# Patient Record
Sex: FEMALE | Race: WHITE | NOT HISPANIC OR LATINO | Employment: FULL TIME | URBAN - METROPOLITAN AREA
[De-identification: names, ages, dates, MRNs, and addresses within clinical notes are randomized per-mention and may not be internally consistent; named-entity substitution may affect disease eponyms.]

---

## 2017-07-12 ENCOUNTER — ALLSCRIPTS OFFICE VISIT (OUTPATIENT)
Dept: OTHER | Facility: OTHER | Age: 47
End: 2017-07-12

## 2017-10-14 ENCOUNTER — APPOINTMENT (OUTPATIENT)
Dept: LAB | Facility: OTHER | Age: 47
End: 2017-10-14
Payer: COMMERCIAL

## 2017-10-14 ENCOUNTER — TRANSCRIBE ORDERS (OUTPATIENT)
Dept: LAB | Facility: OTHER | Age: 47
End: 2017-10-14

## 2017-10-14 DIAGNOSIS — E78.00 PURE HYPERCHOLESTEROLEMIA: ICD-10-CM

## 2017-10-14 DIAGNOSIS — M79.672 LEFT FOOT PAIN: ICD-10-CM

## 2017-10-14 DIAGNOSIS — K21.00 GASTROESOPHAGEAL REFLUX DISEASE WITH ESOPHAGITIS: Primary | ICD-10-CM

## 2017-10-14 DIAGNOSIS — I10 ESSENTIAL HYPERTENSION, MALIGNANT: ICD-10-CM

## 2017-10-14 DIAGNOSIS — M79.671 RIGHT FOOT PAIN: ICD-10-CM

## 2017-10-14 LAB
ALBUMIN SERPL BCP-MCNC: 3.7 G/DL (ref 3.5–5)
ALP SERPL-CCNC: 55 U/L (ref 46–116)
ALT SERPL W P-5'-P-CCNC: 19 U/L (ref 12–78)
ANION GAP SERPL CALCULATED.3IONS-SCNC: 6 MMOL/L (ref 4–13)
AST SERPL W P-5'-P-CCNC: 11 U/L (ref 5–45)
BASOPHILS # BLD AUTO: 0.02 THOUSANDS/ΜL (ref 0–0.1)
BASOPHILS NFR BLD AUTO: 0 % (ref 0–1)
BILIRUB SERPL-MCNC: 0.42 MG/DL (ref 0.2–1)
BUN SERPL-MCNC: 15 MG/DL (ref 5–25)
CALCIUM SERPL-MCNC: 8.7 MG/DL (ref 8.3–10.1)
CHLORIDE SERPL-SCNC: 104 MMOL/L (ref 100–108)
CHOLEST SERPL-MCNC: 221 MG/DL (ref 50–200)
CO2 SERPL-SCNC: 26 MMOL/L (ref 21–32)
CREAT SERPL-MCNC: 0.87 MG/DL (ref 0.6–1.3)
CRP SERPL QL: 3.5 MG/L
EOSINOPHIL # BLD AUTO: 0.25 THOUSAND/ΜL (ref 0–0.61)
EOSINOPHIL NFR BLD AUTO: 2 % (ref 0–6)
ERYTHROCYTE [DISTWIDTH] IN BLOOD BY AUTOMATED COUNT: 13.4 % (ref 11.6–15.1)
ERYTHROCYTE [SEDIMENTATION RATE] IN BLOOD: 16 MM/HOUR (ref 0–20)
GFR SERPL CREATININE-BSD FRML MDRD: 80 ML/MIN/1.73SQ M
GLUCOSE P FAST SERPL-MCNC: 79 MG/DL (ref 65–99)
HCT VFR BLD AUTO: 33.6 % (ref 34.8–46.1)
HDLC SERPL-MCNC: 55 MG/DL (ref 40–60)
HGB BLD-MCNC: 11.9 G/DL (ref 11.5–15.4)
LDLC SERPL CALC-MCNC: 122 MG/DL (ref 0–100)
LYMPHOCYTES # BLD AUTO: 2.71 THOUSANDS/ΜL (ref 0.6–4.47)
LYMPHOCYTES NFR BLD AUTO: 25 % (ref 14–44)
MCH RBC QN AUTO: 30.7 PG (ref 26.8–34.3)
MCHC RBC AUTO-ENTMCNC: 35.4 G/DL (ref 31.4–37.4)
MCV RBC AUTO: 87 FL (ref 82–98)
MONOCYTES # BLD AUTO: 0.67 THOUSAND/ΜL (ref 0.17–1.22)
MONOCYTES NFR BLD AUTO: 6 % (ref 4–12)
NEUTROPHILS # BLD AUTO: 7.22 THOUSANDS/ΜL (ref 1.85–7.62)
NEUTS SEG NFR BLD AUTO: 67 % (ref 43–75)
NRBC BLD AUTO-RTO: 0 /100 WBCS
PLATELET # BLD AUTO: 283 THOUSANDS/UL (ref 149–390)
PMV BLD AUTO: 11.1 FL (ref 8.9–12.7)
POTASSIUM SERPL-SCNC: 4.5 MMOL/L (ref 3.5–5.3)
PROT SERPL-MCNC: 7.1 G/DL (ref 6.4–8.2)
RBC # BLD AUTO: 3.88 MILLION/UL (ref 3.81–5.12)
SODIUM SERPL-SCNC: 136 MMOL/L (ref 136–145)
TRIGL SERPL-MCNC: 220 MG/DL
TSH SERPL DL<=0.05 MIU/L-ACNC: 2.01 UIU/ML (ref 0.36–3.74)
URATE SERPL-MCNC: 5.7 MG/DL (ref 2–6.8)
WBC # BLD AUTO: 10.91 THOUSAND/UL (ref 4.31–10.16)

## 2017-10-14 PROCEDURE — 80061 LIPID PANEL: CPT

## 2017-10-14 PROCEDURE — 84443 ASSAY THYROID STIM HORMONE: CPT

## 2017-10-14 PROCEDURE — 85652 RBC SED RATE AUTOMATED: CPT

## 2017-10-14 PROCEDURE — 36415 COLL VENOUS BLD VENIPUNCTURE: CPT

## 2017-10-14 PROCEDURE — 86140 C-REACTIVE PROTEIN: CPT

## 2017-10-14 PROCEDURE — 85025 COMPLETE CBC W/AUTO DIFF WBC: CPT

## 2017-10-14 PROCEDURE — 84550 ASSAY OF BLOOD/URIC ACID: CPT

## 2017-10-14 PROCEDURE — 80053 COMPREHEN METABOLIC PANEL: CPT

## 2018-01-16 NOTE — PROCEDURES
Results/Data    Procedure: Electromyogram and Nerve Conduction Study  Indication:  Referred by Dr Mike  The procedure's risks, benefits and bleeding risk were discussed with the patient  Written consent was obtained prior to the procedure and is detailed in the patient's record  Technique: A sterile concentric needle electrode was used  The patient tolerated the procedure well  There were no complications  Results : The right peroneal and the bilateral tibial compound motor action potentials were within normal limits  The left peroneal motor terminal latency was within normal limits with a normal compound motor action potential amplitude at the ankle but a low compound motor action potential amplitude at the fibula head secondary to intolerability to pain  The left peroneal compound motor amplitude was within normal limits above the fibula head with a normal conduction velocity across the fibula head  The bilateral sural sensory action potentials were within normal limits  Lateral H soleus responses were within normal limits  Concentric needle examination was performed on various proximal and distal muscles bilaterally including gluteus medius, vastus lateralis, tibialis anterior, medial gastrocnemius, EDB, L4-5 and L5-S1 paraspinal myotomes  There was no evidence of active denervation in any of the muscles tested  The compound motor unit action potentials were of normal configuration with interference patterns being full or full for effort in the remaining muscles tested  Interpretation : This is a normal study  There is no evidence of a tarsal tunnel syndrome bilaterally        Signatures   Electronically signed by : Heydi Ortiz MD; Jul 12 2017  1:29PM EST                       (Author)    Electronically signed by : Heydi Ortiz MD; Aug 28 2017 10:53AM EST                       (Author)

## 2018-10-19 ENCOUNTER — TRANSCRIBE ORDERS (OUTPATIENT)
Dept: ADMINISTRATIVE | Facility: HOSPITAL | Age: 48
End: 2018-10-19

## 2018-10-19 DIAGNOSIS — Z12.31 VISIT FOR SCREENING MAMMOGRAM: Primary | ICD-10-CM

## 2018-10-26 ENCOUNTER — OFFICE VISIT (OUTPATIENT)
Dept: URGENT CARE | Facility: CLINIC | Age: 48
End: 2018-10-26
Payer: COMMERCIAL

## 2018-10-26 ENCOUNTER — HOSPITAL ENCOUNTER (OUTPATIENT)
Dept: MAMMOGRAPHY | Facility: HOSPITAL | Age: 48
Discharge: HOME | End: 2018-10-26
Attending: SPECIALIST
Payer: COMMERCIAL

## 2018-10-26 VITALS
HEIGHT: 67 IN | TEMPERATURE: 98.5 F | DIASTOLIC BLOOD PRESSURE: 90 MMHG | HEART RATE: 93 BPM | BODY MASS INDEX: 37.1 KG/M2 | RESPIRATION RATE: 18 BRPM | WEIGHT: 236.4 LBS | SYSTOLIC BLOOD PRESSURE: 146 MMHG | OXYGEN SATURATION: 98 %

## 2018-10-26 DIAGNOSIS — J06.9 UPPER RESPIRATORY TRACT INFECTION, UNSPECIFIED TYPE: Primary | ICD-10-CM

## 2018-10-26 DIAGNOSIS — Z12.31 VISIT FOR SCREENING MAMMOGRAM: ICD-10-CM

## 2018-10-26 PROCEDURE — 77067 SCR MAMMO BI INCL CAD: CPT

## 2018-10-26 PROCEDURE — S9088 SERVICES PROVIDED IN URGENT: HCPCS | Performed by: PHYSICIAN ASSISTANT

## 2018-10-26 PROCEDURE — 99203 OFFICE O/P NEW LOW 30 MIN: CPT | Performed by: PHYSICIAN ASSISTANT

## 2018-10-26 PROCEDURE — 77063 BREAST TOMOSYNTHESIS BI: CPT

## 2018-10-26 RX ORDER — OMEPRAZOLE 20 MG/1
20 CAPSULE, DELAYED RELEASE ORAL DAILY
COMMUNITY
End: 2019-02-15 | Stop reason: SDUPTHER

## 2018-10-26 RX ORDER — COLESTIPOL HYDROCHLORIDE 5 G/5G
1 GRANULE, FOR SUSPENSION ORAL DAILY
COMMUNITY
End: 2022-06-10 | Stop reason: ALTCHOICE

## 2018-10-26 RX ORDER — CHLORAL HYDRATE 500 MG
1000 CAPSULE ORAL DAILY
COMMUNITY

## 2018-10-26 RX ORDER — DIPHENOXYLATE HYDROCHLORIDE AND ATROPINE SULFATE 2.5; .025 MG/1; MG/1
1 TABLET ORAL DAILY
COMMUNITY

## 2018-10-26 RX ORDER — LISINOPRIL 10 MG/1
10 TABLET ORAL DAILY
COMMUNITY
End: 2019-02-15 | Stop reason: SDUPTHER

## 2018-10-26 RX ORDER — GABAPENTIN 400 MG/1
400 CAPSULE ORAL 3 TIMES DAILY
COMMUNITY
End: 2019-01-29 | Stop reason: SDUPTHER

## 2018-10-26 RX ORDER — LORATADINE 10 MG/1
10 TABLET ORAL DAILY
COMMUNITY
End: 2019-02-15 | Stop reason: SDUPTHER

## 2018-10-26 RX ORDER — MULTIVIT WITH MINERALS/LUTEIN
1000 TABLET ORAL DAILY
COMMUNITY

## 2018-10-26 NOTE — PATIENT INSTRUCTIONS
1  Upper respiratory infection  -Use flonase nasal spray  -Try Coricidin OTC  -Increase fluids  -Tylenol/motrin  -Salt H20 gargles/throat lozenges  -Saline nasal spray  -Try using humidifier at nighttime    -Follow-up with PCP within 5 days  -Go to ER with worsening symptoms, difficulty breathing, high fever, or any signs of distress    Upper Respiratory Infection   AMBULATORY CARE:   An upper respiratory infection  is also called a common cold  It can affect your nose, throat, ears, and sinuses  Common signs and symptoms include the following:  Cold symptoms are usually worst for the first 3 to 5 days  You may have any of the following:  · Runny or stuffy nose    · Sneezing and coughing    · Sore throat or hoarseness    · Red, watery, and sore eyes    · Fatigue     · Chills and fever    · Headache, body aches, or sore muscles  Seek care immediately if:   · You have chest pain or trouble breathing  Contact your healthcare provider if:   · You have a fever over 102ºF (39°C)  · Your sore throat gets worse or you see white or yellow spots in your throat  · Your symptoms get worse after 3 to 5 days or your cold is not better in 14 days  · You have a rash anywhere on your skin  · You have large, tender lumps in your neck  · You have thick, green or yellow drainage from your nose  · You cough up thick yellow, green, or bloody mucus  · You have vomiting for more than 24 hours and cannot keep fluids down  · You have a bad earache  · You have questions or concerns about your condition or care  Treatment for a cold: There is no cure for the common cold  Colds are caused by viruses and do not get better with antibiotics  Most people get better in 7 to 14 days  You may continue to cough for 2 to 3 weeks  The following may help decrease your symptoms:  · Decongestants  help reduce nasal congestion and help you breathe more easily   If you take decongestant pills, they may make you feel restless or not able to sleep  Do not use decongestant sprays for more than a few days  · Cough suppressants  help reduce coughing  Ask your healthcare provider which type of cough medicine is best for you  · NSAIDs , such as ibuprofen, help decrease swelling, pain, and fever  NSAIDs can cause stomach bleeding or kidney problems in certain people  If you take blood thinner medicine, always ask your healthcare provider if NSAIDs are safe for you  Always read the medicine label and follow directions  · Acetaminophen  decreases pain and fever  It is available without a doctor's order  Ask how much to take and how often to take it  Follow directions  Read the labels of all other medicines you are using to see if they also contain acetaminophen, or ask your doctor or pharmacist  Acetaminophen can cause liver damage if not taken correctly  Do not use more than 4 grams (4,000 milligrams) total of acetaminophen in one day  Manage your cold:   · Rest as much as possible  Slowly start to do more each day  · Drink more liquids as directed  Liquids will help thin and loosen mucus so you can cough it up  Liquids will also help prevent dehydration  Liquids that help prevent dehydration include water, fruit juice, and broth  Do not drink liquids that contain caffeine  Caffeine can increase your risk for dehydration  Ask your healthcare provider how much liquid to drink each day  · Soothe a sore throat  Gargle with warm salt water  This helps your sore throat feel better  Make salt water by dissolving ¼ teaspoon salt in 1 cup warm water  You may also suck on hard candy or throat lozenges  You may use a sore throat spray  · Use a humidifier or vaporizer  Use a cool mist humidifier or a vaporizer to increase air moisture in your home  This may make it easier for you to breathe and help decrease your cough  · Use saline nasal drops as directed  These help relieve congestion       · Apply petroleum-based jelly around the outside of your nostrils  This can decrease irritation from blowing your nose  · Do not smoke  Nicotine and other chemicals in cigarettes and cigars can make your symptoms worse  They can also cause infections such as bronchitis or pneumonia  Ask your healthcare provider for information if you currently smoke and need help to quit  E-cigarettes or smokeless tobacco still contain nicotine  Talk to your healthcare provider before you use these products  Prevent spreading your cold to others:   · Try to stay away from other people during the first 2 to 3 days of your cold when it is more easily spread  · Do not share food or drinks  · Do not share hand towels with household members  · Wash your hands often, especially after you blow your nose  Turn away from other people and cover your mouth and nose with a tissue when you sneeze or cough  Follow up with your healthcare provider as directed:  Write down your questions so you remember to ask them during your visits  © 2017 [employer] Information is for End User's use only and may not be sold, redistributed or otherwise used for commercial purposes  All illustrations and images included in CareNotes® are the copyrighted property of [employer]  or [employer]  The above information is an  only  It is not intended as medical advice for individual conditions or treatments  Talk to your doctor, nurse or pharmacist before following any medical regimen to see if it is safe and effective for you

## 2018-10-26 NOTE — PROGRESS NOTES
[facility]        NAME: Pito Mobley is a 50 y o  female  : 1970    MRN: [id number]  DATE: 2018  TIME: 5:52 PM    Assessment and Plan   Upper respiratory tract infection, unspecified type [J06 9]  1  Upper respiratory tract infection, unspecified type           Patient Instructions     1  Upper respiratory infection  -Use flonase nasal spray  -Try Coricidin OTC  -Increase fluids  -Tylenol/motrin  -Salt H20 gargles/throat lozenges  -Saline nasal spray  -Try using humidifier at nighttime    -Follow-up with PCP within 5 days  -Go to ER with worsening symptoms, difficulty breathing, high fever, or any signs of distress  Chief Complaint     Chief Complaint   Patient presents with    Cough    Cold Like Symptoms     started yesterday, reports coughing and sneezing a lot with an itchy throat         History of Present Illness       Patient is a 49-year-old female with a medical history of hypertension who presents today for evaluation of cold symptoms that started yesterday  Patient admits to having a slight cough  She also admits to having increased sneezing and itchy throat  Patient states that she has not tried and any medications at home  Review of Systems   Review of Systems   Constitutional: Negative for chills and fever  HENT: Positive for sneezing  Negative for ear pain, rhinorrhea and sore throat  Respiratory: Positive for cough  Negative for shortness of breath  Cardiovascular: Negative for chest pain  Musculoskeletal: Negative for arthralgias  Neurological: Negative for headaches           Current Medications       Current Outpatient Prescriptions:     Ascorbic Acid (VITAMIN C) 1000 MG tablet, Take 1,000 mg by mouth daily, Disp: , Rfl:     colestipol (COLESTID) 5 g granules, Take 1 g by mouth daily, Disp: , Rfl:     etanercept (ENBREL) 50 mg/mL SOSY, Inject under the skin once a week, Disp: , Rfl:     gabapentin (NEURONTIN) 400 mg capsule, Take 400 mg by mouth 3 (three) times a day, Disp: , Rfl:     lisinopril (ZESTRIL) 10 mg tablet, Take 10 mg by mouth daily, Disp: , Rfl:     loratadine (CLARITIN) 10 mg tablet, Take 10 mg by mouth daily, Disp: , Rfl:     multivitamin (THERAGRAN) TABS, Take 1 tablet by mouth daily, Disp: , Rfl:     Omega-3 Fatty Acids (FISH OIL) 1,000 mg, Take 1,000 mg by mouth daily, Disp: , Rfl:     omeprazole (PriLOSEC) 20 mg delayed release capsule, Take 20 mg by mouth daily, Disp: , Rfl:     Probiotic Product (PROBIOTIC-10 PO), Take 1 Dose by mouth daily, Disp: , Rfl:     Current Allergies     Allergies as of 10/26/2018 - Reviewed 10/26/2018   Allergen Reaction Noted    Methotrexate Lip Swelling 10/26/2018    Keflex [cephalexin] GI Intolerance 10/26/2018            The following portions of the patient's history were reviewed and updated as appropriate: allergies, current medications, past family history, past medical history, past social history, past surgical history and problem list      Past Medical History:   Diagnosis Date    GERD (gastroesophageal reflux disease)     Hypertension     Neuropathy        Past Surgical History:   Procedure Laterality Date    APPENDECTOMY      CHOLECYSTECTOMY      FOOT POSTERIOR RELEASE Left     around ankle    TUBAL LIGATION         Family History   Problem Relation Age of Onset    Hypertension Mother     Cancer Mother     Alzheimer's disease Mother     Parkinsonism Mother     Diabetes Father     Hypertension Father     Cancer Father          Medications have been verified  Objective   /90   Pulse 93   Temp 98 5 °F (36 9 °C) (Temporal)   Resp 18   Ht 5' 7" (1 702 m)   Wt 107 kg (236 lb 6 4 oz)   SpO2 98%   BMI 37 03 kg/m²        Physical Exam     Physical Exam   Constitutional: She is oriented to person, place, and time  She appears well-developed and well-nourished  No distress     HENT:   Right Ear: Tympanic membrane and external ear normal    Left Ear: Tympanic membrane and external ear normal    Nose: Nose normal    Mouth/Throat: Uvula is midline, oropharynx is clear and moist and mucous membranes are normal    Eyes: Pupils are equal, round, and reactive to light  Conjunctivae and EOM are normal    Neck: Normal range of motion  Neck supple  Cardiovascular: Normal rate, regular rhythm and normal heart sounds  Pulmonary/Chest: Effort normal and breath sounds normal  She has no wheezes  She has no rales  Lymphadenopathy:     She has no cervical adenopathy  Neurological: She is alert and oriented to person, place, and time  Skin: Skin is warm and dry  Psychiatric: She has a normal mood and affect  Nursing note and vitals reviewed

## 2018-12-19 ENCOUNTER — OFFICE VISIT (OUTPATIENT)
Dept: URGENT CARE | Facility: CLINIC | Age: 48
End: 2018-12-19
Payer: COMMERCIAL

## 2018-12-19 ENCOUNTER — APPOINTMENT (OUTPATIENT)
Dept: RADIOLOGY | Facility: CLINIC | Age: 48
End: 2018-12-19
Payer: COMMERCIAL

## 2018-12-19 VITALS
WEIGHT: 230 LBS | HEART RATE: 86 BPM | BODY MASS INDEX: 36.96 KG/M2 | SYSTOLIC BLOOD PRESSURE: 132 MMHG | TEMPERATURE: 97.4 F | OXYGEN SATURATION: 99 % | HEIGHT: 66 IN | DIASTOLIC BLOOD PRESSURE: 80 MMHG | RESPIRATION RATE: 18 BRPM

## 2018-12-19 DIAGNOSIS — M25.561 ACUTE PAIN OF RIGHT KNEE: ICD-10-CM

## 2018-12-19 DIAGNOSIS — M25.561 ACUTE PAIN OF RIGHT KNEE: Primary | ICD-10-CM

## 2018-12-19 PROCEDURE — 73562 X-RAY EXAM OF KNEE 3: CPT

## 2018-12-19 PROCEDURE — S9088 SERVICES PROVIDED IN URGENT: HCPCS | Performed by: PHYSICIAN ASSISTANT

## 2018-12-19 PROCEDURE — 99213 OFFICE O/P EST LOW 20 MIN: CPT | Performed by: PHYSICIAN ASSISTANT

## 2018-12-19 RX ORDER — NAPROXEN 500 MG/1
500 TABLET ORAL 2 TIMES DAILY WITH MEALS
Qty: 30 TABLET | Refills: 0 | Status: ON HOLD | OUTPATIENT
Start: 2018-12-19 | End: 2020-01-03 | Stop reason: CLARIF

## 2018-12-20 NOTE — PATIENT INSTRUCTIONS
X-ray shows no arthritis or dislocation  No fracture  Will start naproxen twice a day  She can follow up with Orthopedics  May benefit from physical therapy  Follow up with PCP in 3-5 days  Proceed to  ER if symptoms worsen

## 2018-12-20 NOTE — PROGRESS NOTES
[facility]        NAME: Jeffery Sweet is a 50 y o  female  : 1970    MRN: [id number]  DATE: 2018  TIME: 7:44 PM    Assessment and Plan   Acute pain of right knee [M25 561]  1  Acute pain of right knee  XR knee 3 vw right non injury    naproxen (EC NAPROSYN) 500 MG EC tablet    Ambulatory referral to Orthopedic Surgery         Patient Instructions     X-ray shows no arthritis or dislocation  No fracture  Will start naproxen twice a day  She can follow up with Orthopedics  May benefit from physical therapy  Follow up with PCP in 3-5 days  Proceed to  ER if symptoms worsen  Chief Complaint     Chief Complaint   Patient presents with    Knee Pain     twisted R knee last night  hx arthritis to BL knees  recently had cortisone injections to knees which helped the pain  took ibuprofen prn today  History of Present Illness       51-year-old female with a history of rheumatoid arthritis on Enbrel complains of right knee pain since yesterday  She was sitting down and twisted  She felt a sharp pain in the pool in the knee  It is very stiff and sore today  She took ibuprofen which helped minimally  No fever or chills  No numbness or tingling in the leg  No history of surgery that knee    She had a steroid injection last week from her rheumatologist        Review of Systems   Review of Systems      Current Medications       Current Outpatient Prescriptions:     Ascorbic Acid (VITAMIN C) 1000 MG tablet, Take 1,000 mg by mouth daily, Disp: , Rfl:     colestipol (COLESTID) 5 g granules, Take 1 g by mouth daily, Disp: , Rfl:     etanercept (ENBREL) 50 mg/mL SOSY, Inject under the skin once a week, Disp: , Rfl:     gabapentin (NEURONTIN) 400 mg capsule, Take 400 mg by mouth 3 (three) times a day, Disp: , Rfl:     lisinopril (ZESTRIL) 10 mg tablet, Take 10 mg by mouth daily, Disp: , Rfl:     loratadine (CLARITIN) 10 mg tablet, Take 10 mg by mouth daily, Disp: , Rfl:    multivitamin (THERAGRAN) TABS, Take 1 tablet by mouth daily, Disp: , Rfl:     Omega-3 Fatty Acids (FISH OIL) 1,000 mg, Take 1,000 mg by mouth daily, Disp: , Rfl:     omeprazole (PriLOSEC) 20 mg delayed release capsule, Take 20 mg by mouth daily, Disp: , Rfl:     Probiotic Product (PROBIOTIC-10 PO), Take 1 Dose by mouth daily, Disp: , Rfl:     naproxen (EC NAPROSYN) 500 MG EC tablet, Take 1 tablet (500 mg total) by mouth 2 (two) times a day with meals, Disp: 30 tablet, Rfl: 0    Current Allergies     Allergies as of 12/19/2018 - Reviewed 12/19/2018   Allergen Reaction Noted    Methotrexate Lip Swelling 10/26/2018    Keflex [cephalexin] GI Intolerance 10/26/2018            The following portions of the patient's history were reviewed and updated as appropriate: allergies, current medications, past family history, past medical history, past social history, past surgical history and problem list      Past Medical History:   Diagnosis Date    GERD (gastroesophageal reflux disease)     Hypertension     Neuropathy        Past Surgical History:   Procedure Laterality Date    APPENDECTOMY      CHOLECYSTECTOMY      FOOT POSTERIOR RELEASE Left     around ankle    TUBAL LIGATION         Family History   Problem Relation Age of Onset    Hypertension Mother     Cancer Mother     Alzheimer's disease Mother     Parkinsonism Mother     Diabetes Father     Hypertension Father     Cancer Father          Medications have been verified  Objective   /80 (BP Location: Left arm, Patient Position: Sitting)   Pulse 86   Temp (!) 97 4 °F (36 3 °C) (Tympanic)   Resp 18   Ht 5' 6" (1 676 m)   Wt 104 kg (230 lb)   LMP 12/15/2018   SpO2 99%   BMI 37 12 kg/m²        Physical Exam     Physical Exam   Constitutional: She appears well-developed and well-nourished  Musculoskeletal:   Right knee no effusion  Tender over the medial and lateral joint lines  She has some tenderness along the patellar tendon  She has full active extension and flexion to 90  She has painful flexion past 90  No laxity with varus or valgus  She is very guarded  Negative drawers but again she is guarded  I am unable to elicit a pop or pain with Felice's

## 2019-01-29 DIAGNOSIS — G62.9 PERIPHERAL POLYNEUROPATHY: Primary | ICD-10-CM

## 2019-01-29 RX ORDER — GABAPENTIN 400 MG/1
CAPSULE ORAL
Qty: 90 CAPSULE | Refills: 1 | Status: SHIPPED | OUTPATIENT
Start: 2019-01-29 | End: 2019-03-05 | Stop reason: SDUPTHER

## 2019-02-14 ENCOUNTER — OFFICE VISIT (OUTPATIENT)
Dept: URGENT CARE | Facility: CLINIC | Age: 49
End: 2019-02-14
Payer: COMMERCIAL

## 2019-02-14 VITALS
HEART RATE: 88 BPM | WEIGHT: 230 LBS | SYSTOLIC BLOOD PRESSURE: 122 MMHG | DIASTOLIC BLOOD PRESSURE: 68 MMHG | OXYGEN SATURATION: 99 % | RESPIRATION RATE: 20 BRPM | BODY MASS INDEX: 36.1 KG/M2 | TEMPERATURE: 98 F | HEIGHT: 67 IN

## 2019-02-14 DIAGNOSIS — J02.9 SORE THROAT: Primary | ICD-10-CM

## 2019-02-14 DIAGNOSIS — J06.9 VIRAL UPPER RESPIRATORY TRACT INFECTION: ICD-10-CM

## 2019-02-14 LAB — S PYO AG THROAT QL: NEGATIVE

## 2019-02-14 PROCEDURE — 87430 STREP A AG IA: CPT | Performed by: FAMILY MEDICINE

## 2019-02-14 PROCEDURE — 99203 OFFICE O/P NEW LOW 30 MIN: CPT | Performed by: FAMILY MEDICINE

## 2019-02-15 DIAGNOSIS — G62.9 PERIPHERAL POLYNEUROPATHY: Primary | ICD-10-CM

## 2019-02-15 DIAGNOSIS — I10 ESSENTIAL HYPERTENSION: ICD-10-CM

## 2019-02-15 RX ORDER — OMEPRAZOLE 20 MG/1
CAPSULE, DELAYED RELEASE ORAL
Qty: 30 CAPSULE | Refills: 4 | Status: SHIPPED | OUTPATIENT
Start: 2019-02-15 | End: 2019-03-05 | Stop reason: SDUPTHER

## 2019-02-15 RX ORDER — LORATADINE 10 MG/1
TABLET ORAL
Qty: 30 TABLET | Refills: 4 | Status: SHIPPED | OUTPATIENT
Start: 2019-02-15 | End: 2019-03-05 | Stop reason: SDUPTHER

## 2019-02-15 RX ORDER — LISINOPRIL 10 MG/1
TABLET ORAL
Qty: 30 TABLET | Refills: 4 | Status: SHIPPED | OUTPATIENT
Start: 2019-02-15 | End: 2019-03-05 | Stop reason: SDUPTHER

## 2019-02-15 NOTE — PROGRESS NOTES
[facility]        NAME: Milka Chavez is a 52 y o  female  : 1970    MRN: [id number]  DATE: 2019  TIME: 7:49 PM    Assessment and Plan   Sore throat [J02 9]  1  Sore throat  POCT rapid strepA   2  Viral upper respiratory tract infection           Patient Instructions     Plain Mucinex, plain Robitussin, or plain guaifenesin for congestion  Ibuprofen for fever and pain  Follow up with PCP in 3-5 days  Proceed to  ER if symptoms worsen  Chief Complaint     Chief Complaint   Patient presents with    Cough     cough and congestion for 1 day    Sore Throat     for 1 day         History of Present Illness       Cough (cough and congestion for 1 day)  Sore Throat (for 1 day)          Review of Systems   Review of Systems   Constitutional: Negative  HENT: Positive for congestion and sore throat  Respiratory: Negative  Cardiovascular: Negative            Current Medications       Current Outpatient Medications:     Ascorbic Acid (VITAMIN C) 1000 MG tablet, Take 1,000 mg by mouth daily, Disp: , Rfl:     colestipol (COLESTID) 5 g granules, Take 1 g by mouth daily, Disp: , Rfl:     etanercept (ENBREL) 50 mg/mL SOSY, Inject under the skin once a week, Disp: , Rfl:     gabapentin (NEURONTIN) 400 mg capsule, TAKE ONE CAPSULE BY MOUTH THREE TIMES DAILY , Disp: 90 capsule, Rfl: 1    lisinopril (ZESTRIL) 10 mg tablet, Take 10 mg by mouth daily, Disp: , Rfl:     loratadine (CLARITIN) 10 mg tablet, Take 10 mg by mouth daily, Disp: , Rfl:     multivitamin (THERAGRAN) TABS, Take 1 tablet by mouth daily, Disp: , Rfl:     Omega-3 Fatty Acids (FISH OIL) 1,000 mg, Take 1,000 mg by mouth daily, Disp: , Rfl:     omeprazole (PriLOSEC) 20 mg delayed release capsule, Take 20 mg by mouth daily, Disp: , Rfl:     Probiotic Product (PROBIOTIC-10 PO), Take 1 Dose by mouth daily, Disp: , Rfl:     naproxen (EC NAPROSYN) 500 MG EC tablet, Take 1 tablet (500 mg total) by mouth 2 (two) times a day with meals (Patient not taking: Reported on 2/14/2019), Disp: 30 tablet, Rfl: 0    Current Allergies     Allergies as of 02/14/2019 - Reviewed 02/14/2019   Allergen Reaction Noted    Methotrexate Lip Swelling 10/26/2018    Keflex [cephalexin] GI Intolerance 10/26/2018            The following portions of the patient's history were reviewed and updated as appropriate: allergies, current medications, past family history, past medical history, past social history, past surgical history and problem list      Past Medical History:   Diagnosis Date    GERD (gastroesophageal reflux disease)     Hypertension     Neuropathy        Past Surgical History:   Procedure Laterality Date    APPENDECTOMY      CHOLECYSTECTOMY      FOOT POSTERIOR RELEASE Left     around ankle    TUBAL LIGATION         Family History   Problem Relation Age of Onset    Hypertension Mother     Cancer Mother     Alzheimer's disease Mother     Parkinsonism Mother     Diabetes Father     Hypertension Father     Cancer Father          Medications have been verified  Objective   /68   Pulse 88   Temp 98 °F (36 7 °C) (Tympanic)   Resp 20   Ht 5' 7" (1 702 m)   Wt 104 kg (230 lb)   SpO2 99%   BMI 36 02 kg/m²        Physical Exam     Physical Exam   Constitutional: She is oriented to person, place, and time  She appears well-developed and well-nourished  HENT:   Right Ear: External ear normal    Left Ear: External ear normal    Nose: Nose normal    Mouth/Throat: Oropharynx is clear and moist  No oropharyngeal exudate  Eyes: Conjunctivae are normal    Neck: Normal range of motion  Neck supple  Cardiovascular: Normal rate, regular rhythm and normal heart sounds  No murmur heard  Pulmonary/Chest: Effort normal and breath sounds normal  No respiratory distress  She has no wheezes  She has no rales  She exhibits no tenderness  Abdominal: Soft  Musculoskeletal: Normal range of motion     Lymphadenopathy:     She has no cervical adenopathy  Neurological: She is alert and oriented to person, place, and time  Skin: Skin is warm  No rash noted  No erythema

## 2019-02-15 NOTE — PATIENT INSTRUCTIONS
Plain Mucinex, plain Robitussin, or plain guaifenesin for congestion  Ibuprofen for fever and pain  Follow up with PCP in 3-5 days  Proceed to  ER if symptoms worsen  Upper Respiratory Infection   AMBULATORY CARE:   An upper respiratory infection  is also called a common cold  It can affect your nose, throat, ears, and sinuses  Common signs and symptoms include the following:  Cold symptoms are usually worst for the first 3 to 5 days  You may have any of the following:  · Runny or stuffy nose    · Sneezing and coughing    · Sore throat or hoarseness    · Red, watery, and sore eyes    · Fatigue     · Chills and fever    · Headache, body aches, or sore muscles  Seek care immediately if:   · You have chest pain or trouble breathing  Contact your healthcare provider if:   · You have a fever over 102ºF (39°C)  · Your sore throat gets worse or you see white or yellow spots in your throat  · Your symptoms get worse after 3 to 5 days or your cold is not better in 14 days  · You have a rash anywhere on your skin  · You have large, tender lumps in your neck  · You have thick, green or yellow drainage from your nose  · You cough up thick yellow, green, or bloody mucus  · You have vomiting for more than 24 hours and cannot keep fluids down  · You have a bad earache  · You have questions or concerns about your condition or care  Treatment for a cold: There is no cure for the common cold  Colds are caused by viruses and do not get better with antibiotics  Most people get better in 7 to 14 days  You may continue to cough for 2 to 3 weeks  The following may help decrease your symptoms:  · Decongestants  help reduce nasal congestion and help you breathe more easily  If you take decongestant pills, they may make you feel restless or not able to sleep  Do not use decongestant sprays for more than a few days  · Cough suppressants  help reduce coughing   Ask your healthcare provider which type of cough medicine is best for you  · NSAIDs , such as ibuprofen, help decrease swelling, pain, and fever  NSAIDs can cause stomach bleeding or kidney problems in certain people  If you take blood thinner medicine, always ask your healthcare provider if NSAIDs are safe for you  Always read the medicine label and follow directions  · Acetaminophen  decreases pain and fever  It is available without a doctor's order  Ask how much to take and how often to take it  Follow directions  Read the labels of all other medicines you are using to see if they also contain acetaminophen, or ask your doctor or pharmacist  Acetaminophen can cause liver damage if not taken correctly  Do not use more than 4 grams (4,000 milligrams) total of acetaminophen in one day  Manage your cold:   · Rest as much as possible  Slowly start to do more each day  · Drink more liquids as directed  Liquids will help thin and loosen mucus so you can cough it up  Liquids will also help prevent dehydration  Liquids that help prevent dehydration include water, fruit juice, and broth  Do not drink liquids that contain caffeine  Caffeine can increase your risk for dehydration  Ask your healthcare provider how much liquid to drink each day  · Soothe a sore throat  Gargle with warm salt water  This helps your sore throat feel better  Make salt water by dissolving ¼ teaspoon salt in 1 cup warm water  You may also suck on hard candy or throat lozenges  You may use a sore throat spray  · Use a humidifier or vaporizer  Use a cool mist humidifier or a vaporizer to increase air moisture in your home  This may make it easier for you to breathe and help decrease your cough  · Use saline nasal drops as directed  These help relieve congestion  · Apply petroleum-based jelly around the outside of your nostrils  This can decrease irritation from blowing your nose  · Do not smoke    Nicotine and other chemicals in cigarettes and cigars can make your symptoms worse  They can also cause infections such as bronchitis or pneumonia  Ask your healthcare provider for information if you currently smoke and need help to quit  E-cigarettes or smokeless tobacco still contain nicotine  Talk to your healthcare provider before you use these products  Prevent spreading your cold to others:   · Try to stay away from other people during the first 2 to 3 days of your cold when it is more easily spread  · Do not share food or drinks  · Do not share hand towels with household members  · Wash your hands often, especially after you blow your nose  Turn away from other people and cover your mouth and nose with a tissue when you sneeze or cough  Follow up with your healthcare provider as directed:  Write down your questions so you remember to ask them during your visits  © 2017 [employer] Information is for End User's use only and may not be sold, redistributed or otherwise used for commercial purposes  All illustrations and images included in CareNotes® are the copyrighted property of [employer]  or [employer]ena  The above information is an  only  It is not intended as medical advice for individual conditions or treatments  Talk to your doctor, nurse or pharmacist before following any medical regimen to see if it is safe and effective for you

## 2019-03-02 ENCOUNTER — TRANSCRIBE ORDERS (OUTPATIENT)
Dept: ADMINISTRATIVE | Facility: HOSPITAL | Age: 49
End: 2019-03-02

## 2019-03-02 ENCOUNTER — APPOINTMENT (OUTPATIENT)
Dept: LAB | Facility: CLINIC | Age: 49
End: 2019-03-02
Payer: COMMERCIAL

## 2019-03-02 DIAGNOSIS — L30.8 INTERFACE DERMATITIS: ICD-10-CM

## 2019-03-02 DIAGNOSIS — K52.9 INFLAMMATORY BOWEL DISEASE: Primary | ICD-10-CM

## 2019-03-02 DIAGNOSIS — K57.30 DIVERTICULOSIS OF LARGE INTESTINE WITHOUT DIVERTICULITIS: ICD-10-CM

## 2019-03-02 DIAGNOSIS — E78.5 HYPERLIPIDEMIA, UNSPECIFIED HYPERLIPIDEMIA TYPE: ICD-10-CM

## 2019-03-02 DIAGNOSIS — K52.9 INFLAMMATORY BOWEL DISEASE: ICD-10-CM

## 2019-03-02 LAB
ALBUMIN SERPL BCP-MCNC: 3.7 G/DL (ref 3.5–5)
ALP SERPL-CCNC: 64 U/L (ref 46–116)
ALT SERPL W P-5'-P-CCNC: 24 U/L (ref 12–78)
ANION GAP SERPL CALCULATED.3IONS-SCNC: 7 MMOL/L (ref 4–13)
AST SERPL W P-5'-P-CCNC: 14 U/L (ref 5–45)
BILIRUB SERPL-MCNC: 0.52 MG/DL (ref 0.2–1)
BUN SERPL-MCNC: 16 MG/DL (ref 5–25)
CALCIUM SERPL-MCNC: 8.8 MG/DL (ref 8.3–10.1)
CHLORIDE SERPL-SCNC: 102 MMOL/L (ref 100–108)
CHOLEST SERPL-MCNC: 256 MG/DL (ref 50–200)
CO2 SERPL-SCNC: 24 MMOL/L (ref 21–32)
CREAT SERPL-MCNC: 1.05 MG/DL (ref 0.6–1.3)
ERYTHROCYTE [DISTWIDTH] IN BLOOD BY AUTOMATED COUNT: 12.9 % (ref 11.6–15.1)
GFR SERPL CREATININE-BSD FRML MDRD: 63 ML/MIN/1.73SQ M
GLUCOSE P FAST SERPL-MCNC: 68 MG/DL (ref 65–99)
HCT VFR BLD AUTO: 36.5 % (ref 34.8–46.1)
HDLC SERPL-MCNC: 69 MG/DL (ref 40–60)
HGB BLD-MCNC: 11.6 G/DL (ref 11.5–15.4)
LDLC SERPL CALC-MCNC: 144 MG/DL (ref 0–100)
MCH RBC QN AUTO: 30.4 PG (ref 26.8–34.3)
MCHC RBC AUTO-ENTMCNC: 31.8 G/DL (ref 31.4–37.4)
MCV RBC AUTO: 96 FL (ref 82–98)
NONHDLC SERPL-MCNC: 187 MG/DL
PLATELET # BLD AUTO: 310 THOUSANDS/UL (ref 149–390)
PMV BLD AUTO: 10.6 FL (ref 8.9–12.7)
POTASSIUM SERPL-SCNC: 3.6 MMOL/L (ref 3.5–5.3)
PROT SERPL-MCNC: 7.6 G/DL (ref 6.4–8.2)
RBC # BLD AUTO: 3.82 MILLION/UL (ref 3.81–5.12)
SODIUM SERPL-SCNC: 133 MMOL/L (ref 136–145)
T4 SERPL-MCNC: 12.2 UG/DL (ref 4.7–13.3)
TRIGL SERPL-MCNC: 214 MG/DL
TSH SERPL DL<=0.05 MIU/L-ACNC: 1.94 UIU/ML (ref 0.36–3.74)
WBC # BLD AUTO: 10.44 THOUSAND/UL (ref 4.31–10.16)

## 2019-03-02 PROCEDURE — 84436 ASSAY OF TOTAL THYROXINE: CPT

## 2019-03-02 PROCEDURE — 85027 COMPLETE CBC AUTOMATED: CPT

## 2019-03-02 PROCEDURE — 80061 LIPID PANEL: CPT

## 2019-03-02 PROCEDURE — 80053 COMPREHEN METABOLIC PANEL: CPT

## 2019-03-02 PROCEDURE — 84443 ASSAY THYROID STIM HORMONE: CPT

## 2019-03-02 PROCEDURE — 36415 COLL VENOUS BLD VENIPUNCTURE: CPT

## 2019-03-05 ENCOUNTER — OFFICE VISIT (OUTPATIENT)
Dept: FAMILY MEDICINE CLINIC | Facility: CLINIC | Age: 49
End: 2019-03-05
Payer: COMMERCIAL

## 2019-03-05 VITALS
SYSTOLIC BLOOD PRESSURE: 128 MMHG | HEART RATE: 86 BPM | OXYGEN SATURATION: 98 % | WEIGHT: 231 LBS | HEIGHT: 67 IN | DIASTOLIC BLOOD PRESSURE: 72 MMHG | BODY MASS INDEX: 36.26 KG/M2

## 2019-03-05 DIAGNOSIS — I10 ESSENTIAL HYPERTENSION: ICD-10-CM

## 2019-03-05 DIAGNOSIS — K21.9 GERD WITHOUT ESOPHAGITIS: ICD-10-CM

## 2019-03-05 DIAGNOSIS — H81.10 BENIGN PAROXYSMAL POSITIONAL VERTIGO, UNSPECIFIED LATERALITY: Primary | ICD-10-CM

## 2019-03-05 DIAGNOSIS — E78.2 MIXED HYPERLIPIDEMIA: ICD-10-CM

## 2019-03-05 DIAGNOSIS — G62.9 PERIPHERAL POLYNEUROPATHY: ICD-10-CM

## 2019-03-05 PROCEDURE — 1036F TOBACCO NON-USER: CPT | Performed by: FAMILY MEDICINE

## 2019-03-05 PROCEDURE — 3074F SYST BP LT 130 MM HG: CPT | Performed by: FAMILY MEDICINE

## 2019-03-05 PROCEDURE — 3078F DIAST BP <80 MM HG: CPT | Performed by: FAMILY MEDICINE

## 2019-03-05 PROCEDURE — 3008F BODY MASS INDEX DOCD: CPT | Performed by: FAMILY MEDICINE

## 2019-03-05 PROCEDURE — 99214 OFFICE O/P EST MOD 30 MIN: CPT | Performed by: FAMILY MEDICINE

## 2019-03-05 RX ORDER — GABAPENTIN 400 MG/1
400 CAPSULE ORAL 3 TIMES DAILY
Qty: 90 CAPSULE | Refills: 1 | Status: SHIPPED | OUTPATIENT
Start: 2019-03-05 | End: 2019-05-25 | Stop reason: SDUPTHER

## 2019-03-05 RX ORDER — LISINOPRIL 10 MG/1
10 TABLET ORAL DAILY
Qty: 30 TABLET | Refills: 4 | Status: SHIPPED | OUTPATIENT
Start: 2019-03-05 | End: 2019-07-26 | Stop reason: SDUPTHER

## 2019-03-05 RX ORDER — OMEPRAZOLE 20 MG/1
20 CAPSULE, DELAYED RELEASE ORAL DAILY
Qty: 30 CAPSULE | Refills: 4 | Status: SHIPPED | OUTPATIENT
Start: 2019-03-05 | End: 2019-07-26 | Stop reason: SDUPTHER

## 2019-03-05 RX ORDER — LORATADINE 10 MG/1
10 TABLET ORAL DAILY
Qty: 30 TABLET | Refills: 4 | Status: SHIPPED | OUTPATIENT
Start: 2019-03-05 | End: 2019-07-26 | Stop reason: SDUPTHER

## 2019-03-05 NOTE — ASSESSMENT & PLAN NOTE
GERD symptoms controlled no dyspepsia blood in her stools or nausea continue on current omeprazole dosage no change at this time and follow up in 6 months

## 2019-03-05 NOTE — PATIENT INSTRUCTIONS

## 2019-03-05 NOTE — PROGRESS NOTES
Assessment/Plan:       Problem List Items Addressed This Visit        Digestive    GERD without esophagitis     GERD symptoms controlled no dyspepsia blood in her stools or nausea continue on current omeprazole dosage no change at this time and follow up in 6 months         Relevant Medications    omeprazole (PriLOSEC) 20 mg delayed release capsule    Other Relevant Orders    Comprehensive metabolic panel    Lipid panel    TSH, 3rd generation with Free T4 reflex    CBC and differential       Cardiovascular and Mediastinum    Essential hypertension     Essential hypertension controlled at this time on Zestril continue medication no change at this time blood pressure 128/72 follow-up in 6 months         Relevant Medications    omeprazole (PriLOSEC) 20 mg delayed release capsule    loratadine (CLARITIN) 10 mg tablet    lisinopril (ZESTRIL) 10 mg tablet    Other Relevant Orders    Comprehensive metabolic panel    Lipid panel    TSH, 3rd generation with Free T4 reflex    CBC and differential       Nervous and Auditory    Benign paroxysmal positional vertigo - Primary     Patient notes benign positional vertigo symptoms the room has been spinning when she gets up at times too quickly  It subsided somewhat when she sits still  She denies nausea or vomiting associated with this no headaches no fevers or chills or other infectious signal triggering this  She is here otherwise to review her medications follow-up on her blood pressure and her GERD symptoms    She notes that she has been feeling tired lately         Peripheral polyneuropathy     Peripheral polyneuropathy controlled with gabapentin renew medication at this time follow-up as directed in 6 months         Relevant Medications    gabapentin (NEURONTIN) 400 mg capsule       Other    Mixed hyperlipidemia     Patient has hyperlipidemia at this point she will watch her diet avoid beef now which she had been eating a lot of and now she stopping this and just going with mook and check-in I will repeat a lipid profile in 6 months  She continues on Colestid from Dr Jb Ballesteros for GI and bowel control this will         Relevant Orders    Comprehensive metabolic panel    Lipid panel            Subjective:      Patient ID: Milka Chavez is a 52 y o  female  Patient presents for general follow-up evaluation at time she gets up too quickly and finds that the room is spinning and resolved after minute when she sits still  She otherwise needs her blood pressure recheck medications reviewed and labs reviewed at this point after she had blood work done last week about 3-4 days ago  Her GERD symptoms have been under good control with the medication she wants to review this as well      The following portions of the patient's history were reviewed and updated as appropriate: allergies, current medications, past family history, past medical history, past social history, past surgical history and problem list     Review of Systems   Constitutional: Negative for chills, fatigue and fever  HENT: Negative for congestion, nosebleeds, rhinorrhea, sinus pressure and sore throat  Eyes: Negative for discharge and redness  Respiratory: Negative for cough and shortness of breath  Cardiovascular: Negative for chest pain, palpitations and leg swelling  Gastrointestinal: Negative for abdominal pain, blood in stool and nausea  Endocrine: Negative for cold intolerance, heat intolerance and polyuria  Genitourinary: Negative for dysuria and frequency  Musculoskeletal: Negative for arthralgias, back pain and myalgias  Skin: Negative for rash  Neurological: Positive for dizziness  Negative for weakness and headaches  Hematological: Negative for adenopathy  Psychiatric/Behavioral: Negative for behavioral problems and sleep disturbance  The patient is not nervous/anxious            Objective:      /72 (BP Location: Left arm, Patient Position: Sitting)   Pulse 86   Ht 5' 7" (1 702 m)   Wt 105 kg (231 lb)   SpO2 98%   BMI 36 18 kg/m²        Physical Exam   Constitutional: She is oriented to person, place, and time  She appears well-developed and well-nourished  No distress  HENT:   Head: Normocephalic and atraumatic  Right Ear: External ear normal    Left Ear: External ear normal    Nose: Nose normal    Mouth/Throat: Oropharynx is clear and moist  No oropharyngeal exudate  Eyes: Pupils are equal, round, and reactive to light  Conjunctivae and EOM are normal  Right eye exhibits no discharge  Left eye exhibits no discharge  No scleral icterus  Neck: Normal range of motion  No JVD present  No thyromegaly present  Cardiovascular: Normal rate, regular rhythm and normal heart sounds  No murmur heard  Pulmonary/Chest: Effort normal  She has no wheezes  She has no rales  She exhibits no tenderness  Abdominal: Soft  Bowel sounds are normal  She exhibits no distension and no mass  There is no tenderness  Musculoskeletal: Normal range of motion  She exhibits no edema, tenderness or deformity  Lymphadenopathy:     She has no cervical adenopathy  Neurological: She is alert and oriented to person, place, and time  She has normal reflexes  She displays normal reflexes  No cranial nerve deficit  Coordination normal    Skin: Skin is warm and dry  No rash noted  Psychiatric: She has a normal mood and affect  Her behavior is normal  Judgment and thought content normal    Nursing note and vitals reviewed  Data:    Laboratory Results: I have personally reviewed the pertinent laboratory results/reports   Radiology/Other Diagnostic Testing Results: I have personally reviewed pertinent reports         Lab Results   Component Value Date    WBC 10 44 (H) 03/02/2019    HGB 11 6 03/02/2019    HCT 36 5 03/02/2019    MCV 96 03/02/2019     03/02/2019     Lab Results   Component Value Date    K 3 6 03/02/2019     03/02/2019    CO2 24 03/02/2019    BUN 16 03/02/2019 CREATININE 1 05 03/02/2019    GLUF 68 03/02/2019    CALCIUM 8 8 03/02/2019    AST 14 03/02/2019    ALT 24 03/02/2019    ALKPHOS 64 03/02/2019    EGFR 63 03/02/2019     Lab Results   Component Value Date    CHOLESTEROL 256 (H) 03/02/2019    CHOLESTEROL 221 (H) 10/14/2017     Lab Results   Component Value Date    HDL 69 (H) 03/02/2019    HDL 55 10/14/2017     Lab Results   Component Value Date    LDLCALC 144 (H) 03/02/2019    LDLCALC 122 (H) 10/14/2017     Lab Results   Component Value Date    TRIG 214 (H) 03/02/2019    TRIG 220 (H) 10/14/2017     No results found for: [place], [place]  Lab Results   Component Value Date    [id number] 1 940 03/02/2019     No results found for: HGBA1C  No results found for: EPI Patel, DO

## 2019-03-05 NOTE — ASSESSMENT & PLAN NOTE
Peripheral polyneuropathy controlled with gabapentin renew medication at this time follow-up as directed in 6 months

## 2019-03-05 NOTE — ASSESSMENT & PLAN NOTE
Patient notes benign positional vertigo symptoms the room has been spinning when she gets up at times too quickly  It subsided somewhat when she sits still  She denies nausea or vomiting associated with this no headaches no fevers or chills or other infectious signal triggering this  She is here otherwise to review her medications follow-up on her blood pressure and her GERD symptoms    She notes that she has been feeling tired lately

## 2019-03-05 NOTE — ASSESSMENT & PLAN NOTE
Essential hypertension controlled at this time on Zestril continue medication no change at this time blood pressure 128/72 follow-up in 6 months

## 2019-03-05 NOTE — ASSESSMENT & PLAN NOTE
Patient has hyperlipidemia at this point she will watch her diet avoid beef now which she had been eating a lot of and now she stopping this and just going with salmon and check-in I will repeat a lipid profile in 6 months    She continues on Colestid from Dr Anitha Mejía for GI and bowel control this will

## 2019-05-25 DIAGNOSIS — G62.9 PERIPHERAL POLYNEUROPATHY: ICD-10-CM

## 2019-05-28 RX ORDER — GABAPENTIN 400 MG/1
CAPSULE ORAL
Qty: 90 CAPSULE | Refills: 0 | Status: SHIPPED | OUTPATIENT
Start: 2019-05-28 | End: 2019-06-24 | Stop reason: SDUPTHER

## 2019-06-24 DIAGNOSIS — G62.9 PERIPHERAL POLYNEUROPATHY: ICD-10-CM

## 2019-06-24 RX ORDER — GABAPENTIN 400 MG/1
CAPSULE ORAL
Qty: 90 CAPSULE | Refills: 0 | Status: SHIPPED | OUTPATIENT
Start: 2019-06-24 | End: 2019-07-23 | Stop reason: SDUPTHER

## 2019-07-05 DIAGNOSIS — L40.9 PSORIASIS (A TYPE OF SKIN INFLAMMATION): Primary | ICD-10-CM

## 2019-07-05 RX ORDER — BETAMETHASONE DIPROPIONATE 0.5 MG/G
CREAM TOPICAL 2 TIMES DAILY
Qty: 30 G | Refills: 0 | Status: SHIPPED | OUTPATIENT
Start: 2019-07-05 | End: 2019-07-26 | Stop reason: SDUPTHER

## 2019-07-23 DIAGNOSIS — G62.9 PERIPHERAL POLYNEUROPATHY: ICD-10-CM

## 2019-07-23 RX ORDER — GABAPENTIN 400 MG/1
CAPSULE ORAL
Qty: 90 CAPSULE | Refills: 0 | Status: SHIPPED | OUTPATIENT
Start: 2019-07-23 | End: 2019-07-26 | Stop reason: SDUPTHER

## 2019-07-26 DIAGNOSIS — I10 ESSENTIAL HYPERTENSION: ICD-10-CM

## 2019-07-26 DIAGNOSIS — L40.9 PSORIASIS (A TYPE OF SKIN INFLAMMATION): ICD-10-CM

## 2019-07-26 DIAGNOSIS — G62.9 PERIPHERAL POLYNEUROPATHY: ICD-10-CM

## 2019-07-29 RX ORDER — BETAMETHASONE DIPROPIONATE 0.5 MG/G
CREAM TOPICAL 2 TIMES DAILY
Qty: 30 G | Refills: 0 | Status: SHIPPED | OUTPATIENT
Start: 2019-07-29 | End: 2020-03-04

## 2019-07-29 RX ORDER — LORATADINE 10 MG/1
10 TABLET ORAL DAILY
Qty: 30 TABLET | Refills: 4 | Status: SHIPPED | OUTPATIENT
Start: 2019-07-29 | End: 2021-02-26 | Stop reason: ALTCHOICE

## 2019-07-29 RX ORDER — GABAPENTIN 400 MG/1
400 CAPSULE ORAL 3 TIMES DAILY
Qty: 90 CAPSULE | Refills: 0 | Status: SHIPPED | OUTPATIENT
Start: 2019-07-29 | End: 2019-09-05 | Stop reason: SDUPTHER

## 2019-07-29 RX ORDER — OMEPRAZOLE 20 MG/1
20 CAPSULE, DELAYED RELEASE ORAL DAILY
Qty: 30 CAPSULE | Refills: 4 | Status: SHIPPED | OUTPATIENT
Start: 2019-07-29 | End: 2019-07-30 | Stop reason: SDUPTHER

## 2019-07-29 RX ORDER — LISINOPRIL 10 MG/1
10 TABLET ORAL DAILY
Qty: 30 TABLET | Refills: 4 | Status: SHIPPED | OUTPATIENT
Start: 2019-07-29 | End: 2019-07-30 | Stop reason: SDUPTHER

## 2019-07-30 DIAGNOSIS — I10 ESSENTIAL HYPERTENSION: ICD-10-CM

## 2019-07-30 RX ORDER — OMEPRAZOLE 20 MG/1
20 CAPSULE, DELAYED RELEASE ORAL DAILY
Qty: 90 CAPSULE | Refills: 2 | Status: SHIPPED | OUTPATIENT
Start: 2019-07-30 | End: 2020-05-15 | Stop reason: SDUPTHER

## 2019-07-30 RX ORDER — LISINOPRIL 10 MG/1
10 TABLET ORAL DAILY
Qty: 90 TABLET | Refills: 2 | Status: SHIPPED | OUTPATIENT
Start: 2019-07-30 | End: 2020-05-15 | Stop reason: SDUPTHER

## 2019-09-05 DIAGNOSIS — G62.9 PERIPHERAL POLYNEUROPATHY: ICD-10-CM

## 2019-09-05 RX ORDER — GABAPENTIN 400 MG/1
CAPSULE ORAL
Qty: 90 CAPSULE | Refills: 12 | Status: SHIPPED | OUTPATIENT
Start: 2019-09-05 | End: 2019-09-12 | Stop reason: SDUPTHER

## 2019-09-11 ENCOUNTER — TELEPHONE (OUTPATIENT)
Dept: FAMILY MEDICINE CLINIC | Facility: CLINIC | Age: 49
End: 2019-09-11

## 2019-09-12 DIAGNOSIS — G62.9 PERIPHERAL POLYNEUROPATHY: ICD-10-CM

## 2019-09-12 RX ORDER — GABAPENTIN 400 MG/1
400 CAPSULE ORAL 3 TIMES DAILY
Qty: 270 CAPSULE | Refills: 3 | Status: SHIPPED | OUTPATIENT
Start: 2019-09-12 | End: 2021-02-26 | Stop reason: ALTCHOICE

## 2019-09-13 DIAGNOSIS — G62.9 PERIPHERAL POLYNEUROPATHY: ICD-10-CM

## 2019-09-13 RX ORDER — GABAPENTIN 400 MG/1
CAPSULE ORAL
Qty: 90 CAPSULE | Refills: 0 | Status: ON HOLD | OUTPATIENT
Start: 2019-09-13 | End: 2020-01-03 | Stop reason: CLARIF

## 2019-09-14 ENCOUNTER — APPOINTMENT (OUTPATIENT)
Dept: LAB | Facility: CLINIC | Age: 49
End: 2019-09-14
Payer: COMMERCIAL

## 2019-09-14 DIAGNOSIS — K21.9 GERD WITHOUT ESOPHAGITIS: ICD-10-CM

## 2019-09-14 DIAGNOSIS — E78.2 MIXED HYPERLIPIDEMIA: ICD-10-CM

## 2019-09-14 DIAGNOSIS — I10 ESSENTIAL HYPERTENSION: ICD-10-CM

## 2019-09-14 LAB
ALBUMIN SERPL BCP-MCNC: 3.7 G/DL (ref 3.5–5)
ALP SERPL-CCNC: 67 U/L (ref 46–116)
ALT SERPL W P-5'-P-CCNC: 25 U/L (ref 12–78)
ANION GAP SERPL CALCULATED.3IONS-SCNC: 7 MMOL/L (ref 4–13)
AST SERPL W P-5'-P-CCNC: 16 U/L (ref 5–45)
BASOPHILS # BLD AUTO: 0.06 THOUSANDS/ΜL (ref 0–0.1)
BASOPHILS NFR BLD AUTO: 1 % (ref 0–1)
BILIRUB SERPL-MCNC: 0.43 MG/DL (ref 0.2–1)
BUN SERPL-MCNC: 18 MG/DL (ref 5–25)
CALCIUM SERPL-MCNC: 8.8 MG/DL (ref 8.3–10.1)
CHLORIDE SERPL-SCNC: 104 MMOL/L (ref 100–108)
CHOLEST SERPL-MCNC: 222 MG/DL (ref 50–200)
CO2 SERPL-SCNC: 23 MMOL/L (ref 21–32)
CREAT SERPL-MCNC: 1.18 MG/DL (ref 0.6–1.3)
EOSINOPHIL # BLD AUTO: 0.28 THOUSAND/ΜL (ref 0–0.61)
EOSINOPHIL NFR BLD AUTO: 3 % (ref 0–6)
ERYTHROCYTE [DISTWIDTH] IN BLOOD BY AUTOMATED COUNT: 13 % (ref 11.6–15.1)
GFR SERPL CREATININE-BSD FRML MDRD: 54 ML/MIN/1.73SQ M
GLUCOSE P FAST SERPL-MCNC: 75 MG/DL (ref 65–99)
HCT VFR BLD AUTO: 35.7 % (ref 34.8–46.1)
HDLC SERPL-MCNC: 54 MG/DL (ref 40–60)
HGB BLD-MCNC: 11.8 G/DL (ref 11.5–15.4)
IMM GRANULOCYTES # BLD AUTO: 0.05 THOUSAND/UL (ref 0–0.2)
IMM GRANULOCYTES NFR BLD AUTO: 0 % (ref 0–2)
LDLC SERPL CALC-MCNC: 106 MG/DL (ref 0–100)
LYMPHOCYTES # BLD AUTO: 2.67 THOUSANDS/ΜL (ref 0.6–4.47)
LYMPHOCYTES NFR BLD AUTO: 23 % (ref 14–44)
MCH RBC QN AUTO: 30.3 PG (ref 26.8–34.3)
MCHC RBC AUTO-ENTMCNC: 33.1 G/DL (ref 31.4–37.4)
MCV RBC AUTO: 92 FL (ref 82–98)
MONOCYTES # BLD AUTO: 0.59 THOUSAND/ΜL (ref 0.17–1.22)
MONOCYTES NFR BLD AUTO: 5 % (ref 4–12)
NEUTROPHILS # BLD AUTO: 7.75 THOUSANDS/ΜL (ref 1.85–7.62)
NEUTS SEG NFR BLD AUTO: 68 % (ref 43–75)
NONHDLC SERPL-MCNC: 168 MG/DL
NRBC BLD AUTO-RTO: 0 /100 WBCS
PLATELET # BLD AUTO: 223 THOUSANDS/UL (ref 149–390)
PMV BLD AUTO: 11.8 FL (ref 8.9–12.7)
POTASSIUM SERPL-SCNC: 4.6 MMOL/L (ref 3.5–5.3)
PROT SERPL-MCNC: 7.5 G/DL (ref 6.4–8.2)
RBC # BLD AUTO: 3.9 MILLION/UL (ref 3.81–5.12)
SODIUM SERPL-SCNC: 134 MMOL/L (ref 136–145)
TRIGL SERPL-MCNC: 311 MG/DL
TSH SERPL DL<=0.05 MIU/L-ACNC: 2.41 UIU/ML (ref 0.36–3.74)
WBC # BLD AUTO: 11.4 THOUSAND/UL (ref 4.31–10.16)

## 2019-09-14 PROCEDURE — 85025 COMPLETE CBC W/AUTO DIFF WBC: CPT

## 2019-09-14 PROCEDURE — 36415 COLL VENOUS BLD VENIPUNCTURE: CPT

## 2019-09-14 PROCEDURE — 80053 COMPREHEN METABOLIC PANEL: CPT

## 2019-09-14 PROCEDURE — 84443 ASSAY THYROID STIM HORMONE: CPT

## 2019-09-14 PROCEDURE — 80061 LIPID PANEL: CPT

## 2019-09-17 ENCOUNTER — OFFICE VISIT (OUTPATIENT)
Dept: FAMILY MEDICINE CLINIC | Facility: CLINIC | Age: 49
End: 2019-09-17
Payer: COMMERCIAL

## 2019-09-17 VITALS
OXYGEN SATURATION: 98 % | BODY MASS INDEX: 36.82 KG/M2 | SYSTOLIC BLOOD PRESSURE: 130 MMHG | WEIGHT: 234.6 LBS | HEART RATE: 86 BPM | HEIGHT: 67 IN | DIASTOLIC BLOOD PRESSURE: 78 MMHG

## 2019-09-17 DIAGNOSIS — E78.2 MIXED HYPERLIPIDEMIA: Primary | ICD-10-CM

## 2019-09-17 DIAGNOSIS — I10 ESSENTIAL HYPERTENSION: ICD-10-CM

## 2019-09-17 DIAGNOSIS — K21.9 GERD WITHOUT ESOPHAGITIS: ICD-10-CM

## 2019-09-17 DIAGNOSIS — K58.0 IRRITABLE BOWEL SYNDROME WITH DIARRHEA: ICD-10-CM

## 2019-09-17 DIAGNOSIS — Z01.89 NEED FOR ASSESSMENT FOR SLEEP APNEA: ICD-10-CM

## 2019-09-17 PROCEDURE — 3008F BODY MASS INDEX DOCD: CPT | Performed by: FAMILY MEDICINE

## 2019-09-17 PROCEDURE — 3075F SYST BP GE 130 - 139MM HG: CPT | Performed by: FAMILY MEDICINE

## 2019-09-17 PROCEDURE — 3078F DIAST BP <80 MM HG: CPT | Performed by: FAMILY MEDICINE

## 2019-09-17 PROCEDURE — 99214 OFFICE O/P EST MOD 30 MIN: CPT | Performed by: FAMILY MEDICINE

## 2019-09-17 RX ORDER — MELOXICAM 15 MG/1
TABLET ORAL
Status: ON HOLD | COMMUNITY
Start: 2019-08-05 | End: 2020-01-03 | Stop reason: CLARIF

## 2019-09-17 NOTE — ASSESSMENT & PLAN NOTE
GERD symptoms controlled with omeprazole 20 mg continue this medication and follow up in 6 months    Patient can be referred to Gastroenterology after she turns 50 at her next office visit in 6 months

## 2019-09-17 NOTE — PROGRESS NOTES
Assessment/Plan:       Problem List Items Addressed This Visit        Digestive    GERD without esophagitis      GERD symptoms controlled with omeprazole 20 mg continue this medication and follow up in 6 months  Patient can be referred to Gastroenterology after she turns 50 at her next office visit in 6 months         Irritable bowel syndrome with diarrhea     Irritable bowel syndrome with diarrhea controlled with colestipol at this time and she continues to follow up with Gastroenterology            Cardiovascular and Mediastinum    Essential hypertension      Essential hypertension under relatively stable control at this time initial blood pressure 140/84 and re-evaluation showed an improvement down to 130/78 continue with lisinopril 10 mg daily and follow up at next office visit in 6 months            Other    Mixed hyperlipidemia - Primary      Mixed hyperlipidemia doing well with current medication cholesterol total is at 222 HDL at 54  triglycerides 311 she will continue with Omega 3 fish oils and watch diet closely her cholesterol has improved and dropped down over 30 point from 256 in March I would like this number under 200 and she has to watch closely with her diet regimen as she does not not know if she can tolerate statin medications         Need for assessment for sleep apnea     Patient has had extreme fatigue she is tired at this time of my office visit she has had difficulty with sleeping she states that she awakens every morning exhausted and tired and her  states that she snores all night long  This has been ongoing for over the last 3 months and is is worsening  At this time I will have her assessed for sleep apnea         Relevant Orders    CPAP Study            Subjective:      Patient ID: Trevor Vasquez is a 52 y o  female      Patient presents today for her follow-up visit regarding her hypertension hyperlipidemia and GERD symptoms and review of all lab work and medications at this time  She is extremely fatigued all the time, snoring all night according to her   The following portions of the patient's history were reviewed and updated as appropriate: allergies, current medications, past family history, past medical history, past social history, past surgical history and problem list     Review of Systems   Constitutional: Negative for chills, fatigue and fever  HENT: Negative for congestion, nosebleeds, rhinorrhea, sinus pressure and sore throat  Eyes: Negative for discharge and redness  Respiratory: Negative for cough and shortness of breath  Cardiovascular: Negative for chest pain, palpitations and leg swelling  Gastrointestinal: Negative for abdominal pain, blood in stool and nausea  Endocrine: Negative for cold intolerance, heat intolerance and polyuria  Genitourinary: Negative for dysuria and frequency  Musculoskeletal: Negative for arthralgias, back pain and myalgias  Skin: Negative for rash  Neurological: Negative for dizziness, weakness and headaches  Hematological: Negative for adenopathy  Psychiatric/Behavioral: Negative for behavioral problems and sleep disturbance  The patient is not nervous/anxious  Objective:      /78   Pulse 86   Ht 5' 7" (1 702 m)   Wt 106 kg (234 lb 9 6 oz)   SpO2 98%   BMI 36 74 kg/m²        Physical Exam   Constitutional: She is oriented to person, place, and time  She appears well-developed and well-nourished  No distress  HENT:   Head: Normocephalic and atraumatic  Right Ear: External ear normal    Left Ear: External ear normal    Nose: Nose normal    Mouth/Throat: Oropharynx is clear and moist  No oropharyngeal exudate  Eyes: Pupils are equal, round, and reactive to light  Conjunctivae and EOM are normal  Right eye exhibits no discharge  Left eye exhibits no discharge  No scleral icterus  Neck: Normal range of motion  No JVD present  No thyromegaly present     Cardiovascular: Normal rate, regular rhythm and normal heart sounds  No murmur heard  Pulmonary/Chest: Effort normal  She has no wheezes  She has no rales  She exhibits no tenderness  Abdominal: Soft  Bowel sounds are normal  She exhibits no distension and no mass  There is no tenderness  Musculoskeletal: Normal range of motion  She exhibits no edema, tenderness or deformity  Lymphadenopathy:     She has no cervical adenopathy  Neurological: She is alert and oriented to person, place, and time  She has normal reflexes  She displays normal reflexes  No cranial nerve deficit  Coordination normal    Skin: Skin is warm and dry  No rash noted  Psychiatric: She has a normal mood and affect  Her behavior is normal  Judgment and thought content normal    Nursing note and vitals reviewed  Data:    Laboratory Results: I have personally reviewed the pertinent laboratory results/reports   Radiology/Other Diagnostic Testing Results: I have personally reviewed pertinent reports         Lab Results   Component Value Date    WBC 11 40 (H) 09/14/2019    HGB 11 8 09/14/2019    HCT 35 7 09/14/2019    MCV 92 09/14/2019     09/14/2019     Lab Results   Component Value Date    K 4 6 09/14/2019     09/14/2019    CO2 23 09/14/2019    BUN 18 09/14/2019    CREATININE 1 18 09/14/2019    GLUF 75 09/14/2019    CALCIUM 8 8 09/14/2019    AST 16 09/14/2019    ALT 25 09/14/2019    ALKPHOS 67 09/14/2019    EGFR 54 09/14/2019     Lab Results   Component Value Date    CHOLESTEROL 222 (H) 09/14/2019    CHOLESTEROL 256 (H) 03/02/2019    CHOLESTEROL 221 (H) 10/14/2017     Lab Results   Component Value Date    HDL 54 09/14/2019    HDL 69 (H) 03/02/2019    HDL 55 10/14/2017     Lab Results   Component Value Date    LDLCALC 106 (H) 09/14/2019    LDLCALC 144 (H) 03/02/2019    LDLCALC 122 (H) 10/14/2017     Lab Results   Component Value Date    TRIG 311 (H) 09/14/2019    TRIG 214 (H) 03/02/2019    TRIG 220 (H) 10/14/2017     No results found for: [place], [place]  Lab Results   Component Value Date    [id number] 2 410 09/14/2019     No results found for: HGBA1C  No results found for: PSA    Raegan Saunders DO

## 2019-09-17 NOTE — ASSESSMENT & PLAN NOTE
Mixed hyperlipidemia doing well with current medication cholesterol total is at 222 HDL at 54  triglycerides 311 she will continue with Omega 3 fish oils and watch diet closely her cholesterol has improved and dropped down over 30 point from 256 in March I would like this number under 200 and she has to watch closely with her diet regimen as she does not not know if she can tolerate statin medications

## 2019-09-17 NOTE — ASSESSMENT & PLAN NOTE
Irritable bowel syndrome with diarrhea controlled with colestipol at this time and she continues to follow up with Gastroenterology

## 2019-09-17 NOTE — PATIENT INSTRUCTIONS

## 2019-09-17 NOTE — ASSESSMENT & PLAN NOTE
Essential hypertension under relatively stable control at this time initial blood pressure 140/84 and re-evaluation showed an improvement down to 130/78 continue with lisinopril 10 mg daily and follow up at next office visit in 6 months

## 2019-09-24 DIAGNOSIS — Z01.89 NEED FOR ASSESSMENT FOR SLEEP APNEA: Primary | ICD-10-CM

## 2019-10-08 ENCOUNTER — TELEPHONE (OUTPATIENT)
Dept: FAMILY MEDICINE CLINIC | Facility: CLINIC | Age: 49
End: 2019-10-08

## 2019-10-08 NOTE — TELEPHONE ENCOUNTER
Patient can have a home sleep study contact patient or her insurance to confirm that it is covered and see if the sleep center can set this up as the order was already placed its just about the location

## 2019-11-12 ENCOUNTER — OFFICE VISIT (OUTPATIENT)
Dept: FAMILY MEDICINE CLINIC | Facility: CLINIC | Age: 49
End: 2019-11-12
Payer: COMMERCIAL

## 2019-11-12 VITALS
HEART RATE: 68 BPM | BODY MASS INDEX: 35.85 KG/M2 | HEIGHT: 67 IN | SYSTOLIC BLOOD PRESSURE: 138 MMHG | WEIGHT: 228.4 LBS | OXYGEN SATURATION: 98 % | DIASTOLIC BLOOD PRESSURE: 82 MMHG

## 2019-11-12 DIAGNOSIS — K21.9 GERD WITHOUT ESOPHAGITIS: ICD-10-CM

## 2019-11-12 DIAGNOSIS — E78.2 MIXED HYPERLIPIDEMIA: ICD-10-CM

## 2019-11-12 DIAGNOSIS — I10 ESSENTIAL HYPERTENSION: Primary | ICD-10-CM

## 2019-11-12 DIAGNOSIS — G62.9 PERIPHERAL POLYNEUROPATHY: ICD-10-CM

## 2019-11-12 PROCEDURE — 99214 OFFICE O/P EST MOD 30 MIN: CPT | Performed by: FAMILY MEDICINE

## 2019-11-12 PROCEDURE — 1036F TOBACCO NON-USER: CPT | Performed by: FAMILY MEDICINE

## 2019-11-12 NOTE — ASSESSMENT & PLAN NOTE
GERD symptoms stable at this time she follows up with Gastroenterology and continues on Prilosec 20 mg she stop taking meloxicam and is feeling better overall with more energy and less fatigue joint pains have also subsided and less neuropathy after starting the B complex

## 2019-11-12 NOTE — ASSESSMENT & PLAN NOTE
Peripheral polyneuropathy continue with current medications at this time gabapentin his working well and her symptoms improved with the addition of the B complex vitamins follow-up in 6 months

## 2019-11-12 NOTE — PROGRESS NOTES
Assessment/Plan:       Problem List Items Addressed This Visit        Digestive    GERD without esophagitis     GERD symptoms stable at this time she follows up with Gastroenterology and continues on Prilosec 20 mg she stop taking meloxicam and is feeling better overall with more energy and less fatigue joint pains have also subsided and less neuropathy after starting the B complex  Cardiovascular and Mediastinum    Essential hypertension - Primary     Essential hypertension under good control at this point with lisinopril 10 mg tablets continue to remain physically active avoiding sodium watching diet and continue on the current medication follow-up in 6 months         Relevant Orders    CBC and differential    Comprehensive metabolic panel    Lipid panel    TSH, 3rd generation with Free T4 reflex    Vitamin D 25 hydroxy       Nervous and Auditory    Peripheral polyneuropathy     Peripheral polyneuropathy continue with current medications at this time gabapentin his working well and her symptoms improved with the addition of the B complex vitamins follow-up in 6 months         Relevant Orders    CBC and differential    Comprehensive metabolic panel    Lipid panel    TSH, 3rd generation with Free T4 reflex    Vitamin D 25 hydroxy       Other    Mixed hyperlipidemia     Mixed hyperlipidemia stable lipid profile on last blood test done in September repeat laboratory work in 6 months avoid saturated fats in the diet         Relevant Orders    Lipid panel            Subjective:      Patient ID: Irma Khan is a 52 y o  female  Patient presents today with nasal congestion sore throat which feels dry for her    Overall she is doing better with the B complex vitamins and she stop meloxicam after was evaluated by Gastroenterology      The following portions of the patient's history were reviewed and updated as appropriate: allergies, current medications, past family history, past medical history, past social history, past surgical history and problem list     Review of Systems   Constitutional: Negative for chills, fatigue and fever  HENT: Negative for congestion, nosebleeds, rhinorrhea, sinus pressure and sore throat  Eyes: Negative for discharge and redness  Respiratory: Negative for cough and shortness of breath  Cardiovascular: Negative for chest pain, palpitations and leg swelling  Gastrointestinal: Negative for abdominal pain, blood in stool and nausea  Endocrine: Negative for cold intolerance, heat intolerance and polyuria  Genitourinary: Negative for dysuria and frequency  Musculoskeletal: Negative for arthralgias, back pain and myalgias  Musculoskeletal pain improved patient stop meloxicam at this time continues with Rheumatology   Skin: Negative for rash  Neurological: Negative for dizziness, weakness and headaches  Hematological: Negative for adenopathy  Psychiatric/Behavioral: Negative for behavioral problems and sleep disturbance  The patient is not nervous/anxious  Objective:      /82 (BP Location: Left arm, Patient Position: Sitting)   Pulse 68   Ht 5' 7" (1 702 m)   Wt 104 kg (228 lb 6 4 oz)   SpO2 98%   BMI 35 77 kg/m²        Physical Exam   Constitutional: She is oriented to person, place, and time  She appears well-developed and well-nourished  No distress  HENT:   Head: Normocephalic and atraumatic  Right Ear: External ear normal    Left Ear: External ear normal    Nose: Nose normal    Mouth/Throat: Oropharynx is clear and moist  No oropharyngeal exudate  Eyes: Pupils are equal, round, and reactive to light  Conjunctivae and EOM are normal  Right eye exhibits no discharge  Left eye exhibits no discharge  No scleral icterus  Neck: Normal range of motion  No JVD present  No thyromegaly present  Cardiovascular: Normal rate, regular rhythm and normal heart sounds  No murmur heard  Pulmonary/Chest: Effort normal  She has no wheezes   She has no rales  She exhibits no tenderness  Abdominal: Soft  Bowel sounds are normal  She exhibits no distension and no mass  There is no tenderness  Musculoskeletal: Normal range of motion  She exhibits no edema, tenderness or deformity  Lymphadenopathy:     She has cervical adenopathy  Neurological: She is alert and oriented to person, place, and time  She has normal reflexes  She displays normal reflexes  No cranial nerve deficit  Coordination normal    Skin: Skin is warm and dry  No rash noted  Psychiatric: She has a normal mood and affect  Her behavior is normal  Judgment and thought content normal    Nursing note and vitals reviewed  Data:    Laboratory Results: I have personally reviewed the pertinent laboratory results/reports   Radiology/Other Diagnostic Testing Results: I have personally reviewed pertinent reports         Lab Results   Component Value Date    WBC 11 40 (H) 09/14/2019    HGB 11 8 09/14/2019    HCT 35 7 09/14/2019    MCV 92 09/14/2019     09/14/2019     Lab Results   Component Value Date    K 4 6 09/14/2019     09/14/2019    CO2 23 09/14/2019    BUN 18 09/14/2019    CREATININE 1 18 09/14/2019    GLUF 75 09/14/2019    CALCIUM 8 8 09/14/2019    AST 16 09/14/2019    ALT 25 09/14/2019    ALKPHOS 67 09/14/2019    EGFR 54 09/14/2019     Lab Results   Component Value Date    CHOLESTEROL 222 (H) 09/14/2019    CHOLESTEROL 256 (H) 03/02/2019    CHOLESTEROL 221 (H) 10/14/2017     Lab Results   Component Value Date    HDL 54 09/14/2019    HDL 69 (H) 03/02/2019    HDL 55 10/14/2017     Lab Results   Component Value Date    LDLCALC 106 (H) 09/14/2019    LDLCALC 144 (H) 03/02/2019    LDLCALC 122 (H) 10/14/2017     Lab Results   Component Value Date    TRIG 311 (H) 09/14/2019    TRIG 214 (H) 03/02/2019    TRIG 220 (H) 10/14/2017     No results found for: [place], [place]  Lab Results   Component Value Date    ADO9LQNRESTX 2 410 09/14/2019     No results found for: HGBA1C  No results found for: PSA    Vern Todd, DO

## 2019-11-12 NOTE — ASSESSMENT & PLAN NOTE
Essential hypertension under good control at this point with lisinopril 10 mg tablets continue to remain physically active avoiding sodium watching diet and continue on the current medication follow-up in 6 months

## 2019-11-12 NOTE — ASSESSMENT & PLAN NOTE
Mixed hyperlipidemia stable lipid profile on last blood test done in September repeat laboratory work in 6 months avoid saturated fats in the diet

## 2019-11-12 NOTE — PATIENT INSTRUCTIONS
Hypertension   AMBULATORY CARE:   Hypertension  is high blood pressure (BP)  Your BP is the force of your blood moving against the walls of your arteries  Normal BP is less than 120/80  Prehypertension is between 120/80 and 139/89  Hypertension is 140/90 or higher  Hypertension causes your BP to get so high that your heart has to work much harder than normal  This can damage your heart  You can control hypertension with a healthy lifestyle or medicines  A controlled blood pressure helps protect your organs, such as your heart, lungs, brain, and kidneys  Common symptoms include the following:   · Headache     · Blurred vision     · Chest pain     · Dizziness or weakness     · Trouble breathing    · Nosebleeds  Call 911 for any of the following:   · You have discomfort in your chest that feels like squeezing, pressure, fullness, or pain  · You become confused or have difficulty speaking  · You suddenly feel lightheaded or have trouble breathing  · You have pain or discomfort in your back, neck, jaw, stomach, or arm  Seek care immediately if:   · You have a severe headache or vision loss  · You have weakness in an arm or leg  Contact your healthcare provider if:   · You feel faint, dizzy, confused, or drowsy  · You have been taking your BP medicine and your BP is still higher than your healthcare provider says it should be  · You have questions or concerns about your condition or care  Treatment for hypertension  may include medicine to lower your BP and lower your cholesterol level  A low cholesterol level helps prevent heart disease and makes it easier to control your blood pressure  You may also need to make lifestyle changes  Take your medicine exactly as directed  Manage hypertension:  Talk with your healthcare provider about these and other ways to manage hypertension:  · Check your BP at home  Sit and rest for 5 minutes before you take your BP   Extend your arm and support it on a flat surface  Your arm should be at the same level as your heart  Follow the directions that came with your BP monitor  If possible, take at least 2 BP readings each time  Take your BP at least twice a day at the same times each day, such as morning and evening  Keep a record of your BP readings and bring it to your follow-up visits  Ask your healthcare provider what your BP should be  · Limit sodium (salt) as directed  Too much sodium can affect your fluid balance  Check labels to find low-sodium or no-salt-added foods  Some low-sodium foods use potassium salts for flavor  Too much potassium can also cause health problems  Your healthcare provider will tell you how much sodium and potassium are safe for you to have in a day  He or she may recommend that you limit sodium to 2,300 mg a day  · Follow the meal plan recommended by your healthcare provider  A dietitian or your provider can give you more information on low-sodium plans or the DASH (Dietary Approaches to Stop Hypertension) eating plan  The DASH plan is low in sodium, unhealthy fats, and total fat  It is high in potassium, calcium, and fiber  · Exercise to maintain a healthy weight  Exercise at least 30 minutes per day, on most days of the week  This will help decrease your blood pressure  Ask your healthcare provider about the best exercise plan for you  · Decrease stress  This may help lower your BP  Learn ways to relax, such as deep breathing or listening to music  · Limit alcohol  Women should limit alcohol to 1 drink a day  Men should limit alcohol to 2 drinks a day  A drink of alcohol is 12 ounces of beer, 5 ounces of wine, or 1½ ounces of liquor  · Do not smoke  Nicotine and other chemicals in cigarettes and cigars can increase your BP and also cause lung damage  Ask your healthcare provider for information if you currently smoke and need help to quit  E-cigarettes or smokeless tobacco still contain nicotine  Talk to your healthcare provider before you use these products  · Manage any other health conditions you have  Health conditions such as diabetes can increase your risk for hypertension  Follow your healthcare provider's instructions and take all your medicines as directed  Follow up with your healthcare provider as directed: You will need to return to have your BP checked and to have other lab tests done  Write down your questions so you remember to ask them during your visits  © 2017 [employer] Information is for End User's use only and may not be sold, redistributed or otherwise used for commercial purposes  All illustrations and images included in CareNotes® are the copyrighted property of [employer]  or [employer]  The above information is an  only  It is not intended as medical advice for individual conditions or treatments  Talk to your doctor, nurse or pharmacist before following any medical regimen to see if it is safe and effective for you

## 2019-11-22 ENCOUNTER — OCCMED (OUTPATIENT)
Dept: URGENT CARE | Facility: CLINIC | Age: 49
End: 2019-11-22
Payer: OTHER MISCELLANEOUS

## 2019-11-22 DIAGNOSIS — S80.12XA CONTUSION OF LEFT LOWER LEG, INITIAL ENCOUNTER: Primary | ICD-10-CM

## 2019-11-22 PROCEDURE — G0382 LEV 3 HOSP TYPE B ED VISIT: HCPCS | Performed by: FAMILY MEDICINE

## 2019-11-22 PROCEDURE — 99283 EMERGENCY DEPT VISIT LOW MDM: CPT | Performed by: FAMILY MEDICINE

## 2019-12-07 ENCOUNTER — APPOINTMENT (OUTPATIENT)
Dept: URGENT CARE | Facility: CLINIC | Age: 49
End: 2019-12-07
Payer: OTHER MISCELLANEOUS

## 2019-12-07 PROCEDURE — 99213 OFFICE O/P EST LOW 20 MIN: CPT

## 2019-12-19 ENCOUNTER — APPOINTMENT (OUTPATIENT)
Dept: URGENT CARE | Facility: CLINIC | Age: 49
End: 2019-12-19
Payer: OTHER MISCELLANEOUS

## 2019-12-19 PROCEDURE — 99213 OFFICE O/P EST LOW 20 MIN: CPT

## 2020-01-03 ENCOUNTER — HOSPITAL ENCOUNTER (OUTPATIENT)
Facility: HOSPITAL | Age: 50
Setting detail: OBSERVATION
Discharge: HOME | End: 2020-01-04
Attending: FAMILY MEDICINE | Admitting: INTERNAL MEDICINE
Payer: COMMERCIAL

## 2020-01-03 ENCOUNTER — APPOINTMENT (EMERGENCY)
Dept: RADIOLOGY | Facility: HOSPITAL | Age: 50
End: 2020-01-03
Payer: COMMERCIAL

## 2020-01-03 DIAGNOSIS — R07.9 CHEST PAIN: ICD-10-CM

## 2020-01-03 DIAGNOSIS — R00.2 PALPITATIONS: Primary | ICD-10-CM

## 2020-01-03 PROBLEM — D72.829 LEUKOCYTOSIS: Status: ACTIVE | Noted: 2020-01-03

## 2020-01-03 PROBLEM — I10 ESSENTIAL HYPERTENSION: Chronic | Status: ACTIVE | Noted: 2019-03-05

## 2020-01-03 LAB
ALBUMIN SERPL BCP-MCNC: 4.3 G/DL (ref 3.5–5.7)
ALP SERPL-CCNC: 47 U/L (ref 40–150)
ALT SERPL W P-5'-P-CCNC: 19 U/L (ref 7–52)
ANION GAP SERPL CALCULATED.3IONS-SCNC: 9 MMOL/L (ref 4–13)
AST SERPL W P-5'-P-CCNC: 17 U/L (ref 13–39)
ATRIAL RATE: 82 BPM
ATRIAL RATE: 84 BPM
BASOPHILS # BLD AUTO: 0.1 THOUSANDS/ΜL (ref 0–0.1)
BASOPHILS NFR BLD AUTO: 0 % (ref 0–2)
BILIRUB SERPL-MCNC: 0.4 MG/DL (ref 0.2–1)
BUN SERPL-MCNC: 22 MG/DL (ref 7–25)
CALCIUM SERPL-MCNC: 9.3 MG/DL (ref 8.6–10.5)
CHLORIDE SERPL-SCNC: 103 MMOL/L (ref 98–107)
CO2 SERPL-SCNC: 24 MMOL/L (ref 21–31)
CREAT SERPL-MCNC: 1.25 MG/DL (ref 0.6–1.2)
EOSINOPHIL # BLD AUTO: 0.2 THOUSAND/ΜL (ref 0–0.61)
EOSINOPHIL NFR BLD AUTO: 2 % (ref 0–5)
ERYTHROCYTE [DISTWIDTH] IN BLOOD BY AUTOMATED COUNT: 13.3 % (ref 11.5–14.5)
GFR SERPL CREATININE-BSD FRML MDRD: 51 ML/MIN/1.73SQ M
GLUCOSE SERPL-MCNC: 99 MG/DL (ref 65–99)
HCT VFR BLD AUTO: 35.8 % (ref 42–47)
HGB BLD-MCNC: 12.2 G/DL (ref 12–16)
LYMPHOCYTES # BLD AUTO: 1.8 THOUSANDS/ΜL (ref 0.6–4.47)
LYMPHOCYTES NFR BLD AUTO: 12 % (ref 21–51)
MCH RBC QN AUTO: 30.6 PG (ref 26–34)
MCHC RBC AUTO-ENTMCNC: 34 G/DL (ref 31–37)
MCV RBC AUTO: 90 FL (ref 81–99)
MONOCYTES # BLD AUTO: 0.9 THOUSAND/ΜL (ref 0.17–1.22)
MONOCYTES NFR BLD AUTO: 6 % (ref 2–12)
NEUTROPHILS # BLD AUTO: 11.9 THOUSANDS/ΜL (ref 1.4–6.5)
NEUTS SEG NFR BLD AUTO: 80 % (ref 42–75)
P AXIS: 44 DEGREES
P AXIS: 47 DEGREES
PLATELET # BLD AUTO: 275 THOUSANDS/UL (ref 149–390)
PMV BLD AUTO: 8.5 FL (ref 8.6–11.7)
POTASSIUM SERPL-SCNC: 4.2 MMOL/L (ref 3.5–5.5)
PR INTERVAL: 158 MS
PR INTERVAL: 162 MS
PROT SERPL-MCNC: 7.3 G/DL (ref 6.4–8.9)
QRS AXIS: -2 DEGREES
QRS AXIS: -2 DEGREES
QRSD INTERVAL: 100 MS
QRSD INTERVAL: 94 MS
QT INTERVAL: 384 MS
QT INTERVAL: 390 MS
QTC INTERVAL: 453 MS
QTC INTERVAL: 455 MS
RBC # BLD AUTO: 3.98 MILLION/UL (ref 3.9–5.2)
SODIUM SERPL-SCNC: 136 MMOL/L (ref 134–143)
T WAVE AXIS: 18 DEGREES
T WAVE AXIS: 33 DEGREES
TROPONIN I SERPL-MCNC: <0.03 NG/ML
TSH SERPL DL<=0.05 MIU/L-ACNC: 3.31 UIU/ML (ref 0.45–5.33)
VENTRICULAR RATE: 82 BPM
VENTRICULAR RATE: 84 BPM
WBC # BLD AUTO: 14.9 THOUSAND/UL (ref 4.8–10.8)

## 2020-01-03 PROCEDURE — 94760 N-INVAS EAR/PLS OXIMETRY 1: CPT

## 2020-01-03 PROCEDURE — 99220 PR INITIAL OBSERVATION CARE/DAY 70 MINUTES: CPT | Performed by: PHYSICIAN ASSISTANT

## 2020-01-03 PROCEDURE — 96374 THER/PROPH/DIAG INJ IV PUSH: CPT

## 2020-01-03 PROCEDURE — 99285 EMERGENCY DEPT VISIT HI MDM: CPT

## 2020-01-03 PROCEDURE — 71046 X-RAY EXAM CHEST 2 VIEWS: CPT

## 2020-01-03 PROCEDURE — 84484 ASSAY OF TROPONIN QUANT: CPT | Performed by: PHYSICIAN ASSISTANT

## 2020-01-03 PROCEDURE — 93010 ELECTROCARDIOGRAM REPORT: CPT | Performed by: INTERNAL MEDICINE

## 2020-01-03 PROCEDURE — 36415 COLL VENOUS BLD VENIPUNCTURE: CPT | Performed by: PHYSICIAN ASSISTANT

## 2020-01-03 PROCEDURE — 94664 DEMO&/EVAL PT USE INHALER: CPT

## 2020-01-03 PROCEDURE — 80053 COMPREHEN METABOLIC PANEL: CPT | Performed by: PHYSICIAN ASSISTANT

## 2020-01-03 PROCEDURE — 99284 EMERGENCY DEPT VISIT MOD MDM: CPT | Performed by: PHYSICIAN ASSISTANT

## 2020-01-03 PROCEDURE — 84443 ASSAY THYROID STIM HORMONE: CPT | Performed by: PHYSICIAN ASSISTANT

## 2020-01-03 PROCEDURE — 85025 COMPLETE CBC W/AUTO DIFF WBC: CPT | Performed by: PHYSICIAN ASSISTANT

## 2020-01-03 PROCEDURE — 93005 ELECTROCARDIOGRAM TRACING: CPT

## 2020-01-03 RX ORDER — PANTOPRAZOLE SODIUM 20 MG/1
20 TABLET, DELAYED RELEASE ORAL
Status: DISCONTINUED | OUTPATIENT
Start: 2020-01-03 | End: 2020-01-04 | Stop reason: HOSPADM

## 2020-01-03 RX ORDER — LISINOPRIL 10 MG/1
10 TABLET ORAL
Status: DISCONTINUED | OUTPATIENT
Start: 2020-01-03 | End: 2020-01-04 | Stop reason: HOSPADM

## 2020-01-03 RX ORDER — VITAMIN B COMPLEX
1 CAPSULE ORAL DAILY
COMMUNITY

## 2020-01-03 RX ORDER — ASPIRIN 81 MG/1
324 TABLET, CHEWABLE ORAL ONCE
Status: COMPLETED | OUTPATIENT
Start: 2020-01-03 | End: 2020-01-03

## 2020-01-03 RX ORDER — CHOLECALCIFEROL (VITAMIN D3) 10 MCG
1 TABLET ORAL DAILY
Status: DISCONTINUED | OUTPATIENT
Start: 2020-01-04 | End: 2020-01-04 | Stop reason: HOSPADM

## 2020-01-03 RX ORDER — SACCHAROMYCES BOULARDII 250 MG
250 CAPSULE ORAL 2 TIMES DAILY
Status: DISCONTINUED | OUTPATIENT
Start: 2020-01-03 | End: 2020-01-04 | Stop reason: HOSPADM

## 2020-01-03 RX ORDER — GABAPENTIN 400 MG/1
400 CAPSULE ORAL 3 TIMES DAILY
Status: DISCONTINUED | OUTPATIENT
Start: 2020-01-03 | End: 2020-01-04 | Stop reason: HOSPADM

## 2020-01-03 RX ORDER — MONTELUKAST SODIUM 4 MG/1
1 TABLET, CHEWABLE ORAL DAILY
Status: DISCONTINUED | OUTPATIENT
Start: 2020-01-04 | End: 2020-01-04 | Stop reason: HOSPADM

## 2020-01-03 RX ORDER — NITROGLYCERIN 0.4 MG/1
0.4 TABLET SUBLINGUAL
Status: DISCONTINUED | OUTPATIENT
Start: 2020-01-03 | End: 2020-01-04 | Stop reason: HOSPADM

## 2020-01-03 RX ORDER — ACETAMINOPHEN 325 MG/1
650 TABLET ORAL EVERY 4 HOURS PRN
Status: DISCONTINUED | OUTPATIENT
Start: 2020-01-03 | End: 2020-01-04 | Stop reason: HOSPADM

## 2020-01-03 RX ORDER — NITROGLYCERIN 0.4 MG/1
0.4 TABLET SUBLINGUAL ONCE
Status: COMPLETED | OUTPATIENT
Start: 2020-01-03 | End: 2020-01-03

## 2020-01-03 RX ORDER — ONDANSETRON 2 MG/ML
4 INJECTION INTRAMUSCULAR; INTRAVENOUS EVERY 6 HOURS PRN
Status: DISCONTINUED | OUTPATIENT
Start: 2020-01-03 | End: 2020-01-04 | Stop reason: HOSPADM

## 2020-01-03 RX ORDER — CHLORAL HYDRATE 500 MG
1000 CAPSULE ORAL DAILY
Status: DISCONTINUED | OUTPATIENT
Start: 2020-01-04 | End: 2020-01-04 | Stop reason: HOSPADM

## 2020-01-03 RX ORDER — ASCORBIC ACID 500 MG
1000 TABLET ORAL DAILY
Status: DISCONTINUED | OUTPATIENT
Start: 2020-01-04 | End: 2020-01-04 | Stop reason: HOSPADM

## 2020-01-03 RX ORDER — LORAZEPAM 2 MG/ML
0.5 INJECTION INTRAMUSCULAR ONCE
Status: COMPLETED | OUTPATIENT
Start: 2020-01-03 | End: 2020-01-03

## 2020-01-03 RX ADMIN — ASPIRIN 81 MG 324 MG: 81 TABLET ORAL at 12:45

## 2020-01-03 RX ADMIN — NITROGLYCERIN 0.4 MG: 0.4 TABLET SUBLINGUAL at 12:46

## 2020-01-03 RX ADMIN — GABAPENTIN 400 MG: 400 CAPSULE ORAL at 22:57

## 2020-01-03 RX ADMIN — ACETAMINOPHEN 650 MG: 325 TABLET ORAL at 23:08

## 2020-01-03 RX ADMIN — LORAZEPAM 0.5 MG: 2 INJECTION INTRAMUSCULAR; INTRAVENOUS at 13:34

## 2020-01-03 RX ADMIN — LISINOPRIL 10 MG: 10 TABLET ORAL at 23:00

## 2020-01-03 RX ADMIN — PANTOPRAZOLE SODIUM 20 MG: 20 TABLET, DELAYED RELEASE ORAL at 22:57

## 2020-01-03 NOTE — H&P
H&P- Sobeida Santiago 1970, 52 y o  female MRN: [id number]    Unit/Bed#: -01 Encounter: [id number]    Primary Care Provider: Mp Patel DO   Date and time admitted to hospital: 1/3/2020 10:47 AM        * Chest pain  Assessment & Plan  · Patient reported palpitations and chest pain on admission  · Her blood pressure was elevated  · She was given nitro and Her symptoms reported improved  · Monitor troponins and telemetry - if negative outpatient stress test  · TSH was normal    Leukocytosis  Assessment & Plan  · Unclear etiology, possibly reactive  · Monitor    Mixed hyperlipidemia  Assessment & Plan  · Continue meds    GERD without esophagitis  Assessment & Plan  · Continue PPI    Essential hypertension  Assessment & Plan  · Continue meds and monitor    VTE Prophylaxis: Enoxaparin (Lovenox)  Code Status: full code  POLST: POLST is not applicable to this patient  Discussion with patient    Anticipated Length of Stay:  Patient will be admitted on an Observation basis with an anticipated length of stay of  < 2 midnights  Justification for Hospital Stay: chest pain rule out    Chief Complaint:   Chest pain    History of Present Illness:    Sobeida Santiago is a 52 y o  female who presents with chest pain  Patient with past medical history of GERD, neuropathy and hypertension presented to the emergency room secondary to report of palpitations and also chest discomfort  Patient was at work sitting in a chair became sweaty and dizzy she told her boss she didn't feel well  He told her "She was [place]"  BP was 170/110  Called family to come and get her  She felt tight in chest, felt like heart jumping out  She had elevated blood pressure and the chest sensation was given nitro and symptoms improved, now she feels flutters in chest   She had headache, dizziness that started w/ symptoms  Review of Systems:  Review of Systems   Constitutional: Positive for diaphoresis  Negative for chills and fever  HENT: Negative for rhinorrhea, sore throat and trouble swallowing  Eyes: Negative for discharge and redness  Respiratory: Negative for cough and shortness of breath  Cardiovascular: Positive for chest pain and palpitations  Negative for leg swelling  Gastrointestinal: Negative for abdominal pain, diarrhea, nausea and vomiting  Genitourinary: Positive for hematuria  Negative for dysuria  Musculoskeletal: Positive for arthralgias, back pain and neck pain  Skin: Positive for color change (left leg)  Neurological: Positive for dizziness, light-headedness and headaches  Psychiatric/Behavioral: Negative for agitation and dysphoric mood  Past Medical and Surgical History:   Past Medical History:   Diagnosis Date    GERD (gastroesophageal reflux disease)     Hypertension     Neuropathy        Past Surgical History:   Procedure Laterality Date    APPENDECTOMY      CHOLECYSTECTOMY      FOOT POSTERIOR RELEASE Left     around ankle    TUBAL LIGATION         Meds/Allergies:  Prior to Admission medications    Medication Sig Start Date End Date Taking?  Authorizing Provider   Ascorbic Acid (VITAMIN C) 1000 MG tablet Take 1,000 mg by mouth daily   Yes Historical Provider, MD   betamethasone dipropionate (DIPROSONE) 0 05 % cream Apply topically 2 (two) times a day Both Legs as needed 7/29/19  Yes Jaki Ask, DO   colestipol (COLESTID) 5 g granules Take 1 g by mouth daily   Yes Historical Provider, MD   etanercept (ENBREL) 50 mg/mL SOSY Inject under the skin once a week   Yes Historical Provider, MD   gabapentin (NEURONTIN) 400 mg capsule Take 1 capsule (400 mg total) by mouth 3 (three) times a day 9/12/19  Yes Jaki Ask, DO   lisinopril (ZESTRIL) 10 mg tablet Take 1 tablet (10 mg total) by mouth daily 7/30/19  Yes Jaki Ask, DO   multivitamin (THERAGRAN) TABS Take 1 tablet by mouth daily   Yes Historical Provider, MD   Omega-3 Fatty Acids (FISH OIL) 1,000 mg Take 1,000 mg by mouth daily   Yes Historical Provider, MD   omeprazole (PriLOSEC) 20 mg delayed release capsule Take 1 capsule (20 mg total) by mouth daily 19  Yes Ed Atwood DO   Probiotic Product (PROBIOTIC-10 PO) Take 1 Dose by mouth daily   Yes Historical Provider, MD   gabapentin (NEURONTIN) 400 mg capsule TAKE ONE CAPSULE BY MOUTH THREE TIMES DAILY  19   Ed Atwood, DO   loratadine (CLARITIN) 10 mg tablet Take 1 tablet (10 mg total) by mouth daily 19   Ed Atwood, DO   meloxicam (MOBIC) 15 mg tablet  19   Historical Provider, MD   naproxen (EC NAPROSYN) 500 MG EC tablet Take 1 tablet (500 mg total) by mouth 2 (two) times a day with meals  Patient not taking: Reported on 18   Sawyer Richardson PA-C     I have reviewed home medications with patient personally  Allergies:    Allergies   Allergen Reactions    Methotrexate Lip Swelling    Keflex [Cephalexin] GI Intolerance       Social History:  Marital Status: /Civil Union   Occupation:  dental technician  Patient Pre-hospital Living Situation: home  Patient Pre-hospital Level of Mobility: mobile  Patient Pre-hospital Diet Restrictions: none  Substance Use History:   Social History     Substance and Sexual Activity   Alcohol Use Never    Frequency: Never     Social History     Tobacco Use   Smoking Status Former Smoker    Last attempt to quit: Becky Hairston Years since quittin 0   Smokeless Tobacco Never Used     Social History     Substance and Sexual Activity   Drug Use No       Family History:  I have reviewed the patients family history    Physical Exam:   Vitals:   Blood Pressure: 120/73 (20 1547)  Pulse: 92 (20 1547)  Temperature: 97 6 °F (36 4 °C) (20 1547)  Temp Source: Temporal (20 1547)  Respirations: 17 (20 1547)  Height: 5' 7" (170 2 cm) (20 1500)  Weight - Scale: 101 kg (223 lb 1 7 oz) (20 1500)  SpO2: 99 % (20 1547)    Physical Exam   Constitutional: She is oriented to person, place, and time  She appears well-developed and well-nourished  Fatigued appearing   HENT:   Head: Normocephalic and atraumatic  Eyes: Conjunctivae and EOM are normal  Right eye exhibits no discharge  Left eye exhibits no discharge  Neck: Normal range of motion  No tracheal deviation present  Cardiovascular: Normal rate, regular rhythm and normal heart sounds  Exam reveals no gallop and no friction rub  No murmur heard  Pulmonary/Chest: Effort normal and breath sounds normal  No respiratory distress  She has no wheezes  She has no rales  Abdominal: Soft  Bowel sounds are normal  She exhibits no distension and no mass  There is no tenderness  There is no guarding  Musculoskeletal: Normal range of motion  She exhibits no edema, tenderness or deformity  Neurological: She is alert and oriented to person, place, and time  Skin: Skin is warm and dry  No rash noted  No erythema  No pallor  Bruising left anterior shin   Psychiatric: She has a normal mood and affect  Her behavior is normal  Judgment and thought content normal    Nursing note and vitals reviewed  Additional Data:   Lab Results: I have personally reviewed pertinent reports  Results from last 7 days   Lab Units 01/03/20  1106   WBC Thousand/uL 14 90*   HEMOGLOBIN g/dL 12 2   HEMATOCRIT % 35 8*   PLATELETS Thousands/uL 275   NEUTROS PCT % 80*   LYMPHS PCT % 12*   MONOS PCT % 6   EOS PCT % 2     Results from last 7 days   Lab Units 01/03/20  1106   POTASSIUM mmol/L 4 2   CHLORIDE mmol/L 103   CO2 mmol/L 24   BUN mg/dL 22   CREATININE mg/dL 1 25*   CALCIUM mg/dL 9 3   ALK PHOS U/L 47   ALT U/L 19   AST U/L 17       Imaging: formal read pending  XR chest 2 views   ED Interpretation by Yeni Argueta PA-C (01/03 1203)   No acute finding      Final Result by Monse Lawler MD (01/03 1423)      No acute cardiopulmonary disease              Workstation performed: [id number]           Outright / Epic Records Reviewed: Yes     ** Please Note: This note has been constructed using a voice recognition system   **

## 2020-01-03 NOTE — RESPIRATORY THERAPY NOTE
RT Protocol Note  Marnie Mattson 52 y o  female MRN: [id number]  Unit/Bed#: -01 Encounter: [id number]    Assessment    Principal Problem:    Chest pain  Active Problems:    Essential hypertension    GERD without esophagitis    Mixed hyperlipidemia    Leukocytosis      Home Pulmonary Medications:    Home Devices/Therapy: Other (Comment)(None)    Past Medical History:   Diagnosis Date    GERD (gastroesophageal reflux disease)     Hypertension     Neuropathy      Social History     Socioeconomic History    Marital status: /Civil Union     Spouse name: None    Number of children: None    Years of education: None    Highest education level: None   Occupational History    None   Social Needs    Financial resource strain: None    Food insecurity:     Worry: None     Inability: None    Transportation needs:     Medical: None     Non-medical: None   Tobacco Use    Smoking status: Former Smoker     Last attempt to quit:      Years since quittin 0    Smokeless tobacco: Never Used   Substance and Sexual Activity    Alcohol use: Never     Frequency: Never    Drug use: No    Sexual activity: None   Lifestyle    Physical activity:     Days per week: None     Minutes per session: None    Stress: None   Relationships    Social connections:     Talks on phone: None     Gets together: None     Attends Congregational service: None     Active member of club or organization: None     Attends meetings of clubs or organizations: None     Relationship status: None    Intimate partner violence:     Fear of current or ex partner: None     Emotionally abused: None     Physically abused: None     Forced sexual activity: None   Other Topics Concern    None   Social History Narrative    None       Subjective         Objective    Physical Exam:   Assessment Type: Assess only  General Appearance: Alert, Awake  Respiratory Pattern: Normal  Bilateral Breath Sounds: Clear, Diminished  Cough: None    Vitals:  Blood pressure 120/73, pulse 92, temperature 97 6 °F (36 4 °C), temperature source Temporal, resp  rate 17, height 5' 7" (1 702 m), weight 101 kg (223 lb 1 7 oz), SpO2 98 %  Imaging and other studies: I have personally reviewed pertinent reports  Plan    Respiratory Plan: (Patient states she has no pulmonary history)        Resp Comments: Patient states she will call if she has any breathing difficulty  Patient states she has no breathing problems  will continue to monitor the patient

## 2020-01-03 NOTE — ED PROVIDER NOTES
History  Chief Complaint   Patient presents with    Rapid Heart Rate     Patient states that she feels her heart racing     This is a 51-year-old female patient with a history of hypertension  Today while at work she stated she got fluttering in her chest came flush and diaphoretic  She had a blood pressure taken and states that it was 160/110  During this time she described pressure on her chest but more of a flutter type sensation  She has had this once before while taking Sudafed however she is not taking Sudafed  It is getting better  She did have shortness of breath was episode  No recent illness  No fever no chills no headache blurred vision double vision cough congestion sore throat nausea vomiting or diarrhea no true chest pain or shortness of breath no swelling of the ankles nothing made it better worse  Differential diagnosis includes not limited to supraventricular tachycardia, AFib, anxiety, ACS, abnormal thyroid Avril side effect of medications however does not have any once  , electrolyte imbalance  Patient has no significant medical history of any type of cardiovascular disease in younger people less than 70      Initial EKG interpreted by me 84 beats per minute normal sinus rhythm no ST elevation no ectopics normal axis  no comparisons  Prior to Admission Medications   Prescriptions Last Dose Informant Patient Reported? Taking?    Ascorbic Acid (VITAMIN C) 1000 MG tablet   Yes Yes   Sig: Take 1,000 mg by mouth daily   Omega-3 Fatty Acids (FISH OIL) 1,000 mg   Yes Yes   Sig: Take 1,000 mg by mouth daily   Probiotic Product (PROBIOTIC-10 PO)   Yes Yes   Sig: Take 1 Dose by mouth daily   betamethasone dipropionate (DIPROSONE) 0 05 % cream   No Yes   Sig: Apply topically 2 (two) times a day Both Legs as needed   colestipol (COLESTID) 5 g granules   Yes Yes   Sig: Take 1 g by mouth daily   etanercept (ENBREL) 50 mg/mL SOSY   Yes Yes   Sig: Inject under the skin once a week gabapentin (NEURONTIN) 400 mg capsule   No Yes   Sig: Take 1 capsule (400 mg total) by mouth 3 (three) times a day   gabapentin (NEURONTIN) 400 mg capsule   No No   Sig: TAKE ONE CAPSULE BY MOUTH THREE TIMES DAILY    lisinopril (ZESTRIL) 10 mg tablet   No Yes   Sig: Take 1 tablet (10 mg total) by mouth daily   loratadine (CLARITIN) 10 mg tablet   No No   Sig: Take 1 tablet (10 mg total) by mouth daily   meloxicam (MOBIC) 15 mg tablet   Yes No   multivitamin (THERAGRAN) TABS   Yes Yes   Sig: Take 1 tablet by mouth daily   naproxen (EC NAPROSYN) 500 MG EC tablet Not Taking at Unknown time  No No   Sig: Take 1 tablet (500 mg total) by mouth 2 (two) times a day with meals   Patient not taking: Reported on 2019   omeprazole (PriLOSEC) 20 mg delayed release capsule   No Yes   Sig: Take 1 capsule (20 mg total) by mouth daily      Facility-Administered Medications: None       Past Medical History:   Diagnosis Date    GERD (gastroesophageal reflux disease)     Hypertension     Neuropathy        Past Surgical History:   Procedure Laterality Date    APPENDECTOMY      CHOLECYSTECTOMY      FOOT POSTERIOR RELEASE Left     around ankle    TUBAL LIGATION         Family History   Problem Relation Age of Onset    Hypertension Mother     Cancer Mother     Alzheimer's disease Mother     Parkinsonism Mother     Diabetes Father     Hypertension Father     Cancer Father      I have reviewed and agree with the history as documented  Social History     Tobacco Use    Smoking status: Former Smoker     Last attempt to quit:      Years since quittin 0    Smokeless tobacco: Never Used   Substance Use Topics    Alcohol use: No    Drug use: No        Review of Systems   All other systems reviewed and are negative  Physical Exam  Physical Exam   Constitutional: She appears well-developed and well-nourished  HENT:   Head: Normocephalic and atraumatic     Right Ear: External ear normal    Left Ear: External ear normal    Nose: Nose normal    Mouth/Throat: Oropharynx is clear and moist    Eyes: Pupils are equal, round, and reactive to light  Conjunctivae are normal    Neck: Normal range of motion  Neck supple  Cardiovascular: Normal rate and regular rhythm  Pulmonary/Chest: Effort normal and breath sounds normal    Abdominal: Soft  Bowel sounds are normal  There is no tenderness  Musculoskeletal: Normal range of motion  She exhibits no edema  Neurological: She is alert  Skin: Skin is warm  Psychiatric: She has a normal mood and affect  Her behavior is normal    Nursing note and vitals reviewed        Vital Signs  ED Triage Vitals   Temperature Pulse Resp Blood Pressure SpO2   01/03/20 1054 01/03/20 1048 -- 01/03/20 1048 01/03/20 1048   97 9 °F (36 6 °C) 95  (!) 178/94 100 %      Temp src Heart Rate Source Patient Position - Orthostatic VS BP Location FiO2 (%)   -- -- -- -- --             Pain Score       01/03/20 1048       No Pain           Vitals:    01/03/20 1048   BP: (!) 178/94   Pulse: 95         Visual Acuity      ED Medications  Medications   aspirin chewable tablet 324 mg (324 mg Oral Given 1/3/20 1245)   nitroglycerin (NITROSTAT) SL tablet 0 4 mg (0 4 mg Sublingual Given 1/3/20 1246)   LORazepam (ATIVAN) 2 mg/mL injection 0 5 mg (0 5 mg Intravenous Given 1/3/20 1334)       Diagnostic Studies  Results Reviewed     Procedure Component Value Units Date/Time    Troponin I [[id number]]  (Normal) Collected:  01/03/20 1306    Lab Status:  Final result Specimen:  Blood from Arm, Right Updated:  01/03/20 1333     Troponin I <0 03 ng/mL     Troponin I [[id number]]     Lab Status:  No result Specimen:  Blood     TSH [[id number]]  (Normal) Collected:  01/03/20 1106    Lab Status:  Final result Specimen:  Blood from Arm, Left Updated:  01/03/20 1153     TSH 3RD GENERATON 3 310 uIU/mL     Narrative:       Patients undergoing fluorescein dye angiography may retain small amounts of fluorescein in the body for 48-72 hours post procedure  Samples containing fluorescein can produce falsely depressed TSH values  If the patient had this procedure,a specimen should be resubmitted post fluorescein clearance        Troponin I [[id number]]  (Normal) Collected:  01/03/20 1106    Lab Status:  Final result Specimen:  Blood from Arm, Left Updated:  01/03/20 1142     Troponin I <0 03 ng/mL     Comprehensive metabolic panel [[id number]]  (Abnormal) Collected:  01/03/20 1106    Lab Status:  Final result Specimen:  Blood from Arm, Left Updated:  01/03/20 1137     Sodium 136 mmol/L      Potassium 4 2 mmol/L      Chloride 103 mmol/L      CO2 24 mmol/L      ANION GAP 9 mmol/L      BUN 22 mg/dL      Creatinine 1 25 mg/dL      Glucose 99 mg/dL      Calcium 9 3 mg/dL      AST 17 U/L      ALT 19 U/L      Alkaline Phosphatase 47 U/L      Total Protein 7 3 g/dL      Albumin 4 3 g/dL      Total Bilirubin 0 40 mg/dL      eGFR 51 ml/min/1 73sq m     Narrative:       [place] guidelines for Chronic Kidney Disease (CKD):     Stage 1 with normal or high GFR (GFR > 90 mL/min/1 73 square meters)    Stage 2 Mild CKD (GFR = 60-89 mL/min/1 73 square meters)    Stage 3A Moderate CKD (GFR = 45-59 mL/min/1 73 square meters)    Stage 3B Moderate CKD (GFR = 30-44 mL/min/1 73 square meters)    Stage 4 Severe CKD (GFR = 15-29 mL/min/1 73 square meters)    Stage 5 End Stage CKD (GFR <15 mL/min/1 73 square meters)  Note: GFR calculation is accurate only with a steady state creatinine    CBC and differential [[id number]]  (Abnormal) Collected:  01/03/20 1106    Lab Status:  Final result Specimen:  Blood from Arm, Left Updated:  01/03/20 1120     WBC 14 90 Thousand/uL      RBC 3 98 Million/uL      Hemoglobin 12 2 g/dL      Hematocrit 35 8 %      MCV 90 fL      MCH 30 6 pg      MCHC 34 0 g/dL      RDW 13 3 %      MPV 8 5 fL      Platelets 105 Thousands/uL      Neutrophils Relative 80 %      Lymphocytes Relative 12 %      Monocytes Relative 6 % Eosinophils Relative 2 %      Basophils Relative 0 %      Neutrophils Absolute 11 90 Thousands/µL      Lymphocytes Absolute 1 80 Thousands/µL      Monocytes Absolute 0 90 Thousand/µL      Eosinophils Absolute 0 20 Thousand/µL      Basophils Absolute 0 10 Thousands/µL                  XR chest 2 views   ED Interpretation by Angela Dalton PA-C (01/03 1203)   No acute finding                 Procedures  Procedures         ED Course  ED Course as of Jan 03 1354   Fri Jan 03, 2020   1235 Re-evaluated patient now she states she has some chest pressure  1309 Patient got slight relief in her pressure with nitroglycerin I will also add Ativan  HEART Risk Score      Most Recent Value   History  1 Filed at: 01/03/2020 1242   ECG  0 Filed at: 01/03/2020 1242   Age  1 Filed at: 01/03/2020 1242   Risk Factors  2 Filed at: 01/03/2020 1242   Troponin  0 Filed at: 01/03/2020 1242   Heart Score Risk Calculator   History  1 Filed at: 01/03/2020 1242   ECG  0 Filed at: 01/03/2020 1242   Age  1 Filed at: 01/03/2020 1242   Risk Factors  2 Filed at: 01/03/2020 1242   Troponin  0 Filed at: 01/03/2020 1242   HEART Score  4 Filed at: 01/03/2020 1242   HEART Score  4 Filed at: 01/03/2020 1242                            MDM      Disposition  Final diagnoses:   Palpitations   Chest pain     Time reflects when diagnosis was documented in both MDM as applicable and the Disposition within this note     Time User Action Codes Description Comment    1/3/2020  1:51 PM [place] [R00 2] Palpitations     1/3/2020  1:51 PM [place] [R07 9] Chest pain       ED Disposition     ED Disposition Condition Date/Time Comment    Admit Stable Fri Rigo 3, 2020  1:51 PM Case was discussed with Lindsey Calhoun and the patient's admission status was agreed to be Admission Status: observation status to the service of Dr Kody Ramirez           Follow-up Information    None         Patient's Medications   Discharge Prescriptions No medications on file     No discharge procedures on file      ED Provider  Electronically Signed by           Raisa Singleton PA-C  01/03/20 3759

## 2020-01-03 NOTE — ASSESSMENT & PLAN NOTE
· Patient reported palpitations and chest pain on admission  · Her blood pressure was elevated  · She was given nitro and Her symptoms reported improved  · Monitor troponins and telemetry - if negative outpatient stress test  · TSH was normal

## 2020-01-03 NOTE — PLAN OF CARE
Problem: INFECTION - ADULT  Goal: Absence or prevention of progression during hospitalization  Description  INTERVENTIONS:  - Assess and monitor for signs and symptoms of infection  - Monitor lab/diagnostic results  - Monitor all insertion sites, i e  indwelling lines, tubes, and drains  - Monitor endotracheal if appropriate and nasal secretions for changes in amount and color  - [place] appropriate cooling/warming therapies per order  - Administer medications as ordered  - Instruct and encourage patient and family to use good hand hygiene technique  - Identify and instruct in appropriate isolation precautions for identified infection/condition  Outcome: Progressing     Problem: SAFETY ADULT  Goal: Patient will remain free of falls  Description  INTERVENTIONS:  - Assess patient frequently for physical needs  -  Identify cognitive and physical deficits and behaviors that affect risk of falls    -  [place] fall precautions as indicated by assessment   - Educate patient/family on patient safety including physical limitations  - Instruct patient to call for assistance with activity based on assessment  - Modify environment to reduce risk of injury  - Consider OT/PT consult to assist with strengthening/mobility  Outcome: Progressing     Problem: DISCHARGE PLANNING  Goal: Discharge to home or other facility with appropriate resources  Description  INTERVENTIONS:  - Identify barriers to discharge w/patient and caregiver  - Arrange for needed discharge resources and transportation as appropriate  - Identify discharge learning needs (meds, wound care, etc )  - Refer to Case Management Department for coordinating discharge planning if the patient needs post-hospital services based on physician/advanced practitioner order or complex needs related to functional status, cognitive ability, or social support system   Outcome: Progressing     Problem: Knowledge Deficit  Goal: Patient/family/caregiver demonstrates understanding of disease process, treatment plan, medications, and discharge instructions  Description  Complete learning assessment and assess knowledge base    Interventions:  - Provide teaching at level of understanding  - Provide teaching via preferred learning methods  Outcome: Progressing     Problem: CARDIOVASCULAR - ADULT  Goal: Maintains optimal cardiac output and hemodynamic stability  Description  INTERVENTIONS:  - Monitor I/O, vital signs and rhythm  - Monitor for S/S and trends of decreased cardiac output  - Administer and titrate ordered vasoactive medications to optimize hemodynamic stability  - Assess quality of pulses, skin color and temperature  - Assess for signs of decreased coronary artery perfusion  - Instruct patient to report change in severity of symptoms  Outcome: Progressing  Goal: Absence of cardiac dysrhythmias or at baseline rhythm  Description  INTERVENTIONS:  - Continuous cardiac monitoring, vital signs, obtain 12 lead EKG if ordered  - Administer antiarrhythmic and heart rate control medications as ordered  - Monitor electrolytes and administer replacement therapy as ordered  Outcome: Progressing

## 2020-01-04 VITALS
WEIGHT: 223.11 LBS | SYSTOLIC BLOOD PRESSURE: 132 MMHG | DIASTOLIC BLOOD PRESSURE: 69 MMHG | OXYGEN SATURATION: 93 % | HEIGHT: 67 IN | TEMPERATURE: 98.3 F | HEART RATE: 86 BPM | RESPIRATION RATE: 16 BRPM | BODY MASS INDEX: 35.02 KG/M2

## 2020-01-04 PROBLEM — K21.9 GERD WITHOUT ESOPHAGITIS: Chronic | Status: ACTIVE | Noted: 2019-03-05

## 2020-01-04 LAB
ANION GAP SERPL CALCULATED.3IONS-SCNC: 9 MMOL/L (ref 4–13)
BUN SERPL-MCNC: 21 MG/DL (ref 7–25)
CALCIUM SERPL-MCNC: 9.3 MG/DL (ref 8.6–10.5)
CHLORIDE SERPL-SCNC: 107 MMOL/L (ref 98–107)
CHOLEST SERPL-MCNC: 219 MG/DL (ref 0–200)
CO2 SERPL-SCNC: 22 MMOL/L (ref 21–31)
CREAT SERPL-MCNC: 1.18 MG/DL (ref 0.6–1.2)
ERYTHROCYTE [DISTWIDTH] IN BLOOD BY AUTOMATED COUNT: 13.2 % (ref 11.5–14.5)
GFR SERPL CREATININE-BSD FRML MDRD: 54 ML/MIN/1.73SQ M
GLUCOSE P FAST SERPL-MCNC: 82 MG/DL (ref 65–99)
GLUCOSE SERPL-MCNC: 82 MG/DL (ref 65–99)
HCT VFR BLD AUTO: 32.5 % (ref 42–47)
HDLC SERPL-MCNC: 62 MG/DL
HGB BLD-MCNC: 11.3 G/DL (ref 12–16)
LDLC SERPL CALC-MCNC: 132 MG/DL (ref 0–100)
MAGNESIUM SERPL-MCNC: 1.9 MG/DL (ref 1.9–2.7)
MCH RBC QN AUTO: 30.9 PG (ref 26–34)
MCHC RBC AUTO-ENTMCNC: 34.7 G/DL (ref 31–37)
MCV RBC AUTO: 89 FL (ref 81–99)
NONHDLC SERPL-MCNC: 157 MG/DL
PLATELET # BLD AUTO: 243 THOUSANDS/UL (ref 149–390)
PMV BLD AUTO: 8.9 FL (ref 8.6–11.7)
POTASSIUM SERPL-SCNC: 4.3 MMOL/L (ref 3.5–5.5)
RBC # BLD AUTO: 3.65 MILLION/UL (ref 3.9–5.2)
SODIUM SERPL-SCNC: 138 MMOL/L (ref 134–143)
TRIGL SERPL-MCNC: 125 MG/DL (ref 44–166)
WBC # BLD AUTO: 8.2 THOUSAND/UL (ref 4.8–10.8)

## 2020-01-04 PROCEDURE — 83735 ASSAY OF MAGNESIUM: CPT | Performed by: PHYSICIAN ASSISTANT

## 2020-01-04 PROCEDURE — 85027 COMPLETE CBC AUTOMATED: CPT | Performed by: PHYSICIAN ASSISTANT

## 2020-01-04 PROCEDURE — 99217 PR OBSERVATION CARE DISCHARGE MANAGEMENT: CPT | Performed by: PHYSICIAN ASSISTANT

## 2020-01-04 PROCEDURE — 80061 LIPID PANEL: CPT | Performed by: PHYSICIAN ASSISTANT

## 2020-01-04 PROCEDURE — 80048 BASIC METABOLIC PNL TOTAL CA: CPT | Performed by: PHYSICIAN ASSISTANT

## 2020-01-04 RX ADMIN — GABAPENTIN 400 MG: 400 CAPSULE ORAL at 09:17

## 2020-01-04 RX ADMIN — OXYCODONE HYDROCHLORIDE AND ACETAMINOPHEN 1000 MG: 500 TABLET ORAL at 09:17

## 2020-01-04 RX ADMIN — Medication 250 MG: at 09:17

## 2020-01-04 RX ADMIN — Medication 1 CAPSULE: at 09:16

## 2020-01-04 RX ADMIN — OMEGA-3 FATTY ACIDS CAP 1000 MG 1000 MG: 1000 CAP at 09:16

## 2020-01-04 NOTE — PROGRESS NOTES
Progress Note - Coralyn Oppenheim 1970, 52 y o  female MRN: [id number]    Unit/Bed#: -01 Encounter: [id number]    Primary Care Provider: Moses Herrera DO   Date and time admitted to hospital: 1/3/2020 10:47 AM        * Chest pain  Assessment & Plan  · Patient reported palpitations and chest pain on admission  · Her blood pressure was elevated  · She was given nitro and Her symptoms reported improved  · Monitor troponins and telemetry - if negative outpatient stress test - 0 03 x 4  · TSH was normal    Leukocytosis  Assessment & Plan  · Unclear etiology, possibly reactive  · Monitor    GERD without esophagitis  Assessment & Plan  · Continue PPI    Mixed hyperlipidemia  Assessment & Plan  · Continue meds    Peripheral polyneuropathy  Assessment & Plan  · Continue Neurontin      VTE Pharmacologic Prophylaxis: Pharmacologic: Enoxaparin (Lovenox)    Patient Centered Rounds: I have performed bedside rounds with nursing staff today  Discussions with Specialists or Other Care Team Provider:  Nursing, respiratory  Education and Discussions with Family / Patient: ***    Current Length of Stay: 0 day(s)    Current Patient Status: Observation   Certification Statement: {Certification [id number]}    Discharge Plan: ***    Code Status: Level 1 - Full Code    Subjective:   ***    Objective:     Vitals:   Temp (24hrs), Av 7 °F (36 5 °C), Min:97 3 °F (36 3 °C), Max:97 9 °F (36 6 °C)    Temp:  [97 3 °F (36 3 °C)-97 9 °F (36 6 °C)] 97 3 °F (36 3 °C)  HR:  [79-95] 79  Resp:  [16-18] 16  BP: (107-178)/(58-94) 122/87  SpO2:  [97 %-100 %] 97 %  Body mass index is 34 94 kg/m²       Input and Output Summary (last 24 hours):     No intake or output data in the 24 hours ending 20 0528    Physical Exam:     Physical Exam    Additional Data:     Labs:    Results from last 7 days   Lab Units 20  1106   WBC Thousand/uL 14 90*   HEMOGLOBIN g/dL 12 2   HEMATOCRIT % 35 8*   PLATELETS Thousands/uL 275   NEUTROS PCT % 80*   LYMPHS PCT % 12*   MONOS PCT % 6   EOS PCT % 2     Results from last 7 days   Lab Units 01/03/20  1106   POTASSIUM mmol/L 4 2   CHLORIDE mmol/L 103   CO2 mmol/L 24   BUN mg/dL 22   CREATININE mg/dL 1 25*   CALCIUM mg/dL 9 3   ALK PHOS U/L 47   ALT U/L 19   AST U/L 17                   * I Have Reviewed All Lab Data Listed Above  * Additional Pertinent Lab Tests Reviewed: [place] [facility] Admission  Reviewed    Imaging:  Imaging Reports Reviewed Today Include: ***    Recent Cultures (last 7 days):           Last 24 Hours Medication List:     Current Facility-Administered Medications:  acetaminophen 650 mg Oral Q4H PRN Patsie Purchase, PA-C   vitamin C 1,000 mg Oral Daily Patsie Purchase, PA-C   b complex-vitamin C-folic acid 1 capsule Oral Daily Patsie Purchase, PA-C   colestipol 1 g Oral Daily Patsie Purchase, PA-C   fish oil 1,000 mg Oral Daily Patsie Purchase, PA-C   gabapentin 400 mg Oral TID Patsie Purchase, PA-C   lisinopril 10 mg Oral HS Patsie Purchase, PA-C   nitroglycerin 0 4 mg Sublingual Q5 Min PRN Patsie Purchase, PA-C   ondansetron 4 mg Intravenous Q6H PRN Patsie Purchase, PA-C   pantoprazole 20 mg Oral HS Chelsea Sheth, PA-C   saccharomyces boulardii 250 mg Oral BID Patsie Purchase, PA-C        Today, Patient Was Seen By: ADILENE Mercado    ** Please Note: Dictation voice to text software may have been used in the creation of this document   **

## 2020-01-04 NOTE — PLAN OF CARE
Problem: INFECTION - ADULT  Goal: Absence or prevention of progression during hospitalization  Description  INTERVENTIONS:  - Assess and monitor for signs and symptoms of infection  - Monitor lab/diagnostic results  - Monitor all insertion sites, i e  indwelling lines, tubes, and drains  - Monitor endotracheal if appropriate and nasal secretions for changes in amount and color  - [place] appropriate cooling/warming therapies per order  - Administer medications as ordered  - Instruct and encourage patient and family to use good hand hygiene technique  - Identify and instruct in appropriate isolation precautions for identified infection/condition  Outcome: Progressing     Problem: SAFETY ADULT  Goal: Patient will remain free of falls  Description  INTERVENTIONS:  - Assess patient frequently for physical needs  -  Identify cognitive and physical deficits and behaviors that affect risk of falls    -  [place] fall precautions as indicated by assessment   - Educate patient/family on patient safety including physical limitations  - Instruct patient to call for assistance with activity based on assessment  - Modify environment to reduce risk of injury  - Consider OT/PT consult to assist with strengthening/mobility  Outcome: Progressing     Problem: DISCHARGE PLANNING  Goal: Discharge to home or other facility with appropriate resources  Description  INTERVENTIONS:  - Identify barriers to discharge w/patient and caregiver  - Arrange for needed discharge resources and transportation as appropriate  - Identify discharge learning needs (meds, wound care, etc )  - Refer to Case Management Department for coordinating discharge planning if the patient needs post-hospital services based on physician/advanced practitioner order or complex needs related to functional status, cognitive ability, or social support system   Outcome: Progressing     Problem: Knowledge Deficit  Goal: Patient/family/caregiver demonstrates understanding of disease process, treatment plan, medications, and discharge instructions  Description  Complete learning assessment and assess knowledge base  Interventions:  - Provide teaching at level of understanding  - Provide teaching via preferred learning methods  Outcome: Progressing     Problem: CARDIOVASCULAR - ADULT  Goal: Maintains optimal cardiac output and hemodynamic stability  Description  INTERVENTIONS:  - Monitor I/O, vital signs and rhythm  - Monitor for S/S and trends of decreased cardiac output  - Administer and titrate ordered vasoactive medications to optimize hemodynamic stability  - Assess quality of pulses, skin color and temperature  - Assess for signs of decreased coronary artery perfusion  - Instruct patient to report change in severity of symptoms  Outcome: Progressing  Goal: Absence of cardiac dysrhythmias or at baseline rhythm  Description  INTERVENTIONS:  - Continuous cardiac monitoring, vital signs, obtain 12 lead EKG if ordered  - Administer antiarrhythmic and heart rate control medications as ordered  - Monitor electrolytes and administer replacement therapy as ordered  Outcome: Progressing     Problem: Potential for Falls  Goal: Patient will remain free of falls  Description  INTERVENTIONS:  - Assess patient frequently for physical needs  -  Identify cognitive and physical deficits and behaviors that affect risk of falls    -  [place] fall precautions as indicated by assessment   - Educate patient/family on patient safety including physical limitations  - Instruct patient to call for assistance with activity based on assessment  - Modify environment to reduce risk of injury  - Consider OT/PT consult to assist with strengthening/mobility  Outcome: Progressing

## 2020-01-04 NOTE — NURSING NOTE
Personal belongings at bedside, IV discontinued without incident, AVS reviewed, patient verbalizes intent to comply with follow-up care, awaiting  for transport

## 2020-01-04 NOTE — UTILIZATION REVIEW
Initial Clinical Review    Admission: Date/Time/Statement: OBSERVATION 1/3/19 @ 1354    Orders Placed This Encounter   Procedures    Place in Observation     Standing Status:   Standing     Number of Occurrences:   1     Order Specific Question:   Admitting Physician     Answer:   Osmel Villalba [[id number]]     Order Specific Question:   Level of Care     Answer:   Med Surg [[id number]]     ED Arrival Information     Expected Arrival Acuity Means of Arrival Escorted By Service Admission Type    - 1/3/2020 10:41 Urgent [address] Urgent    Arrival Complaint    rapid heart beat        Chief Complaint   Patient presents with    Rapid Heart Rate     Patient states that she feels her heart racing     Assessment/Plan:   Mrs Maegan Schmid is a 51 yo female who presents to the ED from home with c/o chst pain, palpitations like "heart jumping out" and chest discomfort, chest tightness  while sitting at work when she became dizzy and sweaty, with gray color per co-workers  /100 at work  In ED given NTG with relief of chest pain but + headache and dizziness  She is admitted to OBSERVATION status with chest pain, trend troponins - were negative, tele, OP stress test if trops are negative  Leukocytosis - possibly reactive, trend  PMH:  HLD, HTN, GERD        ED Triage Vitals   Temperature Pulse Respirations Blood Pressure SpO2   01/03/20 1054 01/03/20 1048 01/03/20 1547 01/03/20 1048 01/03/20 1048   97 9 °F (36 6 °C) 95 17 (!) 178/94 100 %      Temp Source Heart Rate Source Patient Position - Orthostatic VS BP Location FiO2 (%)   01/03/20 1547 01/03/20 1547 01/04/20 0054 01/03/20 1547 --   Temporal Monitor Lying Left arm       Pain Score       01/03/20 1048       No Pain        Wt Readings from Last 1 Encounters:   01/03/20 101 kg (223 lb 1 7 oz)     Additional Vital Signs:     01/04/20 0712  97 5 °F (36 4 °C)  79  16  114/65  97 %  None (Room air)   01/04/20 0311  97 3 °F (36 3 °C)Abnormal   79  16  122/87  97 % None (Room air)   01/04/20 0054  97 8 °F (36 6 °C)  83  18  107/58  99 %  None (Room air)   01/03/20 2300    86    119/71       01/03/20 1741          98 %  None (Room air)   01/03/20 1547  97 6 °F (36 4 °C)  92  17  120/73  99 %  None (Room air)     Pertinent Labs/Diagnostic Test Results:     1/3 CXR - no acute disease     1/3 ECG - Normal sinus rhythm  Incomplete right bundle branch block  Possible Anterior infarct , age undetermined  Abnormal ECG  No previous ECGs available    1/3 ECG - Normal sinus rhythm  Low voltage QRS  Incomplete right bundle branch block  Cannot rule out Anterior infarct (cited on or before 03-JAN-2020)  Abnormal ECG  When compared with ECG of 03-JAN-2020 10:56, (unconfirmed)  No significant change was found    Results from last 7 days   Lab Units 01/04/20  0557 01/03/20  1106   WBC Thousand/uL 8 20 14 90*   HEMOGLOBIN g/dL 11 3* 12 2   HEMATOCRIT % 32 5* 35 8*   PLATELETS Thousands/uL 243 275   NEUTROS ABS Thousands/µL  --  11 90*     Results from last 7 days   Lab Units 01/04/20  0557 01/03/20  1106   SODIUM mmol/L 138 136   POTASSIUM mmol/L 4 3 4 2   CHLORIDE mmol/L 107 103   CO2 mmol/L 22 24   ANION GAP mmol/L 9 9   BUN mg/dL 21 22   CREATININE mg/dL 1 18 1 25*   EGFR ml/min/1 73sq m 54 51   CALCIUM mg/dL 9 3 9 3   MAGNESIUM mg/dL 1 9  --      Results from last 7 days   Lab Units 01/03/20  1106   AST U/L 17   ALT U/L 19   ALK PHOS U/L 47   TOTAL PROTEIN g/dL 7 3   ALBUMIN g/dL 4 3   TOTAL BILIRUBIN mg/dL 0 40     Results from last 7 days   Lab Units 01/04/20  0557 01/03/20  1106   GLUCOSE RANDOM mg/dL 82 99     Results from last 7 days   Lab Units 01/03/20  2120 01/03/20  1742 01/03/20  1306 01/03/20  1106   TROPONIN I ng/mL <0 03 <0 03 <0 03 <0 03       Results from last 7 days   Lab Units 01/03/20  1106   TSH 3RD GENERATON uIU/mL 3 310     ED Treatment:   Medication Administration from 01/03/2020 1040 to 01/03/2020 1520    Date/Time Order Dose Route Action   01/03/2020 1243 aspirin chewable tablet 324 mg 324 mg Oral Given   01/03/2020 1246 nitroglycerin (NITROSTAT) SL tablet 0 4 mg 0 4 mg Sublingual Given   01/03/2020 1334 LORazepam (ATIVAN) 2 mg/mL injection 0 5 mg 0 5 mg Intravenous Given        Past Medical History:   Diagnosis Date    GERD (gastroesophageal reflux disease)     Hypertension     Neuropathy      Present on Admission:   Essential hypertension   Mixed hyperlipidemia   GERD without esophagitis   Chest pain   Leukocytosis   Peripheral polyneuropathy    Admitting Diagnosis: Palpitations [R00 2]  Rapid heart rate [R00 0]  Chest pain [R07 9]     Age/Sex: 52 y o  female     Admission Orders:  Scheduled Medications:    Medications:  vitamin C 1,000 mg Oral Daily   b complex-vitamin C-folic acid 1 capsule Oral Daily   colestipol 1 g Oral Daily   fish oil 1,000 mg Oral Daily   gabapentin 400 mg Oral TID   lisinopril 10 mg Oral HS   pantoprazole 20 mg Oral HS   saccharomyces boulardii 250 mg Oral BID     Continuous IV Infusions:     PRN Meds:    acetaminophen 650 mg Oral Q4H PRN x1 1/3   nitroglycerin 0 4 mg Sublingual Q5 Min PRN    ondansetron 4 mg Intravenous Q6H PRN      Tele  SCDs  Ambulate q shift   Cardiac diet no caffeine, no chocolate    Network Utilization Review Department  [email or web address]  ATTENTION: Please call with any questions or concerns to [phone] and carefully listen to the prompts so that you are directed to the right person  All voicemails are confidential   Kalee Welch all requests for admission clinical reviews, approved or denied determinations and any other requests to dedicated fax number below belonging to the campus where the patient is receiving treatment   List of dedicated fax numbers for the Facilities:  [address] DENIALS (Administrative/Medical Necessity) [phone]   [address] (Maternity/NICU/Pediatrics) [phone]   [facility] [phone]   Joaquin Mattson Margarita Campos [phone]   [facility] [phone]   [facility] [address] [facility] [phone]   [facility]  [phone] [address] [facility]  [phone]   [address] [place] [address] [phone]

## 2020-01-04 NOTE — SOCIAL WORK
D/c order written, pt denied any d/c needs, pt's family transported the pt home, pt in agreement with the d/c and d/c plan home,

## 2020-01-04 NOTE — ASSESSMENT & PLAN NOTE
· Patient reported palpitations and chest pain on admission - symptoms have resolved    There was no recurrence of noted on telemetry for tachycardia and her blood pressure was normal  · blood pressure was elevated - repeats normal  · She was given nitro and Her symptoms reported improved  · Monitor troponins and telemetry - 0 03 x 4 - no abnormalities  · TSH was normal

## 2020-01-04 NOTE — DISCHARGE SUMMARY
Discharge- Baldemar Herrera 1970, 52 y o  female MRN: [id number]    Unit/Bed#: -01 Encounter: [id number]    Primary Care Provider: Huang Birmingham DO   Date and time admitted to hospital: 1/3/2020 10:47 AM        * Chest pain  Assessment & Plan  · Patient reported palpitations and chest pain on admission - symptoms have resolved  There was no recurrence of noted on telemetry for tachycardia and her blood pressure was normal  · blood pressure was elevated - repeats normal  · She was given nitro and Her symptoms reported improved  · Monitor troponins and telemetry - 0 03 x 4 - no abnormalities  · TSH was normal    Leukocytosis  Assessment & Plan  · Unclear etiology, possibly reactive  · Resolved    Mixed hyperlipidemia  Assessment & Plan  · Continue meds     GERD without esophagitis  Assessment & Plan  · Continue PPI     Peripheral polyneuropathy  Assessment & Plan  · Continue Neurontin     Essential hypertension  Assessment & Plan  · Continue meds and monitor     Discharging Physician / Practitioner: Sri Martinez PA-C  PCP: Huang Birmingham DO  Admission Date:   Admission Orders (From admission, onward)     Ordered        01/03/20 1354  Place in Observation  Once                   Discharge Date: 01/04/20    Resolved Problems  Date Reviewed: 1/4/2020    None          Consultations During Hospital Stay:  · None    Procedures Performed:   · Chest x-ray no acute disease    Significant Findings / Test Results:   · None    Incidental Findings:   · None    Test Results Pending at Discharge (will require follow up): · None     Outpatient Tests Requested:  · None    Complications:     None    Reason for Admission:  Lightheaded dizzy chest pain    Hospital Course:     Baldemar Herrera is a 52 y o  female patient who originally presented to the hospital on 1/3/2020 due to lightheaded dizzy and chest pain    Patient was at work when she felt a sensation in her chest with palpitations and chest discomfort she reportedly became sweaty and dizzy and was grade appearance her blood pressure was elevated  Family brought her to the emergency room she was admitted to rule out any acute coronary syndrome  Troponins were normal x3 thyroid level was normal   There was no abnormal findings on telemetry are her vitals during her observation stay  There is no complaints at this time patient is feeling well and is requesting discharge home  She can follow up outpatient with her PCP for any additional testing that is requested  Please see above list of diagnoses and related plan for additional information  Condition at Discharge: fair     Discharge Day Visit / Exam:     Subjective:  Patient seen in bed  She has had no recurrence of symptoms  She is feeling well and requests discharge home  She has tolerated a diet she has been up ambulating without any issues or concerns  There is no findings on her telemetry  Blood pressures been stable  Vitals: Blood Pressure: 132/69 (01/04/20 1107)  Pulse: 86 (01/04/20 1107)  Temperature: 98 3 °F (36 8 °C) (01/04/20 1107)  Temp Source: Tympanic (01/04/20 1107)  Respirations: 16 (01/04/20 1107)  Height: 5' 7" (170 2 cm) (01/03/20 1500)  Weight - Scale: 101 kg (223 lb 1 7 oz) (01/03/20 1500)  SpO2: 93 % (01/04/20 1107)     Exam:   Physical Exam   Constitutional: She is oriented to person, place, and time  She appears well-developed and well-nourished  HENT:   Head: Normocephalic and atraumatic  Eyes: Conjunctivae and EOM are normal  Right eye exhibits no discharge  Left eye exhibits no discharge  Neck: Normal range of motion  No tracheal deviation present  Cardiovascular: Normal rate, regular rhythm and normal heart sounds  Exam reveals no gallop and no friction rub  No murmur heard  Pulmonary/Chest: Effort normal and breath sounds normal  No respiratory distress  She has no wheezes  She has no rales  Abdominal: Soft   Bowel sounds are normal  She exhibits no distension and no mass  There is no tenderness  There is no guarding  Musculoskeletal: Normal range of motion  She exhibits no edema, tenderness or deformity  Neurological: She is alert and oriented to person, place, and time  Skin: Skin is warm and dry  No rash noted  No erythema  No pallor  Psychiatric: She has a normal mood and affect  Her behavior is normal  Judgment and thought content normal    Nursing note and vitals reviewed  Discussion with patient    Discharge instructions/Information to patient and family:   See after visit summary for information provided to patient and family  Provisions for Follow-Up Care:  See after visit summary for information related to follow-up care and any pertinent home health orders  Disposition:     Home    Planned Readmission:    None     Discharge Statement:  I spent 30 minutes discharging the patient  This time was spent on the day of discharge  I had direct contact with the patient on the day of discharge  Greater than 50% of the total time was spent examining patient, answering all patient questions, arranging and discussing plan of care with patient as well as directly providing post-discharge instructions  Additional time then spent on discharge activities  Discharge Medications:  See after visit summary for reconciled discharge medications provided to patient and family        ** Please Note: This note has been constructed using a voice recognition system **

## 2020-01-04 NOTE — SOCIAL WORK
Chart reviewed, assessment completed, pt is aware she is here as an obs status, pt is independent and drives,  Pt lives with her spouse in a 1 story home, 2 steps outside and a ramp, no steps inside, pt does wear glasses and states that she is able to read the medication labels, pt has rx plan at [address] [facility] in [place], pt monalisa any d/c needs, family will transport the pt home when stable for d/c , Patient/caregiver received discharge checklist   Content reviewed  Patient/caregiver encouraged to participate in discharge plan of care prior to discharge home  CM reviewed d/c planning process including the following: identifying help at home, patient preference for d/c planning needs, availability of treatment team to discuss questions or concerns patient and/or family may have regarding understanding medications and recognizing signs and symptoms once discharged  CM also encouraged patient to follow up with all recommended appointments after discharge  Patient advised of importance for patient and family to participate in managing patients medical well being

## 2020-01-06 ENCOUNTER — TRANSITIONAL CARE MANAGEMENT (OUTPATIENT)
Dept: FAMILY MEDICINE CLINIC | Facility: CLINIC | Age: 50
End: 2020-01-06

## 2020-01-10 ENCOUNTER — OFFICE VISIT (OUTPATIENT)
Dept: FAMILY MEDICINE CLINIC | Facility: CLINIC | Age: 50
End: 2020-01-10
Payer: COMMERCIAL

## 2020-01-10 VITALS
SYSTOLIC BLOOD PRESSURE: 122 MMHG | HEART RATE: 88 BPM | DIASTOLIC BLOOD PRESSURE: 78 MMHG | HEIGHT: 67 IN | BODY MASS INDEX: 35.53 KG/M2 | WEIGHT: 226.4 LBS | OXYGEN SATURATION: 98 %

## 2020-01-10 DIAGNOSIS — G62.9 PERIPHERAL POLYNEUROPATHY: ICD-10-CM

## 2020-01-10 DIAGNOSIS — K21.9 GERD WITHOUT ESOPHAGITIS: Chronic | ICD-10-CM

## 2020-01-10 DIAGNOSIS — K58.0 IRRITABLE BOWEL SYNDROME WITH DIARRHEA: ICD-10-CM

## 2020-01-10 DIAGNOSIS — R07.9 CHEST PAIN, UNSPECIFIED TYPE: Primary | ICD-10-CM

## 2020-01-10 DIAGNOSIS — M99.02 THORACIC REGION SOMATIC DYSFUNCTION: ICD-10-CM

## 2020-01-10 DIAGNOSIS — I10 ESSENTIAL HYPERTENSION: Chronic | ICD-10-CM

## 2020-01-10 PROCEDURE — 99495 TRANSJ CARE MGMT MOD F2F 14D: CPT | Performed by: FAMILY MEDICINE

## 2020-01-10 PROCEDURE — 1111F DSCHRG MED/CURRENT MED MERGE: CPT | Performed by: FAMILY MEDICINE

## 2020-01-10 PROCEDURE — 98925 OSTEOPATH MANJ 1-2 REGIONS: CPT | Performed by: FAMILY MEDICINE

## 2020-01-10 NOTE — ASSESSMENT & PLAN NOTE
Chest pain appears to be noncardiac with rule out enzymes at done through hospitalization over 23 hour care    She does not have anginal-type symptoms or worsening shortness of breath associated with this and she will follow up with Cardiology

## 2020-01-10 NOTE — ASSESSMENT & PLAN NOTE
GERD symptoms by history which could contribute to her chest pain she does not have blood in her stools or significantly worsening acid reflux indigestion or nausea she will continue with her current medication if symptoms worsen she is to double the dosage on her medication and take it twice daily for 1 week or 2 weeks and then follow up with me as needed

## 2020-01-10 NOTE — ASSESSMENT & PLAN NOTE
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

## 2020-01-10 NOTE — ASSESSMENT & PLAN NOTE
Consider reducing gabapentin as a result of side effects we discussed that at this point and will talk about it again at her next office visit she just picked up a new script for this I would recommend that she reduce it to twice daily now since she has the 400 mg at home and if symptoms worsen she can always go back to 3 times a day but she will keep in close contact with me in follow-up in 1 month

## 2020-01-10 NOTE — PROGRESS NOTES
Assessment/Plan:       Problem List Items Addressed This Visit        Digestive    GERD without esophagitis (Chronic)     GERD symptoms by history which could contribute to her chest pain she does not have blood in her stools or significantly worsening acid reflux indigestion or nausea she will continue with her current medication if symptoms worsen she is to double the dosage on her medication and take it twice daily for 1 week or 2 weeks and then follow up with me as needed         Irritable bowel syndrome with diarrhea       Cardiovascular and Mediastinum    Essential hypertension (Chronic)     Essential hypertension with initial blood pressure showing 140/80 on recheck after sitting patient's blood pressure came down to 100 and 22/76 now  I recommend maintaining good blood pressure control weight reduction exercise and she will follow up with Cardiology            Nervous and Auditory    Peripheral polyneuropathy     Consider reducing gabapentin as a result of side effects we discussed that at this point and will talk about it again at her next office visit she just picked up a new script for this I would recommend that she reduce it to twice daily now since she has the 400 mg at home and if symptoms worsen she can always go back to 3 times a day but she will keep in close contact with me in follow-up in 1 month            Other    Chest pain - Primary     Chest pain appears to be noncardiac with rule out enzymes at done through hospitalization over 23 hour care  She does not have anginal-type symptoms or worsening shortness of breath associated with this and she will follow up with Cardiology         Thoracic region somatic dysfunction     OMT done for patient with active corrections and BP reduction    Additionally some release of chest pain as it could be thoracic in nature contributing we discussed this 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  Subjective:      Patient ID: Scotty Rizzo is a 52 y o  female  Patient presents today for transition of care management after hospitalization for 23 hour stay for chest pain noncardiac she  has worsening shortness of breath  Walking up steps, but unclear anginal-type pain at this point without palpitations dizziness or headaches  The following portions of the patient's history were reviewed and updated as appropriate: allergies, current medications, past family history, past medical history, past social history, past surgical history and problem list     Review of Systems   Constitutional: Negative for chills, fatigue and fever  HENT: Negative for congestion, nosebleeds, rhinorrhea, sinus pressure and sore throat  Eyes: Negative for discharge and redness  Respiratory: Negative for cough and shortness of breath  Cardiovascular: Positive for chest pain  Negative for palpitations and leg swelling  Gastrointestinal: Negative for abdominal pain, blood in stool and nausea  Endocrine: Negative for cold intolerance, heat intolerance and polyuria  Genitourinary: Negative for dysuria and frequency  Musculoskeletal: Negative for arthralgias, back pain and myalgias  Skin: Negative for rash  Neurological: Negative for dizziness, weakness and headaches  Hematological: Negative for adenopathy  Psychiatric/Behavioral: Negative for behavioral problems and sleep disturbance  The patient is not nervous/anxious  Objective:      /78   Pulse 88   Ht 5' 7" (1 702 m)   Wt 103 kg (226 lb 6 4 oz)   SpO2 98%   BMI 35 46 kg/m²        Physical Exam   Constitutional: She is oriented to person, place, and time  She appears well-developed and well-nourished  No distress  HENT:   Head: Normocephalic and atraumatic     Right Ear: External ear normal    Left Ear: External ear normal    Nose: Nose normal    Mouth/Throat: Oropharynx is clear and moist  No oropharyngeal exudate  Eyes: Pupils are equal, round, and reactive to light  Conjunctivae and EOM are normal  Right eye exhibits no discharge  Left eye exhibits no discharge  No scleral icterus  Neck: Normal range of motion  No JVD present  No thyromegaly present  Cardiovascular: Normal rate, regular rhythm and normal heart sounds  No murmur heard  Pulmonary/Chest: Effort normal  She has no wheezes  She has no rales  She exhibits no tenderness  Abdominal: Soft  Bowel sounds are normal  She exhibits no distension and no mass  There is no tenderness  Musculoskeletal: Normal range of motion  She exhibits no edema, tenderness or deformity  Lymphadenopathy:     She has no cervical adenopathy  Neurological: She is alert and oriented to person, place, and time  She has normal reflexes  She displays normal reflexes  No cranial nerve deficit  Coordination normal    Skin: Skin is warm and dry  No rash noted  Psychiatric: She has a normal mood and affect  Her behavior is normal  Judgment and thought content normal    Nursing note and vitals reviewed       OMT  Performed by: Macarthur Bosworth, DO  Authorized by: Macarthur Bosworth,      Verbal consent obtained?: Yes    Consent given by:  Patient  Patient states understanding of procedure being performed: Yes    Patient identity confirmed:  Verbally with patient  Procedure Details:     Region evaluated and treated:  Thoracic T1 - T4 and Thoracic T5 - T9    Thoracic T1 - T4 details:     Examination Method:  Range of Motion, Contracture, Active and Tenderness, Pain    Severity:  Moderate    Treatment Method:  Muscle Energy Treatment, Myofascial Release Treatment and Soft Tissue Treatment    Response:  Improved    Thoracic T5 - T9 details:     Examination Method:  Range of Motion, Contracture    Severity:  Moderate    Treatment Method:  Soft Tissue Treatment and Myofascial Release Treatment    Response:  Improved - The somatic dysfunction is improved but not completely resolved  Total Regions Treated:  2      Data:    Laboratory Results: I have personally reviewed the pertinent laboratory results/reports   Radiology/Other Diagnostic Testing Results: I have personally reviewed pertinent reports         Lab Results   Component Value Date    WBC 8 20 01/04/2020    HGB 11 3 (L) 01/04/2020    HCT 32 5 (L) 01/04/2020    MCV 89 01/04/2020     01/04/2020     Lab Results   Component Value Date    K 4 3 01/04/2020     01/04/2020    CO2 22 01/04/2020    BUN 21 01/04/2020    CREATININE 1 18 01/04/2020    GLUF 82 01/04/2020    CALCIUM 9 3 01/04/2020    AST 17 01/03/2020    ALT 19 01/03/2020    ALKPHOS 47 01/03/2020    EGFR 54 01/04/2020     Lab Results   Component Value Date    CHOLESTEROL 219 (H) 01/04/2020    CHOLESTEROL 222 (H) 09/14/2019    CHOLESTEROL 256 (H) 03/02/2019     Lab Results   Component Value Date    HDL 62 01/04/2020    HDL 54 09/14/2019    HDL 69 (H) 03/02/2019     Lab Results   Component Value Date    LDLCALC 132 (H) 01/04/2020    LDLCALC 106 (H) 09/14/2019    LDLCALC 144 (H) 03/02/2019     Lab Results   Component Value Date    TRIG 125 01/04/2020    TRIG 311 (H) 09/14/2019    TRIG 214 (H) 03/02/2019     No results found for: [place], [place]  Lab Results   Component Value Date    [id number] 3 310 01/03/2020     No results found for: HGBA1C  No results found for: EPI Patel DO

## 2020-01-10 NOTE — ASSESSMENT & PLAN NOTE
Essential hypertension with initial blood pressure showing 140/80 on recheck after sitting patient's blood pressure came down to 100 and 22/76 now    I recommend maintaining good blood pressure control weight reduction exercise and she will follow up with Cardiology

## 2020-01-31 ENCOUNTER — TELEPHONE (OUTPATIENT)
Dept: FAMILY MEDICINE CLINIC | Facility: CLINIC | Age: 50
End: 2020-01-31

## 2020-01-31 NOTE — TELEPHONE ENCOUNTER
Patient cancelled appointment for next fridya is feeling better  Patient wants to know if she can cut back the gabapentin from 2 pills to 1 pill? She is using CBD pain rleief cream and that is working well

## 2020-03-03 DIAGNOSIS — L40.9 PSORIASIS (A TYPE OF SKIN INFLAMMATION): ICD-10-CM

## 2020-03-04 RX ORDER — BETAMETHASONE DIPROPIONATE 0.5 MG/G
CREAM TOPICAL
Qty: 30 G | Refills: 12 | Status: SHIPPED | OUTPATIENT
Start: 2020-03-04 | End: 2022-06-10 | Stop reason: ALTCHOICE

## 2020-05-11 ENCOUNTER — APPOINTMENT (OUTPATIENT)
Dept: LAB | Facility: CLINIC | Age: 50
End: 2020-05-11
Payer: COMMERCIAL

## 2020-05-11 DIAGNOSIS — E78.2 MIXED HYPERLIPIDEMIA: ICD-10-CM

## 2020-05-11 DIAGNOSIS — I10 ESSENTIAL HYPERTENSION: ICD-10-CM

## 2020-05-11 DIAGNOSIS — G62.9 PERIPHERAL POLYNEUROPATHY: ICD-10-CM

## 2020-05-11 LAB
25(OH)D3 SERPL-MCNC: 18.4 NG/ML (ref 30–100)
ALBUMIN SERPL BCP-MCNC: 3.7 G/DL (ref 3.5–5)
ALP SERPL-CCNC: 53 U/L (ref 46–116)
ALT SERPL W P-5'-P-CCNC: 20 U/L (ref 12–78)
ANION GAP SERPL CALCULATED.3IONS-SCNC: 7 MMOL/L (ref 4–13)
AST SERPL W P-5'-P-CCNC: 14 U/L (ref 5–45)
BASOPHILS # BLD AUTO: 0.06 THOUSANDS/ΜL (ref 0–0.1)
BASOPHILS NFR BLD AUTO: 1 % (ref 0–1)
BILIRUB SERPL-MCNC: 0.39 MG/DL (ref 0.2–1)
BUN SERPL-MCNC: 26 MG/DL (ref 5–25)
CALCIUM SERPL-MCNC: 8.8 MG/DL (ref 8.3–10.1)
CHLORIDE SERPL-SCNC: 105 MMOL/L (ref 100–108)
CHOLEST SERPL-MCNC: 235 MG/DL (ref 50–200)
CO2 SERPL-SCNC: 22 MMOL/L (ref 21–32)
CREAT SERPL-MCNC: 1.21 MG/DL (ref 0.6–1.3)
EOSINOPHIL # BLD AUTO: 0.24 THOUSAND/ΜL (ref 0–0.61)
EOSINOPHIL NFR BLD AUTO: 2 % (ref 0–6)
ERYTHROCYTE [DISTWIDTH] IN BLOOD BY AUTOMATED COUNT: 12.9 % (ref 11.6–15.1)
GFR SERPL CREATININE-BSD FRML MDRD: 52 ML/MIN/1.73SQ M
GLUCOSE P FAST SERPL-MCNC: 86 MG/DL (ref 65–99)
HCT VFR BLD AUTO: 32.4 % (ref 34.8–46.1)
HDLC SERPL-MCNC: 60 MG/DL
HGB BLD-MCNC: 10.8 G/DL (ref 11.5–15.4)
IMM GRANULOCYTES # BLD AUTO: 0.05 THOUSAND/UL (ref 0–0.2)
IMM GRANULOCYTES NFR BLD AUTO: 1 % (ref 0–2)
LDLC SERPL CALC-MCNC: 132 MG/DL (ref 0–100)
LYMPHOCYTES # BLD AUTO: 2.45 THOUSANDS/ΜL (ref 0.6–4.47)
LYMPHOCYTES NFR BLD AUTO: 23 % (ref 14–44)
MCH RBC QN AUTO: 30.5 PG (ref 26.8–34.3)
MCHC RBC AUTO-ENTMCNC: 33.3 G/DL (ref 31.4–37.4)
MCV RBC AUTO: 92 FL (ref 82–98)
MONOCYTES # BLD AUTO: 0.66 THOUSAND/ΜL (ref 0.17–1.22)
MONOCYTES NFR BLD AUTO: 6 % (ref 4–12)
NEUTROPHILS # BLD AUTO: 7.22 THOUSANDS/ΜL (ref 1.85–7.62)
NEUTS SEG NFR BLD AUTO: 67 % (ref 43–75)
NONHDLC SERPL-MCNC: 175 MG/DL
NRBC BLD AUTO-RTO: 0 /100 WBCS
PLATELET # BLD AUTO: 258 THOUSANDS/UL (ref 149–390)
PMV BLD AUTO: 10.8 FL (ref 8.9–12.7)
POTASSIUM SERPL-SCNC: 4.3 MMOL/L (ref 3.5–5.3)
PROT SERPL-MCNC: 7.1 G/DL (ref 6.4–8.2)
RBC # BLD AUTO: 3.54 MILLION/UL (ref 3.81–5.12)
SODIUM SERPL-SCNC: 134 MMOL/L (ref 136–145)
TRIGL SERPL-MCNC: 215 MG/DL
TSH SERPL DL<=0.05 MIU/L-ACNC: 1.9 UIU/ML (ref 0.36–3.74)
WBC # BLD AUTO: 10.68 THOUSAND/UL (ref 4.31–10.16)

## 2020-05-11 PROCEDURE — 85025 COMPLETE CBC W/AUTO DIFF WBC: CPT

## 2020-05-11 PROCEDURE — 36415 COLL VENOUS BLD VENIPUNCTURE: CPT

## 2020-05-11 PROCEDURE — 80061 LIPID PANEL: CPT

## 2020-05-11 PROCEDURE — 80053 COMPREHEN METABOLIC PANEL: CPT

## 2020-05-11 PROCEDURE — 82306 VITAMIN D 25 HYDROXY: CPT

## 2020-05-11 PROCEDURE — 84443 ASSAY THYROID STIM HORMONE: CPT

## 2020-05-15 ENCOUNTER — TELEMEDICINE (OUTPATIENT)
Dept: FAMILY MEDICINE CLINIC | Facility: CLINIC | Age: 50
End: 2020-05-15
Payer: COMMERCIAL

## 2020-05-15 VITALS — BODY MASS INDEX: 36.96 KG/M2 | HEIGHT: 66 IN | WEIGHT: 230 LBS

## 2020-05-15 DIAGNOSIS — G62.9 PERIPHERAL POLYNEUROPATHY: Primary | ICD-10-CM

## 2020-05-15 DIAGNOSIS — K58.0 IRRITABLE BOWEL SYNDROME WITH DIARRHEA: ICD-10-CM

## 2020-05-15 DIAGNOSIS — K21.9 GERD WITHOUT ESOPHAGITIS: Chronic | ICD-10-CM

## 2020-05-15 DIAGNOSIS — E78.2 MIXED HYPERLIPIDEMIA: ICD-10-CM

## 2020-05-15 DIAGNOSIS — I10 ESSENTIAL HYPERTENSION: Chronic | ICD-10-CM

## 2020-05-15 PROCEDURE — 3008F BODY MASS INDEX DOCD: CPT | Performed by: FAMILY MEDICINE

## 2020-05-15 PROCEDURE — 99214 OFFICE O/P EST MOD 30 MIN: CPT | Performed by: FAMILY MEDICINE

## 2020-05-15 RX ORDER — LISINOPRIL 10 MG/1
10 TABLET ORAL DAILY
Qty: 90 TABLET | Refills: 2 | Status: SHIPPED | OUTPATIENT
Start: 2020-05-15 | End: 2021-03-01

## 2020-05-15 RX ORDER — OMEPRAZOLE 20 MG/1
20 CAPSULE, DELAYED RELEASE ORAL DAILY
Qty: 90 CAPSULE | Refills: 2 | Status: SHIPPED | OUTPATIENT
Start: 2020-05-15 | End: 2021-03-01

## 2020-06-02 ENCOUNTER — APPOINTMENT (EMERGENCY)
Dept: CT IMAGING | Facility: HOSPITAL | Age: 50
End: 2020-06-02
Payer: COMMERCIAL

## 2020-06-02 ENCOUNTER — HOSPITAL ENCOUNTER (EMERGENCY)
Facility: HOSPITAL | Age: 50
Discharge: HOME | End: 2020-06-02
Attending: FAMILY MEDICINE | Admitting: FAMILY MEDICINE
Payer: COMMERCIAL

## 2020-06-02 VITALS
SYSTOLIC BLOOD PRESSURE: 177 MMHG | OXYGEN SATURATION: 100 % | TEMPERATURE: 97.9 F | DIASTOLIC BLOOD PRESSURE: 82 MMHG | RESPIRATION RATE: 16 BRPM | HEART RATE: 89 BPM

## 2020-06-02 DIAGNOSIS — K52.9 GASTROENTERITIS: Primary | ICD-10-CM

## 2020-06-02 LAB
ALBUMIN SERPL BCP-MCNC: 4.2 G/DL (ref 3.5–5.7)
ALP SERPL-CCNC: 44 U/L (ref 40–150)
ALT SERPL W P-5'-P-CCNC: 16 U/L (ref 7–52)
ANION GAP SERPL CALCULATED.3IONS-SCNC: 7 MMOL/L (ref 4–13)
AST SERPL W P-5'-P-CCNC: 16 U/L (ref 13–39)
BACTERIA UR QL AUTO: ABNORMAL /HPF
BASOPHILS # BLD AUTO: 0 THOUSANDS/ΜL (ref 0–0.1)
BASOPHILS NFR BLD AUTO: 0 % (ref 0–2)
BILIRUB SERPL-MCNC: 0.3 MG/DL (ref 0.2–1)
BILIRUB UR QL STRIP: NEGATIVE
BUN SERPL-MCNC: 15 MG/DL (ref 7–25)
CALCIUM SERPL-MCNC: 8.7 MG/DL (ref 8.6–10.5)
CHLORIDE SERPL-SCNC: 105 MMOL/L (ref 98–107)
CLARITY UR: CLEAR
CO2 SERPL-SCNC: 24 MMOL/L (ref 21–31)
COLOR UR: YELLOW
CREAT SERPL-MCNC: 1.2 MG/DL (ref 0.6–1.2)
EOSINOPHIL # BLD AUTO: 0.1 THOUSAND/ΜL (ref 0–0.61)
EOSINOPHIL NFR BLD AUTO: 1 % (ref 0–5)
ERYTHROCYTE [DISTWIDTH] IN BLOOD BY AUTOMATED COUNT: 13 % (ref 11.5–14.5)
GFR SERPL CREATININE-BSD FRML MDRD: 53 ML/MIN/1.73SQ M
GLUCOSE SERPL-MCNC: 85 MG/DL (ref 65–99)
GLUCOSE UR STRIP-MCNC: NEGATIVE MG/DL
HCT VFR BLD AUTO: 32.7 % (ref 42–47)
HGB BLD-MCNC: 11.5 G/DL (ref 12–16)
HGB UR QL STRIP.AUTO: ABNORMAL
KETONES UR STRIP-MCNC: NEGATIVE MG/DL
LEUKOCYTE ESTERASE UR QL STRIP: NEGATIVE
LIPASE SERPL-CCNC: 20 U/L (ref 11–82)
LYMPHOCYTES # BLD AUTO: 1.6 THOUSANDS/ΜL (ref 0.6–4.47)
LYMPHOCYTES NFR BLD AUTO: 16 % (ref 21–51)
MCH RBC QN AUTO: 31.2 PG (ref 26–34)
MCHC RBC AUTO-ENTMCNC: 35.2 G/DL (ref 31–37)
MCV RBC AUTO: 89 FL (ref 81–99)
MONOCYTES # BLD AUTO: 0.3 THOUSAND/ΜL (ref 0.17–1.22)
MONOCYTES NFR BLD AUTO: 3 % (ref 2–12)
NEUTROPHILS # BLD AUTO: 8.1 THOUSANDS/ΜL (ref 1.4–6.5)
NEUTS SEG NFR BLD AUTO: 81 % (ref 42–75)
NITRITE UR QL STRIP: NEGATIVE
NON-SQ EPI CELLS URNS QL MICRO: ABNORMAL /HPF
PH UR STRIP.AUTO: 6 [PH]
PLATELET # BLD AUTO: 247 THOUSANDS/UL (ref 149–390)
PMV BLD AUTO: 8.1 FL (ref 8.6–11.7)
POTASSIUM SERPL-SCNC: 4 MMOL/L (ref 3.5–5.5)
PROT SERPL-MCNC: 6.9 G/DL (ref 6.4–8.9)
PROT UR STRIP-MCNC: ABNORMAL MG/DL
RBC # BLD AUTO: 3.69 MILLION/UL (ref 3.9–5.2)
RBC #/AREA URNS AUTO: ABNORMAL /HPF
SODIUM SERPL-SCNC: 136 MMOL/L (ref 134–143)
SP GR UR STRIP.AUTO: <=1.005 (ref 1–1.03)
UROBILINOGEN UR QL STRIP.AUTO: 0.2 E.U./DL
WBC # BLD AUTO: 10.1 THOUSAND/UL (ref 4.8–10.8)
WBC #/AREA URNS AUTO: ABNORMAL /HPF

## 2020-06-02 PROCEDURE — 96375 TX/PRO/DX INJ NEW DRUG ADDON: CPT

## 2020-06-02 PROCEDURE — 99284 EMERGENCY DEPT VISIT MOD MDM: CPT | Performed by: FAMILY MEDICINE

## 2020-06-02 PROCEDURE — 80053 COMPREHEN METABOLIC PANEL: CPT | Performed by: FAMILY MEDICINE

## 2020-06-02 PROCEDURE — 74176 CT ABD & PELVIS W/O CONTRAST: CPT

## 2020-06-02 PROCEDURE — 85025 COMPLETE CBC W/AUTO DIFF WBC: CPT | Performed by: FAMILY MEDICINE

## 2020-06-02 PROCEDURE — 83690 ASSAY OF LIPASE: CPT | Performed by: FAMILY MEDICINE

## 2020-06-02 PROCEDURE — 96374 THER/PROPH/DIAG INJ IV PUSH: CPT

## 2020-06-02 PROCEDURE — 81001 URINALYSIS AUTO W/SCOPE: CPT | Performed by: FAMILY MEDICINE

## 2020-06-02 PROCEDURE — 81003 URINALYSIS AUTO W/O SCOPE: CPT | Performed by: FAMILY MEDICINE

## 2020-06-02 PROCEDURE — 99284 EMERGENCY DEPT VISIT MOD MDM: CPT

## 2020-06-02 PROCEDURE — 96361 HYDRATE IV INFUSION ADD-ON: CPT

## 2020-06-02 PROCEDURE — 36415 COLL VENOUS BLD VENIPUNCTURE: CPT | Performed by: FAMILY MEDICINE

## 2020-06-02 RX ORDER — KETOROLAC TROMETHAMINE 30 MG/ML
30 INJECTION, SOLUTION INTRAMUSCULAR; INTRAVENOUS ONCE
Status: COMPLETED | OUTPATIENT
Start: 2020-06-02 | End: 2020-06-02

## 2020-06-02 RX ORDER — ONDANSETRON 2 MG/ML
4 INJECTION INTRAMUSCULAR; INTRAVENOUS ONCE
Status: COMPLETED | OUTPATIENT
Start: 2020-06-02 | End: 2020-06-02

## 2020-06-02 RX ORDER — ONDANSETRON 4 MG/1
4 TABLET, FILM COATED ORAL EVERY 6 HOURS
Qty: 12 TABLET | Refills: 0 | Status: SHIPPED | OUTPATIENT
Start: 2020-06-02 | End: 2021-02-26 | Stop reason: ALTCHOICE

## 2020-06-02 RX ADMIN — FAMOTIDINE 40 MG: 10 INJECTION, SOLUTION INTRAVENOUS at 09:12

## 2020-06-02 RX ADMIN — ONDANSETRON 4 MG: 2 INJECTION INTRAMUSCULAR; INTRAVENOUS at 09:12

## 2020-06-02 RX ADMIN — SODIUM CHLORIDE 1000 ML: 0.9 INJECTION, SOLUTION INTRAVENOUS at 09:13

## 2020-06-02 RX ADMIN — KETOROLAC TROMETHAMINE 30 MG: 30 INJECTION, SOLUTION INTRAMUSCULAR at 10:10

## 2020-06-04 ENCOUNTER — VBI (OUTPATIENT)
Dept: ADMINISTRATIVE | Facility: OTHER | Age: 50
End: 2020-06-04

## 2020-11-14 ENCOUNTER — APPOINTMENT (OUTPATIENT)
Dept: LAB | Facility: CLINIC | Age: 50
End: 2020-11-14
Payer: COMMERCIAL

## 2020-11-14 DIAGNOSIS — E78.2 MIXED HYPERLIPIDEMIA: ICD-10-CM

## 2020-11-14 DIAGNOSIS — I10 ESSENTIAL HYPERTENSION: Chronic | ICD-10-CM

## 2020-11-14 LAB
ALBUMIN SERPL BCP-MCNC: 3.5 G/DL (ref 3.5–5)
ALP SERPL-CCNC: 57 U/L (ref 46–116)
ALT SERPL W P-5'-P-CCNC: 23 U/L (ref 12–78)
ANION GAP SERPL CALCULATED.3IONS-SCNC: 6 MMOL/L (ref 4–13)
AST SERPL W P-5'-P-CCNC: 10 U/L (ref 5–45)
BASOPHILS # BLD AUTO: 0.05 THOUSANDS/ΜL (ref 0–0.1)
BASOPHILS NFR BLD AUTO: 1 % (ref 0–1)
BILIRUB SERPL-MCNC: 0.4 MG/DL (ref 0.2–1)
BUN SERPL-MCNC: 20 MG/DL (ref 5–25)
CALCIUM SERPL-MCNC: 8.7 MG/DL (ref 8.3–10.1)
CHLORIDE SERPL-SCNC: 106 MMOL/L (ref 100–108)
CHOLEST SERPL-MCNC: 260 MG/DL (ref 50–200)
CO2 SERPL-SCNC: 26 MMOL/L (ref 21–32)
CREAT SERPL-MCNC: 1.22 MG/DL (ref 0.6–1.3)
EOSINOPHIL # BLD AUTO: 0.27 THOUSAND/ΜL (ref 0–0.61)
EOSINOPHIL NFR BLD AUTO: 3 % (ref 0–6)
ERYTHROCYTE [DISTWIDTH] IN BLOOD BY AUTOMATED COUNT: 12.5 % (ref 11.6–15.1)
GFR SERPL CREATININE-BSD FRML MDRD: 52 ML/MIN/1.73SQ M
GLUCOSE P FAST SERPL-MCNC: 81 MG/DL (ref 65–99)
HCT VFR BLD AUTO: 34.1 % (ref 34.8–46.1)
HDLC SERPL-MCNC: 74 MG/DL
HGB BLD-MCNC: 11.4 G/DL (ref 11.5–15.4)
IMM GRANULOCYTES # BLD AUTO: 0.04 THOUSAND/UL (ref 0–0.2)
IMM GRANULOCYTES NFR BLD AUTO: 0 % (ref 0–2)
LDLC SERPL CALC-MCNC: 137 MG/DL (ref 0–100)
LYMPHOCYTES # BLD AUTO: 2.75 THOUSANDS/ΜL (ref 0.6–4.47)
LYMPHOCYTES NFR BLD AUTO: 26 % (ref 14–44)
MCH RBC QN AUTO: 30.7 PG (ref 26.8–34.3)
MCHC RBC AUTO-ENTMCNC: 33.4 G/DL (ref 31.4–37.4)
MCV RBC AUTO: 92 FL (ref 82–98)
MONOCYTES # BLD AUTO: 0.54 THOUSAND/ΜL (ref 0.17–1.22)
MONOCYTES NFR BLD AUTO: 5 % (ref 4–12)
NEUTROPHILS # BLD AUTO: 6.91 THOUSANDS/ΜL (ref 1.85–7.62)
NEUTS SEG NFR BLD AUTO: 65 % (ref 43–75)
NONHDLC SERPL-MCNC: 186 MG/DL
NRBC BLD AUTO-RTO: 0 /100 WBCS
PLATELET # BLD AUTO: 272 THOUSANDS/UL (ref 149–390)
PMV BLD AUTO: 10.9 FL (ref 8.9–12.7)
POTASSIUM SERPL-SCNC: 4.6 MMOL/L (ref 3.5–5.3)
PROT SERPL-MCNC: 7 G/DL (ref 6.4–8.2)
RBC # BLD AUTO: 3.71 MILLION/UL (ref 3.81–5.12)
SODIUM SERPL-SCNC: 138 MMOL/L (ref 136–145)
TRIGL SERPL-MCNC: 247 MG/DL
TSH SERPL DL<=0.05 MIU/L-ACNC: 1.88 UIU/ML (ref 0.36–3.74)
WBC # BLD AUTO: 10.56 THOUSAND/UL (ref 4.31–10.16)

## 2020-11-14 PROCEDURE — 85025 COMPLETE CBC W/AUTO DIFF WBC: CPT

## 2020-11-14 PROCEDURE — 36415 COLL VENOUS BLD VENIPUNCTURE: CPT

## 2020-11-14 PROCEDURE — 84443 ASSAY THYROID STIM HORMONE: CPT

## 2020-11-14 PROCEDURE — 80061 LIPID PANEL: CPT

## 2020-11-14 PROCEDURE — 80053 COMPREHEN METABOLIC PANEL: CPT

## 2020-11-20 ENCOUNTER — OFFICE VISIT (OUTPATIENT)
Dept: FAMILY MEDICINE CLINIC | Facility: CLINIC | Age: 50
End: 2020-11-20
Payer: COMMERCIAL

## 2020-11-20 VITALS
HEIGHT: 66 IN | WEIGHT: 233.4 LBS | OXYGEN SATURATION: 99 % | SYSTOLIC BLOOD PRESSURE: 124 MMHG | HEART RATE: 93 BPM | BODY MASS INDEX: 37.51 KG/M2 | TEMPERATURE: 97.9 F | DIASTOLIC BLOOD PRESSURE: 80 MMHG

## 2020-11-20 DIAGNOSIS — Z12.31 VISIT FOR SCREENING MAMMOGRAM: ICD-10-CM

## 2020-11-20 DIAGNOSIS — D50.8 IRON DEFICIENCY ANEMIA SECONDARY TO INADEQUATE DIETARY IRON INTAKE: ICD-10-CM

## 2020-11-20 DIAGNOSIS — K21.9 GERD WITHOUT ESOPHAGITIS: Primary | Chronic | ICD-10-CM

## 2020-11-20 DIAGNOSIS — E78.2 MIXED HYPERLIPIDEMIA: ICD-10-CM

## 2020-11-20 DIAGNOSIS — M05.79 RHEUMATOID ARTHRITIS INVOLVING MULTIPLE SITES WITH POSITIVE RHEUMATOID FACTOR (HCC): ICD-10-CM

## 2020-11-20 DIAGNOSIS — I10 ESSENTIAL HYPERTENSION: Chronic | ICD-10-CM

## 2020-11-20 DIAGNOSIS — G62.9 PERIPHERAL POLYNEUROPATHY: ICD-10-CM

## 2020-11-20 PROCEDURE — 3725F SCREEN DEPRESSION PERFORMED: CPT | Performed by: FAMILY MEDICINE

## 2020-11-20 PROCEDURE — 99396 PREV VISIT EST AGE 40-64: CPT | Performed by: FAMILY MEDICINE

## 2020-11-20 PROCEDURE — 3079F DIAST BP 80-89 MM HG: CPT | Performed by: FAMILY MEDICINE

## 2020-11-20 PROCEDURE — 1036F TOBACCO NON-USER: CPT | Performed by: FAMILY MEDICINE

## 2020-11-20 PROCEDURE — 3074F SYST BP LT 130 MM HG: CPT | Performed by: FAMILY MEDICINE

## 2020-11-20 PROCEDURE — 3008F BODY MASS INDEX DOCD: CPT | Performed by: FAMILY MEDICINE

## 2020-11-20 RX ORDER — ROSUVASTATIN CALCIUM 10 MG/1
10 TABLET, COATED ORAL DAILY
Qty: 90 TABLET | Refills: 3 | Status: SHIPPED | OUTPATIENT
Start: 2020-11-20 | End: 2021-08-27 | Stop reason: SDUPTHER

## 2021-02-26 ENCOUNTER — OFFICE VISIT (OUTPATIENT)
Dept: FAMILY MEDICINE CLINIC | Facility: CLINIC | Age: 51
End: 2021-02-26
Payer: COMMERCIAL

## 2021-02-26 VITALS
TEMPERATURE: 98.3 F | SYSTOLIC BLOOD PRESSURE: 120 MMHG | OXYGEN SATURATION: 100 % | WEIGHT: 236 LBS | DIASTOLIC BLOOD PRESSURE: 80 MMHG | BODY MASS INDEX: 37.93 KG/M2 | HEIGHT: 66 IN | HEART RATE: 95 BPM

## 2021-02-26 DIAGNOSIS — M05.79 RHEUMATOID ARTHRITIS INVOLVING MULTIPLE SITES WITH POSITIVE RHEUMATOID FACTOR (HCC): ICD-10-CM

## 2021-02-26 DIAGNOSIS — E78.2 MIXED HYPERLIPIDEMIA: ICD-10-CM

## 2021-02-26 DIAGNOSIS — G62.9 PERIPHERAL POLYNEUROPATHY: ICD-10-CM

## 2021-02-26 DIAGNOSIS — D50.8 IRON DEFICIENCY ANEMIA SECONDARY TO INADEQUATE DIETARY IRON INTAKE: ICD-10-CM

## 2021-02-26 DIAGNOSIS — I10 ESSENTIAL HYPERTENSION: Chronic | ICD-10-CM

## 2021-02-26 DIAGNOSIS — K21.9 GERD WITHOUT ESOPHAGITIS: Primary | Chronic | ICD-10-CM

## 2021-02-26 PROCEDURE — 3074F SYST BP LT 130 MM HG: CPT | Performed by: FAMILY MEDICINE

## 2021-02-26 PROCEDURE — 3008F BODY MASS INDEX DOCD: CPT | Performed by: FAMILY MEDICINE

## 2021-02-26 PROCEDURE — 99214 OFFICE O/P EST MOD 30 MIN: CPT | Performed by: FAMILY MEDICINE

## 2021-02-26 PROCEDURE — 3079F DIAST BP 80-89 MM HG: CPT | Performed by: FAMILY MEDICINE

## 2021-02-26 PROCEDURE — 1036F TOBACCO NON-USER: CPT | Performed by: FAMILY MEDICINE

## 2021-02-26 RX ORDER — MONTELUKAST SODIUM 4 MG/1
TABLET, CHEWABLE ORAL
COMMUNITY
Start: 2021-02-07

## 2021-02-26 RX ORDER — PRAMIPEXOLE DIHYDROCHLORIDE 0.25 MG/1
0.25 TABLET ORAL 3 TIMES DAILY
Qty: 90 TABLET | Refills: 3 | Status: SHIPPED | OUTPATIENT
Start: 2021-02-26 | End: 2022-06-10 | Stop reason: ALTCHOICE

## 2021-02-26 NOTE — ASSESSMENT & PLAN NOTE
Hyperlipidemia stable with Crestor 10 mg tablets continue heart healthy diet avoiding  Excess saturated fats follow-up lipid profile CMP at next office visit all labs reviewed today with recommendations provided

## 2021-02-26 NOTE — ASSESSMENT & PLAN NOTE
Rheumatoid arthritis with positive rheumatoid factor continue with Enbrel continue with follow-up through Rheumatology

## 2021-02-26 NOTE — ASSESSMENT & PLAN NOTE
Peripheral polyneuropathy stable currently without worsening pain patient will continue with home exercise program stretching and strengthening

## 2021-02-26 NOTE — ASSESSMENT & PLAN NOTE
GERD symptoms currently stable with omeprazole continue capsule as directed follow-up as scheduled next office visit

## 2021-02-26 NOTE — PROGRESS NOTES
Assessment/Plan:       Problem List Items Addressed This Visit        Digestive    GERD without esophagitis - Primary (Chronic)      GERD symptoms currently stable with omeprazole continue capsule as directed follow-up as scheduled next office visit         Relevant Orders    CBC and differential    Comprehensive metabolic panel    Lipid panel    TSH, 3rd generation with Free T4 reflex    HIV 1/2 Antigen/Antibody (4th Generation) w Reflex [facility]       Cardiovascular and Mediastinum    Essential hypertension (Chronic)      Hypertension stable continue current medication as directed         Relevant Orders    CBC and differential    Comprehensive metabolic panel    Lipid panel    TSH, 3rd generation with Free T4 reflex    HIV 1/2 Antigen/Antibody (4th Generation) w Reflex [facility]       Nervous and Auditory    Peripheral polyneuropathy      Peripheral polyneuropathy stable currently without worsening pain patient will continue with home exercise program stretching and strengthening         Relevant Medications    pramipexole (MIRAPEX) 0 25 mg tablet    Other Relevant Orders    CBC and differential    Comprehensive metabolic panel    Lipid panel    TSH, 3rd generation with Free T4 reflex    HIV 1/2 Antigen/Antibody (4th Generation) w Reflex [facility]       Musculoskeletal and Integument    Rheumatoid arthritis involving multiple sites with positive rheumatoid factor (HCC)      Rheumatoid arthritis with positive rheumatoid factor continue with Enbrel continue with follow-up through Rheumatology         Relevant Orders    CBC and differential    Comprehensive metabolic panel    Lipid panel    TSH, 3rd generation with Free T4 reflex    HIV 1/2 Antigen/Antibody (4th Generation) w Reflex [facility]       Other    Mixed hyperlipidemia      Hyperlipidemia stable with Crestor 10 mg tablets continue heart healthy diet avoiding  Excess saturated fats follow-up lipid profile CMP at next office visit all labs reviewed today with recommendations provided         Relevant Medications    colestipol (COLESTID) 1 g tablet    Other Relevant Orders    CBC and differential    Comprehensive metabolic panel    Lipid panel    TSH, 3rd generation with Free T4 reflex    HIV 1/2 Antigen/Antibody (4th Generation) w Reflex [facility]    Iron deficiency anemia secondary to inadequate dietary iron intake      Continue over-the-counter supplement and iron rich foods follow CBC iron levels yearly         Relevant Orders    Iron    CBC and differential    Comprehensive metabolic panel    Lipid panel    TSH, 3rd generation with Free T4 reflex    HIV 1/2 Antigen/Antibody (4th Generation) w Reflex [facility]            Subjective:      Patient ID: Roselyn Mckeon is a 46 y o  female  Patient is here presenting today for general checkup and review of lab work and medications      The following portions of the patient's history were reviewed and updated as appropriate: allergies, current medications, past family history, past medical history, past social history, past surgical history and problem list     Review of Systems   Constitutional: Negative for chills, fatigue and fever  HENT: Negative for congestion, nosebleeds, rhinorrhea, sinus pressure and sore throat  Eyes: Negative for discharge and redness  Respiratory: Negative for cough and shortness of breath  Cardiovascular: Negative for chest pain, palpitations and leg swelling  Gastrointestinal: Negative for abdominal pain, blood in stool and nausea  Endocrine: Negative for cold intolerance, heat intolerance and polyuria  Genitourinary: Negative for dysuria and frequency  Musculoskeletal: Negative for arthralgias, back pain and myalgias  Skin: Negative for rash  Neurological: Negative for dizziness, weakness and headaches  Hematological: Negative for adenopathy  Psychiatric/Behavioral: Negative for behavioral problems and sleep disturbance  The patient is not nervous/anxious  Objective:      /80   Pulse 95   Temp 98 3 °F (36 8 °C)   Ht 5' 6" (1 676 m)   Wt 107 kg (236 lb)   SpO2 100%   BMI 38 09 kg/m²        Physical Exam  Vitals signs and nursing note reviewed  Constitutional:       General: She is not in acute distress  Appearance: Normal appearance  She is well-developed and normal weight  HENT:      Head: Normocephalic and atraumatic  Right Ear: Tympanic membrane and external ear normal       Left Ear: Tympanic membrane and external ear normal       Nose: Nose normal       Mouth/Throat:      Mouth: Mucous membranes are moist       Pharynx: Oropharynx is clear  No oropharyngeal exudate  Eyes:      General: No scleral icterus  Right eye: No discharge  Left eye: No discharge  Conjunctiva/sclera: Conjunctivae normal       Pupils: Pupils are equal, round, and reactive to light  Neck:      Musculoskeletal: Normal range of motion  Thyroid: No thyromegaly  Vascular: No JVD  Cardiovascular:      Rate and Rhythm: Regular rhythm  Tachycardia present  Heart sounds: Normal heart sounds  No murmur  Pulmonary:      Effort: Pulmonary effort is normal       Breath sounds: No wheezing or rales  Chest:      Chest wall: No tenderness  Abdominal:      General: Bowel sounds are normal  There is no distension  Palpations: Abdomen is soft  There is no mass  Tenderness: There is no abdominal tenderness  Musculoskeletal: Normal range of motion  General: No tenderness or deformity  Lymphadenopathy:      Cervical: No cervical adenopathy  Skin:     General: Skin is warm and dry  Findings: No rash  Neurological:      General: No focal deficit present  Mental Status: She is alert and oriented to person, place, and time  Cranial Nerves: No cranial nerve deficit  Coordination: Coordination normal       Deep Tendon Reflexes: Reflexes are normal and symmetric   Reflexes normal    Psychiatric: Mood and Affect: Mood normal          Behavior: Behavior normal          Thought Content: Thought content normal          Judgment: Judgment normal           Data:    Laboratory Results: I have personally reviewed the pertinent laboratory results/reports   Radiology/Other Diagnostic Testing Results: I have personally reviewed pertinent reports         Lab Results   Component Value Date    WBC 10 56 (H) 11/14/2020    HGB 11 4 (L) 11/14/2020    HCT 34 1 (L) 11/14/2020    MCV 92 11/14/2020     11/14/2020     Lab Results   Component Value Date    K 4 6 11/14/2020     11/14/2020    CO2 26 11/14/2020    BUN 20 11/14/2020    CREATININE 1 22 11/14/2020    GLUF 81 11/14/2020    CALCIUM 8 7 11/14/2020    AST 10 11/14/2020    ALT 23 11/14/2020    ALKPHOS 57 11/14/2020    EGFR 52 11/14/2020     Lab Results   Component Value Date    CHOLESTEROL 260 (H) 11/14/2020    CHOLESTEROL 235 (H) 05/11/2020    CHOLESTEROL 219 (H) 01/04/2020     Lab Results   Component Value Date    HDL 74 11/14/2020    HDL 60 05/11/2020    HDL 62 01/04/2020     Lab Results   Component Value Date    LDLCALC 137 (H) 11/14/2020    LDLCALC 132 (H) 05/11/2020    LDLCALC 132 (H) 01/04/2020     Lab Results   Component Value Date    TRIG 247 (H) 11/14/2020    TRIG 215 (H) 05/11/2020    TRIG 125 01/04/2020     No results found for: [place], [place]  Lab Results   Component Value Date    [id number] 1 880 11/14/2020     No results found for: HGBA1C  No results found for: PSA    [facility] [place], DO

## 2021-02-26 NOTE — PATIENT INSTRUCTIONS
Heart Healthy Diet   WHAT YOU NEED TO KNOW:   A heart healthy diet is an eating plan low in unhealthy fats and sodium (salt)  The plan is high in healthy fats and fiber  A heart healthy diet helps improve your cholesterol levels and lowers your risk for heart disease and stroke  A dietitian will teach you how to read and understand food labels  DISCHARGE INSTRUCTIONS:   Heart healthy diet guidelines to follow:   · Choose foods that contain healthy fats  ? Unsaturated fats  include monounsaturated and polyunsaturated fats  Unsaturated fat is found in foods such as soybean, canola, olive, corn, and safflower oils  It is also found in soft tub margarine that is made with liquid vegetable oil  ? Omega-3 fat  is found in certain fish, such as salmon, tuna, and trout, and in walnuts and flaxseed  Eat fish high in omega-3 fats at least 2 times a week  · Get 20 to 30 grams of fiber each day  Fruits, vegetables, whole-grain foods, and legumes (cooked beans) are good sources of fiber  · Limit or do not have unhealthy fats  ? Cholesterol  is found in animal foods, such as eggs and lobster, and in dairy products made from whole milk  Limit cholesterol to less than 200 mg each day  ? Saturated fat  is found in meats, such as watson and hamburger  It is also found in chicken or turkey skin, whole milk, and butter  Limit saturated fat to less than 7% of your total daily calories  ? Trans fat  is found in packaged foods, such as potato chips and cookies  It is also in hard margarine, some fried foods, and shortening  Do not eat foods that contain trans fats  · Limit sodium as directed  You may be told to limit sodium to 2,000 to 2,300 mg each day  Choose low-sodium or no-salt-added foods  Add little or no salt to food you prepare  Use herbs and spices in place of salt         Include the following in your heart healthy plan:  Ask your dietitian or healthcare provider how many servings to have from each of the following food groups:  · Grains:      ? Whole-wheat breads, cereals, and pastas, and brown rice    ? Low-fat, low-sodium crackers and chips    · Vegetables:      ? Broccoli, green beans, green peas, and spinach    ? Collards, kale, and lima beans    ? Carrots, sweet potatoes, tomatoes, and peppers    ? Canned vegetables with no salt added    · Fruits:      ? Bananas, peaches, pears, and pineapple    ? Grapes, raisins, and dates    ? Oranges, tangerines, grapefruit, orange juice, and grapefruit juice    ? Apricots, mangoes, melons, and papaya    ? Raspberries and strawberries    ? Canned fruit with no added sugar    · Low-fat dairy:      ? Nonfat (skim) milk, 1% milk, and low-fat almond, cashew, or soy milks fortified with calcium    ? Low-fat cheese, regular or frozen yogurt, and cottage cheese    · Meats and proteins:      ? Lean cuts of beef and pork (loin, leg, round), skinless chicken and turkey    ? Legumes, soy products, egg whites, or nuts    Limit or do not include the following in your heart healthy plan:   · Unhealthy fats and oils:      ? Whole or 2% milk, cream cheese, sour cream, or cheese    ? High-fat cuts of beef (T-bone steaks, ribs), chicken or turkey with skin, and organ meats such as liver    ? Butter, stick margarine, shortening, and cooking oils such as coconut or palm oil    · Foods and liquids high in sodium:      ? Packaged foods, such as frozen dinners, cookies, macaroni and cheese, and cereals with more than 300 mg of sodium per serving    ? Vegetables with added sodium, such as instant potatoes, vegetables with added sauces, or regular canned vegetables    ? Cured or smoked meats, such as hot dogs, watson, and sausage    ? High-sodium ketchup, barbecue sauce, salad dressing, pickles, olives, soy sauce, or miso    · Foods and liquids high in sugar:      ? Candy, cake, cookies, pies, or doughnuts    ? Soft drinks (soda), sports drinks, or sweetened tea    ?  Canned or dry mixes for cakes, soups, sauces, or gravies    Other healthy heart guidelines:   · Do not smoke  Nicotine and other chemicals in cigarettes and cigars can cause lung and heart damage  Ask your healthcare provider for information if you currently smoke and need help to quit  E-cigarettes or smokeless tobacco still contain nicotine  Talk to your healthcare provider before you use these products  · Limit or do not drink alcohol as directed  Alcohol can damage your heart and raise your blood pressure  Your healthcare provider may give you specific daily and weekly limits  The general recommended limit is 1 drink a day for women 21 or older and for men 72 or older  Do not have more than 3 drinks in a day or 7 in a week  The recommended limit is 2 drinks a day for men 24to 59years of age  Do not have more than 4 drinks in a day or 14 in a week  A drink of alcohol is 12 ounces of beer, 5 ounces of wine, or 1½ ounces of liquor  · Exercise regularly  Exercise can help you maintain a healthy weight and improve your blood pressure and cholesterol levels  Regular exercise can also decrease your risk for heart problems  Ask your healthcare provider about the best exercise plan for you  Do not start an exercise program without asking your healthcare provider  Follow up with your doctor or cardiologist as directed:  Write down your questions so you remember to ask them during your visits  © Copyright [address] Information is for End User's use only and may not be sold, redistributed or otherwise used for commercial purposes  All illustrations and images included in CareNotes® are the copyrighted property of A D A M [employer] Inc  or [address] [place] [address]  The above information is an  only  It is not intended as medical advice for individual conditions or treatments  Talk to your doctor, nurse or pharmacist before following any medical regimen to see if it is safe and effective for you

## 2021-02-28 DIAGNOSIS — I10 ESSENTIAL HYPERTENSION: Chronic | ICD-10-CM

## 2021-03-01 RX ORDER — LISINOPRIL 10 MG/1
TABLET ORAL
Qty: 90 TABLET | Refills: 3 | Status: SHIPPED | OUTPATIENT
Start: 2021-03-01 | End: 2021-08-27 | Stop reason: SDUPTHER

## 2021-03-01 RX ORDER — OMEPRAZOLE 20 MG/1
CAPSULE, DELAYED RELEASE ORAL
Qty: 90 CAPSULE | Refills: 3 | Status: SHIPPED | OUTPATIENT
Start: 2021-03-01 | End: 2021-08-27 | Stop reason: SDUPTHER

## 2021-03-06 ENCOUNTER — LAB (OUTPATIENT)
Dept: LAB | Facility: CLINIC | Age: 51
End: 2021-03-06
Payer: COMMERCIAL

## 2021-03-06 DIAGNOSIS — M05.79 RHEUMATOID ARTHRITIS INVOLVING MULTIPLE SITES WITH POSITIVE RHEUMATOID FACTOR (HCC): ICD-10-CM

## 2021-03-06 DIAGNOSIS — G62.9 PERIPHERAL POLYNEUROPATHY: ICD-10-CM

## 2021-03-06 DIAGNOSIS — E78.2 MIXED HYPERLIPIDEMIA: ICD-10-CM

## 2021-03-06 DIAGNOSIS — I10 ESSENTIAL HYPERTENSION: Chronic | ICD-10-CM

## 2021-03-06 DIAGNOSIS — D50.8 IRON DEFICIENCY ANEMIA SECONDARY TO INADEQUATE DIETARY IRON INTAKE: ICD-10-CM

## 2021-03-06 DIAGNOSIS — Z12.31 VISIT FOR SCREENING MAMMOGRAM: ICD-10-CM

## 2021-03-06 DIAGNOSIS — K21.9 GERD WITHOUT ESOPHAGITIS: Chronic | ICD-10-CM

## 2021-03-06 LAB
ALBUMIN SERPL BCP-MCNC: 3.7 G/DL (ref 3.5–5)
ALP SERPL-CCNC: 65 U/L (ref 46–116)
ALT SERPL W P-5'-P-CCNC: 29 U/L (ref 12–78)
ANION GAP SERPL CALCULATED.3IONS-SCNC: 5 MMOL/L (ref 4–13)
AST SERPL W P-5'-P-CCNC: 11 U/L (ref 5–45)
BASOPHILS # BLD AUTO: 0.06 THOUSANDS/ΜL (ref 0–0.1)
BASOPHILS NFR BLD AUTO: 1 % (ref 0–1)
BILIRUB SERPL-MCNC: 0.47 MG/DL (ref 0.2–1)
BUN SERPL-MCNC: 23 MG/DL (ref 5–25)
CALCIUM SERPL-MCNC: 8.7 MG/DL (ref 8.3–10.1)
CHLORIDE SERPL-SCNC: 109 MMOL/L (ref 100–108)
CHOLEST SERPL-MCNC: 183 MG/DL (ref 50–200)
CO2 SERPL-SCNC: 25 MMOL/L (ref 21–32)
CREAT SERPL-MCNC: 1.47 MG/DL (ref 0.6–1.3)
EOSINOPHIL # BLD AUTO: 0.28 THOUSAND/ΜL (ref 0–0.61)
EOSINOPHIL NFR BLD AUTO: 2 % (ref 0–6)
ERYTHROCYTE [DISTWIDTH] IN BLOOD BY AUTOMATED COUNT: 13 % (ref 11.6–15.1)
GFR SERPL CREATININE-BSD FRML MDRD: 41 ML/MIN/1.73SQ M
GLUCOSE P FAST SERPL-MCNC: 87 MG/DL (ref 65–99)
HCT VFR BLD AUTO: 35.1 % (ref 34.8–46.1)
HDLC SERPL-MCNC: 61 MG/DL
HGB BLD-MCNC: 12 G/DL (ref 11.5–15.4)
IMM GRANULOCYTES # BLD AUTO: 0.05 THOUSAND/UL (ref 0–0.2)
IMM GRANULOCYTES NFR BLD AUTO: 0 % (ref 0–2)
IRON SERPL-MCNC: 87 UG/DL (ref 50–170)
LDLC SERPL CALC-MCNC: 75 MG/DL (ref 0–100)
LYMPHOCYTES # BLD AUTO: 3.01 THOUSANDS/ΜL (ref 0.6–4.47)
LYMPHOCYTES NFR BLD AUTO: 25 % (ref 14–44)
MCH RBC QN AUTO: 31.1 PG (ref 26.8–34.3)
MCHC RBC AUTO-ENTMCNC: 34.2 G/DL (ref 31.4–37.4)
MCV RBC AUTO: 91 FL (ref 82–98)
MONOCYTES # BLD AUTO: 0.78 THOUSAND/ΜL (ref 0.17–1.22)
MONOCYTES NFR BLD AUTO: 7 % (ref 4–12)
NEUTROPHILS # BLD AUTO: 7.74 THOUSANDS/ΜL (ref 1.85–7.62)
NEUTS SEG NFR BLD AUTO: 65 % (ref 43–75)
NONHDLC SERPL-MCNC: 122 MG/DL
NRBC BLD AUTO-RTO: 0 /100 WBCS
PLATELET # BLD AUTO: 314 THOUSANDS/UL (ref 149–390)
PMV BLD AUTO: 10.7 FL (ref 8.9–12.7)
POTASSIUM SERPL-SCNC: 4.6 MMOL/L (ref 3.5–5.3)
PROT SERPL-MCNC: 7 G/DL (ref 6.4–8.2)
RBC # BLD AUTO: 3.86 MILLION/UL (ref 3.81–5.12)
SODIUM SERPL-SCNC: 139 MMOL/L (ref 136–145)
TRIGL SERPL-MCNC: 235 MG/DL
TSH SERPL DL<=0.05 MIU/L-ACNC: 1.86 UIU/ML (ref 0.36–3.74)
WBC # BLD AUTO: 11.92 THOUSAND/UL (ref 4.31–10.16)

## 2021-03-06 PROCEDURE — 87389 HIV-1 AG W/HIV-1&-2 AB AG IA: CPT

## 2021-03-06 PROCEDURE — 84443 ASSAY THYROID STIM HORMONE: CPT

## 2021-03-06 PROCEDURE — 80053 COMPREHEN METABOLIC PANEL: CPT

## 2021-03-06 PROCEDURE — 85025 COMPLETE CBC W/AUTO DIFF WBC: CPT

## 2021-03-06 PROCEDURE — 83540 ASSAY OF IRON: CPT

## 2021-03-06 PROCEDURE — 36415 COLL VENOUS BLD VENIPUNCTURE: CPT

## 2021-03-06 PROCEDURE — 80061 LIPID PANEL: CPT

## 2021-03-08 LAB — HIV 1+2 AB+HIV1 P24 AG SERPL QL IA: NORMAL

## 2021-07-20 ENCOUNTER — OFFICE VISIT (OUTPATIENT)
Dept: FAMILY MEDICINE CLINIC | Facility: CLINIC | Age: 51
End: 2021-07-20
Payer: COMMERCIAL

## 2021-07-20 VITALS
HEIGHT: 66 IN | OXYGEN SATURATION: 96 % | SYSTOLIC BLOOD PRESSURE: 138 MMHG | RESPIRATION RATE: 18 BRPM | HEART RATE: 86 BPM | TEMPERATURE: 99.6 F | DIASTOLIC BLOOD PRESSURE: 70 MMHG | BODY MASS INDEX: 40.18 KG/M2 | WEIGHT: 250 LBS

## 2021-07-20 DIAGNOSIS — E78.2 MIXED HYPERLIPIDEMIA: ICD-10-CM

## 2021-07-20 DIAGNOSIS — K58.0 IRRITABLE BOWEL SYNDROME WITH DIARRHEA: ICD-10-CM

## 2021-07-20 DIAGNOSIS — I10 ESSENTIAL HYPERTENSION: Chronic | ICD-10-CM

## 2021-07-20 DIAGNOSIS — K21.9 GERD WITHOUT ESOPHAGITIS: Primary | Chronic | ICD-10-CM

## 2021-07-20 DIAGNOSIS — M54.42 ACUTE LEFT-SIDED LOW BACK PAIN WITH LEFT-SIDED SCIATICA: ICD-10-CM

## 2021-07-20 DIAGNOSIS — M05.79 RHEUMATOID ARTHRITIS INVOLVING MULTIPLE SITES WITH POSITIVE RHEUMATOID FACTOR (HCC): ICD-10-CM

## 2021-07-20 PROCEDURE — 3078F DIAST BP <80 MM HG: CPT | Performed by: FAMILY MEDICINE

## 2021-07-20 PROCEDURE — 1036F TOBACCO NON-USER: CPT | Performed by: FAMILY MEDICINE

## 2021-07-20 PROCEDURE — 3075F SYST BP GE 130 - 139MM HG: CPT | Performed by: FAMILY MEDICINE

## 2021-07-20 PROCEDURE — 3008F BODY MASS INDEX DOCD: CPT | Performed by: FAMILY MEDICINE

## 2021-07-20 PROCEDURE — 99214 OFFICE O/P EST MOD 30 MIN: CPT | Performed by: FAMILY MEDICINE

## 2021-07-20 RX ORDER — CHLORZOXAZONE 500 MG/1
500 TABLET ORAL 4 TIMES DAILY PRN
Qty: 30 TABLET | Refills: 1 | Status: SHIPPED | OUTPATIENT
Start: 2021-07-20 | End: 2022-06-10 | Stop reason: ALTCHOICE

## 2021-07-20 RX ORDER — ETANERCEPT 50 MG/ML
SOLUTION SUBCUTANEOUS
COMMUNITY
Start: 2021-04-28 | End: 2021-07-20 | Stop reason: ALTCHOICE

## 2021-07-20 RX ORDER — KETOROLAC TROMETHAMINE 10 MG/1
10 TABLET, FILM COATED ORAL EVERY 6 HOURS PRN
Qty: 20 TABLET | Refills: 1 | Status: SHIPPED | OUTPATIENT
Start: 2021-07-20 | End: 2022-02-21

## 2021-07-20 RX ORDER — TOFACITINIB 5 MG/1
TABLET, FILM COATED ORAL
COMMUNITY
Start: 2021-07-13

## 2021-07-20 RX ORDER — PREDNISONE 1 MG/1
TABLET ORAL
COMMUNITY
Start: 2021-05-28 | End: 2022-06-10 | Stop reason: ALTCHOICE

## 2021-07-20 RX ORDER — VITAMIN B COMPLEX
1 CAPSULE ORAL
COMMUNITY
End: 2022-06-10 | Stop reason: ALTCHOICE

## 2021-07-20 RX ORDER — ROSUVASTATIN CALCIUM 10 MG/1
TABLET, COATED ORAL
COMMUNITY
Start: 2021-05-06 | End: 2022-06-10 | Stop reason: ALTCHOICE

## 2021-07-20 NOTE — ASSESSMENT & PLAN NOTE
Essential hypertension under stable control continue current medications as directed lisinopril 10 mg tablets following a heart healthy diet and avoiding excess sodium re-evaluate at next office visit

## 2021-07-20 NOTE — PROGRESS NOTES
BMI Counseling: Body mass index is 40 35 kg/m²  The BMI is above normal  Nutrition recommendations include reducing portion sizes, 3-5 servings of fruits/vegetables daily, reducing fast food intake, consuming healthier snacks, decreasing soda and/or juice intake, moderation in carbohydrate intake, reducing intake of saturated fat and trans fat and reducing intake of cholesterol  Exercise recommendations include exercising 3-5 times per week

## 2021-07-20 NOTE — ASSESSMENT & PLAN NOTE
Irritable bowel syndrome stable with current diet regimen maintain high-fiber diet and continue with Colestid cholestyramine to prevent diarrhea

## 2021-07-20 NOTE — ASSESSMENT & PLAN NOTE
Mixed hyperlipidemia stable currently following lipid profile and heart healthy diet continue with Colestid

## 2021-07-20 NOTE — PATIENT INSTRUCTIONS
Lower Back Exercises   WHAT YOU NEED TO KNOW:   Lower back exercises help heal and strengthen your back muscles to prevent another injury  Ask your healthcare provider if you need to see a physical therapist for more advanced exercises  DISCHARGE INSTRUCTIONS:   Return to the emergency department if:   · You have severe pain that prevents you from moving  Contact your healthcare provider if:   · Your pain becomes worse  · You have new pain  · You have questions or concerns about your condition or care  Do lower back exercises safely:   · Do the exercises on a mat or firm surface  (not on a bed) to support your spine and prevent low back pain  · Move slowly and smoothly  Avoid fast or jerky motions  · Breathe normally  Do not hold your breath  · Stop if you feel pain  It is normal to feel some discomfort at first  Regular exercise will help decrease your discomfort over time  Lower back exercises: Your healthcare provider may recommend that you do back exercises 10 to 30 minutes each day  He may also recommend that you do exercises 1 to 3 times each day  Ask your healthcare provider which exercises are best for you and how often to do them  · Ankle pumps:  Lie on your back  Move your foot up (with your toes pointing toward your head)  Then, move your foot down (with your toes pointing away from you)  Repeat this exercise 10 times on each side  · Heel slides:  Lie on your back  Slowly bend one leg and then straighten it  Next, bend the other leg and then straighten it  Repeat 10 times on each side  · Pelvic tilt:  Lie on your back with your knees bent and feet flat on the floor  Place your arms in a relaxed position beside your body  Tighten the muscles of your abdomen and flatten your back against the floor  Hold for 5 seconds  Repeat 5 times  · Back stretch:  Lie on your back with your hands behind your head   Bend your knees and turn the lower half of your body to one side  Hold this position for 10 seconds  Repeat 3 times on each side  · Straight leg raises:  Lie on your back with one leg straight  Bend the other knee  Tighten your abdomen and then slowly lift the straight leg up about 6 to 12 inches off the floor  Hold for 1 to 5 seconds  Lower your leg slowly  Repeat 10 times on each leg  · Knee-to-chest:  Lie on your back with your knees bent and feet flat on the floor  Pull one of your knees toward your chest and hold it there for 5 seconds  Return your leg to the starting position  Lift the other knee toward your chest and hold for 5 seconds  Do this 5 times on each side  · Cat and camel:  Place your hands and knees on the floor  Arch your back upward toward the ceiling and lower your head  Round out your spine as much as you can  Hold for 5 seconds  Lift your head upward and push your chest downward toward the floor  Hold for 5 seconds  Do 3 sets or as directed  · Wall squats:  Stand with your back against a wall  Tighten the muscles of your abdomen  Slowly lower your body until your knees are bent at a 45 degree angle  Hold this position for 5 seconds  Slowly move back up to a standing position  Repeat 10 times  · Curl up:  Lie on your back with your knees bent and feet flat on the floor  Place your hands, palms down, underneath the curve in your lower back  Next, with your elbows on the floor, lift your shoulders and chest 2 to 3 inches  Keep your head in line with your shoulders  Hold this position for 5 seconds  When you can do this exercise without pain for 10 to 15 seconds, you may add a rotation  While your shoulders and chest are lifted off the ground, turn slightly to the left and hold  Repeat on the other side  · Bird dog:  Place your hands and knees on the floor  Keep your wrists directly below your shoulders and your knees directly below your hips  Pull your belly button in toward your spine   Do not flatten or arch your back  Tighten your abdominal muscles  Raise one arm straight out so that it is aligned with your head  Next, raise the leg opposite your arm  Hold this position for 15 seconds  Lower your arm and leg slowly and change sides  Do 5 sets  © Copyright [employer] 2021 Information is for End User's use only and may not be sold, redistributed or otherwise used for commercial purposes  All illustrations and images included in CareNotes® are the copyrighted property of [employer]  or [facility]   The above information is an  only  It is not intended as medical advice for individual conditions or treatments  Talk to your doctor, nurse or pharmacist before following any medical regimen to see if it is safe and effective for you

## 2021-07-20 NOTE — PROGRESS NOTES
Assessment/Plan:       Problem List Items Addressed This Visit        Digestive    GERD without esophagitis - Primary (Chronic)      GERD symptoms currently stable with omeprazole no change in dosing currently follow GERD lifestyle diet         Irritable bowel syndrome with diarrhea      Irritable bowel syndrome stable with current diet regimen maintain high-fiber diet and continue with Colestid cholestyramine to prevent diarrhea            Cardiovascular and Mediastinum    Essential hypertension (Chronic)      Essential hypertension under stable control continue current medications as directed lisinopril 10 mg tablets following a heart healthy diet and avoiding excess sodium re-evaluate at next office visit            Musculoskeletal and Integument    Rheumatoid arthritis involving multiple sites with positive rheumatoid factor (HCC)      Rheumatoid arthritis followed by rheumatology continue with Enbrel patient will also additionally take prednisone as needed for flare ups         Relevant Medications    Xeljanz 5 MG TABS    Enbrel SureClick 50 MG/ML injection    predniSONE 5 mg tablet       Other    Mixed hyperlipidemia      Mixed hyperlipidemia stable currently following lipid profile and heart healthy diet continue with Colestid         Relevant Medications    rosuvastatin (CRESTOR) 10 MG tablet            Subjective:      Patient ID: Taiwo Napier is a 46 y o  female  Patient presents for general checkup evaluation review of laboratory work doing well -- but presents today for lower back and left hip region pain over about 3 days denies trauma or falling      The following portions of the patient's history were reviewed and updated as appropriate: allergies, current medications, past family history, past medical history, past social history, past surgical history and problem list     Review of Systems   Constitutional: Negative for chills, fatigue and fever     HENT: Negative for congestion, nosebleeds, rhinorrhea, sinus pressure and sore throat  Eyes: Negative for discharge and redness  Respiratory: Negative for cough and shortness of breath  Cardiovascular: Negative for chest pain, palpitations and leg swelling  Gastrointestinal: Negative for abdominal pain, blood in stool and nausea  Endocrine: Negative for cold intolerance, heat intolerance and polyuria  Genitourinary: Negative for dysuria and frequency  Musculoskeletal: Positive for arthralgias  Negative for back pain and myalgias  Skin: Negative for rash  Neurological: Negative for dizziness, weakness and headaches  Hematological: Negative for adenopathy  Psychiatric/Behavioral: Negative for behavioral problems and sleep disturbance  The patient is not nervous/anxious  Objective:      /70 (BP Location: Left arm, Patient Position: Sitting)   Pulse 86   Temp 99 6 °F (37 6 °C)   Resp 18   Ht 5' 6" (1 676 m)   Wt 113 kg (250 lb)   SpO2 96%   BMI 40 35 kg/m²        Physical Exam  Vitals and nursing note reviewed  Constitutional:       General: She is not in acute distress  Appearance: Normal appearance  She is well-developed and normal weight  HENT:      Head: Normocephalic and atraumatic  Right Ear: Tympanic membrane, ear canal and external ear normal       Left Ear: Tympanic membrane, ear canal and external ear normal       Nose: Nose normal       Mouth/Throat:      Mouth: Mucous membranes are moist       Pharynx: Oropharynx is clear  No oropharyngeal exudate  Eyes:      General: No scleral icterus  Right eye: No discharge  Left eye: No discharge  Conjunctiva/sclera: Conjunctivae normal       Pupils: Pupils are equal, round, and reactive to light  Neck:      Thyroid: No thyromegaly  Vascular: No JVD  Cardiovascular:      Rate and Rhythm: Normal rate and regular rhythm  Pulses: Normal pulses  Heart sounds: Normal heart sounds  No murmur heard       Pulmonary: Effort: Pulmonary effort is normal       Breath sounds: No wheezing or rales  Chest:      Chest wall: No tenderness  Abdominal:      General: Bowel sounds are normal  There is no distension  Palpations: Abdomen is soft  There is no mass  Tenderness: There is no abdominal tenderness  Musculoskeletal:         General: No tenderness or deformity  Normal range of motion  Cervical back: Normal range of motion  Comments: Pain in lower lumbar region left side acute tenderness over L4-5 region radiating into positive sciatic notch pain and rotation pain   Lymphadenopathy:      Cervical: No cervical adenopathy  Skin:     General: Skin is warm and dry  Findings: No rash  Neurological:      General: No focal deficit present  Mental Status: She is alert and oriented to person, place, and time  Mental status is at baseline  Cranial Nerves: No cranial nerve deficit  Coordination: Coordination normal       Deep Tendon Reflexes: Reflexes are normal and symmetric  Reflexes normal    Psychiatric:         Mood and Affect: Mood normal          Behavior: Behavior normal          Thought Content: Thought content normal          Judgment: Judgment normal           Data:    Laboratory Results: I have personally reviewed the pertinent laboratory results/reports   Radiology/Other Diagnostic Testing Results: I have personally reviewed pertinent reports         Lab Results   Component Value Date    WBC 11 92 (H) 03/06/2021    HGB 12 0 03/06/2021    HCT 35 1 03/06/2021    MCV 91 03/06/2021     03/06/2021     Lab Results   Component Value Date    K 4 6 03/06/2021     (H) 03/06/2021    CO2 25 03/06/2021    BUN 23 03/06/2021    CREATININE 1 47 (H) 03/06/2021    GLUF 87 03/06/2021    CALCIUM 8 7 03/06/2021    AST 11 03/06/2021    ALT 29 03/06/2021    ALKPHOS 65 03/06/2021    EGFR 41 03/06/2021     Lab Results   Component Value Date    CHOLESTEROL 183 03/06/2021    CHOLESTEROL 260 (H) 11/14/2020    CHOLESTEROL 235 (H) 05/11/2020     Lab Results   Component Value Date    HDL 61 03/06/2021    HDL 74 11/14/2020    HDL 60 05/11/2020     Lab Results   Component Value Date    LDLCALC 75 03/06/2021    LDLCALC 137 (H) 11/14/2020    LDLCALC 132 (H) 05/11/2020     Lab Results   Component Value Date    TRIG 235 (H) 03/06/2021    TRIG 247 (H) 11/14/2020    TRIG 215 (H) 05/11/2020     No results found for: [place], [place]  Lab Results   Component Value Date    [id number] 1 860 03/06/2021     No results found for: HGBA1C  No results found for: EPI Marcum, DO

## 2021-07-20 NOTE — ASSESSMENT & PLAN NOTE
Patient will need a short course of prednisone muscle relaxers and pain control    She has pain over lumbar 4 5 region into sciatic notch and lateral posterior thigh region of left side to her knee with pain on rotation and deep palpation

## 2021-07-20 NOTE — ASSESSMENT & PLAN NOTE
GERD symptoms currently stable with omeprazole no change in dosing currently follow GERD lifestyle diet

## 2021-07-20 NOTE — ASSESSMENT & PLAN NOTE
Rheumatoid arthritis followed by rheumatology continue with Onita Grain as patient noted the Enbrel was not effective anymore and her rheumatologist need to change for her- patient will also additionally take prednisone as needed for flare ups

## 2021-08-13 ENCOUNTER — VBI (OUTPATIENT)
Dept: ADMINISTRATIVE | Facility: OTHER | Age: 51
End: 2021-08-13

## 2021-08-19 ENCOUNTER — RA CDI HCC (OUTPATIENT)
Dept: OTHER | Facility: HOSPITAL | Age: 51
End: 2021-08-19

## 2021-08-27 ENCOUNTER — OFFICE VISIT (OUTPATIENT)
Dept: FAMILY MEDICINE CLINIC | Facility: CLINIC | Age: 51
End: 2021-08-27
Payer: COMMERCIAL

## 2021-08-27 VITALS
OXYGEN SATURATION: 97 % | HEART RATE: 99 BPM | HEIGHT: 66 IN | DIASTOLIC BLOOD PRESSURE: 60 MMHG | BODY MASS INDEX: 39.7 KG/M2 | SYSTOLIC BLOOD PRESSURE: 130 MMHG | TEMPERATURE: 99.3 F | WEIGHT: 247 LBS | RESPIRATION RATE: 18 BRPM

## 2021-08-27 DIAGNOSIS — I10 ESSENTIAL HYPERTENSION: Chronic | ICD-10-CM

## 2021-08-27 DIAGNOSIS — M05.79 RHEUMATOID ARTHRITIS INVOLVING MULTIPLE SITES WITH POSITIVE RHEUMATOID FACTOR (HCC): ICD-10-CM

## 2021-08-27 DIAGNOSIS — K58.0 IRRITABLE BOWEL SYNDROME WITH DIARRHEA: Primary | ICD-10-CM

## 2021-08-27 DIAGNOSIS — G62.9 PERIPHERAL POLYNEUROPATHY: ICD-10-CM

## 2021-08-27 DIAGNOSIS — E78.2 MIXED HYPERLIPIDEMIA: ICD-10-CM

## 2021-08-27 DIAGNOSIS — D50.8 IRON DEFICIENCY ANEMIA SECONDARY TO INADEQUATE DIETARY IRON INTAKE: ICD-10-CM

## 2021-08-27 DIAGNOSIS — K21.9 GERD WITHOUT ESOPHAGITIS: Chronic | ICD-10-CM

## 2021-08-27 DIAGNOSIS — Z12.31 VISIT FOR SCREENING MAMMOGRAM: ICD-10-CM

## 2021-08-27 PROCEDURE — 3078F DIAST BP <80 MM HG: CPT | Performed by: FAMILY MEDICINE

## 2021-08-27 PROCEDURE — 1036F TOBACCO NON-USER: CPT | Performed by: FAMILY MEDICINE

## 2021-08-27 PROCEDURE — 99214 OFFICE O/P EST MOD 30 MIN: CPT | Performed by: FAMILY MEDICINE

## 2021-08-27 PROCEDURE — 3008F BODY MASS INDEX DOCD: CPT | Performed by: FAMILY MEDICINE

## 2021-08-27 PROCEDURE — 3075F SYST BP GE 130 - 139MM HG: CPT | Performed by: FAMILY MEDICINE

## 2021-08-27 RX ORDER — ROSUVASTATIN CALCIUM 10 MG/1
10 TABLET, COATED ORAL DAILY
Qty: 90 TABLET | Refills: 3 | Status: SHIPPED | OUTPATIENT
Start: 2021-08-27

## 2021-08-27 RX ORDER — LISINOPRIL 10 MG/1
10 TABLET ORAL DAILY
Qty: 90 TABLET | Refills: 3 | Status: SHIPPED | OUTPATIENT
Start: 2021-08-27

## 2021-08-27 RX ORDER — OMEPRAZOLE 20 MG/1
20 CAPSULE, DELAYED RELEASE ORAL DAILY
Qty: 90 CAPSULE | Refills: 3 | Status: SHIPPED | OUTPATIENT
Start: 2021-08-27

## 2021-08-27 NOTE — ASSESSMENT & PLAN NOTE
Irritable bowel syndrome stable continue with probiotics and omeprazole along with Omega 3 fish oils and multiple vitamins also Colestid to prevent diarrhea

## 2021-08-27 NOTE — ASSESSMENT & PLAN NOTE
Hypertension under stable control with lisinopril 10 mg tablets continue same dosage avoiding sodium in her diet recommend weight reduction and exercise and re-evaluate in 6 months    Patient was 11 lb lighter at her winter appointment in February of this year I encouraged her to work on diet and exercise for her next visit

## 2021-08-27 NOTE — PROGRESS NOTES
Assessment/Plan:       Problem List Items Addressed This Visit        Digestive    GERD without esophagitis (Chronic)    Irritable bowel syndrome with diarrhea - Primary      Irritable bowel syndrome stable continue with probiotics and omeprazole along with Omega 3 fish oils and multiple vitamins also Colestid to prevent diarrhea            Cardiovascular and Mediastinum    Essential hypertension (Chronic)     Hypertension under stable control with lisinopril 10 mg tablets continue same dosage avoiding sodium in her diet recommend weight reduction and exercise and re-evaluate in 6 months  Patient was 11 lb lighter at her winter appointment in February of this year I encouraged her to work on diet and exercise for her next visit            Nervous and Auditory    Peripheral polyneuropathy       Musculoskeletal and Integument    Rheumatoid arthritis involving multiple sites with positive rheumatoid factor (HCC)      Rheumatoid arthritis involving multiple joints patient is followed by Rheumatology and is continuing on Xeljanz 5 mg tablets doing well            Other    Mixed hyperlipidemia    Iron deficiency anemia secondary to inadequate dietary iron intake      Other Visit Diagnoses     Visit for screening mammogram                Subjective:      Patient ID: Moses Lima is a 46 y o  female  Patient presents today for six-month follow-up evaluation review of medications and laboratory work      The following portions of the patient's history were reviewed and updated as appropriate: allergies, current medications, past family history, past medical history, past social history, past surgical history and problem list     Review of Systems   Constitutional: Negative for chills, fatigue and fever  HENT: Negative for congestion, nosebleeds, rhinorrhea, sinus pressure and sore throat  Eyes: Negative for discharge and redness  Respiratory: Negative for cough and shortness of breath      Cardiovascular: Negative for chest pain, palpitations and leg swelling  Gastrointestinal: Negative for abdominal pain, blood in stool and nausea  Endocrine: Negative for cold intolerance, heat intolerance and polyuria  Genitourinary: Negative for dysuria and frequency  Musculoskeletal: Negative for arthralgias, back pain and myalgias  Skin: Negative for rash  Neurological: Negative for dizziness, weakness and headaches  Hematological: Negative for adenopathy  Psychiatric/Behavioral: Negative for behavioral problems and sleep disturbance  The patient is not nervous/anxious  Objective:      /60 (BP Location: Left arm, Patient Position: Sitting)   Pulse 99   Temp 99 3 °F (37 4 °C)   Resp 18   Ht 5' 6" (1 676 m)   Wt 112 kg (247 lb)   SpO2 97%   BMI 39 87 kg/m²        Physical Exam  Vitals and nursing note reviewed  Constitutional:       General: She is not in acute distress  Appearance: Normal appearance  She is well-developed and normal weight  HENT:      Head: Normocephalic and atraumatic  Right Ear: Tympanic membrane, ear canal and external ear normal       Left Ear: Tympanic membrane, ear canal and external ear normal       Nose: Nose normal       Mouth/Throat:      Mouth: Mucous membranes are moist       Pharynx: Oropharynx is clear  No oropharyngeal exudate  Eyes:      General: No scleral icterus  Right eye: No discharge  Left eye: No discharge  Extraocular Movements: Extraocular movements intact  Conjunctiva/sclera: Conjunctivae normal       Pupils: Pupils are equal, round, and reactive to light  Neck:      Thyroid: No thyromegaly  Vascular: No JVD  Cardiovascular:      Rate and Rhythm: Normal rate and regular rhythm  Pulses: Normal pulses  Heart sounds: Normal heart sounds  No murmur heard  Pulmonary:      Effort: Pulmonary effort is normal       Breath sounds: No wheezing or rales  Chest:      Chest wall: No tenderness  Abdominal:      General: Bowel sounds are normal  There is no distension  Palpations: Abdomen is soft  There is no mass  Tenderness: There is no abdominal tenderness  Musculoskeletal:         General: No tenderness or deformity  Normal range of motion  Cervical back: Normal range of motion  Lymphadenopathy:      Cervical: No cervical adenopathy  Skin:     General: Skin is warm and dry  Findings: No rash  Neurological:      General: No focal deficit present  Mental Status: She is alert and oriented to person, place, and time  Mental status is at baseline  Cranial Nerves: No cranial nerve deficit  Coordination: Coordination normal       Deep Tendon Reflexes: Reflexes are normal and symmetric  Reflexes normal    Psychiatric:         Mood and Affect: Mood normal          Behavior: Behavior normal          Thought Content: Thought content normal          Judgment: Judgment normal           Data:    Laboratory Results: I have personally reviewed the pertinent laboratory results/reports   Radiology/Other Diagnostic Testing Results: I have personally reviewed pertinent reports         Lab Results   Component Value Date    WBC 11 92 (H) 03/06/2021    HGB 12 0 03/06/2021    HCT 35 1 03/06/2021    MCV 91 03/06/2021     03/06/2021     Lab Results   Component Value Date    K 4 6 03/06/2021     (H) 03/06/2021    CO2 25 03/06/2021    BUN 23 03/06/2021    CREATININE 1 47 (H) 03/06/2021    GLUF 87 03/06/2021    CALCIUM 8 7 03/06/2021    AST 11 03/06/2021    ALT 29 03/06/2021    ALKPHOS 65 03/06/2021    EGFR 41 03/06/2021     Lab Results   Component Value Date    CHOLESTEROL 183 03/06/2021    CHOLESTEROL 260 (H) 11/14/2020    CHOLESTEROL 235 (H) 05/11/2020     Lab Results   Component Value Date    HDL 61 03/06/2021    HDL 74 11/14/2020    HDL 60 05/11/2020     Lab Results   Component Value Date    LDLCALC 75 03/06/2021    LDLCALC 137 (H) 11/14/2020    LDLCALC 132 (H) 05/11/2020 Lab Results   Component Value Date    TRIG 235 (H) 03/06/2021    TRIG 247 (H) 11/14/2020    TRIG 215 (H) 05/11/2020     No results found for: [place], [place]  Lab Results   Component Value Date    [id number] 1 860 03/06/2021     No results found for: HGBA1C  No results found for: PSA    Noble Chen DO

## 2021-08-27 NOTE — ASSESSMENT & PLAN NOTE
Rheumatoid arthritis involving multiple joints patient is followed by Rheumatology and is continuing on Xeljanz 5 mg tablets doing well

## 2021-09-30 ENCOUNTER — HOSPITAL ENCOUNTER (OUTPATIENT)
Dept: MAMMOGRAPHY | Facility: HOSPITAL | Age: 51
Discharge: HOME | End: 2021-09-30
Attending: FAMILY MEDICINE
Payer: COMMERCIAL

## 2021-09-30 VITALS — BODY MASS INDEX: 39.7 KG/M2 | WEIGHT: 247 LBS | HEIGHT: 66 IN

## 2021-09-30 DIAGNOSIS — Z12.31 VISIT FOR SCREENING MAMMOGRAM: ICD-10-CM

## 2021-09-30 PROCEDURE — 77063 BREAST TOMOSYNTHESIS BI: CPT

## 2021-09-30 PROCEDURE — 77067 SCR MAMMO BI INCL CAD: CPT

## 2021-12-08 NOTE — ASSESSMENT & PLAN NOTE
Patient has had extreme fatigue she is tired at this time of my office visit she has had difficulty with sleeping she states that she awakens every morning exhausted and tired and her  states that she snores all night long  This has been ongoing for over the last 3 months and is is worsening    At this time I will have her assessed for sleep apnea Consent 1/Introductory Paragraph: The rationale for Mohs was explained to the patient and consent was obtained. The risks, benefits and alternatives to therapy were discussed in detail. Specifically, the risks of infection, scarring, bleeding, prolonged wound healing, incomplete removal, allergy to anesthesia, nerve injury and recurrence were addressed. Prior to the procedure, the treatment site was clearly identified and confirmed by the patient. All components of Universal Protocol/PAUSE Rule completed.

## 2021-12-15 ENCOUNTER — VBI (OUTPATIENT)
Dept: ADMINISTRATIVE | Facility: OTHER | Age: 51
End: 2021-12-15

## 2022-02-20 DIAGNOSIS — M54.42 ACUTE LEFT-SIDED LOW BACK PAIN WITH LEFT-SIDED SCIATICA: ICD-10-CM

## 2022-02-21 RX ORDER — KETOROLAC TROMETHAMINE 10 MG/1
TABLET, FILM COATED ORAL
Qty: 20 TABLET | Refills: 0 | Status: SHIPPED | OUTPATIENT
Start: 2022-02-21 | End: 2022-06-10 | Stop reason: ALTCHOICE

## 2022-03-04 ENCOUNTER — OFFICE VISIT (OUTPATIENT)
Dept: FAMILY MEDICINE CLINIC | Facility: CLINIC | Age: 52
End: 2022-03-04
Payer: COMMERCIAL

## 2022-03-04 VITALS
TEMPERATURE: 98.2 F | OXYGEN SATURATION: 100 % | HEIGHT: 66 IN | BODY MASS INDEX: 40.11 KG/M2 | HEART RATE: 100 BPM | SYSTOLIC BLOOD PRESSURE: 130 MMHG | RESPIRATION RATE: 18 BRPM | WEIGHT: 249.6 LBS | DIASTOLIC BLOOD PRESSURE: 80 MMHG

## 2022-03-04 DIAGNOSIS — I10 ESSENTIAL HYPERTENSION: Chronic | ICD-10-CM

## 2022-03-04 DIAGNOSIS — M05.79 RHEUMATOID ARTHRITIS INVOLVING MULTIPLE SITES WITH POSITIVE RHEUMATOID FACTOR (HCC): ICD-10-CM

## 2022-03-04 DIAGNOSIS — R06.02 SHORTNESS OF BREATH: ICD-10-CM

## 2022-03-04 DIAGNOSIS — U07.1 COVID-19: Primary | ICD-10-CM

## 2022-03-04 PROCEDURE — 3725F SCREEN DEPRESSION PERFORMED: CPT | Performed by: FAMILY MEDICINE

## 2022-03-04 PROCEDURE — 99214 OFFICE O/P EST MOD 30 MIN: CPT | Performed by: FAMILY MEDICINE

## 2022-03-04 RX ORDER — FLUTICASONE FUROATE AND VILANTEROL TRIFENATATE 100; 25 UG/1; UG/1
1 POWDER RESPIRATORY (INHALATION) DAILY
Qty: 60 BLISTER | Refills: 0 | Status: SHIPPED | OUTPATIENT
Start: 2022-03-04 | End: 2022-03-29

## 2022-03-04 RX ORDER — DEXTROMETHORPHAN HYDROBROMIDE AND PROMETHAZINE HYDROCHLORIDE 15; 6.25 MG/5ML; MG/5ML
10 SOLUTION ORAL 3 TIMES DAILY PRN
Qty: 240 ML | Refills: 1 | Status: SHIPPED | OUTPATIENT
Start: 2022-03-04 | End: 2022-06-10 | Stop reason: ALTCHOICE

## 2022-03-04 RX ORDER — AZITHROMYCIN 250 MG/1
TABLET, FILM COATED ORAL
Qty: 6 TABLET | Refills: 0 | Status: SHIPPED | OUTPATIENT
Start: 2022-03-04 | End: 2022-03-09

## 2022-03-04 NOTE — PROGRESS NOTES
BMI Counseling: Body mass index is 40 29 kg/m²  The BMI is above normal  Nutrition recommendations include reducing portion sizes, decreasing overall calorie intake, 3-5 servings of fruits/vegetables daily, reducing fast food intake, consuming healthier snacks, decreasing soda and/or juice intake, moderation in carbohydrate intake, increasing intake of lean protein, reducing intake of saturated fat and trans fat and reducing intake of cholesterol  Exercise recommendations include exercising 3-5 times per week  Assessment/Plan:       Problem List Items Addressed This Visit        Cardiovascular and Mediastinum    Essential hypertension (Chronic)     Stable no change in medications currently            Musculoskeletal and Integument    Rheumatoid arthritis involving multiple sites with positive rheumatoid factor ([facility] [address] )     Patient is having difficulty with recovery she still has shortness of breath when she showers I will add an inhaler her joints have been stable without worsening rheumatoid symptoms she is taking ibuprofen            Other    COVID-19 - Primary     Patient tested positive for COVID-19 1 week ago she had both vaccines Moderna without the booster  She has had sore throat headache and congestion with dry cough symptoms and generalized fatigue and she remain at home this week  She presents here for follow-up evaluation    I will provide her with a Zithromax Z-Salty prescription and a note for her job along with promethazine DM         Relevant Medications    fluticasone-vilanterol (Breo Ellipta) 100-25 mcg/inh inhaler    Promethazine-DM (PHENERGAN-DM) 6 25-15 mg/5 mL oral syrup    azithromycin (ZITHROMAX) 250 mg tablet    Shortness of breath     Add Breo inhaler now in light of COVID infection patient is having difficulty with showers and physical activities at home planning to return to work on Monday of next week I would like to follow-up with her                 Subjective:      Patient ID: Watson Quiroz Joelle Nieves is a 46 y o  female  Patient has had cough congestion sore throat headache over the last week after testing positive for COVID last Saturday      The following portions of the patient's history were reviewed and updated as appropriate: allergies, current medications, past family history, past medical history, past social history, past surgical history and problem list     Review of Systems   Constitutional: Negative for chills, fatigue and fever  HENT: Positive for congestion  Negative for nosebleeds, rhinorrhea, sinus pressure and sore throat  Eyes: Negative for discharge and redness  Respiratory: Negative for cough and shortness of breath  Cardiovascular: Negative for chest pain, palpitations and leg swelling  Gastrointestinal: Negative for abdominal pain, blood in stool and nausea  Endocrine: Negative for cold intolerance, heat intolerance and polyuria  Genitourinary: Negative for dysuria and frequency  Musculoskeletal: Negative for arthralgias, back pain and myalgias  Skin: Negative for rash  Neurological: Positive for weakness and headaches  Negative for dizziness  Hematological: Negative for adenopathy  Psychiatric/Behavioral: Negative for behavioral problems and sleep disturbance  The patient is not nervous/anxious  Objective:      /80 (BP Location: Left arm, Patient Position: Sitting)   Pulse 100   Temp 98 2 °F (36 8 °C)   Resp 18   Ht 5' 6" (1 676 m)   Wt 113 kg (249 lb 9 6 oz)   SpO2 100%   BMI 40 29 kg/m²        Physical Exam  Vitals and nursing note reviewed  Constitutional:       General: She is not in acute distress  Appearance: Normal appearance  She is well-developed  HENT:      Head: Normocephalic and atraumatic  Right Ear: Tympanic membrane, ear canal and external ear normal       Left Ear: Tympanic membrane, ear canal and external ear normal       Nose: Congestion present        Mouth/Throat:      Pharynx: Posterior oropharyngeal erythema present  No oropharyngeal exudate  Eyes:      General: No scleral icterus  Right eye: No discharge  Left eye: No discharge  Conjunctiva/sclera: Conjunctivae normal       Pupils: Pupils are equal, round, and reactive to light  Neck:      Thyroid: No thyromegaly  Vascular: No JVD  Cardiovascular:      Rate and Rhythm: Normal rate and regular rhythm  Heart sounds: Normal heart sounds  No murmur heard  Pulmonary:      Effort: Pulmonary effort is normal       Breath sounds: Wheezing and rhonchi present  No rales  Chest:      Chest wall: No tenderness  Abdominal:      General: Bowel sounds are normal  There is no distension  Palpations: Abdomen is soft  There is no mass  Tenderness: There is no abdominal tenderness  Musculoskeletal:         General: No tenderness or deformity  Normal range of motion  Cervical back: Normal range of motion  Lymphadenopathy:      Cervical: No cervical adenopathy  Skin:     General: Skin is warm and dry  Findings: No rash  Neurological:      Mental Status: She is alert and oriented to person, place, and time  Cranial Nerves: No cranial nerve deficit  Coordination: Coordination normal       Deep Tendon Reflexes: Reflexes are normal and symmetric  Reflexes normal    Psychiatric:         Behavior: Behavior normal          Thought Content: Thought content normal          Judgment: Judgment normal           Data:    Laboratory Results: I have personally reviewed the pertinent laboratory results/reports   Radiology/Other Diagnostic Testing Results: I have personally reviewed pertinent reports         Lab Results   Component Value Date    WBC 11 92 (H) 03/06/2021    HGB 12 0 03/06/2021    HCT 35 1 03/06/2021    MCV 91 03/06/2021     03/06/2021     Lab Results   Component Value Date    K 4 6 03/06/2021     (H) 03/06/2021    CO2 25 03/06/2021    BUN 23 03/06/2021    CREATININE 1 47 (H) 03/06/2021    GLUF 87 03/06/2021    CALCIUM 8 7 03/06/2021    AST 11 03/06/2021    ALT 29 03/06/2021    ALKPHOS 65 03/06/2021    EGFR 41 03/06/2021     Lab Results   Component Value Date    CHOLESTEROL 183 03/06/2021    CHOLESTEROL 260 (H) 11/14/2020    CHOLESTEROL 235 (H) 05/11/2020     Lab Results   Component Value Date    HDL 61 03/06/2021    HDL 74 11/14/2020    HDL 60 05/11/2020     Lab Results   Component Value Date    LDLCALC 75 03/06/2021    LDLCALC 137 (H) 11/14/2020    LDLCALC 132 (H) 05/11/2020     Lab Results   Component Value Date    TRIG 235 (H) 03/06/2021    TRIG 247 (H) 11/14/2020    TRIG 215 (H) 05/11/2020     No results found for: [place], [place]  Lab Results   Component Value Date    [id number] 1 860 03/06/2021     No results found for: HGBA1C  No results found for: EPI Flowers, DO

## 2022-03-04 NOTE — ASSESSMENT & PLAN NOTE
Patient tested positive for COVID-19 1 week ago she had both vaccines Moderna without the booster  She has had sore throat headache and congestion with dry cough symptoms and generalized fatigue and she remain at home this week  She presents here for follow-up evaluation    I will provide her with a Zithromax Z-Slaty prescription and a note for her job along with promethazine DM

## 2022-03-04 NOTE — ASSESSMENT & PLAN NOTE
Add Breo inhaler now in light of COVID infection patient is having difficulty with showers and physical activities at home planning to return to work on Monday of next week I would like to follow-up with her

## 2022-03-04 NOTE — ASSESSMENT & PLAN NOTE
Patient is having difficulty with recovery she still has shortness of breath when she showers I will add an inhaler her joints have been stable without worsening rheumatoid symptoms she is taking ibuprofen

## 2022-03-11 ENCOUNTER — APPOINTMENT (OUTPATIENT)
Dept: RADIOLOGY | Facility: CLINIC | Age: 52
End: 2022-03-11
Payer: COMMERCIAL

## 2022-03-11 ENCOUNTER — OFFICE VISIT (OUTPATIENT)
Dept: FAMILY MEDICINE CLINIC | Facility: CLINIC | Age: 52
End: 2022-03-11
Payer: COMMERCIAL

## 2022-03-11 VITALS
HEART RATE: 88 BPM | TEMPERATURE: 98.3 F | OXYGEN SATURATION: 100 % | HEIGHT: 66 IN | WEIGHT: 252.2 LBS | DIASTOLIC BLOOD PRESSURE: 78 MMHG | RESPIRATION RATE: 18 BRPM | SYSTOLIC BLOOD PRESSURE: 140 MMHG | BODY MASS INDEX: 40.53 KG/M2

## 2022-03-11 DIAGNOSIS — U07.1 COVID-19: Primary | ICD-10-CM

## 2022-03-11 DIAGNOSIS — R06.02 SHORTNESS OF BREATH: ICD-10-CM

## 2022-03-11 DIAGNOSIS — I10 ESSENTIAL HYPERTENSION: Chronic | ICD-10-CM

## 2022-03-11 PROCEDURE — 3008F BODY MASS INDEX DOCD: CPT | Performed by: NURSE PRACTITIONER

## 2022-03-11 PROCEDURE — 1036F TOBACCO NON-USER: CPT | Performed by: NURSE PRACTITIONER

## 2022-03-11 PROCEDURE — 71046 X-RAY EXAM CHEST 2 VIEWS: CPT

## 2022-03-11 PROCEDURE — 99214 OFFICE O/P EST MOD 30 MIN: CPT | Performed by: NURSE PRACTITIONER

## 2022-03-11 PROCEDURE — 3078F DIAST BP <80 MM HG: CPT | Performed by: NURSE PRACTITIONER

## 2022-03-11 PROCEDURE — 3077F SYST BP >= 140 MM HG: CPT | Performed by: NURSE PRACTITIONER

## 2022-03-11 NOTE — PROGRESS NOTES
OFFICE VISIT  Cleve Severance 46 y o  female MRN: [id number]          Assessment / Plan:  Problem List Items Addressed This Visit        Cardiovascular and Mediastinum    Essential hypertension (Chronic)     Blood pressure slightly elevated today in office  On lisinopril 10mg daily, low salt diet, avoid otc decongestants  Other    COVID-19 - Primary     Most all symptoms have subsided  Overall doing well except for shortness and feeling winded with exertion, oxygen has been stable  Will obtain a chest xray to evaluate          Shortness of breath     Oxygen stable at 97% in office, no resp distress  Intermittent cough, using breo with good relief No previous lung disease  Relevant Orders    XR chest pa & lateral            Reason For Visit / Chief Complaint  Chief Complaint   Patient presents with    Follow-up    COVID-19        HPI:  Cleve Severance is a 46 y o  female who presents today for covid followup, she took a home test on , she did take another tracy test which was positive  She reports some sob with activities, she does have a pulse ox at home 95% or higher  She did have a zpack, breo, phenergan  She reports feeling much better  SHe has been back to work since Monday    She does report using th ebero, more after shower     Historical Information   Past Medical History:   Diagnosis Date    GERD (gastroesophageal reflux disease)     Hypertension     Neuropathy      Past Surgical History:   Procedure Laterality Date    APPENDECTOMY      CHOLECYSTECTOMY      FOOT POSTERIOR RELEASE Left     around ankle    TUBAL LIGATION       Social History   Social History     Substance and Sexual Activity   Alcohol Use Never     Social History     Substance and Sexual Activity   Drug Use No     Social History     Tobacco Use   Smoking Status Former Smoker    Quit date:     Years since quittin 2   Smokeless Tobacco Never Used     Family History   Problem Relation Age of Onset    Hypertension Mother     Cancer Mother     Alzheimer's disease Mother     Parkinsonism Mother     Diabetes Father     Hypertension Father     Cancer Father     Breast cancer Sister 48    No Known Problems Daughter     No Known Problems Maternal Grandmother     No Known Problems Paternal Grandmother     No Known Problems Sister     No Known Problems Sister     No Known Problems Maternal Aunt     No Known Problems Maternal Aunt     No Known Problems Maternal Aunt     No Known Problems Paternal Aunt     Breast cancer Paternal Aunt     Breast cancer Paternal Aunt     No Known Problems Paternal Aunt        Meds/Allergies   Allergies   Allergen Reactions    Methotrexate Lip Swelling    Keflex [Cephalexin] GI Intolerance       Meds:    Current Outpatient Medications:     Ascorbic Acid (VITAMIN C) 1000 MG tablet, Take 1,000 mg by mouth daily, Disp: , Rfl:     b complex vitamins capsule, Take 1 capsule by mouth daily, Disp: , Rfl:     b complex vitamins capsule, Take 1 capsule by mouth, Disp: , Rfl:     betamethasone dipropionate (DIPROSONE) 0 05 % cream, APPLY TOPICALLY TWICE A DAY TO BOTH LEGS AS NEEDED, Disp: 30 g, Rfl: 12    chlorzoxazone (PARAFON FORTE) 500 mg tablet, Take 1 tablet (500 mg total) by mouth 4 (four) times a day as needed for muscle spasms, Disp: 30 tablet, Rfl: 1    colestipol (COLESTID) 1 g tablet, , Disp: , Rfl:     colestipol (COLESTID) 5 g granules, Take 1 g by mouth daily, Disp: , Rfl:     fluticasone-vilanterol (Breo Ellipta) 100-25 mcg/inh inhaler, Inhale 1 puff daily Rinse mouth after use , Disp: 60 blister, Rfl: 0    lisinopril (ZESTRIL) 10 mg tablet, Take 1 tablet (10 mg total) by mouth daily, Disp: 90 tablet, Rfl: 3    Misc Natural Products (Turmeric Curcumin) CAPS, Take by mouth, Disp: , Rfl:     multivitamin (THERAGRAN) TABS, Take 1 tablet by mouth daily, Disp: , Rfl:     Omega-3 Fatty Acids (FISH OIL) 1,000 mg, Take 1,000 mg by mouth daily, Disp: , Rfl:    omeprazole (PriLOSEC) 20 mg delayed release capsule, Take 1 capsule (20 mg total) by mouth daily, Disp: 90 capsule, Rfl: 3    Probiotic Product (PROBIOTIC-10 PO), Take 1 Dose by mouth daily, Disp: , Rfl:     Promethazine-DM (PHENERGAN-DM) 6 25-15 mg/5 mL oral syrup, Take 10 mL by mouth 3 (three) times a day as needed for cough, Disp: 240 mL, Rfl: 1    rosuvastatin (CRESTOR) 10 MG tablet, , Disp: , Rfl:     rosuvastatin (CRESTOR) 10 MG tablet, Take 1 tablet (10 mg total) by mouth daily, Disp: 90 tablet, Rfl: 3    Xeljanz 5 MG TABS, , Disp: , Rfl:     ketorolac (TORADOL) 10 mg tablet, TAKE ONE TABLET BY MOUTH EVERY SIX HOURS AS NEEDED FOR MODERATE PAIN (Patient not taking: Reported on 3/4/2022), Disp: 20 tablet, Rfl: 0    pramipexole (MIRAPEX) 0 25 mg tablet, Take 1 tablet (0 25 mg total) by mouth 3 (three) times a day (Patient not taking: Reported on 8/27/2021), Disp: 90 tablet, Rfl: 3    predniSONE 5 mg tablet, Take 4 daily (all together) X 3 days, 3 daily X 3 days, 2 daily X 3 days, 1 daily X 3 days (Patient not taking: Reported on 8/27/2021), Disp: , Rfl:       REVIEW OF SYSTEMS  Review of Systems   Constitutional: Negative for activity change, chills, fatigue and fever  HENT: Negative for congestion, ear discharge, ear pain, sinus pressure, sinus pain, sore throat, tinnitus and trouble swallowing  Eyes: Negative for photophobia, pain, discharge, itching and visual disturbance  Respiratory: Positive for cough, chest tightness and shortness of breath  Negative for wheezing  Cardiovascular: Negative for chest pain and leg swelling  Gastrointestinal: Negative for abdominal distention, abdominal pain, constipation, diarrhea, nausea and vomiting  Endocrine: Negative for polydipsia, polyphagia and polyuria  Genitourinary: Negative for dysuria and frequency  Musculoskeletal: Negative for arthralgias, myalgias, neck pain and neck stiffness  Skin: Negative for color change     Neurological: Negative for dizziness, syncope, weakness, numbness and headaches  Hematological: Does not bruise/bleed easily  Psychiatric/Behavioral: Negative for behavioral problems, confusion, self-injury, sleep disturbance and suicidal ideas  The patient is not nervous/anxious  Current Vitals:   Blood Pressure: 140/78 (03/11/22 0800)  Pulse: 88 (03/11/22 0800)  Temperature: 98 3 °F (36 8 °C) (03/11/22 0800)  Respirations: 18 (03/11/22 0800)  Height: 5' 6" (167 6 cm) (03/11/22 0800)  Weight - Scale: 114 kg (252 lb 3 2 oz) (03/11/22 0800)  SpO2: 100 % (03/11/22 0800)  [unfilled]    PHYSICAL EXAMS:  Physical Exam  Constitutional:       Appearance: Normal appearance  HENT:      Head: Normocephalic and atraumatic  Cardiovascular:      Rate and Rhythm: Normal rate and regular rhythm  Pulses: Normal pulses  Heart sounds: Normal heart sounds  Pulmonary:      Effort: Pulmonary effort is normal       Breath sounds: Normal breath sounds  Skin:     General: Skin is warm  Neurological:      General: No focal deficit present  Mental Status: She is alert and oriented to person, place, and time  Psychiatric:         Mood and Affect: Mood normal          Behavior: Behavior normal              Lab, imaging and other studies: I have personally reviewed pertinent reports  Jenni Judge

## 2022-03-11 NOTE — ASSESSMENT & PLAN NOTE
Blood pressure slightly elevated today in office  On lisinopril 10mg daily, low salt diet, avoid otc decongestants

## 2022-03-11 NOTE — ASSESSMENT & PLAN NOTE
Oxygen stable at 97% in office, no resp distress  Intermittent cough, using breo with good relief No previous lung disease

## 2022-03-11 NOTE — ASSESSMENT & PLAN NOTE
Most all symptoms have subsided  Overall doing well except for shortness and feeling winded with exertion, oxygen has been stable   Will obtain a chest xray to evaluate

## 2022-03-29 DIAGNOSIS — U07.1 COVID-19: ICD-10-CM

## 2022-03-29 RX ORDER — FLUTICASONE FUROATE AND VILANTEROL TRIFENATATE 100; 25 UG/1; UG/1
POWDER RESPIRATORY (INHALATION)
Qty: 60 EACH | Refills: 0 | Status: SHIPPED | OUTPATIENT
Start: 2022-03-29 | End: 2022-06-10 | Stop reason: ALTCHOICE

## 2022-06-10 ENCOUNTER — OFFICE VISIT (OUTPATIENT)
Dept: FAMILY MEDICINE CLINIC | Facility: CLINIC | Age: 52
End: 2022-06-10
Payer: COMMERCIAL

## 2022-06-10 VITALS
OXYGEN SATURATION: 98 % | TEMPERATURE: 97.9 F | DIASTOLIC BLOOD PRESSURE: 82 MMHG | BODY MASS INDEX: 40.34 KG/M2 | WEIGHT: 251 LBS | HEART RATE: 88 BPM | HEIGHT: 66 IN | SYSTOLIC BLOOD PRESSURE: 128 MMHG

## 2022-06-10 DIAGNOSIS — E78.2 MIXED HYPERLIPIDEMIA: ICD-10-CM

## 2022-06-10 DIAGNOSIS — D50.8 IRON DEFICIENCY ANEMIA SECONDARY TO INADEQUATE DIETARY IRON INTAKE: ICD-10-CM

## 2022-06-10 DIAGNOSIS — H81.10 BENIGN PAROXYSMAL POSITIONAL VERTIGO, UNSPECIFIED LATERALITY: ICD-10-CM

## 2022-06-10 DIAGNOSIS — M05.79 RHEUMATOID ARTHRITIS INVOLVING MULTIPLE SITES WITH POSITIVE RHEUMATOID FACTOR (HCC): Primary | ICD-10-CM

## 2022-06-10 DIAGNOSIS — Z00.00 ANNUAL PHYSICAL EXAM: ICD-10-CM

## 2022-06-10 DIAGNOSIS — K21.9 GERD WITHOUT ESOPHAGITIS: Chronic | ICD-10-CM

## 2022-06-10 DIAGNOSIS — I10 ESSENTIAL HYPERTENSION: Chronic | ICD-10-CM

## 2022-06-10 PROCEDURE — 99396 PREV VISIT EST AGE 40-64: CPT | Performed by: FAMILY MEDICINE

## 2022-06-10 PROCEDURE — 3074F SYST BP LT 130 MM HG: CPT | Performed by: FAMILY MEDICINE

## 2022-06-10 PROCEDURE — 1036F TOBACCO NON-USER: CPT | Performed by: FAMILY MEDICINE

## 2022-06-10 PROCEDURE — 3079F DIAST BP 80-89 MM HG: CPT | Performed by: FAMILY MEDICINE

## 2022-06-10 PROCEDURE — 3008F BODY MASS INDEX DOCD: CPT | Performed by: FAMILY MEDICINE

## 2022-06-10 NOTE — PROGRESS NOTES
Assessment/Plan:       Problem List Items Addressed This Visit        Digestive    GERD without esophagitis (Chronic)     GERD symptoms are stable currently continue with omeprazole 20 mg avoid late-night eating avoid acid rich foods              Cardiovascular and Mediastinum    Essential hypertension (Chronic)     Hypertension stable control currently doing well avoiding sodium and continuing with lisinopril 10 mg           Relevant Orders    CBC and differential    Comprehensive metabolic panel    Lipid panel    TSH, 3rd generation with Free T4 reflex    Iron Panel (Includes Ferritin, Iron Sat%, Iron, and TIBC)       Nervous and Auditory    Benign paroxysmal positional vertigo     No recent vertigo and she will continue to perform exercises for balance when symptoms flare up and if exacerbation worsens referral for physical therapy can be done              Musculoskeletal and Integument    Rheumatoid arthritis involving multiple sites with positive rheumatoid factor (HCC) - Primary     Done is stable rheumatoid arthritis without recent flare up continuing on Xeljanz 5 mg and followed by Rheumatology           Relevant Orders    CBC and differential    Comprehensive metabolic panel    Lipid panel    TSH, 3rd generation with Free T4 reflex    Iron Panel (Includes Ferritin, Iron Sat%, Iron, and TIBC)       Other    Mixed hyperlipidemia     Lipid profile CMP reviewed continue with Crestor 10 mg tablets avoiding saturated fats following a heart healthy diet and re-evaluate laboratory work every 6 months           Relevant Orders    CBC and differential    Comprehensive metabolic panel    Lipid panel    TSH, 3rd generation with Free T4 reflex    Iron Panel (Includes Ferritin, Iron Sat%, Iron, and TIBC)    Iron deficiency anemia secondary to inadequate dietary iron intake     Repeat CBC iron level at next office visit in 6 months           Relevant Orders    CBC and differential    Comprehensive metabolic panel    Lipid panel    TSH, 3rd generation with Free T4 reflex    Iron Panel (Includes Ferritin, Iron Sat%, Iron, and TIBC)    Annual physical exam     Reviewed all health issues today and ordered laboratory work for next visit                   Subjective:      Patient ID: Alley Goodwin is a 46 y o  female  Follow-up evaluation today with laboratory work      The following portions of the patient's history were reviewed and updated as appropriate: allergies, current medications, past family history, past medical history, past social history, past surgical history and problem list     Review of Systems   Constitutional: Negative for chills, fatigue and fever  HENT: Positive for rhinorrhea  Negative for congestion, nosebleeds, sinus pressure and sore throat  Eyes: Negative for discharge and redness  Respiratory: Negative for cough and shortness of breath  Cardiovascular: Negative for chest pain, palpitations and leg swelling  Gastrointestinal: Negative for abdominal pain, blood in stool and nausea  Endocrine: Negative for cold intolerance, heat intolerance and polyuria  Genitourinary: Negative for dysuria and frequency  Musculoskeletal: Positive for arthralgias  Negative for back pain and myalgias  Skin: Negative for rash  Neurological: Negative for dizziness, weakness and headaches  Hematological: Negative for adenopathy  Psychiatric/Behavioral: Negative for behavioral problems and sleep disturbance  The patient is not nervous/anxious  Objective:      /82   Pulse 88   Temp 97 9 °F (36 6 °C)   Ht 5' 6" (1 676 m)   Wt 114 kg (251 lb)   SpO2 98%   BMI 40 51 kg/m²        Physical Exam  Vitals and nursing note reviewed  Constitutional:       General: She is not in acute distress  Appearance: Normal appearance  She is well-developed and normal weight  HENT:      Head: Normocephalic and atraumatic        Right Ear: Tympanic membrane, ear canal and external ear normal       Left Ear: Tympanic membrane, ear canal and external ear normal       Nose: Nose normal       Mouth/Throat:      Mouth: Mucous membranes are moist       Pharynx: No oropharyngeal exudate  Eyes:      General: No scleral icterus  Right eye: No discharge  Left eye: No discharge  Conjunctiva/sclera: Conjunctivae normal       Pupils: Pupils are equal, round, and reactive to light  Neck:      Thyroid: No thyromegaly  Vascular: No JVD  Cardiovascular:      Rate and Rhythm: Normal rate and regular rhythm  Pulses: Normal pulses  Heart sounds: Normal heart sounds  No murmur heard  Pulmonary:      Effort: Pulmonary effort is normal       Breath sounds: No wheezing or rales  Chest:      Chest wall: No tenderness  Abdominal:      General: Bowel sounds are normal  There is no distension  Palpations: Abdomen is soft  There is no mass  Tenderness: There is no abdominal tenderness  Musculoskeletal:         General: No tenderness or deformity  Normal range of motion  Cervical back: Normal range of motion  Lymphadenopathy:      Cervical: No cervical adenopathy  Skin:     General: Skin is warm and dry  Capillary Refill: Capillary refill takes less than 2 seconds  Findings: No rash  Neurological:      General: No focal deficit present  Mental Status: She is alert and oriented to person, place, and time  Mental status is at baseline  Cranial Nerves: No cranial nerve deficit  Coordination: Coordination normal       Deep Tendon Reflexes: Reflexes are normal and symmetric  Reflexes normal    Psychiatric:         Mood and Affect: Mood normal          Behavior: Behavior normal          Thought Content: Thought content normal          Judgment: Judgment normal           Data:    Laboratory Results: I have personally reviewed the pertinent laboratory results/reports   Radiology/Other Diagnostic Testing Results: I have personally reviewed pertinent reports  Lab Results   Component Value Date    WBC 11 92 (H) 03/06/2021    HGB 12 0 03/06/2021    HCT 35 1 03/06/2021    MCV 91 03/06/2021     03/06/2021     Lab Results   Component Value Date    K 4 6 03/06/2021     (H) 03/06/2021    CO2 25 03/06/2021    BUN 23 03/06/2021    CREATININE 1 47 (H) 03/06/2021    GLUF 87 03/06/2021    CALCIUM 8 7 03/06/2021    AST 11 03/06/2021    ALT 29 03/06/2021    ALKPHOS 65 03/06/2021    EGFR 41 03/06/2021     Lab Results   Component Value Date    CHOLESTEROL 183 03/06/2021    CHOLESTEROL 260 (H) 11/14/2020    CHOLESTEROL 235 (H) 05/11/2020     Lab Results   Component Value Date    HDL 61 03/06/2021    HDL 74 11/14/2020    HDL 60 05/11/2020     Lab Results   Component Value Date    LDLCALC 75 03/06/2021    LDLCALC 137 (H) 11/14/2020    LDLCALC 132 (H) 05/11/2020     Lab Results   Component Value Date    TRIG 235 (H) 03/06/2021    TRIG 247 (H) 11/14/2020    TRIG 215 (H) 05/11/2020     No results found for: [place], [place]  Lab Results   Component Value Date    [id number] 1 860 03/06/2021     No results found for: HGBA1C  No results found for: EPI Whitlock DO

## 2022-06-10 NOTE — ASSESSMENT & PLAN NOTE
Done is stable rheumatoid arthritis without recent flare up continuing on Xeljanz 5 mg and followed by Rheumatology

## 2022-06-10 NOTE — ASSESSMENT & PLAN NOTE
Lipid profile CMP reviewed continue with Crestor 10 mg tablets avoiding saturated fats following a heart healthy diet and re-evaluate laboratory work every 6 months

## 2022-06-10 NOTE — ASSESSMENT & PLAN NOTE
No recent vertigo and she will continue to perform exercises for balance when symptoms flare up and if exacerbation worsens referral for physical therapy can be done

## 2022-06-10 NOTE — ASSESSMENT & PLAN NOTE
Hypertension stable control currently doing well avoiding sodium and continuing with lisinopril 10 mg

## 2022-06-10 NOTE — PATIENT INSTRUCTIONS
Heart Healthy Diet   WHAT YOU NEED TO KNOW:   A heart healthy diet is an eating plan low in unhealthy fats and sodium (salt)  The plan is high in healthy fats and fiber  A heart healthy diet helps improve your cholesterol levels and lowers your risk for heart disease and stroke  A dietitian will teach you how to read and understand food labels  DISCHARGE INSTRUCTIONS:   Heart healthy diet guidelines to follow:   Choose foods that contain healthy fats  Unsaturated fats  include monounsaturated and polyunsaturated fats  Unsaturated fat is found in foods such as soybean, canola, olive, corn, and safflower oils  It is also found in soft tub margarine that is made with liquid vegetable oil  Omega-3 fat  is found in certain fish, such as salmon, tuna, and trout, and in walnuts and flaxseed  Eat fish high in omega-3 fats at least 2 times a week  Get 20 to 30 grams of fiber each day  Fruits, vegetables, whole-grain foods, and legumes (cooked beans) are good sources of fiber  Limit or do not have unhealthy fats  Cholesterol  is found in animal foods, such as eggs and lobster, and in dairy products made from whole milk  Limit cholesterol to less than 200 mg each day  Saturated fat  is found in meats, such as watson and hamburger  It is also found in chicken or turkey skin, whole milk, and butter  Limit saturated fat to less than 7% of your total daily calories  Trans fat  is found in packaged foods, such as potato chips and cookies  It is also in hard margarine, some fried foods, and shortening  Do not eat foods that contain trans fats  Limit sodium as directed  You may be told to limit sodium to 2,000 to 2,300 mg each day  Choose low-sodium or no-salt-added foods  Add little or no salt to food you prepare  Use herbs and spices in place of salt         Include the following in your heart healthy plan:  Ask your dietitian or healthcare provider how many servings to have from each of the following food groups:  Grains:      Whole-wheat breads, cereals, and pastas, and brown rice    Low-fat, low-sodium crackers and chips    Vegetables:      Broccoli, green beans, green peas, and spinach    Collards, kale, and lima beans    Carrots, sweet potatoes, tomatoes, and peppers    Canned vegetables with no salt added    Fruits:      Bananas, peaches, pears, and pineapple    Grapes, raisins, and dates    Oranges, tangerines, grapefruit, orange juice, and grapefruit juice    Apricots, mangoes, melons, and papaya    Raspberries and strawberries    Canned fruit with no added sugar    Low-fat dairy:      Nonfat (skim) milk, 1% milk, and low-fat almond, cashew, or soy milks fortified with calcium    Low-fat cheese, regular or frozen yogurt, and cottage cheese    Meats and proteins:      Lean cuts of beef and pork (loin, leg, round), skinless chicken and turkey    Legumes, soy products, egg whites, or nuts    Limit or do not include the following in your heart healthy plan:   Unhealthy fats and oils:      Whole or 2% milk, cream cheese, sour cream, or cheese    High-fat cuts of beef (T-bone steaks, ribs), chicken or turkey with skin, and organ meats such as liver    Butter, stick margarine, shortening, and cooking oils such as coconut or palm oil    Foods and liquids high in sodium:      Packaged foods, such as frozen dinners, cookies, macaroni and cheese, and cereals with more than 300 mg of sodium per serving    Vegetables with added sodium, such as instant potatoes, vegetables with added sauces, or regular canned vegetables    Cured or smoked meats, such as hot dogs, watson, and sausage    High-sodium ketchup, barbecue sauce, salad dressing, pickles, olives, soy sauce, or miso    Foods and liquids high in sugar:      Candy, cake, cookies, pies, or doughnuts    Soft drinks (soda), sports drinks, or sweetened tea    Canned or dry mixes for cakes, soups, sauces, or gravies    Other healthy heart guidelines:   Do not smoke   Nicotine and other chemicals in cigarettes and cigars can cause lung and heart damage  Ask your healthcare provider for information if you currently smoke and need help to quit  E-cigarettes or smokeless tobacco still contain nicotine  Talk to your healthcare provider before you use these products  Limit or do not drink alcohol as directed  Alcohol can damage your heart and raise your blood pressure  Your healthcare provider may give you specific daily and weekly limits  The general recommended limit is 1 drink a day for women 21 or older and for men 72 or older  Do not have more than 3 drinks in a day or 7 in a week  The recommended limit is 2 drinks a day for men 24to 59years of age  Do not have more than 4 drinks in a day or 14 in a week  A drink of alcohol is 12 ounces of beer, 5 ounces of wine, or 1½ ounces of liquor  Exercise regularly  Exercise can help you maintain a healthy weight and improve your blood pressure and cholesterol levels  Regular exercise can also decrease your risk for heart problems  Ask your healthcare provider about the best exercise plan for you  Do not start an exercise program without asking your healthcare provider  Follow up with your doctor or cardiologist as directed:  Write down your questions so you remember to ask them during your visits  © Copyright [address] 2022 Information is for End User's use only and may not be sold, redistributed or otherwise used for commercial purposes  All illustrations and images included in CareNotes® are the copyrighted property of [employer]  or [employer]   The above information is an  only  It is not intended as medical advice for individual conditions or treatments  Talk to your doctor, nurse or pharmacist before following any medical regimen to see if it is safe and effective for you

## 2022-06-10 NOTE — ASSESSMENT & PLAN NOTE
GERD symptoms are stable currently continue with omeprazole 20 mg avoid late-night eating avoid acid rich foods

## 2022-07-01 ENCOUNTER — APPOINTMENT (OUTPATIENT)
Dept: LAB | Facility: CLINIC | Age: 52
End: 2022-07-01
Payer: COMMERCIAL

## 2022-07-01 DIAGNOSIS — K21.9 GERD WITHOUT ESOPHAGITIS: Chronic | ICD-10-CM

## 2022-07-01 DIAGNOSIS — I10 ESSENTIAL HYPERTENSION: ICD-10-CM

## 2022-07-01 DIAGNOSIS — E78.2 MIXED HYPERLIPIDEMIA: ICD-10-CM

## 2022-07-01 DIAGNOSIS — Z12.31 VISIT FOR SCREENING MAMMOGRAM: ICD-10-CM

## 2022-07-01 DIAGNOSIS — M05.79 RHEUMATOID ARTHRITIS INVOLVING MULTIPLE SITES WITH POSITIVE RHEUMATOID FACTOR (HCC): ICD-10-CM

## 2022-07-01 DIAGNOSIS — D50.8 IRON DEFICIENCY ANEMIA SECONDARY TO INADEQUATE DIETARY IRON INTAKE: ICD-10-CM

## 2022-07-01 DIAGNOSIS — G62.9 PERIPHERAL POLYNEUROPATHY: ICD-10-CM

## 2022-07-01 DIAGNOSIS — K58.0 IRRITABLE BOWEL SYNDROME WITH DIARRHEA: ICD-10-CM

## 2022-07-01 LAB
ALBUMIN SERPL BCP-MCNC: 3.5 G/DL (ref 3.5–5)
ALP SERPL-CCNC: 54 U/L (ref 46–116)
ALT SERPL W P-5'-P-CCNC: 40 U/L (ref 12–78)
ANION GAP SERPL CALCULATED.3IONS-SCNC: 8 MMOL/L (ref 4–13)
AST SERPL W P-5'-P-CCNC: 26 U/L (ref 5–45)
BASOPHILS # BLD AUTO: 0.04 THOUSANDS/ΜL (ref 0–0.1)
BASOPHILS NFR BLD AUTO: 0 % (ref 0–1)
BILIRUB SERPL-MCNC: 0.39 MG/DL (ref 0.2–1)
BUN SERPL-MCNC: 22 MG/DL (ref 5–25)
CALCIUM SERPL-MCNC: 9.1 MG/DL (ref 8.3–10.1)
CHLORIDE SERPL-SCNC: 108 MMOL/L (ref 100–108)
CHOLEST SERPL-MCNC: 200 MG/DL
CO2 SERPL-SCNC: 22 MMOL/L (ref 21–32)
CREAT SERPL-MCNC: 1.84 MG/DL (ref 0.6–1.3)
EOSINOPHIL # BLD AUTO: 0.13 THOUSAND/ΜL (ref 0–0.61)
EOSINOPHIL NFR BLD AUTO: 1 % (ref 0–6)
ERYTHROCYTE [DISTWIDTH] IN BLOOD BY AUTOMATED COUNT: 13.2 % (ref 11.6–15.1)
FERRITIN SERPL-MCNC: 97 NG/ML (ref 8–388)
GFR SERPL CREATININE-BSD FRML MDRD: 31 ML/MIN/1.73SQ M
GLUCOSE P FAST SERPL-MCNC: 85 MG/DL (ref 65–99)
HCT VFR BLD AUTO: 31.5 % (ref 34.8–46.1)
HDLC SERPL-MCNC: 63 MG/DL
HGB BLD-MCNC: 10.8 G/DL (ref 11.5–15.4)
IMM GRANULOCYTES # BLD AUTO: 0.09 THOUSAND/UL (ref 0–0.2)
IMM GRANULOCYTES NFR BLD AUTO: 1 % (ref 0–2)
IRON SATN MFR SERPL: 31 % (ref 15–50)
IRON SERPL-MCNC: 118 UG/DL (ref 50–170)
LDLC SERPL CALC-MCNC: 81 MG/DL (ref 0–100)
LYMPHOCYTES # BLD AUTO: 1.95 THOUSANDS/ΜL (ref 0.6–4.47)
LYMPHOCYTES NFR BLD AUTO: 16 % (ref 14–44)
MCH RBC QN AUTO: 31.7 PG (ref 26.8–34.3)
MCHC RBC AUTO-ENTMCNC: 34.3 G/DL (ref 31.4–37.4)
MCV RBC AUTO: 92 FL (ref 82–98)
MONOCYTES # BLD AUTO: 0.76 THOUSAND/ΜL (ref 0.17–1.22)
MONOCYTES NFR BLD AUTO: 6 % (ref 4–12)
NEUTROPHILS # BLD AUTO: 9.33 THOUSANDS/ΜL (ref 1.85–7.62)
NEUTS SEG NFR BLD AUTO: 76 % (ref 43–75)
NONHDLC SERPL-MCNC: 137 MG/DL
NRBC BLD AUTO-RTO: 0 /100 WBCS
PLATELET # BLD AUTO: 265 THOUSANDS/UL (ref 149–390)
PMV BLD AUTO: 10.9 FL (ref 8.9–12.7)
POTASSIUM SERPL-SCNC: 4.7 MMOL/L (ref 3.5–5.3)
PROT SERPL-MCNC: 7.1 G/DL (ref 6.4–8.2)
RBC # BLD AUTO: 3.41 MILLION/UL (ref 3.81–5.12)
SODIUM SERPL-SCNC: 138 MMOL/L (ref 136–145)
TIBC SERPL-MCNC: 377 UG/DL (ref 250–450)
TRIGL SERPL-MCNC: 279 MG/DL
TSH SERPL DL<=0.05 MIU/L-ACNC: 2.48 UIU/ML (ref 0.45–4.5)
WBC # BLD AUTO: 12.3 THOUSAND/UL (ref 4.31–10.16)

## 2022-07-01 PROCEDURE — 82728 ASSAY OF FERRITIN: CPT

## 2022-07-01 PROCEDURE — 80053 COMPREHEN METABOLIC PANEL: CPT

## 2022-07-01 PROCEDURE — 36415 COLL VENOUS BLD VENIPUNCTURE: CPT

## 2022-07-01 PROCEDURE — 83550 IRON BINDING TEST: CPT

## 2022-07-01 PROCEDURE — 84443 ASSAY THYROID STIM HORMONE: CPT

## 2022-07-01 PROCEDURE — 83540 ASSAY OF IRON: CPT

## 2022-07-01 PROCEDURE — 85025 COMPLETE CBC W/AUTO DIFF WBC: CPT

## 2022-07-01 PROCEDURE — 80061 LIPID PANEL: CPT

## 2022-08-29 DIAGNOSIS — Z12.31 VISIT FOR SCREENING MAMMOGRAM: ICD-10-CM

## 2022-08-29 DIAGNOSIS — M05.79 RHEUMATOID ARTHRITIS INVOLVING MULTIPLE SITES WITH POSITIVE RHEUMATOID FACTOR (HCC): ICD-10-CM

## 2022-08-29 DIAGNOSIS — D50.8 IRON DEFICIENCY ANEMIA SECONDARY TO INADEQUATE DIETARY IRON INTAKE: ICD-10-CM

## 2022-08-29 DIAGNOSIS — I10 ESSENTIAL HYPERTENSION: Chronic | ICD-10-CM

## 2022-08-29 DIAGNOSIS — K21.9 GERD WITHOUT ESOPHAGITIS: Chronic | ICD-10-CM

## 2022-08-29 DIAGNOSIS — E78.2 MIXED HYPERLIPIDEMIA: ICD-10-CM

## 2022-08-29 DIAGNOSIS — G62.9 PERIPHERAL POLYNEUROPATHY: ICD-10-CM

## 2022-08-30 DIAGNOSIS — E78.2 MIXED HYPERLIPIDEMIA: ICD-10-CM

## 2022-08-30 DIAGNOSIS — K21.9 GERD WITHOUT ESOPHAGITIS: Chronic | ICD-10-CM

## 2022-08-30 DIAGNOSIS — D50.8 IRON DEFICIENCY ANEMIA SECONDARY TO INADEQUATE DIETARY IRON INTAKE: ICD-10-CM

## 2022-08-30 DIAGNOSIS — Z12.31 VISIT FOR SCREENING MAMMOGRAM: ICD-10-CM

## 2022-08-30 DIAGNOSIS — I10 ESSENTIAL HYPERTENSION: Chronic | ICD-10-CM

## 2022-08-30 DIAGNOSIS — M05.79 RHEUMATOID ARTHRITIS INVOLVING MULTIPLE SITES WITH POSITIVE RHEUMATOID FACTOR (HCC): ICD-10-CM

## 2022-08-30 DIAGNOSIS — G62.9 PERIPHERAL POLYNEUROPATHY: ICD-10-CM

## 2022-08-30 RX ORDER — ROSUVASTATIN CALCIUM 10 MG/1
TABLET, COATED ORAL
Qty: 90 TABLET | Refills: 3 | Status: SHIPPED | OUTPATIENT
Start: 2022-08-30 | End: 2022-08-30 | Stop reason: SDUPTHER

## 2022-08-30 RX ORDER — ROSUVASTATIN CALCIUM 10 MG/1
10 TABLET, COATED ORAL DAILY
Qty: 90 TABLET | Refills: 0 | Status: SHIPPED | OUTPATIENT
Start: 2022-08-30

## 2022-08-30 RX ORDER — OMEPRAZOLE 20 MG/1
20 CAPSULE, DELAYED RELEASE ORAL DAILY
Qty: 90 CAPSULE | Refills: 0 | Status: SHIPPED | OUTPATIENT
Start: 2022-08-30

## 2022-08-30 RX ORDER — OMEPRAZOLE 20 MG/1
CAPSULE, DELAYED RELEASE ORAL
Qty: 90 CAPSULE | Refills: 3 | Status: SHIPPED | OUTPATIENT
Start: 2022-08-30 | End: 2022-08-30 | Stop reason: SDUPTHER

## 2022-11-28 ENCOUNTER — VBI (OUTPATIENT)
Dept: ADMINISTRATIVE | Facility: OTHER | Age: 52
End: 2022-11-28

## 2023-02-03 ENCOUNTER — OFFICE VISIT (OUTPATIENT)
Dept: FAMILY MEDICINE CLINIC | Facility: CLINIC | Age: 53
End: 2023-02-03

## 2023-02-03 VITALS
TEMPERATURE: 96.9 F | RESPIRATION RATE: 16 BRPM | OXYGEN SATURATION: 100 % | DIASTOLIC BLOOD PRESSURE: 78 MMHG | HEIGHT: 66 IN | BODY MASS INDEX: 40.5 KG/M2 | WEIGHT: 252 LBS | SYSTOLIC BLOOD PRESSURE: 122 MMHG | HEART RATE: 92 BPM

## 2023-02-03 DIAGNOSIS — G62.9 PERIPHERAL POLYNEUROPATHY: ICD-10-CM

## 2023-02-03 DIAGNOSIS — D50.8 IRON DEFICIENCY ANEMIA SECONDARY TO INADEQUATE DIETARY IRON INTAKE: ICD-10-CM

## 2023-02-03 DIAGNOSIS — I10 ESSENTIAL HYPERTENSION: Chronic | ICD-10-CM

## 2023-02-03 DIAGNOSIS — Z12.31 VISIT FOR SCREENING MAMMOGRAM: ICD-10-CM

## 2023-02-03 DIAGNOSIS — K21.9 GERD WITHOUT ESOPHAGITIS: Chronic | ICD-10-CM

## 2023-02-03 DIAGNOSIS — M05.79 RHEUMATOID ARTHRITIS INVOLVING MULTIPLE SITES WITH POSITIVE RHEUMATOID FACTOR (HCC): ICD-10-CM

## 2023-02-03 DIAGNOSIS — E78.2 MIXED HYPERLIPIDEMIA: ICD-10-CM

## 2023-02-03 RX ORDER — LISINOPRIL 10 MG/1
10 TABLET ORAL DAILY
Qty: 90 TABLET | Refills: 3 | Status: SHIPPED | OUTPATIENT
Start: 2023-02-03

## 2023-02-03 RX ORDER — OMEPRAZOLE 20 MG/1
20 CAPSULE, DELAYED RELEASE ORAL DAILY
Qty: 90 CAPSULE | Refills: 3 | Status: SHIPPED | OUTPATIENT
Start: 2023-02-03

## 2023-02-03 RX ORDER — ROSUVASTATIN CALCIUM 10 MG/1
10 TABLET, COATED ORAL DAILY
Qty: 90 TABLET | Refills: 3 | Status: SHIPPED | OUTPATIENT
Start: 2023-02-03

## 2023-02-03 NOTE — ASSESSMENT & PLAN NOTE
Mixed hyperlipidemia stable with Crestor 10 mg continue same dosage follow-up lipid profile reviewed labs with patient at this time avoid saturated fats repeat blood work at next visit in 6 months

## 2023-02-03 NOTE — PROGRESS NOTES
BMI Counseling: Body mass index is 40 67 kg/m²  The BMI is above normal  Nutrition recommendations include reducing portion sizes, 3-5 servings of fruits/vegetables daily, reducing fast food intake, consuming healthier snacks, decreasing soda and/or juice intake, moderation in carbohydrate intake and reducing intake of saturated fat and trans fat  Exercise recommendations include exercising 3-5 times per week     Assessment/Plan:       Problem List Items Addressed This Visit        Digestive    GERD without esophagitis (Chronic)     GERD symptoms stable continue omeprazole 20 mg daily         Relevant Medications    omeprazole (PriLOSEC) 20 mg delayed release capsule    rosuvastatin (CRESTOR) 10 MG tablet       Cardiovascular and Mediastinum    Essential hypertension (Chronic)     Pretension stable control continue same medication regimen lisinopril renew at this time 10 mg daily         Relevant Medications    lisinopril (ZESTRIL) 10 mg tablet    omeprazole (PriLOSEC) 20 mg delayed release capsule    rosuvastatin (CRESTOR) 10 MG tablet       Nervous and Auditory    Peripheral polyneuropathy    Relevant Medications    rosuvastatin (CRESTOR) 10 MG tablet       Musculoskeletal and Integument    Rheumatoid arthritis involving multiple sites with positive rheumatoid factor (HCC)     Rheumatoid arthritis stable no recent flareup continue with Xeljanz 5 mg tablets and follow-up with rheumatology as scheduled         Relevant Medications    rosuvastatin (CRESTOR) 10 MG tablet       Other    Mixed hyperlipidemia     Mixed hyperlipidemia stable with Crestor 10 mg continue same dosage follow-up lipid profile reviewed labs with patient at this time avoid saturated fats repeat blood work at next visit in 6 months         Relevant Medications    rosuvastatin (CRESTOR) 10 MG tablet    Iron deficiency anemia secondary to inadequate dietary iron intake     Follow-up CBC and iron level in 6 months continue B complex vitamins Relevant Medications    rosuvastatin (CRESTOR) 10 MG tablet   Other Visit Diagnoses     Visit for screening mammogram        Relevant Medications    rosuvastatin (CRESTOR) 10 MG tablet    Other Relevant Orders    Mammo screening bilateral w 3d & cad            Subjective:      Patient ID: Pippa Navarro is a 46 y o  female  Patient here for 6-month follow-up evaluation review of lab work      The following portions of the patient's history were reviewed and updated as appropriate: allergies, current medications, past family history, past medical history, past social history, past surgical history and problem list     Review of Systems   Constitutional: Negative for chills, fatigue and fever  HENT: Negative for congestion, nosebleeds, rhinorrhea, sinus pressure and sore throat  Eyes: Negative for discharge and redness  Respiratory: Negative for cough and shortness of breath  Cardiovascular: Negative for chest pain, palpitations and leg swelling  Gastrointestinal: Negative for abdominal pain, blood in stool and nausea  Endocrine: Negative for cold intolerance, heat intolerance and polyuria  Genitourinary: Negative for dysuria and frequency  Musculoskeletal: Negative for arthralgias, back pain and myalgias  Skin: Negative for rash  Neurological: Negative for dizziness, weakness and headaches  Hematological: Negative for adenopathy  Psychiatric/Behavioral: Negative for behavioral problems and sleep disturbance  The patient is not nervous/anxious  Objective:      /78   Pulse 92   Temp (!) 96 9 °F (36 1 °C)   Resp 16   Ht 5' 6" (1 676 m)   Wt 114 kg (252 lb)   SpO2 100%   BMI 40 67 kg/m²        Physical Exam  Vitals and nursing note reviewed  Constitutional:       General: She is not in acute distress  Appearance: Normal appearance  She is well-developed and normal weight  HENT:      Head: Normocephalic and atraumatic        Right Ear: Tympanic membrane and external ear normal       Left Ear: Tympanic membrane and external ear normal       Nose: Nose normal       Mouth/Throat:      Mouth: Mucous membranes are moist       Pharynx: No oropharyngeal exudate  Eyes:      General: No scleral icterus  Right eye: No discharge  Left eye: No discharge  Conjunctiva/sclera: Conjunctivae normal       Pupils: Pupils are equal, round, and reactive to light  Neck:      Thyroid: No thyromegaly  Vascular: No JVD  Cardiovascular:      Rate and Rhythm: Normal rate and regular rhythm  Pulses: Normal pulses  Heart sounds: Normal heart sounds  No murmur heard  Pulmonary:      Effort: Pulmonary effort is normal       Breath sounds: No wheezing or rales  Chest:      Chest wall: No tenderness  Abdominal:      General: Bowel sounds are normal  There is no distension  Palpations: Abdomen is soft  There is no mass  Tenderness: There is no abdominal tenderness  Musculoskeletal:         General: No tenderness or deformity  Normal range of motion  Cervical back: Normal range of motion  Lymphadenopathy:      Cervical: No cervical adenopathy  Skin:     General: Skin is warm and dry  Capillary Refill: Capillary refill takes less than 2 seconds  Findings: No rash  Neurological:      General: No focal deficit present  Mental Status: She is alert and oriented to person, place, and time  Cranial Nerves: No cranial nerve deficit  Coordination: Coordination normal       Deep Tendon Reflexes: Reflexes are normal and symmetric  Reflexes normal    Psychiatric:         Mood and Affect: Mood normal          Behavior: Behavior normal          Thought Content: Thought content normal          Judgment: Judgment normal           Data:    Laboratory Results: I have personally reviewed the pertinent laboratory results/reports   Radiology/Other Diagnostic Testing Results: I have personally reviewed pertinent reports         Lab Results   Component Value Date    WBC 12 30 (H) 07/01/2022    HGB 10 8 (L) 07/01/2022    HCT 31 5 (L) 07/01/2022    MCV 92 07/01/2022     07/01/2022     Lab Results   Component Value Date    K 4 7 07/01/2022     07/01/2022    CO2 22 07/01/2022    BUN 22 07/01/2022    CREATININE 1 84 (H) 07/01/2022    GLUF 85 07/01/2022    CALCIUM 9 1 07/01/2022    AST 26 07/01/2022    ALT 40 07/01/2022    ALKPHOS 54 07/01/2022    EGFR 31 07/01/2022     Lab Results   Component Value Date    CHOLESTEROL 200 07/01/2022    CHOLESTEROL 183 03/06/2021    CHOLESTEROL 260 (H) 11/14/2020     Lab Results   Component Value Date    HDL 63 07/01/2022    HDL 61 03/06/2021    HDL 74 11/14/2020     Lab Results   Component Value Date    LDLCALC 81 07/01/2022    LDLCALC 75 03/06/2021    LDLCALC 137 (H) 11/14/2020     Lab Results   Component Value Date    TRIG 279 (H) 07/01/2022    TRIG 235 (H) 03/06/2021    TRIG 247 (H) 11/14/2020     No results found for: [place], [place]  Lab Results   Component Value Date    [id number] 2 480 07/01/2022     No results found for: HGBA1C  No results found for: EPI CORONANissa, DO

## 2023-02-03 NOTE — ASSESSMENT & PLAN NOTE
Pretension stable control continue same medication regimen lisinopril renew at this time 10 mg daily

## 2023-02-03 NOTE — ASSESSMENT & PLAN NOTE
Rheumatoid arthritis stable no recent flareup continue with Xeljanz 5 mg tablets and follow-up with rheumatology as scheduled

## 2023-06-23 ENCOUNTER — APPOINTMENT (OUTPATIENT)
Dept: LAB | Facility: CLINIC | Age: 53
End: 2023-06-23
Payer: COMMERCIAL

## 2023-06-23 DIAGNOSIS — E78.2 MIXED HYPERLIPIDEMIA: ICD-10-CM

## 2023-06-23 DIAGNOSIS — D50.8 IRON DEFICIENCY ANEMIA SECONDARY TO INADEQUATE DIETARY IRON INTAKE: ICD-10-CM

## 2023-06-23 LAB
FERRITIN SERPL-MCNC: 100 NG/ML (ref 11–307)
IRON SATN MFR SERPL: 28 % (ref 15–50)
IRON SERPL-MCNC: 96 UG/DL (ref 50–170)
TIBC SERPL-MCNC: 340 UG/DL (ref 250–450)
TSH SERPL DL<=0.05 MIU/L-ACNC: 2.62 UIU/ML (ref 0.45–4.5)

## 2023-06-23 PROCEDURE — 84443 ASSAY THYROID STIM HORMONE: CPT

## 2023-06-23 PROCEDURE — 83550 IRON BINDING TEST: CPT

## 2023-06-23 PROCEDURE — 83540 ASSAY OF IRON: CPT

## 2023-06-23 PROCEDURE — 82728 ASSAY OF FERRITIN: CPT

## 2023-06-29 ENCOUNTER — OFFICE VISIT (OUTPATIENT)
Dept: FAMILY MEDICINE CLINIC | Facility: CLINIC | Age: 53
End: 2023-06-29
Payer: COMMERCIAL

## 2023-06-29 VITALS
WEIGHT: 254.63 LBS | SYSTOLIC BLOOD PRESSURE: 138 MMHG | RESPIRATION RATE: 18 BRPM | HEART RATE: 84 BPM | HEIGHT: 66 IN | BODY MASS INDEX: 40.92 KG/M2 | OXYGEN SATURATION: 100 % | DIASTOLIC BLOOD PRESSURE: 74 MMHG | TEMPERATURE: 97.1 F

## 2023-06-29 DIAGNOSIS — K58.0 IRRITABLE BOWEL SYNDROME WITH DIARRHEA: ICD-10-CM

## 2023-06-29 DIAGNOSIS — D63.1 ANEMIA DUE TO STAGE 4 CHRONIC KIDNEY DISEASE (HCC): ICD-10-CM

## 2023-06-29 DIAGNOSIS — K21.9 GERD WITHOUT ESOPHAGITIS: Chronic | ICD-10-CM

## 2023-06-29 DIAGNOSIS — N18.4 ANEMIA DUE TO STAGE 4 CHRONIC KIDNEY DISEASE (HCC): ICD-10-CM

## 2023-06-29 DIAGNOSIS — N17.9 ACUTE RENAL FAILURE, UNSPECIFIED ACUTE RENAL FAILURE TYPE (HCC): ICD-10-CM

## 2023-06-29 DIAGNOSIS — G62.9 PERIPHERAL POLYNEUROPATHY: ICD-10-CM

## 2023-06-29 DIAGNOSIS — M05.79 RHEUMATOID ARTHRITIS INVOLVING MULTIPLE SITES WITH POSITIVE RHEUMATOID FACTOR (HCC): Primary | ICD-10-CM

## 2023-06-29 DIAGNOSIS — I10 ESSENTIAL HYPERTENSION: Chronic | ICD-10-CM

## 2023-06-29 DIAGNOSIS — E78.2 MIXED HYPERLIPIDEMIA: ICD-10-CM

## 2023-06-29 PROCEDURE — 99215 OFFICE O/P EST HI 40 MIN: CPT | Performed by: FAMILY MEDICINE

## 2023-06-29 RX ORDER — AMLODIPINE BESYLATE 5 MG/1
5 TABLET ORAL DAILY
Qty: 30 TABLET | Refills: 5 | Status: SHIPPED | OUTPATIENT
Start: 2023-06-29

## 2023-06-29 NOTE — ASSESSMENT & PLAN NOTE
Patient relatively stable she tried gabapentin and is continuing with the B complex multiple vitamin now

## 2023-06-29 NOTE — ASSESSMENT & PLAN NOTE
Followed by rheumatology seen recently and has been doing better overall with Parish Jones    Follow-up with rheumatology every 6 months

## 2023-06-29 NOTE — ASSESSMENT & PLAN NOTE
IBS syndrome with diarrhea continue with current medication regimen high-fiber diet Colestid 1 g tablet twice daily

## 2023-06-29 NOTE — ASSESSMENT & PLAN NOTE
Acute on chronic renal insufficiency developing and worsening over the course of the past year lab work was done through rheumatology and she was notified to contact our office on review of the lab work she does show significant reduced function in her kidneys with GFR now at 16 and creatinine up above 3 03 back on her baseline 2 years ago was normal   I will refer her immediately for nephrology evaluation additionally she will need a gastroenterology work-up for the significant anemia     Her iron levels were normal on last laboratory work    I will repeat her lab work now pending nephrology follow-up

## 2023-06-29 NOTE — PROGRESS NOTES
Assessment/Plan:       Problem List Items Addressed This Visit        Digestive    GERD without esophagitis (Chronic)     GERD symptoms are stable continue with current medication regimen omeprazole 20 mg daily         Relevant Medications    amLODIPine (NORVASC) 5 mg tablet    Other Relevant Orders    Ambulatory Referral to Gastroenterology    Irritable bowel syndrome with diarrhea     IBS syndrome with diarrhea continue with current medication regimen high-fiber diet Colestid 1 g tablet twice daily         Relevant Medications    amLODIPine (NORVASC) 5 mg tablet       Cardiovascular and Mediastinum    Essential hypertension (Chronic)     Hypertension stable however in light of kidney function change we will stop lisinopril and start patient on amlodipine 5 mg daily         Relevant Medications    amLODIPine (NORVASC) 5 mg tablet       Nervous and Auditory    Peripheral polyneuropathy     Patient relatively stable she tried gabapentin and is continuing with the B complex multiple vitamin now         Relevant Medications    amLODIPine (NORVASC) 5 mg tablet       Musculoskeletal and Integument    Rheumatoid arthritis involving multiple sites with positive rheumatoid factor ([address] ) - Primary     Followed by rheumatology seen recently and has been doing better overall with Diana Rene    Follow-up with rheumatology every 6 months         Relevant Medications    amLODIPine (NORVASC) 5 mg tablet       Genitourinary    Acute renal failure (ARF) ([address] )     Acute on chronic renal insufficiency developing and worsening over the course of the past year lab work was done through rheumatology and she was notified to contact our office on review of the lab work she does show significant reduced function in her kidneys with GFR now at 16 and creatinine up above 3 03 back on her baseline 2 years ago was normal   I will refer her immediately for nephrology evaluation additionally she will need a gastroenterology work-up for the significant anemia     Her iron levels were normal on last laboratory work  I will repeat her lab work now pending nephrology follow-up         Relevant Medications    amLODIPine (NORVASC) 5 mg tablet    Other Relevant Orders    Ambulatory Referral to Nephrology    CBC and differential    Comprehensive metabolic panel       Other    Mixed hyperlipidemia     Mixed hyperlipidemia stable on Crestor 10 mg tablets daily         Relevant Medications    amLODIPine (NORVASC) 5 mg tablet    Anemia due to stage 4 chronic kidney disease (HCC)     Anemia with stage IV kidney disease follow-up with gastroenterology  Relevant Medications    amLODIPine (NORVASC) 5 mg tablet    Other Relevant Orders    Ambulatory Referral to Gastroenterology    CBC and differential    Comprehensive metabolic panel         Subjective:      Patient ID: Velma Cooper is a 48 y o  female  Patient presents today to discuss abnormalities with blood work after seen by rheumatology lab work ordered showing acute renal failure numbers with creatinine up to 3 03 after her baseline was down at 1 02---2 years ago  Also significant anemia with drop in hemoglobin to 8 8      The following portions of the patient's history were reviewed and updated as appropriate: allergies, current medications, past family history, past medical history, past social history, past surgical history and problem list     Review of Systems   Constitutional: Negative for chills, fatigue and fever  HENT: Negative for congestion, nosebleeds, rhinorrhea, sinus pressure and sore throat  Eyes: Negative for discharge and redness  Respiratory: Negative for cough and shortness of breath  Cardiovascular: Negative for chest pain, palpitations and leg swelling  Gastrointestinal: Negative for abdominal pain, blood in stool and nausea  Endocrine: Negative for cold intolerance, heat intolerance and polyuria  Genitourinary: Negative for dysuria and frequency     Musculoskeletal: "Negative for arthralgias, back pain and myalgias  Skin: Negative for rash  Neurological: Negative for dizziness, weakness and headaches  Hematological: Negative for adenopathy  Psychiatric/Behavioral: Negative for behavioral problems and sleep disturbance  The patient is not nervous/anxious  Objective:      /74 (BP Location: Left arm, Patient Position: Sitting, Cuff Size: Standard)   Pulse 84   Temp (!) 97 1 °F (36 2 °C) (Tympanic)   Resp 18   Ht 5' 6\" (1 676 m)   Wt 115 kg (254 lb 10 1 oz)   SpO2 100%   BMI 41 10 kg/m²        Physical Exam  Vitals and nursing note reviewed  Constitutional:       General: She is not in acute distress  Appearance: Normal appearance  She is well-developed and normal weight  HENT:      Head: Normocephalic and atraumatic  Right Ear: Tympanic membrane, ear canal and external ear normal       Left Ear: Tympanic membrane, ear canal and external ear normal       Nose: Nose normal       Mouth/Throat:      Mouth: Mucous membranes are moist       Pharynx: Oropharynx is clear  No oropharyngeal exudate  Eyes:      General: No scleral icterus  Right eye: No discharge  Left eye: No discharge  Extraocular Movements: Extraocular movements intact  Conjunctiva/sclera: Conjunctivae normal       Pupils: Pupils are equal, round, and reactive to light  Neck:      Thyroid: No thyromegaly  Vascular: No JVD  Cardiovascular:      Rate and Rhythm: Normal rate and regular rhythm  Pulses: Normal pulses  Heart sounds: Normal heart sounds  No murmur heard  Pulmonary:      Effort: Pulmonary effort is normal       Breath sounds: No wheezing or rales  Chest:      Chest wall: No tenderness  Abdominal:      General: Bowel sounds are normal  There is no distension  Palpations: Abdomen is soft  There is no mass  Tenderness: There is no abdominal tenderness     Musculoskeletal:         General: No tenderness or " "deformity  Normal range of motion  Cervical back: Normal range of motion  Lymphadenopathy:      Cervical: No cervical adenopathy  Skin:     General: Skin is warm and dry  Capillary Refill: Capillary refill takes less than 2 seconds  Findings: No rash  Neurological:      General: No focal deficit present  Mental Status: She is alert and oriented to person, place, and time  Mental status is at baseline  Cranial Nerves: No cranial nerve deficit  Coordination: Coordination normal       Deep Tendon Reflexes: Reflexes are normal and symmetric  Reflexes normal    Psychiatric:         Mood and Affect: Mood normal          Behavior: Behavior normal          Thought Content: Thought content normal          Judgment: Judgment normal           Data:    Laboratory Results: I have personally reviewed the pertinent laboratory results/reports   Radiology/Other Diagnostic Testing Results: I have personally reviewed pertinent reports         Lab Results   Component Value Date    WBC 8 37 06/23/2023    HGB 8 8 (L) 06/23/2023    HCT 25 9 (L) 06/23/2023    MCV 94 06/23/2023     06/23/2023     Lab Results   Component Value Date    K 4 9 06/23/2023     (H) 06/23/2023    CO2 20 (L) 06/23/2023    BUN 44 (H) 06/23/2023    CREATININE 3 03 (H) 06/23/2023    GLUF 95 06/23/2023    CALCIUM 8 7 06/23/2023    AST 16 06/23/2023    ALT 36 06/23/2023    ALKPHOS 58 06/23/2023    EGFR 16 06/23/2023     Lab Results   Component Value Date    CHOLESTEROL 214 (H) 06/23/2023    CHOLESTEROL 200 07/01/2022    CHOLESTEROL 183 03/06/2021     Lab Results   Component Value Date    HDL 59 06/23/2023    HDL 63 07/01/2022    HDL 61 03/06/2021     Lab Results   Component Value Date    LDLCALC 110 (H) 06/23/2023    LDLCALC 81 07/01/2022    LDLCALC 75 03/06/2021     Lab Results   Component Value Date    TRIG 224 (H) 06/23/2023    TRIG 279 (H) 07/01/2022    TRIG 235 (H) 03/06/2021     No results found for: \"CHOLHDL\"  Lab " "Results   Component Value Date    [id number] 2 618 06/23/2023     No results found for: \"HGBA1C\"  No results found for: \"PSA\"    Vidal Owens DO      "

## 2023-06-30 ENCOUNTER — CONSULT (OUTPATIENT)
Dept: NEPHROLOGY | Facility: CLINIC | Age: 53
End: 2023-06-30
Payer: COMMERCIAL

## 2023-06-30 VITALS
OXYGEN SATURATION: 99 % | HEART RATE: 83 BPM | HEIGHT: 66 IN | BODY MASS INDEX: 40.98 KG/M2 | DIASTOLIC BLOOD PRESSURE: 80 MMHG | SYSTOLIC BLOOD PRESSURE: 136 MMHG | WEIGHT: 255 LBS

## 2023-06-30 DIAGNOSIS — N17.9 ACUTE RENAL FAILURE, UNSPECIFIED ACUTE RENAL FAILURE TYPE (HCC): Primary | ICD-10-CM

## 2023-06-30 DIAGNOSIS — M05.79 RHEUMATOID ARTHRITIS INVOLVING MULTIPLE SITES WITH POSITIVE RHEUMATOID FACTOR (HCC): ICD-10-CM

## 2023-06-30 DIAGNOSIS — R80.9 PROTEINURIA, UNSPECIFIED TYPE: ICD-10-CM

## 2023-06-30 DIAGNOSIS — I10 ESSENTIAL HYPERTENSION: Chronic | ICD-10-CM

## 2023-06-30 DIAGNOSIS — R31.29 OTHER MICROSCOPIC HEMATURIA: ICD-10-CM

## 2023-06-30 DIAGNOSIS — K21.9 GERD WITHOUT ESOPHAGITIS: Chronic | ICD-10-CM

## 2023-06-30 DIAGNOSIS — D64.9 ANEMIA, UNSPECIFIED TYPE: ICD-10-CM

## 2023-06-30 DIAGNOSIS — N18.32 STAGE 3B CHRONIC KIDNEY DISEASE (HCC): ICD-10-CM

## 2023-06-30 NOTE — ASSESSMENT & PLAN NOTE
Lab Results   Component Value Date    EGFR 16 06/23/2023    EGFR 31 07/01/2022    EGFR 41 03/06/2021    CREATININE 3 03 (H) 06/23/2023    CREATININE 1 84 (H) 07/01/2022    CREATININE 1 47 (H) 03/06/2021     Patient last creatinine of around 1 8 mg/dL is presumed to be recurrent baseline creatinine  However going back several years, the patient's creatinine has been declining, with an estimated GFR going down about 10 mL/min every year except for this particular year at which time it went down by 15 mL/min

## 2023-06-30 NOTE — PROGRESS NOTES
[facility] [place], [place]    Name: Velma Cooper  YOB: 1970      Assessment/Plan:    Acute renal failure (ARF) [facility])  Patient will require work-up including kidney ultrasound to rule out anatomic issues  We will discontinue turmeric at this time which may be contributing to the patient's reduction in kidney function  In addition she is on omeprazole which she is occasionally linked to an acute interstitial nephritis, check urine eosinophils  Patient appears to have had a history of hematuria and proteinuria in the past, we will repeat urines, please refer below  In the meantime the patient will continue to avoid all NSAIDs, and maintain appropriate hydration  We will see her back in about 1 month to reassess and discuss  Ultimately, the patient may require additional lab testing and biopsy depending on she progresses from a clinical standpoint  Stage 3b chronic kidney disease [facility])  Lab Results   Component Value Date    EGFR 16 06/23/2023    EGFR 31 07/01/2022    EGFR 41 03/06/2021    CREATININE 3 03 (H) 06/23/2023    CREATININE 1 84 (H) 07/01/2022    CREATININE 1 47 (H) 03/06/2021     Patient last creatinine of around 1 8 mg/dL is presumed to be recurrent baseline creatinine  However going back several years, the patient's creatinine has been declining, with an estimated GFR going down about 10 mL/min every year except for this particular year at which time it went down by 15 mL/min  Other microscopic hematuria  Patient may have a familial hematuria  Previously, she had a full urologic work-up which ruled out anatomic issues  Of note nobody in the family has hearing loss, and her older sister who is on dialysis is on dialysis due to drug-induced kidney failure  Essential hypertension  Blood pressures are well controlled, continue current medications, continue to encourage low-sodium diet      GERD without esophagitis  Continue with omeprazole at this time, will check urine eosinophils in the case that there is an acute interstitial nephritis which can rarely be caused by proton pump inhibitors  Although classically this medication can cause chronic tubulointerstitial nephritis, I do not believe this is a current cause for the reduction of her kidney function  Ultimately if possible, patient will benefit from transition to an H2 blocker  Rheumatoid arthritis involving multiple sites with positive rheumatoid factor ([facility] [address] )  Patient currently controlled on Oliveira Fellers, rarely, rheumatoid arthritis can cause a secondary kidney disorder, most commonly membranous nephropathy, but others can also be included  As noted we may need to proceed with a kidney biopsy to further evaluate  Anemia  Patient had iron stores which came back within appropriate limits  She takes a B complex, however we will check a B12 to ensure that she does not have a deficiency which may be due to intrinsic factor  We will check a reticulocyte count to confirm bone marrow is appropriately making reticulocytes  Ultimately she may benefit from hematology referral for further evaluation  We will also be ruling out a monoclonal gammopathy, please refer below  Proteinuria  We will rule out monoclonal gammopathy  If serum and urine electrophoresis come back within normal limits, there may be other causes for the patient's proteinuria including secondary FSGS due to hyperfiltration syndrome, or rheumatoid associated disorder such as a membranous nephropathy, and much less likely a secondary amyloid disorder  Of course there are additional potential etiologies which will be pursued depending on the patient's clinical progress and outcomes           Problem List Items Addressed This Visit        Digestive    GERD without esophagitis (Chronic)     Continue with omeprazole at this time, will check urine eosinophils in the case that there is an acute interstitial nephritis which can rarely be caused by proton pump inhibitors  Although classically this medication can cause chronic tubulointerstitial nephritis, I do not believe this is a current cause for the reduction of her kidney function  Ultimately if possible, patient will benefit from transition to an H2 blocker  Cardiovascular and Mediastinum    Essential hypertension (Chronic)     Blood pressures are well controlled, continue current medications, continue to encourage low-sodium diet  Musculoskeletal and Integument    Rheumatoid arthritis involving multiple sites with positive rheumatoid factor ([facility] )     Patient currently controlled on [place], rarely, rheumatoid arthritis can cause a secondary kidney disorder, most commonly membranous nephropathy, but others can also be included  As noted we may need to proceed with a kidney biopsy to further evaluate  Genitourinary    Acute renal failure (ARF) ([facility] ) - Primary     Patient will require work-up including kidney ultrasound to rule out anatomic issues  We will discontinue turmeric at this time which may be contributing to the patient's reduction in kidney function  In addition she is on omeprazole which she is occasionally linked to an acute interstitial nephritis, check urine eosinophils  Patient appears to have had a history of hematuria and proteinuria in the past, we will repeat urines, please refer below  In the meantime the patient will continue to avoid all NSAIDs, and maintain appropriate hydration  We will see her back in about 1 month to reassess and discuss  Ultimately, the patient may require additional lab testing and biopsy depending on she progresses from a clinical standpoint           Relevant Orders    US kidney and bladder    Albumin / creatinine urine ratio    Urinalysis with microscopic    Protein / creatinine ratio, urine    Protein electrophoresis, serum    Protein electrophoresis, urine    Magnesium    PTH, intact Phosphorus    Reticulocytes    Vitamin D 25 hydroxy    Vitamin B12    CBC and differential    Urine Eosinophils    Basic metabolic panel    Other microscopic hematuria     Patient may have a familial hematuria  Previously, she had a full urologic work-up which ruled out anatomic issues  Of note nobody in the family has hearing loss, and her older sister who is on dialysis is on dialysis due to drug-induced kidney failure  Stage 3b chronic kidney disease [facility])     Lab Results   Component Value Date    EGFR 16 06/23/2023    EGFR 31 07/01/2022    EGFR 41 03/06/2021    CREATININE 3 03 (H) 06/23/2023    CREATININE 1 84 (H) 07/01/2022    CREATININE 1 47 (H) 03/06/2021     Patient last creatinine of around 1 8 mg/dL is presumed to be recurrent baseline creatinine  However going back several years, the patient's creatinine has been declining, with an estimated GFR going down about 10 mL/min every year except for this particular year at which time it went down by 15 mL/min  Other    Proteinuria     We will rule out monoclonal gammopathy  If serum and urine electrophoresis come back within normal limits, there may be other causes for the patient's proteinuria including secondary FSGS due to hyperfiltration syndrome, or rheumatoid associated disorder such as a membranous nephropathy, and much less likely a secondary amyloid disorder  Of course there are additional potential etiologies which will be pursued depending on the patient's clinical progress and outcomes  Anemia     Patient had iron stores which came back within appropriate limits  She takes a B complex, however we will check a B12 to ensure that she does not have a deficiency which may be due to intrinsic factor  We will check a reticulocyte count to confirm bone marrow is appropriately making reticulocytes  Ultimately she may benefit from hematology referral for further evaluation    We will also be ruling out a monoclonal gammopathy, please refer below  I gave her lots of participate in the care of your patient  Patient will have imaging as well as additional labs to further evaluate her current situation  At this time we asked her to discontinue turmeric and any other potential anti-inflammatory including NSAIDs  We will check labs tomorrow, followed by additional labs in a week or 2 to reassess kidney function  Patient to have kidney ultrasound done in a few days, due to the weekend, will most likely have her ultrasound on July 3  Subjective:      Patient ID: Ray Smith is a 48 y o  female  Patient presents for initial evaluation regarding acute kidney injury  Reviewed the patient's labs in detail, most recent creatinine noted to be 3 mg/dL, with a BUN of 44 mg/dL, serum bicarbonate of 20 mmol/L  This compares to blood work from 1 year ago which noted a creatinine of 1 84 mg/dL, with a BUN and bicarbonate within normal limits  Although the patient has a history of diarrhea, she currently does not have clinical issues with it  She is taking medications to control loose bowel movements  Patient has rheumatoid arthritis, however this is also controlled on DMARDs  Patient denies use of nonsteroid anti-inflammatory medications, however she has been on turmeric for the last 2 years or so  This has assisted her regarding osteoarthritic complaints  Patient has a sister on dialysis for the past 15 years or so due to a drug reaction  Patient has hematuria for many years and it runs in her family  As far she is aware, and her family members have kidney dysfunction as a consequence of the hematuria  She also denies a history of nephrolithiasis, both personally and in the family  Patient's hemoglobin is also declining, most recent hemoglobin is 8 8 g/dL, last year was 10 8 g/dL, and the year prior 12 g/dL  Hypertension  This is a chronic problem  The current episode started more than 1 year ago  The problem is unchanged  The problem is controlled  Pertinent negatives include no chest pain, orthopnea or peripheral edema  There are no associated agents to hypertension  Risk factors for coronary artery disease include post-menopausal state and obesity  Past treatments include lifestyle changes and calcium channel blockers  There are no compliance problems  Hypertensive end-organ damage includes kidney disease  Identifiable causes of hypertension include chronic renal disease  The following portions of the patient's history were reviewed and updated as appropriate: allergies, current medications, past family history, past medical history, past social history, past surgical history and problem list     Review of Systems   Cardiovascular: Negative for chest pain and orthopnea  All other systems reviewed and are negative          Social History     Socioeconomic History   • Marital status: /Civil Union     Spouse name: None   • Number of children: None   • Years of education: None   • Highest education level: None   Occupational History   • None   Tobacco Use   • Smoking status: Former     Types: Cigarettes     Quit date:      Years since quittin 5   • Smokeless tobacco: Never   Vaping Use   • Vaping Use: Never used   Substance and Sexual Activity   • Alcohol use: Never   • Drug use: No   • Sexual activity: None   Other Topics Concern   • None   Social History Narrative   • None     Social Determinants of Health     Financial Resource Strain: Not on file   Food Insecurity: Not on file   Transportation Needs: Not on file   Physical Activity: Not on file   Stress: Not on file   Social Connections: Not on file   Intimate Partner Violence: Not on file   Housing Stability: Not on file     Past Medical History:   Diagnosis Date   • GERD (gastroesophageal reflux disease)    • Hypertension    • Neuropathy      Past Surgical History:   Procedure Laterality Date   • APPENDECTOMY     • CHOLECYSTECTOMY • FOOT POSTERIOR RELEASE Left     around ankle   • TUBAL LIGATION         Current Outpatient Medications:   •  amLODIPine (NORVASC) 5 mg tablet, Take 1 tablet (5 mg total) by mouth daily, Disp: 30 tablet, Rfl: 5  •  Ascorbic Acid (VITAMIN C) 1000 MG tablet, Take 1,000 mg by mouth daily, Disp: , Rfl:   •  b complex vitamins capsule, Take 1 capsule by mouth daily, Disp: , Rfl:   •  colestipol (COLESTID) 1 g tablet, , Disp: , Rfl:   •  multivitamin (THERAGRAN) TABS, Take 1 tablet by mouth daily, Disp: , Rfl:   •  Omega-3 Fatty Acids (FISH OIL) 1,000 mg, Take 1,000 mg by mouth daily, Disp: , Rfl:   •  omeprazole (PriLOSEC) 20 mg delayed release capsule, Take 1 capsule (20 mg total) by mouth daily, Disp: 90 capsule, Rfl: 3  •  rosuvastatin (CRESTOR) 10 MG tablet, Take 1 tablet (10 mg total) by mouth daily, Disp: 90 tablet, Rfl: 3  •  Xeljanz 5 MG TABS, , Disp: , Rfl:     Lab Results   Component Value Date    SODIUM 137 06/23/2023    K 4 9 06/23/2023     (H) 06/23/2023    CO2 20 (L) 06/23/2023    AGAP 4 06/23/2023    BUN 44 (H) 06/23/2023    CREATININE 3 03 (H) 06/23/2023    GLUC 85 06/02/2020    GLUF 95 06/23/2023    CALCIUM 8 7 06/23/2023    AST 16 06/23/2023    ALT 36 06/23/2023    ALKPHOS 58 06/23/2023    TP 7 1 06/23/2023    TBILI 0 29 06/23/2023    EGFR 16 06/23/2023     Lab Results   Component Value Date    WBC 8 37 06/23/2023    HGB 8 8 (L) 06/23/2023    HCT 25 9 (L) 06/23/2023    MCV 94 06/23/2023     06/23/2023     Lab Results   Component Value Date    CHOLESTEROL 214 (H) 06/23/2023    CHOLESTEROL 200 07/01/2022    CHOLESTEROL 183 03/06/2021     Lab Results   Component Value Date    HDL 59 06/23/2023    HDL 63 07/01/2022    HDL 61 03/06/2021     Lab Results   Component Value Date    LDLCALC 110 (H) 06/23/2023    LDLCALC 81 07/01/2022    LDLCALC 75 03/06/2021     Lab Results   Component Value Date    TRIG 224 (H) 06/23/2023    TRIG 279 (H) 07/01/2022    TRIG 235 (H) 03/06/2021     No results "found for: \"CHOLHDL\"  Lab Results   Component Value Date    STS6EKRMMUBQ 2 618 06/23/2023     Lab Results   Component Value Date    CALCIUM 8 7 06/23/2023     No results found for: \"SPEP\", \"UPEP\"  No results found for: \"MICROALBUR\", \"[id number]\"        Objective:      /80 (BP Location: Left arm, Patient Position: Sitting, Cuff Size: Large)   Pulse 83   Ht 5' 6\" (1 676 m)   Wt 116 kg (255 lb)   SpO2 99%   BMI 41 16 kg/m²          Physical Exam  Vitals reviewed  Constitutional:       General: She is not in acute distress  Appearance: She is well-developed  HENT:      Head: Normocephalic and atraumatic  Eyes:      Conjunctiva/sclera: Conjunctivae normal    Cardiovascular:      Rate and Rhythm: Normal rate and regular rhythm  Pulmonary:      Effort: Pulmonary effort is normal       Breath sounds: Normal breath sounds  Abdominal:      Palpations: Abdomen is soft  Musculoskeletal:      Cervical back: Neck supple  Skin:     General: Skin is warm  Findings: No rash  Neurological:      Mental Status: She is alert and oriented to person, place, and time  Cranial Nerves: No cranial nerve deficit     Psychiatric:         Behavior: Behavior normal          "

## 2023-06-30 NOTE — ASSESSMENT & PLAN NOTE
Patient will require work-up including kidney ultrasound to rule out anatomic issues  We will discontinue turmeric at this time which may be contributing to the patient's reduction in kidney function  In addition she is on omeprazole which she is occasionally linked to an acute interstitial nephritis, check urine eosinophils  Patient appears to have had a history of hematuria and proteinuria in the past, we will repeat urines, please refer below  In the meantime the patient will continue to avoid all NSAIDs, and maintain appropriate hydration  We will see her back in about 1 month to reassess and discuss  Ultimately, the patient may require additional lab testing and biopsy depending on she progresses from a clinical standpoint

## 2023-06-30 NOTE — ASSESSMENT & PLAN NOTE
Patient may have a familial hematuria  Previously, she had a full urologic work-up which ruled out anatomic issues  Of note nobody in the family has hearing loss, and her older sister who is on dialysis is on dialysis due to drug-induced kidney failure

## 2023-06-30 NOTE — ASSESSMENT & PLAN NOTE
Patient currently controlled on [place], rarely, rheumatoid arthritis can cause a secondary kidney disorder, most commonly membranous nephropathy, but others can also be included  As noted we may need to proceed with a kidney biopsy to further evaluate

## 2023-06-30 NOTE — ASSESSMENT & PLAN NOTE
Continue with omeprazole at this time, will check urine eosinophils in the case that there is an acute interstitial nephritis which can rarely be caused by proton pump inhibitors  Although classically this medication can cause chronic tubulointerstitial nephritis, I do not believe this is a current cause for the reduction of her kidney function  Ultimately if possible, patient will benefit from transition to an H2 blocker

## 2023-06-30 NOTE — ASSESSMENT & PLAN NOTE
We will rule out monoclonal gammopathy  If serum and urine electrophoresis come back within normal limits, there may be other causes for the patient's proteinuria including secondary FSGS due to hyperfiltration syndrome, or rheumatoid associated disorder such as a membranous nephropathy, and much less likely a secondary amyloid disorder  Of course there are additional potential etiologies which will be pursued depending on the patient's clinical progress and outcomes

## 2023-06-30 NOTE — ASSESSMENT & PLAN NOTE
Patient had iron stores which came back within appropriate limits  She takes a B complex, however we will check a B12 to ensure that she does not have a deficiency which may be due to intrinsic factor  We will check a reticulocyte count to confirm bone marrow is appropriately making reticulocytes  Ultimately she may benefit from hematology referral for further evaluation  We will also be ruling out a monoclonal gammopathy, please refer below

## 2023-06-30 NOTE — ASSESSMENT & PLAN NOTE
Blood pressures are well controlled, continue current medications, continue to encourage low-sodium diet

## 2023-07-01 ENCOUNTER — APPOINTMENT (OUTPATIENT)
Dept: LAB | Facility: CLINIC | Age: 53
End: 2023-07-01
Payer: COMMERCIAL

## 2023-07-01 DIAGNOSIS — N17.9 ACUTE RENAL FAILURE, UNSPECIFIED ACUTE RENAL FAILURE TYPE (HCC): ICD-10-CM

## 2023-07-01 LAB
25(OH)D3 SERPL-MCNC: 16.3 NG/ML (ref 30–100)
ALBUMIN SERPL BCP-MCNC: 3.7 G/DL (ref 3.5–5)
ALP SERPL-CCNC: 65 U/L (ref 46–116)
ALT SERPL W P-5'-P-CCNC: 36 U/L (ref 12–78)
ANION GAP SERPL CALCULATED.3IONS-SCNC: 4 MMOL/L
AST SERPL W P-5'-P-CCNC: 20 U/L (ref 5–45)
BACTERIA UR QL AUTO: ABNORMAL /HPF
BASOPHILS # BLD AUTO: 0.02 THOUSANDS/ÂΜL (ref 0–0.1)
BASOPHILS NFR BLD AUTO: 0 % (ref 0–1)
BILIRUB SERPL-MCNC: 0.34 MG/DL (ref 0.2–1)
BILIRUB UR QL STRIP: NEGATIVE
BUN SERPL-MCNC: 40 MG/DL (ref 5–25)
CALCIUM SERPL-MCNC: 8.6 MG/DL (ref 8.3–10.1)
CHLORIDE SERPL-SCNC: 112 MMOL/L (ref 96–108)
CLARITY UR: CLEAR
CO2 SERPL-SCNC: 20 MMOL/L (ref 21–32)
COLOR UR: ABNORMAL
CREAT SERPL-MCNC: 2.78 MG/DL (ref 0.6–1.3)
CREAT UR-MCNC: 30.4 MG/DL
CREAT UR-MCNC: 30.4 MG/DL
EOSINOPHIL # BLD AUTO: 0.1 THOUSAND/ÂΜL (ref 0–0.61)
EOSINOPHIL NFR BLD AUTO: 1 % (ref 0–6)
ERYTHROCYTE [DISTWIDTH] IN BLOOD BY AUTOMATED COUNT: 13.4 % (ref 11.6–15.1)
GFR SERPL CREATININE-BSD FRML MDRD: 18 ML/MIN/1.73SQ M
GLUCOSE SERPL-MCNC: 124 MG/DL (ref 65–140)
GLUCOSE UR STRIP-MCNC: NEGATIVE MG/DL
HCT VFR BLD AUTO: 27.3 % (ref 34.8–46.1)
HGB BLD-MCNC: 9.3 G/DL (ref 11.5–15.4)
HGB UR QL STRIP.AUTO: ABNORMAL
IMM GRANULOCYTES # BLD AUTO: 0.08 THOUSAND/UL (ref 0–0.2)
IMM GRANULOCYTES NFR BLD AUTO: 1 % (ref 0–2)
KETONES UR STRIP-MCNC: NEGATIVE MG/DL
LEUKOCYTE ESTERASE UR QL STRIP: NEGATIVE
LYMPHOCYTES # BLD AUTO: 1.33 THOUSANDS/ÂΜL (ref 0.6–4.47)
LYMPHOCYTES NFR BLD AUTO: 14 % (ref 14–44)
MAGNESIUM SERPL-MCNC: 2.1 MG/DL (ref 1.6–2.6)
MCH RBC QN AUTO: 31.4 PG (ref 26.8–34.3)
MCHC RBC AUTO-ENTMCNC: 34.1 G/DL (ref 31.4–37.4)
MCV RBC AUTO: 92 FL (ref 82–98)
MICROALBUMIN UR-MCNC: 365 MG/L (ref 0–20)
MICROALBUMIN/CREAT 24H UR: 1201 MG/G CREATININE (ref 0–30)
MONOCYTES # BLD AUTO: 0.54 THOUSAND/ÂΜL (ref 0.17–1.22)
MONOCYTES NFR BLD AUTO: 6 % (ref 4–12)
NEUTROPHILS # BLD AUTO: 7.79 THOUSANDS/ÂΜL (ref 1.85–7.62)
NEUTS SEG NFR BLD AUTO: 78 % (ref 43–75)
NITRITE UR QL STRIP: NEGATIVE
NON-SQ EPI CELLS URNS QL MICRO: ABNORMAL /HPF
NRBC BLD AUTO-RTO: 0 /100 WBCS
PH UR STRIP.AUTO: 6 [PH]
PHOSPHATE SERPL-MCNC: 3.4 MG/DL (ref 2.7–4.5)
PLATELET # BLD AUTO: 254 THOUSANDS/UL (ref 149–390)
PMV BLD AUTO: 11.1 FL (ref 8.9–12.7)
POTASSIUM SERPL-SCNC: 4.5 MMOL/L (ref 3.5–5.3)
PROT SERPL-MCNC: 7.2 G/DL (ref 6.4–8.4)
PROT UR STRIP-MCNC: ABNORMAL MG/DL
PROT UR-MCNC: 45 MG/DL
PROT/CREAT UR: 1.48 MG/G{CREAT} (ref 0–0.1)
PTH-INTACT SERPL-MCNC: 146.2 PG/ML (ref 12–88)
RBC # BLD AUTO: 2.96 MILLION/UL (ref 3.81–5.12)
RBC #/AREA URNS AUTO: ABNORMAL /HPF
RETICS # AUTO: ABNORMAL 10*3/UL (ref 14097–95744)
RETICS # CALC: 3.58 % (ref 0.37–1.87)
SODIUM SERPL-SCNC: 136 MMOL/L (ref 135–147)
SP GR UR STRIP.AUTO: 1 (ref 1–1.03)
UROBILINOGEN UR STRIP-ACNC: <2 MG/DL
VIT B12 SERPL-MCNC: 625 PG/ML (ref 180–914)
WBC # BLD AUTO: 9.86 THOUSAND/UL (ref 4.31–10.16)
WBC #/AREA URNS AUTO: ABNORMAL /HPF

## 2023-07-01 PROCEDURE — 85025 COMPLETE CBC W/AUTO DIFF WBC: CPT | Performed by: FAMILY MEDICINE

## 2023-07-01 PROCEDURE — 82306 VITAMIN D 25 HYDROXY: CPT

## 2023-07-01 PROCEDURE — 82043 UR ALBUMIN QUANTITATIVE: CPT

## 2023-07-01 PROCEDURE — 81001 URINALYSIS AUTO W/SCOPE: CPT

## 2023-07-01 PROCEDURE — 82570 ASSAY OF URINE CREATININE: CPT

## 2023-07-01 PROCEDURE — 84100 ASSAY OF PHOSPHORUS: CPT

## 2023-07-01 PROCEDURE — 84166 PROTEIN E-PHORESIS/URINE/CSF: CPT

## 2023-07-01 PROCEDURE — 83735 ASSAY OF MAGNESIUM: CPT

## 2023-07-01 PROCEDURE — 80053 COMPREHEN METABOLIC PANEL: CPT | Performed by: FAMILY MEDICINE

## 2023-07-01 PROCEDURE — 85045 AUTOMATED RETICULOCYTE COUNT: CPT

## 2023-07-01 PROCEDURE — 84165 PROTEIN E-PHORESIS SERUM: CPT

## 2023-07-01 PROCEDURE — 36415 COLL VENOUS BLD VENIPUNCTURE: CPT

## 2023-07-01 PROCEDURE — 84156 ASSAY OF PROTEIN URINE: CPT

## 2023-07-01 PROCEDURE — 83970 ASSAY OF PARATHORMONE: CPT

## 2023-07-01 PROCEDURE — 82607 VITAMIN B-12: CPT

## 2023-07-03 ENCOUNTER — HOSPITAL ENCOUNTER (OUTPATIENT)
Dept: ULTRASOUND IMAGING | Facility: HOSPITAL | Age: 53
Discharge: HOME | End: 2023-07-03
Attending: INTERNAL MEDICINE
Payer: COMMERCIAL

## 2023-07-03 ENCOUNTER — APPOINTMENT (OUTPATIENT)
Dept: LAB | Facility: HOSPITAL | Age: 53
End: 2023-07-03
Payer: COMMERCIAL

## 2023-07-03 DIAGNOSIS — N17.9 ACUTE RENAL FAILURE, UNSPECIFIED ACUTE RENAL FAILURE TYPE (HCC): ICD-10-CM

## 2023-07-03 DIAGNOSIS — E55.9 VITAMIN D DEFICIENCY: Primary | ICD-10-CM

## 2023-07-03 PROCEDURE — 76775 US EXAM ABDO BACK WALL LIM: CPT

## 2023-07-03 PROCEDURE — 87205 SMEAR GRAM STAIN: CPT

## 2023-07-03 RX ORDER — ERGOCALCIFEROL 1.25 MG/1
50000 CAPSULE ORAL WEEKLY
Qty: 12 CAPSULE | Refills: 1 | Status: SHIPPED | OUTPATIENT
Start: 2023-07-03

## 2023-07-04 LAB — EOSINOPHIL NFR URNS MANUAL: 0 %

## 2023-07-05 LAB
ALBUMIN SERPL ELPH-MCNC: 4.25 G/DL (ref 3.2–5.1)
ALBUMIN SERPL ELPH-MCNC: 61.6 % (ref 48–70)
ALBUMIN UR ELPH-MCNC: 75.9 %
ALPHA1 GLOB MFR UR ELPH: 4.9 %
ALPHA1 GLOB SERPL ELPH-MCNC: 0.32 G/DL (ref 0.15–0.47)
ALPHA1 GLOB SERPL ELPH-MCNC: 4.6 % (ref 1.8–7)
ALPHA2 GLOB MFR UR ELPH: 3 %
ALPHA2 GLOB SERPL ELPH-MCNC: 0.69 G/DL (ref 0.42–1.04)
ALPHA2 GLOB SERPL ELPH-MCNC: 10 % (ref 5.9–14.9)
B-GLOBULIN MFR UR ELPH: 6.2 %
BETA GLOB ABNORMAL SERPL ELPH-MCNC: 0.44 G/DL (ref 0.31–0.57)
BETA1 GLOB SERPL ELPH-MCNC: 6.4 % (ref 4.7–7.7)
BETA2 GLOB SERPL ELPH-MCNC: 6.3 % (ref 3.1–7.9)
BETA2+GAMMA GLOB SERPL ELPH-MCNC: 0.43 G/DL (ref 0.2–0.58)
GAMMA GLOB ABNORMAL SERPL ELPH-MCNC: 0.77 G/DL (ref 0.4–1.66)
GAMMA GLOB MFR UR ELPH: 10 %
GAMMA GLOB SERPL ELPH-MCNC: 11.1 % (ref 6.9–22.3)
IGG/ALB SER: 1.6 {RATIO} (ref 1.1–1.8)
PROT PATTERN SERPL ELPH-IMP: NORMAL
PROT PATTERN UR ELPH-IMP: NORMAL
PROT SERPL-MCNC: 6.9 G/DL (ref 6.4–8.2)
PROT UR-MCNC: 45 MG/DL

## 2023-07-05 PROCEDURE — 84165 PROTEIN E-PHORESIS SERUM: CPT | Performed by: PATHOLOGY

## 2023-07-05 PROCEDURE — 84166 PROTEIN E-PHORESIS/URINE/CSF: CPT | Performed by: PATHOLOGY

## 2023-07-14 ENCOUNTER — VBI (OUTPATIENT)
Dept: ADMINISTRATIVE | Facility: OTHER | Age: 53
End: 2023-07-14

## 2023-07-15 ENCOUNTER — APPOINTMENT (OUTPATIENT)
Dept: LAB | Facility: CLINIC | Age: 53
End: 2023-07-15
Payer: COMMERCIAL

## 2023-07-28 ENCOUNTER — APPOINTMENT (OUTPATIENT)
Dept: LAB | Facility: CLINIC | Age: 53
End: 2023-07-28
Payer: COMMERCIAL

## 2023-08-04 ENCOUNTER — OFFICE VISIT (OUTPATIENT)
Dept: NEPHROLOGY | Facility: CLINIC | Age: 53
End: 2023-08-04

## 2023-08-04 ENCOUNTER — OFFICE VISIT (OUTPATIENT)
Dept: FAMILY MEDICINE CLINIC | Facility: CLINIC | Age: 53
End: 2023-08-04
Payer: COMMERCIAL

## 2023-08-04 VITALS
HEIGHT: 66 IN | TEMPERATURE: 97.9 F | DIASTOLIC BLOOD PRESSURE: 92 MMHG | SYSTOLIC BLOOD PRESSURE: 138 MMHG | WEIGHT: 257.8 LBS | RESPIRATION RATE: 18 BRPM | OXYGEN SATURATION: 100 % | BODY MASS INDEX: 41.43 KG/M2 | HEART RATE: 88 BPM

## 2023-08-04 VITALS
HEART RATE: 65 BPM | OXYGEN SATURATION: 99 % | DIASTOLIC BLOOD PRESSURE: 90 MMHG | HEIGHT: 66 IN | WEIGHT: 257 LBS | BODY MASS INDEX: 41.3 KG/M2 | SYSTOLIC BLOOD PRESSURE: 140 MMHG

## 2023-08-04 DIAGNOSIS — N18.32 STAGE 3B CHRONIC KIDNEY DISEASE (HCC): ICD-10-CM

## 2023-08-04 DIAGNOSIS — Z00.00 ANNUAL PHYSICAL EXAM: ICD-10-CM

## 2023-08-04 DIAGNOSIS — N03.1 HYPERFILTRATION FOCAL SEGMENTAL GLOMERULOSCLEROSIS: ICD-10-CM

## 2023-08-04 DIAGNOSIS — R80.9 PROTEINURIA, UNSPECIFIED TYPE: ICD-10-CM

## 2023-08-04 DIAGNOSIS — N18.4 ANEMIA DUE TO STAGE 4 CHRONIC KIDNEY DISEASE (HCC): ICD-10-CM

## 2023-08-04 DIAGNOSIS — E55.9 VITAMIN D DEFICIENCY: ICD-10-CM

## 2023-08-04 DIAGNOSIS — N17.9 ACUTE RENAL FAILURE, UNSPECIFIED ACUTE RENAL FAILURE TYPE (HCC): Primary | ICD-10-CM

## 2023-08-04 DIAGNOSIS — I10 ESSENTIAL HYPERTENSION: Chronic | ICD-10-CM

## 2023-08-04 DIAGNOSIS — D63.1 ANEMIA DUE TO STAGE 4 CHRONIC KIDNEY DISEASE (HCC): ICD-10-CM

## 2023-08-04 DIAGNOSIS — E78.2 MIXED HYPERLIPIDEMIA: ICD-10-CM

## 2023-08-04 DIAGNOSIS — I10 ESSENTIAL HYPERTENSION: Primary | Chronic | ICD-10-CM

## 2023-08-04 DIAGNOSIS — D50.8 IRON DEFICIENCY ANEMIA SECONDARY TO INADEQUATE DIETARY IRON INTAKE: ICD-10-CM

## 2023-08-04 DIAGNOSIS — M05.79 RHEUMATOID ARTHRITIS INVOLVING MULTIPLE SITES WITH POSITIVE RHEUMATOID FACTOR (HCC): ICD-10-CM

## 2023-08-04 PROCEDURE — 99396 PREV VISIT EST AGE 40-64: CPT | Performed by: FAMILY MEDICINE

## 2023-08-04 RX ORDER — TERAZOSIN 1 MG/1
1 CAPSULE ORAL
Qty: 30 CAPSULE | Refills: 3 | Status: SHIPPED | OUTPATIENT
Start: 2023-08-04

## 2023-08-04 NOTE — PROGRESS NOTES
[facility] of [address] [place] [address]    Name: Gregor Beal  YOB: 1970      Assessment/Plan:    Acute renal failure (ARF) ([facility])  Patient's kidney function continues to slowly improve. Looking back at previous kidney function over the last several years, it appears the patient may be losing as much as 10 mL/min of kidney function with estimated GFR. If this is true, then goal GFR at this time should be at least 21 mL/min, we will continue to monitor closely at this time. Stage 3b chronic kidney disease [facility])  Lab Results   Component Value Date    EGFR 19 07/28/2023    EGFR 18 07/15/2023    EGFR 18 07/01/2023    CREATININE 2.70 (H) 07/28/2023    CREATININE 2.78 (H) 07/15/2023    CREATININE 2.78 (H) 07/01/2023   As mentioned above, patient's estimated GFR goal at this time is around 21 mL/min, other we will continue to look to see if she can achieve better than this value. With respect etiology, after work-up noted below, the most likely etiology of chronic kidney disease in the setting of proteinuria negative for monoclonal gammopathy and without evidence of diabetes mellitus, is obesity related hyperfiltration syndrome. We discussed this during the office visit and made a plan on trying to have substantial weight loss as we continue to monitor kidney function and protein content in the urine. Patient is willing to adjust diet significantly and increase physical activity in order to achieve substantial weight loss. In the event that this becomes difficult, will offer the patient medical intervention potentially in form of a GLP-1 agonist to assist with appetite suppression. With respect to medication intervention, unfortunately due to the patient's current kidney function she is at high risk for adverse events being based on RAAS inhibition as well as SGLT-2 inhibitors.   Although that being said, we may consider introducing the latter option if her kidney function is able to get at and above an estimated GFR of 20 mL/min consistently. This will be discussed at her next appointment as we continue to monitor kidney function closely. Proteinuria  As mentioned above, monoclonal gammopathy ruled out with a serum electrophoresis. Most likely etiology is due to hyperfiltration syndrome, typically there is also secondary focal segmental glomerulosclerosis. After discussion with the patient, she is agreeable that at this point obtaining a kidney biopsy will likely not be of benefit as this is the most likely cause of chronic kidney disease. Please refer above regarding medical intervention. Hyperfiltration focal segmental glomerulosclerosis  This diagnosis is clinical, a biopsy has not been performed to confirm this diagnosis. We will continue to treat with weight loss. As mentioned elsewhere in this note, the patient is at high risk for adverse events being placed on RAAS inhibition and therefore we will need to continue to decline this specific medical intervention at this time. However, if the patient is able to have good consistent estimated GFR is at or above 20 mL/min, will consider the addition of an SGLT-2 inhibitor. Essential hypertension  Blood pressure slightly elevated at this time, would continue to monitor for now. Patient will be adjusting diet, reducing sodium and caloric intake which will hopefully also reduce blood pressures. We will continue to monitor for now. Vitamin D deficiency  Continue with once weekly ergocalciferol. Reassess vitamin D levelsin about 1 calendar year. Problem List Items Addressed This Visit        Cardiovascular and Mediastinum    Essential hypertension (Chronic)     Blood pressure slightly elevated at this time, would continue to monitor for now. Patient will be adjusting diet, reducing sodium and caloric intake which will hopefully also reduce blood pressures.   We will continue to monitor for now. Musculoskeletal and Integument    Rheumatoid arthritis involving multiple sites with positive rheumatoid factor (HCC)       Genitourinary    Acute renal failure (ARF) ([address]) - Primary     Patient's kidney function continues to slowly improve. Looking back at previous kidney function over the last several years, it appears the patient may be losing as much as 10 mL/min of kidney function with estimated GFR. If this is true, then goal GFR at this time should be at least 21 mL/min, we will continue to monitor closely at this time. Stage 3b chronic kidney disease [facility])     Lab Results   Component Value Date    EGFR 19 07/28/2023    EGFR 18 07/15/2023    EGFR 18 07/01/2023    CREATININE 2.70 (H) 07/28/2023    CREATININE 2.78 (H) 07/15/2023    CREATININE 2.78 (H) 07/01/2023   As mentioned above, patient's estimated GFR goal at this time is around 21 mL/min, other we will continue to look to see if she can achieve better than this value. With respect etiology, after work-up noted below, the most likely etiology of chronic kidney disease in the setting of proteinuria negative for monoclonal gammopathy and without evidence of diabetes mellitus, is obesity related hyperfiltration syndrome. We discussed this during the office visit and made a plan on trying to have substantial weight loss as we continue to monitor kidney function and protein content in the urine. Patient is willing to adjust diet significantly and increase physical activity in order to achieve substantial weight loss. In the event that this becomes difficult, will offer the patient medical intervention potentially in form of a GLP-1 agonist to assist with appetite suppression. With respect to medication intervention, unfortunately due to the patient's current kidney function she is at high risk for adverse events being based on RAAS inhibition as well as SGLT-2 inhibitors.   Although that being said, we may consider introducing the latter option if her kidney function is able to get at and above an estimated GFR of 20 mL/min consistently. This will be discussed at her next appointment as we continue to monitor kidney function closely. Relevant Orders    Albumin / creatinine urine ratio    Urinalysis with microscopic    Comprehensive metabolic panel    CBC and differential    Hyperfiltration focal segmental glomerulosclerosis     This diagnosis is clinical, a biopsy has not been performed to confirm this diagnosis. We will continue to treat with weight loss. As mentioned elsewhere in this note, the patient is at high risk for adverse events being placed on RAAS inhibition and therefore we will need to continue to decline this specific medical intervention at this time. However, if the patient is able to have good consistent estimated GFR is at or above 20 mL/min, will consider the addition of an SGLT-2 inhibitor. Other    Proteinuria     As mentioned above, monoclonal gammopathy ruled out with a serum electrophoresis. Most likely etiology is due to hyperfiltration syndrome, typically there is also secondary focal segmental glomerulosclerosis. After discussion with the patient, she is agreeable that at this point obtaining a kidney biopsy will likely not be of benefit as this is the most likely cause of chronic kidney disease. Please refer above regarding medical intervention. Vitamin D deficiency     Continue with once weekly ergocalciferol. Reassess vitamin D levelsin about 1 calendar year. Patient continues to improve at this time, please refer to assessment plan for details. We will check labs in approximately 6 weeks, and then see the patient back in approximately 3 months with blood work also to be done prior to that appointment. Subjective:      Patient ID: Rachelle De Souza is a 48 y.o. female. Patient presents for follow up appoimntment.     Reviewed the patient's most recent labs, creatinine is down to 2.7 mg/dL which places estimated GFR of 19 mL/min. There were no significant electrolyte abnormalities noted. Patient has been avoiding all NSAIDs including turmeric. Taking medications as prescribed with no specific side effects at this time. Hypertension  This is a chronic problem. The current episode started more than 1 year ago. The problem is unchanged. The problem is controlled. Pertinent negatives include no chest pain or orthopnea. There are no associated agents to hypertension. Risk factors for coronary artery disease include post-menopausal state, obesity and sedentary lifestyle. Past treatments include lifestyle changes and calcium channel blockers. Compliance problems include diet. Hypertensive end-organ damage includes kidney disease. Identifiable causes of hypertension include chronic renal disease. The following portions of the patient's history were reviewed and updated as appropriate: allergies, current medications, past family history, past medical history, past social history, past surgical history and problem list.    Review of Systems   Cardiovascular: Negative for chest pain and orthopnea. All other systems reviewed and are negative.         Social History     Socioeconomic History   • Marital status: /Civil Union     Spouse name: None   • Number of children: None   • Years of education: None   • Highest education level: None   Occupational History   • None   Tobacco Use   • Smoking status: Former     Types: Cigarettes     Quit date:      Years since quittin.6   • Smokeless tobacco: Never   Vaping Use   • Vaping Use: Never used   Substance and Sexual Activity   • Alcohol use: Never   • Drug use: No   • Sexual activity: None   Other Topics Concern   • None   Social History Narrative   • None     Social Determinants of Health     Financial Resource Strain: Not on file   Food Insecurity: Not on file   Transportation Needs: Not on file   Physical Activity: Not on file   Stress: Not on file   Social Connections: Not on file   Intimate Partner Violence: Not on file   Housing Stability: Not on file     Past Medical History:   Diagnosis Date   • GERD (gastroesophageal reflux disease)    • Hypertension    • Neuropathy      Past Surgical History:   Procedure Laterality Date   • APPENDECTOMY     • CHOLECYSTECTOMY     • FOOT POSTERIOR RELEASE Left     around ankle   • TUBAL LIGATION         Current Outpatient Medications:   •  amLODIPine (NORVASC) 5 mg tablet, Take 1 tablet (5 mg total) by mouth daily, Disp: 30 tablet, Rfl: 5  •  Ascorbic Acid (VITAMIN C) 1000 MG tablet, Take 1,000 mg by mouth daily, Disp: , Rfl:   •  b complex vitamins capsule, Take 1 capsule by mouth daily, Disp: , Rfl:   •  colestipol (COLESTID) 1 g tablet, , Disp: , Rfl:   •  ergocalciferol (ERGOCALCIFEROL) 1.25 MG (91457 UT) capsule, Take 1 capsule (50,000 Units total) by mouth once a week, Disp: 12 capsule, Rfl: 1  •  multivitamin (THERAGRAN) TABS, Take 1 tablet by mouth daily, Disp: , Rfl:   •  Omega-3 Fatty Acids (FISH OIL) 1,000 mg, Take 1,000 mg by mouth daily, Disp: , Rfl:   •  omeprazole (PriLOSEC) 20 mg delayed release capsule, Take 1 capsule (20 mg total) by mouth daily, Disp: 90 capsule, Rfl: 3  •  rosuvastatin (CRESTOR) 10 MG tablet, Take 1 tablet (10 mg total) by mouth daily, Disp: 90 tablet, Rfl: 3  •  Xeljanz 5 MG TABS, , Disp: , Rfl:     Lab Results   Component Value Date    SODIUM 138 07/28/2023    K 4.3 07/28/2023     07/28/2023    CO2 23 07/28/2023    AGAP 7 07/28/2023    BUN 28 (H) 07/28/2023    CREATININE 2.70 (H) 07/28/2023    GLUC 124 07/01/2023    GLUF 96 07/28/2023    CALCIUM 8.7 07/28/2023    AST 21 07/28/2023    ALT 37 07/28/2023    ALKPHOS 63 07/28/2023    TP 6.9 07/28/2023    TBILI 0.41 07/28/2023    EGFR 19 07/28/2023     Lab Results   Component Value Date    WBC 11.03 (H) 07/28/2023    HGB 9.1 (L) 07/28/2023    HCT 27.0 (L) 07/28/2023 MCV 93 07/28/2023     07/28/2023     Lab Results   Component Value Date    CHOLESTEROL 214 (H) 06/23/2023    CHOLESTEROL 200 07/01/2022    CHOLESTEROL 183 03/06/2021     Lab Results   Component Value Date    HDL 59 06/23/2023    HDL 63 07/01/2022    HDL 61 03/06/2021     Lab Results   Component Value Date    LDLCALC 110 (H) 06/23/2023    LDLCALC 81 07/01/2022    LDLCALC 75 03/06/2021     Lab Results   Component Value Date    TRIG 224 (H) 06/23/2023    TRIG 279 (H) 07/01/2022    TRIG 235 (H) 03/06/2021     No results found for: "CHOLHDL"  Lab Results   Component Value Date    OXT7WWAWKDDZ 2.618 06/23/2023     Lab Results   Component Value Date    .2 (H) 07/01/2023    CALCIUM 8.7 07/28/2023    PHOS 3.4 07/01/2023     Lab Results   Component Value Date    SPEP See Comment 07/01/2023    UPEP See Comment 07/01/2023     No results found for: "Patricia Jane", "[id number]"        Objective:      /90 (BP Location: Left arm, Patient Position: Sitting, Cuff Size: Large)   Pulse 65   Ht 5' 6" (1.676 m)   Wt 117 kg (257 lb)   SpO2 99%   BMI 41.48 kg/m²          Physical Exam  Vitals reviewed. Constitutional:       General: She is not in acute distress. Appearance: She is well-developed. HENT:      Head: Normocephalic and atraumatic. Eyes:      Conjunctiva/sclera: Conjunctivae normal.   Cardiovascular:      Rate and Rhythm: Normal rate and regular rhythm. Pulmonary:      Effort: Pulmonary effort is normal.      Breath sounds: Normal breath sounds. Abdominal:      Palpations: Abdomen is soft. Musculoskeletal:      Cervical back: Neck supple. Skin:     General: Skin is warm. Findings: No rash. Neurological:      Mental Status: She is alert and oriented to person, place, and time. Cranial Nerves: No cranial nerve deficit.    Psychiatric:         Behavior: Behavior normal.

## 2023-08-04 NOTE — PROGRESS NOTES
BMI Counseling: Body mass index is 41.61 kg/m². The BMI is above normal. Nutrition recommendations include reducing portion sizes, 3-5 servings of fruits/vegetables daily, consuming healthier snacks, moderation in carbohydrate intake, reducing intake of saturated fat and trans fat and reducing intake of cholesterol. Exercise recommendations include exercising 3-5 times per week. ADULT ANNUAL [facility]    NAME: Pieter Pimentel  AGE: 48 y.o. SEX: female  : 1970     DATE: 2023     Assessment and Plan:     Problem List Items Addressed This Visit        Cardiovascular and Mediastinum    Essential hypertension - Primary (Chronic)     Hypertension under stable control on current medications amlodipine continue same dosage follow-up in 6 months. Add alpha blocker.          Relevant Medications    terazosin (HYTRIN) 1 mg capsule       Genitourinary    Stage 3b chronic kidney disease ([address])     Lab Results   Component Value Date    EGFR 19 2023    EGFR 18 07/15/2023    EGFR 18 2023    CREATININE 2.70 (H) 2023    CREATININE 2.78 (H) 07/15/2023    CREATININE 2.78 (H) 2023   Renal function diminished patient is following up with nephrology now creatinine at 2.7 and GFR at 19 maintain stable hydration and avoid nephrotoxic medications         Hyperfiltration focal segmental glomerulosclerosis     Condition discovered through nephrology and work-up with biopsy patient will continue on current treatment plan as scheduled            Other    Mixed hyperlipidemia     Mixed hyperlipidemia is stable on Crestor 10 mg tablets         Iron deficiency anemia secondary to inadequate dietary iron intake     Iron deficiency anemia monitored with iron levels and CBC every 6 months continue current treatment plan monitoring closely with renal disease diagnosis         Annual physical exam    Anemia due to stage 4 chronic kidney disease ([address] [facility])     Follow-up CBC iron levels         Vitamin D deficiency     Continue with vitamin D supplementation 50,000 units weekly            Immunizations and preventive care screenings were discussed with patient today. Appropriate education was printed on patient's after visit summary. Counseling:  Alcohol/drug use: discussed moderation in alcohol intake, the recommendations for healthy alcohol use, and avoidance of illicit drug use. Dental Health: discussed importance of regular tooth brushing, flossing, and dental visits. Injury prevention: discussed safety/seat belts, safety helmets, smoke detectors, carbon dioxide detectors, and smoking near bedding or upholstery. Sexual health: discussed sexually transmitted diseases, partner selection, use of condoms, avoidance of unintended pregnancy, and contraceptive alternatives. · Exercise: the importance of regular exercise/physical activity was discussed. Recommend exercise 3-5 times per week for at least 30 minutes. Return in about 2 months (around 10/4/2023). Chief Complaint:     Chief Complaint   Patient presents with   • Physical Exam      History of Present Illness:     Adult Annual Physical   Patient here for a comprehensive physical exam. The patient reports no problems. Diet and Physical Activity  · Diet/Nutrition: well balanced diet. · Exercise: no formal exercise. Depression Screening  PHQ-2/9 Depression Screening    Little interest or pleasure in doing things: 0 - not at all  Feeling down, depressed, or hopeless: 0 - not at all  PHQ-2 Score: 0  PHQ-2 Interpretation: Negative depression screen       General Health  · Sleep: sleeps well. · Hearing: normal - bilateral.  · Vision: no vision problems. · Dental: regular dental visits.        /GYN Health  · Patient is: postmenopausal  · Last menstrual period:   · Contraceptive method: .     Review of Systems:     Review of Systems   Constitutional: Negative for chills, fatigue and fever.   HENT: Positive for postnasal drip. Negative for congestion, nosebleeds, rhinorrhea, sinus pressure and sore throat. Eyes: Negative for discharge and redness. Respiratory: Negative for cough and shortness of breath. Cardiovascular: Negative for chest pain, palpitations and leg swelling. Gastrointestinal: Negative for abdominal pain, blood in stool and nausea. Endocrine: Negative for cold intolerance, heat intolerance and polyuria. Genitourinary: Negative for dysuria and frequency. Musculoskeletal: Negative for arthralgias, back pain and myalgias. Skin: Negative for rash. Neurological: Negative for dizziness, weakness and headaches. Hematological: Negative for adenopathy. Psychiatric/Behavioral: Negative for behavioral problems and sleep disturbance. The patient is not nervous/anxious.        Past Medical History:     Past Medical History:   Diagnosis Date   • GERD (gastroesophageal reflux disease)    • Hypertension    • Neuropathy       Past Surgical History:     Past Surgical History:   Procedure Laterality Date   • APPENDECTOMY     • CHOLECYSTECTOMY     • FOOT POSTERIOR RELEASE Left     around ankle   • TUBAL LIGATION        Social History:     Social History     Socioeconomic History   • Marital status: /Civil Union     Spouse name: None   • Number of children: None   • Years of education: None   • Highest education level: None   Occupational History   • None   Tobacco Use   • Smoking status: Former     Types: Cigarettes     Quit date:      Years since quittin.6   • Smokeless tobacco: Never   Vaping Use   • Vaping Use: Never used   Substance and Sexual Activity   • Alcohol use: Never   • Drug use: No   • Sexual activity: None   Other Topics Concern   • None   Social History Narrative   • None     Social Determinants of Health     Financial Resource Strain: Not on file   Food Insecurity: Not on file   Transportation Needs: Not on file   Physical Activity: Not on file Stress: Not on file   Social Connections: Not on file   Intimate Partner Violence: Not on file   Housing Stability: Not on file      Family History:     Family History   Problem Relation Age of Onset   • Hypertension Mother    • Cancer Mother    • Alzheimer's disease Mother    • Parkinsonism Mother    • Diabetes Father    • Hypertension Father    • Cancer Father    • Breast cancer Sister 48   • No Known Problems Daughter    • No Known Problems Maternal Grandmother    • No Known Problems Paternal Grandmother    • No Known Problems Sister    • No Known Problems Sister    • No Known Problems Maternal Aunt    • No Known Problems Maternal Aunt    • No Known Problems Maternal Aunt    • No Known Problems Paternal Aunt    • Breast cancer Paternal Aunt    • Breast cancer Paternal Aunt    • No Known Problems Paternal Aunt       Current Medications:     Current Outpatient Medications   Medication Sig Dispense Refill   • amLODIPine (NORVASC) 5 mg tablet Take 1 tablet (5 mg total) by mouth daily 30 tablet 5   • Ascorbic Acid (VITAMIN C) 1000 MG tablet Take 1,000 mg by mouth daily     • b complex vitamins capsule Take 1 capsule by mouth daily     • colestipol (COLESTID) 1 g tablet      • ergocalciferol (ERGOCALCIFEROL) 1.25 MG (39171 UT) capsule Take 1 capsule (50,000 Units total) by mouth once a week 12 capsule 1   • multivitamin (THERAGRAN) TABS Take 1 tablet by mouth daily     • Omega-3 Fatty Acids (FISH OIL) 1,000 mg Take 1,000 mg by mouth daily     • omeprazole (PriLOSEC) 20 mg delayed release capsule Take 1 capsule (20 mg total) by mouth daily 90 capsule 3   • rosuvastatin (CRESTOR) 10 MG tablet Take 1 tablet (10 mg total) by mouth daily 90 tablet 3   • terazosin (HYTRIN) 1 mg capsule Take 1 capsule (1 mg total) by mouth daily at bedtime 30 capsule 3   • Xeljanz 5 MG TABS        No current facility-administered medications for this visit. Allergies:      Allergies   Allergen Reactions   • Methotrexate Lip Swelling   • Keflex [Cephalexin] GI Intolerance      Physical Exam:     /92 (BP Location: Right arm, Patient Position: Sitting, Cuff Size: Large)   Pulse 88   Temp 97.9 °F (36.6 °C) (Tympanic)   Resp 18   Ht 5' 6" (1.676 m)   Wt 117 kg (257 lb 12.8 oz)   SpO2 100%   BMI 41.61 kg/m²     Physical Exam  Vitals and nursing note reviewed. Constitutional:       General: She is not in acute distress. Appearance: She is well-developed. HENT:      Head: Normocephalic and atraumatic. Right Ear: Tympanic membrane and external ear normal.      Left Ear: Tympanic membrane and external ear normal.      Nose: Rhinorrhea present. Mouth/Throat:      Mouth: Mucous membranes are moist.   Eyes:      Extraocular Movements: Extraocular movements intact. Conjunctiva/sclera: Conjunctivae normal.      Pupils: Pupils are equal, round, and reactive to light. Cardiovascular:      Rate and Rhythm: Normal rate and regular rhythm. Heart sounds: No murmur heard. Pulmonary:      Effort: Pulmonary effort is normal. No respiratory distress. Breath sounds: Normal breath sounds. Abdominal:      Palpations: Abdomen is soft. Tenderness: There is no abdominal tenderness. Musculoskeletal:         General: No swelling. Normal range of motion. Cervical back: Neck supple. Skin:     General: Skin is warm and dry. Capillary Refill: Capillary refill takes less than 2 seconds. Neurological:      General: No focal deficit present. Mental Status: She is alert and oriented to person, place, and time. Mental status is at baseline. Psychiatric:         Mood and Affect: Mood normal.         Behavior: Behavior normal.         Thought Content:  Thought content normal.         Judgment: Judgment normal.          [facility], [address]

## 2023-08-04 NOTE — ASSESSMENT & PLAN NOTE
Lab Results   Component Value Date    EGFR 19 07/28/2023    EGFR 18 07/15/2023    EGFR 18 07/01/2023    CREATININE 2.70 (H) 07/28/2023    CREATININE 2.78 (H) 07/15/2023    CREATININE 2.78 (H) 07/01/2023   Renal function diminished patient is following up with nephrology now creatinine at 2.7 and GFR at 19 maintain stable hydration and avoid nephrotoxic medications

## 2023-08-04 NOTE — ASSESSMENT & PLAN NOTE
This diagnosis is clinical, a biopsy has not been performed to confirm this diagnosis. We will continue to treat with weight loss. As mentioned elsewhere in this note, the patient is at high risk for adverse events being placed on RAAS inhibition and therefore we will need to continue to decline this specific medical intervention at this time. However, if the patient is able to have good consistent estimated GFR is at or above 20 mL/min, will consider the addition of an SGLT-2 inhibitor.

## 2023-08-04 NOTE — ASSESSMENT & PLAN NOTE
As mentioned above, monoclonal gammopathy ruled out with a serum electrophoresis. Most likely etiology is due to hyperfiltration syndrome, typically there is also secondary focal segmental glomerulosclerosis. After discussion with the patient, she is agreeable that at this point obtaining a kidney biopsy will likely not be of benefit as this is the most likely cause of chronic kidney disease. Please refer above regarding medical intervention.

## 2023-08-04 NOTE — ASSESSMENT & PLAN NOTE
Hypertension under stable control on current medications amlodipine continue same dosage follow-up in 6 months. Add alpha blocker.

## 2023-08-04 NOTE — ASSESSMENT & PLAN NOTE
Lab Results   Component Value Date    EGFR 19 07/28/2023    EGFR 18 07/15/2023    EGFR 18 07/01/2023    CREATININE 2.70 (H) 07/28/2023    CREATININE 2.78 (H) 07/15/2023    CREATININE 2.78 (H) 07/01/2023   As mentioned above, patient's estimated GFR goal at this time is around 21 mL/min, other we will continue to look to see if she can achieve better than this value. With respect etiology, after work-up noted below, the most likely etiology of chronic kidney disease in the setting of proteinuria negative for monoclonal gammopathy and without evidence of diabetes mellitus, is obesity related hyperfiltration syndrome. We discussed this during the office visit and made a plan on trying to have substantial weight loss as we continue to monitor kidney function and protein content in the urine. Patient is willing to adjust diet significantly and increase physical activity in order to achieve substantial weight loss. In the event that this becomes difficult, will offer the patient medical intervention potentially in form of a GLP-1 agonist to assist with appetite suppression. With respect to medication intervention, unfortunately due to the patient's current kidney function she is at high risk for adverse events being based on RAAS inhibition as well as SGLT-2 inhibitors. Although that being said, we may consider introducing the latter option if her kidney function is able to get at and above an estimated GFR of 20 mL/min consistently. This will be discussed at her next appointment as we continue to monitor kidney function closely.

## 2023-08-04 NOTE — ASSESSMENT & PLAN NOTE
Patient's kidney function continues to slowly improve. Looking back at previous kidney function over the last several years, it appears the patient may be losing as much as 10 mL/min of kidney function with estimated GFR. If this is true, then goal GFR at this time should be at least 21 mL/min, we will continue to monitor closely at this time.

## 2023-08-04 NOTE — ASSESSMENT & PLAN NOTE
Iron deficiency anemia monitored with iron levels and CBC every 6 months continue current treatment plan monitoring closely with renal disease diagnosis

## 2023-08-04 NOTE — ASSESSMENT & PLAN NOTE
Blood pressure slightly elevated at this time, would continue to monitor for now. Patient will be adjusting diet, reducing sodium and caloric intake which will hopefully also reduce blood pressures. We will continue to monitor for now.

## 2023-08-11 ENCOUNTER — CLINICAL SUPPORT (OUTPATIENT)
Dept: FAMILY MEDICINE CLINIC | Facility: CLINIC | Age: 53
End: 2023-08-11
Payer: COMMERCIAL

## 2023-08-11 ENCOUNTER — OFFICE VISIT (OUTPATIENT)
Dept: FAMILY MEDICINE CLINIC | Facility: CLINIC | Age: 53
End: 2023-08-11
Payer: COMMERCIAL

## 2023-08-11 VITALS — DIASTOLIC BLOOD PRESSURE: 79 MMHG | SYSTOLIC BLOOD PRESSURE: 169 MMHG | BODY MASS INDEX: 41.61 KG/M2 | HEIGHT: 66 IN

## 2023-08-11 VITALS — SYSTOLIC BLOOD PRESSURE: 169 MMHG | DIASTOLIC BLOOD PRESSURE: 79 MMHG

## 2023-08-11 DIAGNOSIS — I10 ESSENTIAL HYPERTENSION: Chronic | ICD-10-CM

## 2023-08-11 DIAGNOSIS — M54.42 ACUTE LEFT-SIDED LOW BACK PAIN WITH LEFT-SIDED SCIATICA: Primary | ICD-10-CM

## 2023-08-11 DIAGNOSIS — I10 HYPERTENSION, UNSPECIFIED TYPE: Primary | ICD-10-CM

## 2023-08-11 PROCEDURE — 99213 OFFICE O/P EST LOW 20 MIN: CPT | Performed by: FAMILY MEDICINE

## 2023-08-11 NOTE — PROGRESS NOTES
Assessment/Plan:       Problem List Items Addressed This Visit        Cardiovascular and Mediastinum    Essential hypertension (Chronic)     Begin taking Hytrin 1 mg capsules twice daily now through the weekend and continue to monitor blood pressure we may increase this up to 5 mg            Nervous and Auditory    Acute left-sided low back pain with left-sided sciatica - Primary     Stable now but continue HEP working on core strengthening. Subjective:      Patient ID: Duke Bonilla is a 48 y.o. female. Patient came in today for blood pressure evaluation with her home cuff seeing variable readings starting Hytrin 1 mg in addition to her amlodipine she is feeling well overall      The following portions of the patient's history were reviewed and updated as appropriate: allergies, current medications, past family history, past medical history, past social history, past surgical history and problem list.    Review of Systems   Constitutional: Negative for chills, fatigue and fever. HENT: Negative for congestion, nosebleeds, rhinorrhea, sinus pressure and sore throat. Eyes: Negative for discharge and redness. Respiratory: Negative for cough and shortness of breath. Cardiovascular: Negative for chest pain, palpitations and leg swelling. Gastrointestinal: Negative for abdominal pain, blood in stool and nausea. Endocrine: Negative for cold intolerance, heat intolerance and polyuria. Genitourinary: Negative for dysuria and frequency. Musculoskeletal: Negative for arthralgias, back pain and myalgias. Skin: Negative for rash. Neurological: Negative for dizziness, weakness and headaches. Hematological: Negative for adenopathy. Psychiatric/Behavioral: Negative for behavioral problems and sleep disturbance. The patient is not nervous/anxious.           Objective:      /79 (BP Location: Left arm, Patient Position: Sitting, Cuff Size: Large)   Ht 5' 6" (1.676 m)   BMI 41.61 kg/m² Physical Exam  Vitals and nursing note reviewed. Constitutional:       General: She is not in acute distress. Appearance: Normal appearance. She is well-developed. She is obese. HENT:      Head: Normocephalic and atraumatic. Right Ear: Tympanic membrane, ear canal and external ear normal.      Left Ear: Tympanic membrane, ear canal and external ear normal.      Nose: Nose normal.      Mouth/Throat:      Mouth: Mucous membranes are moist.      Pharynx: Oropharynx is clear. No oropharyngeal exudate. Eyes:      General: No scleral icterus. Right eye: No discharge. Left eye: No discharge. Extraocular Movements: Extraocular movements intact. Conjunctiva/sclera: Conjunctivae normal.      Pupils: Pupils are equal, round, and reactive to light. Neck:      Thyroid: No thyromegaly. Vascular: No JVD. Cardiovascular:      Rate and Rhythm: Normal rate and regular rhythm. Pulses: Normal pulses. Heart sounds: Normal heart sounds. No murmur heard. Pulmonary:      Effort: Pulmonary effort is normal.      Breath sounds: No wheezing or rales. Chest:      Chest wall: No tenderness. Abdominal:      General: Bowel sounds are normal. There is no distension. Palpations: Abdomen is soft. There is no mass. Tenderness: There is no abdominal tenderness. Musculoskeletal:         General: No tenderness or deformity. Normal range of motion. Cervical back: Normal range of motion. Lymphadenopathy:      Cervical: No cervical adenopathy. Skin:     General: Skin is warm and dry. Capillary Refill: Capillary refill takes less than 2 seconds. Findings: No rash. Neurological:      General: No focal deficit present. Mental Status: She is alert and oriented to person, place, and time. Mental status is at baseline. Cranial Nerves: No cranial nerve deficit.       Coordination: Coordination normal.      Deep Tendon Reflexes: Reflexes are normal and symmetric. Reflexes normal.   Psychiatric:         Mood and Affect: Mood normal.         Behavior: Behavior normal.         Thought Content: Thought content normal.         Judgment: Judgment normal.          Data:    Laboratory Results: I have personally reviewed the pertinent laboratory results/reports   Radiology/Other Diagnostic Testing Results: I have personally reviewed pertinent reports.        Lab Results   Component Value Date    WBC 11.03 (H) 07/28/2023    HGB 9.1 (L) 07/28/2023    HCT 27.0 (L) 07/28/2023    MCV 93 07/28/2023     07/28/2023     Lab Results   Component Value Date    K 4.3 07/28/2023     07/28/2023    CO2 23 07/28/2023    BUN 28 (H) 07/28/2023    CREATININE 2.70 (H) 07/28/2023    GLUF 96 07/28/2023    CALCIUM 8.7 07/28/2023    AST 21 07/28/2023    ALT 37 07/28/2023    ALKPHOS 63 07/28/2023    EGFR 19 07/28/2023     Lab Results   Component Value Date    CHOLESTEROL 214 (H) 06/23/2023    CHOLESTEROL 200 07/01/2022    CHOLESTEROL 183 03/06/2021     Lab Results   Component Value Date    HDL 59 06/23/2023    HDL 63 07/01/2022    HDL 61 03/06/2021     Lab Results   Component Value Date    LDLCALC 110 (H) 06/23/2023    LDLCALC 81 07/01/2022    LDLCALC 75 03/06/2021     Lab Results   Component Value Date    TRIG 224 (H) 06/23/2023    TRIG 279 (H) 07/01/2022    TRIG 235 (H) 03/06/2021     No results found for: "CHOLHDL"  Lab Results   Component Value Date    [id number] 2.618 06/23/2023     No results found for: "HGBA1C"  No results found for: "PSA"    Chikis Bangor, DO

## 2023-08-11 NOTE — ASSESSMENT & PLAN NOTE
Begin taking Hytrin 1 mg capsules twice daily now through the weekend and continue to monitor blood pressure we may increase this up to 5 mg

## 2023-08-14 DIAGNOSIS — K21.9 GERD WITHOUT ESOPHAGITIS: Chronic | ICD-10-CM

## 2023-08-14 DIAGNOSIS — D63.1 ANEMIA DUE TO STAGE 4 CHRONIC KIDNEY DISEASE (HCC): ICD-10-CM

## 2023-08-14 DIAGNOSIS — N17.9 ACUTE RENAL FAILURE, UNSPECIFIED ACUTE RENAL FAILURE TYPE (HCC): ICD-10-CM

## 2023-08-14 DIAGNOSIS — M05.79 RHEUMATOID ARTHRITIS INVOLVING MULTIPLE SITES WITH POSITIVE RHEUMATOID FACTOR (HCC): ICD-10-CM

## 2023-08-14 DIAGNOSIS — E78.2 MIXED HYPERLIPIDEMIA: ICD-10-CM

## 2023-08-14 DIAGNOSIS — K58.0 IRRITABLE BOWEL SYNDROME WITH DIARRHEA: ICD-10-CM

## 2023-08-14 DIAGNOSIS — I10 ESSENTIAL HYPERTENSION: Chronic | ICD-10-CM

## 2023-08-14 DIAGNOSIS — G62.9 PERIPHERAL POLYNEUROPATHY: ICD-10-CM

## 2023-08-14 DIAGNOSIS — N18.4 ANEMIA DUE TO STAGE 4 CHRONIC KIDNEY DISEASE (HCC): ICD-10-CM

## 2023-08-14 RX ORDER — AMLODIPINE BESYLATE 5 MG/1
5 TABLET ORAL DAILY
Qty: 90 TABLET | Refills: 3 | Status: SHIPPED | OUTPATIENT
Start: 2023-08-14

## 2023-08-19 ENCOUNTER — APPOINTMENT (OUTPATIENT)
Dept: LAB | Facility: CLINIC | Age: 53
End: 2023-08-19
Payer: COMMERCIAL

## 2023-08-19 DIAGNOSIS — M05.79 SEROPOSITIVE RHEUMATOID ARTHRITIS OF MULTIPLE SITES (HCC): ICD-10-CM

## 2023-08-19 LAB
ALBUMIN SERPL BCP-MCNC: 3.5 G/DL (ref 3.5–5)
ALP SERPL-CCNC: 66 U/L (ref 46–116)
ALT SERPL W P-5'-P-CCNC: 43 U/L (ref 12–78)
ANION GAP SERPL CALCULATED.3IONS-SCNC: 7 MMOL/L
AST SERPL W P-5'-P-CCNC: 21 U/L (ref 5–45)
BASOPHILS # BLD AUTO: 0.02 THOUSANDS/ÂΜL (ref 0–0.1)
BASOPHILS NFR BLD AUTO: 0 % (ref 0–1)
BILIRUB SERPL-MCNC: 0.38 MG/DL (ref 0.2–1)
BUN SERPL-MCNC: 31 MG/DL (ref 5–25)
CALCIUM SERPL-MCNC: 8.7 MG/DL (ref 8.3–10.1)
CHLORIDE SERPL-SCNC: 111 MMOL/L (ref 96–108)
CHOLEST SERPL-MCNC: 154 MG/DL
CO2 SERPL-SCNC: 22 MMOL/L (ref 21–32)
CREAT SERPL-MCNC: 2.98 MG/DL (ref 0.6–1.3)
CRP SERPL QL: <3 MG/L
EOSINOPHIL # BLD AUTO: 0.13 THOUSAND/ÂΜL (ref 0–0.61)
EOSINOPHIL NFR BLD AUTO: 2 % (ref 0–6)
ERYTHROCYTE [DISTWIDTH] IN BLOOD BY AUTOMATED COUNT: 13.5 % (ref 11.6–15.1)
ERYTHROCYTE [SEDIMENTATION RATE] IN BLOOD: 6 MM/HOUR (ref 0–29)
GFR SERPL CREATININE-BSD FRML MDRD: 17 ML/MIN/1.73SQ M
GLUCOSE P FAST SERPL-MCNC: 137 MG/DL (ref 65–99)
HCT VFR BLD AUTO: 24.9 % (ref 34.8–46.1)
HDLC SERPL-MCNC: 69 MG/DL
HGB BLD-MCNC: 8.5 G/DL (ref 11.5–15.4)
IMM GRANULOCYTES # BLD AUTO: 0.11 THOUSAND/UL (ref 0–0.2)
IMM GRANULOCYTES NFR BLD AUTO: 1 % (ref 0–2)
LDLC SERPL CALC-MCNC: 54 MG/DL (ref 0–100)
LYMPHOCYTES # BLD AUTO: 1.36 THOUSANDS/ÂΜL (ref 0.6–4.47)
LYMPHOCYTES NFR BLD AUTO: 16 % (ref 14–44)
MCH RBC QN AUTO: 30.9 PG (ref 26.8–34.3)
MCHC RBC AUTO-ENTMCNC: 34.1 G/DL (ref 31.4–37.4)
MCV RBC AUTO: 91 FL (ref 82–98)
MONOCYTES # BLD AUTO: 0.53 THOUSAND/ÂΜL (ref 0.17–1.22)
MONOCYTES NFR BLD AUTO: 6 % (ref 4–12)
NEUTROPHILS # BLD AUTO: 6.59 THOUSANDS/ÂΜL (ref 1.85–7.62)
NEUTS SEG NFR BLD AUTO: 75 % (ref 43–75)
NRBC BLD AUTO-RTO: 0 /100 WBCS
PLATELET # BLD AUTO: 221 THOUSANDS/UL (ref 149–390)
PMV BLD AUTO: 11.2 FL (ref 8.9–12.7)
POTASSIUM SERPL-SCNC: 4 MMOL/L (ref 3.5–5.3)
PROT SERPL-MCNC: 6.9 G/DL (ref 6.4–8.4)
RBC # BLD AUTO: 2.75 MILLION/UL (ref 3.81–5.12)
SODIUM SERPL-SCNC: 140 MMOL/L (ref 135–147)
TRIGL SERPL-MCNC: 153 MG/DL
WBC # BLD AUTO: 8.74 THOUSAND/UL (ref 4.31–10.16)

## 2023-08-19 PROCEDURE — 80061 LIPID PANEL: CPT

## 2023-08-19 PROCEDURE — 85652 RBC SED RATE AUTOMATED: CPT

## 2023-08-19 PROCEDURE — 36415 COLL VENOUS BLD VENIPUNCTURE: CPT

## 2023-08-19 PROCEDURE — 86803 HEPATITIS C AB TEST: CPT

## 2023-08-19 PROCEDURE — 80053 COMPREHEN METABOLIC PANEL: CPT

## 2023-08-19 PROCEDURE — 86705 HEP B CORE ANTIBODY IGM: CPT

## 2023-08-19 PROCEDURE — 86704 HEP B CORE ANTIBODY TOTAL: CPT

## 2023-08-19 PROCEDURE — 86706 HEP B SURFACE ANTIBODY: CPT

## 2023-08-19 PROCEDURE — 85025 COMPLETE CBC W/AUTO DIFF WBC: CPT

## 2023-08-19 PROCEDURE — 87340 HEPATITIS B SURFACE AG IA: CPT

## 2023-08-19 PROCEDURE — 86480 TB TEST CELL IMMUN MEASURE: CPT

## 2023-08-19 PROCEDURE — 86140 C-REACTIVE PROTEIN: CPT

## 2023-08-20 LAB
HBV CORE AB SER QL: NORMAL
HBV CORE IGM SER QL: NORMAL
HBV SURFACE AB SER-ACNC: 3.05 MIU/ML
HBV SURFACE AG SER QL: NORMAL

## 2023-08-21 ENCOUNTER — TELEPHONE (OUTPATIENT)
Dept: FAMILY MEDICINE CLINIC | Facility: CLINIC | Age: 53
End: 2023-08-21

## 2023-08-21 DIAGNOSIS — I10 ESSENTIAL HYPERTENSION: Chronic | ICD-10-CM

## 2023-08-21 LAB
HCV AB S/CO SERPL IA: NON REACTIVE
SL AMB INTERPRETATION: NORMAL

## 2023-08-21 RX ORDER — TERAZOSIN 1 MG/1
2 CAPSULE ORAL
Qty: 30 CAPSULE | Refills: 3 | Status: SHIPPED | OUTPATIENT
Start: 2023-08-21

## 2023-08-21 NOTE — TELEPHONE ENCOUNTER
Patient has been experiencing some leg swelling since starting the Terazosin. She has recently increased this medication to twice daily since last visit. She also has still been taking her blood pressure at home and it has been around 130/90 range.

## 2023-08-21 NOTE — TELEPHONE ENCOUNTER
Called patient but Enma Coreas was at work I did speak with  Valentin Santiago and advised him to tell Enma Coreas - Stay on this medication for now elevate legs at times move feet and legs and we will discuss other options after we see how the blood pressure does since it has been so high and difficult to control it is coming down now and I like to see how this does over the next 2 weeks stay away from all salt in diet this will help with leg swelling. Contact us in 2 weeks if swelling not going down or sooner if not getting any better.

## 2023-08-21 NOTE — TELEPHONE ENCOUNTER
Patient called back, notified patient in detail. Patient acknowledged and notified us she will need a refill on this medication since she is now doing 2 at bedtime instead of one. Refill put to [address]. Patient will call if any other symptoms or concerns arise over the next two weeks and is aware she can stop by any day for a blood pressure check on the nurse schedule.

## 2023-08-22 LAB
GAMMA INTERFERON BACKGROUND BLD IA-ACNC: 0.03 IU/ML
M TB IFN-G BLD-IMP: NEGATIVE
M TB IFN-G CD4+ BCKGRND COR BLD-ACNC: -0.01 IU/ML
M TB IFN-G CD4+ BCKGRND COR BLD-ACNC: 0 IU/ML
MITOGEN IGNF BCKGRD COR BLD-ACNC: 1.29 IU/ML

## 2023-09-16 ENCOUNTER — APPOINTMENT (OUTPATIENT)
Dept: LAB | Facility: HOSPITAL | Age: 53
End: 2023-09-16
Payer: COMMERCIAL

## 2023-09-16 DIAGNOSIS — N18.32 STAGE 3B CHRONIC KIDNEY DISEASE (HCC): ICD-10-CM

## 2023-09-16 LAB
BACTERIA UR QL AUTO: ABNORMAL /HPF
BILIRUB UR QL STRIP: NEGATIVE
CLARITY UR: CLEAR
COLOR UR: YELLOW
CREAT UR-MCNC: 41.2 MG/DL
GLUCOSE UR STRIP-MCNC: NEGATIVE MG/DL
HGB UR QL STRIP.AUTO: ABNORMAL
KETONES UR STRIP-MCNC: NEGATIVE MG/DL
LEUKOCYTE ESTERASE UR QL STRIP: NEGATIVE
MICROALBUMIN UR-MCNC: 439.1 MG/L
MICROALBUMIN/CREAT 24H UR: 1066 MG/G CREATININE (ref 0–30)
NITRITE UR QL STRIP: NEGATIVE
NON-SQ EPI CELLS URNS QL MICRO: ABNORMAL /HPF
PH UR STRIP.AUTO: 5.5 [PH]
PROT UR STRIP-MCNC: ABNORMAL MG/DL
RBC #/AREA URNS AUTO: ABNORMAL /HPF
SP GR UR STRIP.AUTO: <=1.005
UROBILINOGEN UR QL STRIP.AUTO: 0.2 E.U./DL
WBC #/AREA URNS AUTO: ABNORMAL /HPF

## 2023-09-16 PROCEDURE — 81001 URINALYSIS AUTO W/SCOPE: CPT

## 2023-09-16 PROCEDURE — 82043 UR ALBUMIN QUANTITATIVE: CPT

## 2023-09-16 PROCEDURE — 82570 ASSAY OF URINE CREATININE: CPT

## 2023-09-18 ENCOUNTER — VBI (OUTPATIENT)
Dept: ADMINISTRATIVE | Facility: OTHER | Age: 53
End: 2023-09-18

## 2023-09-21 ENCOUNTER — TELEPHONE (OUTPATIENT)
Dept: NEPHROLOGY | Facility: CLINIC | Age: 53
End: 2023-09-21

## 2023-09-21 NOTE — TELEPHONE ENCOUNTER
Call from patient. Requesting lab results, CC to you. She states everyday she is so drained by 3-3:30 and could just fall asleep and bone chilling chills. Bilat LBP (not severe)every couple days. No trouble voiding, no pain or frequency. Urine is frothy. She was wondering if may be from medications.

## 2023-09-22 ENCOUNTER — APPOINTMENT (OUTPATIENT)
Dept: LAB | Facility: HOSPITAL | Age: 53
End: 2023-09-22
Payer: COMMERCIAL

## 2023-09-22 DIAGNOSIS — N30.00 ACUTE CYSTITIS WITHOUT HEMATURIA: ICD-10-CM

## 2023-09-22 DIAGNOSIS — N30.00 ACUTE CYSTITIS WITHOUT HEMATURIA: Primary | ICD-10-CM

## 2023-09-22 LAB
BACTERIA UR QL AUTO: ABNORMAL /HPF
BILIRUB UR QL STRIP: NEGATIVE
CLARITY UR: CLEAR
COLOR UR: YELLOW
GLUCOSE UR STRIP-MCNC: NEGATIVE MG/DL
HGB UR QL STRIP.AUTO: ABNORMAL
KETONES UR STRIP-MCNC: NEGATIVE MG/DL
LEUKOCYTE ESTERASE UR QL STRIP: NEGATIVE
NITRITE UR QL STRIP: NEGATIVE
NON-SQ EPI CELLS URNS QL MICRO: ABNORMAL /HPF
PH UR STRIP.AUTO: 5.5 [PH]
PROT UR STRIP-MCNC: ABNORMAL MG/DL
RBC #/AREA URNS AUTO: ABNORMAL /HPF
SP GR UR STRIP.AUTO: 1.02
UROBILINOGEN UR QL STRIP.AUTO: 0.2 E.U./DL
WBC #/AREA URNS AUTO: ABNORMAL /HPF

## 2023-09-22 PROCEDURE — 81001 URINALYSIS AUTO W/SCOPE: CPT

## 2023-09-22 PROCEDURE — 87086 URINE CULTURE/COLONY COUNT: CPT

## 2023-09-22 RX ORDER — CIPROFLOXACIN 250 MG/1
250 TABLET, FILM COATED ORAL EVERY 12 HOURS SCHEDULED
Qty: 10 TABLET | Refills: 0 | Status: SHIPPED | OUTPATIENT
Start: 2023-09-22 | End: 2023-09-27

## 2023-09-22 NOTE — TELEPHONE ENCOUNTER
Urine study and blood work were stable with no evidence of infection. However if she feels like she may have an infection we should redraw a urine study, and then place her on antibiotic. It sounds as though she has something going on at this time.   Labs are in the chart to be done today, and an antibiotic was sent in

## 2023-09-23 LAB — BACTERIA UR CULT: NORMAL

## 2023-09-27 ENCOUNTER — OFFICE VISIT (OUTPATIENT)
Dept: FAMILY MEDICINE CLINIC | Facility: CLINIC | Age: 53
End: 2023-09-27
Payer: COMMERCIAL

## 2023-09-27 ENCOUNTER — OFFICE VISIT (OUTPATIENT)
Dept: NEPHROLOGY | Facility: CLINIC | Age: 53
End: 2023-09-27
Payer: COMMERCIAL

## 2023-09-27 VITALS
SYSTOLIC BLOOD PRESSURE: 140 MMHG | HEIGHT: 66 IN | DIASTOLIC BLOOD PRESSURE: 80 MMHG | OXYGEN SATURATION: 99 % | BODY MASS INDEX: 39.05 KG/M2 | WEIGHT: 243 LBS | HEART RATE: 82 BPM

## 2023-09-27 VITALS
BODY MASS INDEX: 39.28 KG/M2 | HEIGHT: 66 IN | DIASTOLIC BLOOD PRESSURE: 90 MMHG | WEIGHT: 244.4 LBS | TEMPERATURE: 97.7 F | RESPIRATION RATE: 18 BRPM | HEART RATE: 87 BPM | SYSTOLIC BLOOD PRESSURE: 140 MMHG | OXYGEN SATURATION: 100 %

## 2023-09-27 DIAGNOSIS — I10 ESSENTIAL HYPERTENSION: Chronic | ICD-10-CM

## 2023-09-27 DIAGNOSIS — N18.32 STAGE 3B CHRONIC KIDNEY DISEASE (HCC): Primary | ICD-10-CM

## 2023-09-27 DIAGNOSIS — R60.0 BILATERAL LEG EDEMA: ICD-10-CM

## 2023-09-27 DIAGNOSIS — E55.9 VITAMIN D DEFICIENCY: ICD-10-CM

## 2023-09-27 DIAGNOSIS — K21.9 GERD WITHOUT ESOPHAGITIS: Chronic | ICD-10-CM

## 2023-09-27 DIAGNOSIS — N03.1 HYPERFILTRATION FOCAL SEGMENTAL GLOMERULOSCLEROSIS: Primary | ICD-10-CM

## 2023-09-27 DIAGNOSIS — M05.79 RHEUMATOID ARTHRITIS INVOLVING MULTIPLE SITES WITH POSITIVE RHEUMATOID FACTOR (HCC): ICD-10-CM

## 2023-09-27 DIAGNOSIS — N03.1 HYPERFILTRATION FOCAL SEGMENTAL GLOMERULOSCLEROSIS: ICD-10-CM

## 2023-09-27 DIAGNOSIS — D50.8 IRON DEFICIENCY ANEMIA SECONDARY TO INADEQUATE DIETARY IRON INTAKE: ICD-10-CM

## 2023-09-27 PROCEDURE — 99214 OFFICE O/P EST MOD 30 MIN: CPT | Performed by: INTERNAL MEDICINE

## 2023-09-27 PROCEDURE — 99214 OFFICE O/P EST MOD 30 MIN: CPT | Performed by: FAMILY MEDICINE

## 2023-09-27 RX ORDER — FUROSEMIDE 40 MG/1
40 TABLET ORAL DAILY PRN
Qty: 30 TABLET | Refills: 1 | Status: SHIPPED | OUTPATIENT
Start: 2023-09-27

## 2023-09-27 NOTE — PATIENT INSTRUCTIONS
Please take the furosemide (Lasix) 40 mg once a day for the next 2 or 3 days to help reduce the swelling.

## 2023-09-27 NOTE — ASSESSMENT & PLAN NOTE
Patient presented with leg edema which progressively worsened over the last 3 days it is painful at this point. In light of her underlying renal function and condition I like her followed up by nephrology now possibly she will need to go through with a biopsy.   Consideration for prednisone or other treatment discussed with patient and her  at this time I will avoid diuretics and defer that to nephrology

## 2023-09-27 NOTE — PROGRESS NOTES
Assessment/Plan:       Problem List Items Addressed This Visit        Digestive    GERD without esophagitis (Chronic)     Stable on PPI no change today. Cardiovascular and Mediastinum    Essential hypertension (Chronic)     Hypertension is stable continue with same medication regimen currently            Musculoskeletal and Integument    Rheumatoid arthritis involving multiple sites with positive rheumatoid factor (HCC)     Stable no worsening joint pain on examination no swelling continue same medications            Genitourinary    Hyperfiltration focal segmental glomerulosclerosis - Primary     Patient presented with leg edema which progressively worsened over the last 3 days it is painful at this point. In light of her underlying renal function and condition I like her followed up by nephrology now possibly she will need to go through with a biopsy. Consideration for prednisone or other treatment discussed with patient and her  at this time I will avoid diuretics and defer that to nephrology            Other    Iron deficiency anemia secondary to inadequate dietary iron intake     Stable monitor CBC follow-up iron eluate at next visit         Bilateral leg edema     Vital leg edema worsening over the last 3 days patient notes more discomfort overall no shortness of breath or other symptoms. Will defer to nephrology at this point based on patient's underlying renal function status over the last several months              Subjective:      Patient ID: Deb Faria is a 48 y.o. female. Bilateral leg edema over the last 3 days worsening with discomfort and pain      The following portions of the patient's history were reviewed and updated as appropriate: allergies, current medications, past family history, past medical history, past social history, past surgical history and problem list.    Review of Systems   Constitutional: Negative for chills, fatigue and fever.    HENT: Negative for congestion, nosebleeds, rhinorrhea, sinus pressure and sore throat. Eyes: Negative for discharge and redness. Respiratory: Negative for cough and shortness of breath. Cardiovascular: Positive for leg swelling. Negative for chest pain and palpitations. Gastrointestinal: Negative for abdominal pain, blood in stool and nausea. Endocrine: Negative for cold intolerance, heat intolerance and polyuria. Genitourinary: Negative for dysuria and frequency. Musculoskeletal: Negative for arthralgias, back pain and myalgias. Skin: Negative for rash. Neurological: Negative for dizziness, weakness and headaches. Hematological: Negative for adenopathy. Psychiatric/Behavioral: Negative for behavioral problems and sleep disturbance. The patient is not nervous/anxious. Objective:      /90 (BP Location: Left arm, Patient Position: Sitting, Cuff Size: Standard)   Pulse 87   Temp 97.7 °F (36.5 °C) (Tympanic)   Resp 18   Ht 5' 6" (1.676 m)   Wt 111 kg (244 lb 6.4 oz)   SpO2 100%   BMI 39.45 kg/m²        Physical Exam  Vitals and nursing note reviewed. Constitutional:       General: She is not in acute distress. Appearance: She is well-developed. HENT:      Head: Normocephalic and atraumatic. Right Ear: External ear normal.      Left Ear: External ear normal.      Nose: Nose normal.      Mouth/Throat:      Pharynx: No oropharyngeal exudate. Eyes:      General: No scleral icterus. Right eye: No discharge. Left eye: No discharge. Conjunctiva/sclera: Conjunctivae normal.      Pupils: Pupils are equal, round, and reactive to light. Neck:      Thyroid: No thyromegaly. Vascular: No JVD. Cardiovascular:      Rate and Rhythm: Normal rate and regular rhythm. Heart sounds: Normal heart sounds. No murmur heard. Pulmonary:      Effort: Pulmonary effort is normal.      Breath sounds: No wheezing or rales. Chest:      Chest wall: No tenderness.    Abdominal: General: Bowel sounds are normal. There is no distension. Palpations: Abdomen is soft. There is no mass. Tenderness: There is no abdominal tenderness. Musculoskeletal:         General: No tenderness or deformity. Normal range of motion. Cervical back: Normal range of motion. Right lower leg: Edema present. Left lower leg: Edema present. Lymphadenopathy:      Cervical: No cervical adenopathy. Skin:     General: Skin is warm and dry. Findings: No rash. Neurological:      Mental Status: She is alert and oriented to person, place, and time. Cranial Nerves: No cranial nerve deficit. Coordination: Coordination normal.      Deep Tendon Reflexes: Reflexes are normal and symmetric. Reflexes normal.   Psychiatric:         Behavior: Behavior normal.         Thought Content: Thought content normal.         Judgment: Judgment normal.          Data:    Laboratory Results: I have personally reviewed the pertinent laboratory results/reports   Radiology/Other Diagnostic Testing Results: I have personally reviewed pertinent reports.        Lab Results   Component Value Date    WBC 8.36 09/16/2023    HGB 9.0 (L) 09/16/2023    HCT 28.4 (L) 09/16/2023    MCV 96 09/16/2023     09/16/2023     Lab Results   Component Value Date    K 4.1 09/16/2023     09/16/2023    CO2 19 (L) 09/16/2023    BUN 36 (H) 09/16/2023    CREATININE 3.06 (H) 09/16/2023    GLUF 93 09/16/2023    CALCIUM 8.8 09/16/2023    AST 18 09/16/2023    ALT 23 09/16/2023    ALKPHOS 66 09/16/2023    EGFR 16 09/16/2023     Lab Results   Component Value Date    CHOLESTEROL 154 08/19/2023    CHOLESTEROL 214 (H) 06/23/2023    CHOLESTEROL 200 07/01/2022     Lab Results   Component Value Date    HDL 69 08/19/2023    HDL 59 06/23/2023    HDL 63 07/01/2022     Lab Results   Component Value Date    LDLCALC 54 08/19/2023    LDLCALC 110 (H) 06/23/2023    LDLCALC 81 07/01/2022     Lab Results   Component Value Date    TRIG 153 (H) 08/19/2023    TRIG 224 (H) 06/23/2023    TRIG 279 (H) 07/01/2022     No results found for: "CHOLHDL"  Lab Results   Component Value Date    [id number] 2.618 06/23/2023     No results found for: "HGBA1C"  No results found for: "PSA"    Austin Pinon, DO

## 2023-09-27 NOTE — ASSESSMENT & PLAN NOTE
Vital leg edema worsening over the last 3 days patient notes more discomfort overall no shortness of breath or other symptoms.   Will defer to nephrology at this point based on patient's underlying renal function status over the last several months

## 2023-09-27 NOTE — PROGRESS NOTES
[facility] of [address] [place] [address]    Name: Krunal hCung  YOB: 1970      Assessment/Plan:    Stage 3b chronic kidney disease [facility])  Lab Results   Component Value Date    EGFR 16 09/16/2023    EGFR 17 08/19/2023    EGFR 19 07/28/2023    CREATININE 3.06 (H) 09/16/2023    CREATININE 2.98 (H) 08/19/2023    CREATININE 2.70 (H) 07/28/2023   Patient kidney function most likely has progressed to stage IV chronic kidney disease. Continue to encourage blood pressure control, and avoidance of potential nephrotoxins. Unfortunately, patient requires continuation of use of omeprazole due to reflux symptoms. Hyperfiltration focal segmental glomerulosclerosis  As mentioned previously, this is a presumed diagnosis, patient is an excellent drop in weight loss with appropriate diet control and caloric restriction. Unfortunately due to the patient's advanced chronic kidney disease, cannot place on RAAS inhibition at this time. We will initiate furosemide, please refer below. Bilateral leg edema  Continue to encourage low-sodium diet, will initiate furosemide 40 mg daily for now. This will be taken on an as-needed basis, if the patient is not experiencing lower extremity edema she is to not take furosemide that specific day. Escalation of care may be indicated depending the patient progresses from a clinical standpoint. Vitamin D deficiency  Continue with high-dose vitamin D supplementation. Problem List Items Addressed This Visit          Genitourinary    Stage 3b chronic kidney disease [facility]) - Primary     Lab Results   Component Value Date    EGFR 16 09/16/2023    EGFR 17 08/19/2023    EGFR 19 07/28/2023    CREATININE 3.06 (H) 09/16/2023    CREATININE 2.98 (H) 08/19/2023    CREATININE 2.70 (H) 07/28/2023   Patient kidney function most likely has progressed to stage IV chronic kidney disease.   Continue to encourage blood pressure control, and avoidance of potential nephrotoxins. Unfortunately, patient requires continuation of use of omeprazole due to reflux symptoms. Relevant Medications    furosemide (LASIX) 40 mg tablet    Hyperfiltration focal segmental glomerulosclerosis     As mentioned previously, this is a presumed diagnosis, patient is an excellent drop in weight loss with appropriate diet control and caloric restriction. Unfortunately due to the patient's advanced chronic kidney disease, cannot place on RAAS inhibition at this time. We will initiate furosemide, please refer below. Relevant Medications    furosemide (LASIX) 40 mg tablet       Other    Vitamin D deficiency     Continue with high-dose vitamin D supplementation. Bilateral leg edema     Continue to encourage low-sodium diet, will initiate furosemide 40 mg daily for now. This will be taken on an as-needed basis, if the patient is not experiencing lower extremity edema she is to not take furosemide that specific day. Escalation of care may be indicated depending the patient progresses from a clinical standpoint. Relevant Medications    furosemide (LASIX) 40 mg tablet       We will see the patient back in a few months, she will let us know if the furosemide is not helpful and improving edema. Additional labs may be indicated depending on the patient progresses from a clinical standpoint. Subjective:      Patient ID: Lily Perez is a 48 y.o. female. Patient presents for follow-up appointment. We reviewed the patient's labs in detail, with most recent creatinine at 3.06 mg/dL, serum bicarbonate at 19 mmol/L, serum sodium potassium level appropriate, though the patient's serum sodium level was slightly reduced at 136 mmol/L. Though the patient's current creatinine is between 3-3.1 mg/dL, over the summer is closer to around 2.8 mg/dL. She denies use of nausea will need inflammatory medications.   She has been eating and drinking normally, although she has been significantly reducing caloric intake and has lost appropriate amount of weight due to excellent diet control management. She is on furosemide, however she is taking on an as-needed basis. She denies use of nonsteroid anti-inflammatory medications at this time. Hypertension  This is a chronic problem. The current episode started more than 1 year ago. The problem is unchanged. The problem is controlled. Pertinent negatives include no chest pain, orthopnea or peripheral edema. There are no associated agents to hypertension. Risk factors for coronary artery disease include post-menopausal state and obesity. Past treatments include lifestyle changes and calcium channel blockers. There are no compliance problems. Hypertensive end-organ damage includes kidney disease. Identifiable causes of hypertension include chronic renal disease. The following portions of the patient's history were reviewed and updated as appropriate: allergies, current medications, past family history, past medical history, past social history, past surgical history and problem list.    Review of Systems   Cardiovascular:  Negative for chest pain and orthopnea. All other systems reviewed and are negative.         Social History     Socioeconomic History    Marital status: /Civil Union     Spouse name: None    Number of children: None    Years of education: None    Highest education level: None   Occupational History    None   Tobacco Use    Smoking status: Former     Types: Cigarettes     Quit date:      Years since quittin.8    Smokeless tobacco: Never   Vaping Use    Vaping Use: Never used   Substance and Sexual Activity    Alcohol use: Never    Drug use: No    Sexual activity: None   Other Topics Concern    None   Social History Narrative    None     Social Determinants of Health     Financial Resource Strain: Not on file   Food Insecurity: Not on file   Transportation Needs: Not on file   Physical Activity: Not on file   Stress: Not on file   Social Connections: Not on file   Intimate Partner Violence: Not on file   Housing Stability: Not on file     Past Medical History:   Diagnosis Date    GERD (gastroesophageal reflux disease)     Hypertension     Neuropathy      Past Surgical History:   Procedure Laterality Date    APPENDECTOMY      CHOLECYSTECTOMY      FOOT POSTERIOR RELEASE Left     around ankle    TUBAL LIGATION         Current Outpatient Medications:     amLODIPine (NORVASC) 5 mg tablet, Take 1 tablet (5 mg total) by mouth daily, Disp: 90 tablet, Rfl: 3    Ascorbic Acid (VITAMIN C) 1000 MG tablet, Take 1,000 mg by mouth daily, Disp: , Rfl:     b complex vitamins capsule, Take 1 capsule by mouth daily, Disp: , Rfl:     colestipol (COLESTID) 1 g tablet, , Disp: , Rfl:     ergocalciferol (ERGOCALCIFEROL) 1.25 MG (28565 UT) capsule, Take 1 capsule (50,000 Units total) by mouth once a week, Disp: 12 capsule, Rfl: 1    furosemide (LASIX) 40 mg tablet, Take 1 tablet (40 mg total) by mouth daily as needed (swelling of the legs), Disp: 30 tablet, Rfl: 1    multivitamin (THERAGRAN) TABS, Take 1 tablet by mouth daily, Disp: , Rfl:     Omega-3 Fatty Acids (FISH OIL) 1,000 mg, Take 1,000 mg by mouth daily, Disp: , Rfl:     omeprazole (PriLOSEC) 20 mg delayed release capsule, Take 1 capsule (20 mg total) by mouth daily, Disp: 90 capsule, Rfl: 3    rosuvastatin (CRESTOR) 10 MG tablet, Take 1 tablet (10 mg total) by mouth daily, Disp: 90 tablet, Rfl: 3    terazosin (HYTRIN) 1 mg capsule, Take 2 capsules (2 mg total) by mouth daily at bedtime, Disp: 30 capsule, Rfl: 3    Xeljanz 5 MG TABS, , Disp: , Rfl:     Lab Results   Component Value Date    SODIUM 136 09/16/2023    K 4.1 09/16/2023     09/16/2023    CO2 19 (L) 09/16/2023    AGAP 12 09/16/2023    BUN 36 (H) 09/16/2023    CREATININE 3.06 (H) 09/16/2023    GLUC 124 07/01/2023    GLUF 93 09/16/2023    CALCIUM 8.8 09/16/2023    AST 18 09/16/2023    ALT 23 09/16/2023 ALKPHOS 66 09/16/2023    TP 7.0 09/16/2023    TBILI 0.38 09/16/2023    EGFR 16 09/16/2023     Lab Results   Component Value Date    WBC 8.36 09/16/2023    HGB 9.0 (L) 09/16/2023    HCT 28.4 (L) 09/16/2023    MCV 96 09/16/2023     09/16/2023     Lab Results   Component Value Date    CHOLESTEROL 154 08/19/2023    CHOLESTEROL 214 (H) 06/23/2023    CHOLESTEROL 200 07/01/2022     Lab Results   Component Value Date    HDL 69 08/19/2023    HDL 59 06/23/2023    HDL 63 07/01/2022     Lab Results   Component Value Date    LDLCALC 54 08/19/2023    LDLCALC 110 (H) 06/23/2023    LDLCALC 81 07/01/2022     Lab Results   Component Value Date    TRIG 153 (H) 08/19/2023    TRIG 224 (H) 06/23/2023    TRIG 279 (H) 07/01/2022     No results found for: "CHOLHDL"  Lab Results   Component Value Date    NHK7MLGHCSZB 2.618 06/23/2023     Lab Results   Component Value Date    .2 (H) 07/01/2023    CALCIUM 8.8 09/16/2023    PHOS 3.4 07/01/2023     Lab Results   Component Value Date    SPEP See Comment 07/01/2023    UPEP See Comment 07/01/2023     No results found for: "Yoko Karrie", "[id number]"        Objective:      /80 (BP Location: Left arm, Patient Position: Sitting, Cuff Size: Large)   Pulse 82   Ht 5' 6" (1.676 m)   Wt 110 kg (243 lb)   SpO2 99%   BMI 39.22 kg/m²          Physical Exam  Vitals reviewed. Constitutional:       General: She is not in acute distress. Appearance: She is well-developed. HENT:      Head: Normocephalic and atraumatic. Eyes:      Conjunctiva/sclera: Conjunctivae normal.   Cardiovascular:      Rate and Rhythm: Normal rate and regular rhythm. Pulmonary:      Effort: Pulmonary effort is normal.      Breath sounds: Normal breath sounds. Abdominal:      Palpations: Abdomen is soft. Musculoskeletal:      Cervical back: Neck supple. Skin:     General: Skin is warm. Findings: No rash. Neurological:      Mental Status: She is alert and oriented to person, place, and time. Cranial Nerves: No cranial nerve deficit.    Psychiatric:         Behavior: Behavior normal.

## 2023-10-17 NOTE — ASSESSMENT & PLAN NOTE
Continue to encourage low-sodium diet, will initiate furosemide 40 mg daily for now. This will be taken on an as-needed basis, if the patient is not experiencing lower extremity edema she is to not take furosemide that specific day. Escalation of care may be indicated depending the patient progresses from a clinical standpoint.

## 2023-10-17 NOTE — ASSESSMENT & PLAN NOTE
As mentioned previously, this is a presumed diagnosis, patient is an excellent drop in weight loss with appropriate diet control and caloric restriction. Unfortunately due to the patient's advanced chronic kidney disease, cannot place on RAAS inhibition at this time. We will initiate furosemide, please refer below.

## 2023-10-17 NOTE — ASSESSMENT & PLAN NOTE
Lab Results   Component Value Date    EGFR 16 09/16/2023    EGFR 17 08/19/2023    EGFR 19 07/28/2023    CREATININE 3.06 (H) 09/16/2023    CREATININE 2.98 (H) 08/19/2023    CREATININE 2.70 (H) 07/28/2023   Patient kidney function most likely has progressed to stage IV chronic kidney disease. Continue to encourage blood pressure control, and avoidance of potential nephrotoxins. Unfortunately, patient requires continuation of use of omeprazole due to reflux symptoms.

## 2023-10-19 DIAGNOSIS — I10 ESSENTIAL HYPERTENSION: Chronic | ICD-10-CM

## 2023-10-19 RX ORDER — TERAZOSIN 1 MG/1
CAPSULE ORAL
Qty: 30 CAPSULE | Refills: 0 | Status: SHIPPED | OUTPATIENT
Start: 2023-10-19

## 2023-11-10 ENCOUNTER — APPOINTMENT (OUTPATIENT)
Dept: LAB | Facility: CLINIC | Age: 53
End: 2023-11-10
Payer: COMMERCIAL

## 2023-11-14 ENCOUNTER — OFFICE VISIT (OUTPATIENT)
Dept: NEPHROLOGY | Facility: CLINIC | Age: 53
End: 2023-11-14
Payer: COMMERCIAL

## 2023-11-14 VITALS
WEIGHT: 232 LBS | HEIGHT: 66 IN | DIASTOLIC BLOOD PRESSURE: 82 MMHG | HEART RATE: 73 BPM | OXYGEN SATURATION: 98 % | BODY MASS INDEX: 37.28 KG/M2 | SYSTOLIC BLOOD PRESSURE: 150 MMHG

## 2023-11-14 DIAGNOSIS — N17.9 ACUTE RENAL FAILURE, UNSPECIFIED ACUTE RENAL FAILURE TYPE (HCC): Primary | ICD-10-CM

## 2023-11-14 DIAGNOSIS — I10 ESSENTIAL HYPERTENSION: Chronic | ICD-10-CM

## 2023-11-14 DIAGNOSIS — K21.9 GERD WITHOUT ESOPHAGITIS: Chronic | ICD-10-CM

## 2023-11-14 DIAGNOSIS — N03.1 HYPERFILTRATION FOCAL SEGMENTAL GLOMERULOSCLEROSIS: ICD-10-CM

## 2023-11-14 DIAGNOSIS — N18.32 STAGE 3B CHRONIC KIDNEY DISEASE (HCC): ICD-10-CM

## 2023-11-14 DIAGNOSIS — R80.9 PROTEINURIA, UNSPECIFIED TYPE: ICD-10-CM

## 2023-11-14 PROCEDURE — 99215 OFFICE O/P EST HI 40 MIN: CPT | Performed by: INTERNAL MEDICINE

## 2023-11-14 NOTE — LETTER
November 15, 2023     Patient: Rambo Melchor  YOB: 1970  Date of Visit: 11/14/2023      To Whom it May Concern:    Cash Dee is under my professional care. Boogie Vidales was seen in my office on 11/14/2023. Boogie Vidales may return to work on 11/15/23    If you have any questions or concerns, please don't hesitate to call.          Sincerely,          Melissa Delong,         CC: No Recipients

## 2023-11-14 NOTE — PROGRESS NOTES
[facility] of [address] [place] [address]    Name: Love Maurice  YOB: 1970      Assessment/Plan:    Acute renal failure (ARF) [facility])  Unfortunately, the patient continues to decline from the kidney function standpoint. At this time it is unclear if the patient is currently in acute kidney injury or if this is simply a rapid progression of chronic kidney disease. After discussion, we have agreed to proceed with a kidney biopsy to further evaluate and help to delineate what may or may not be going on. I am afraid that the patient may simply be experiencing accelerated chronic kidney function loss, and positive medical intervention may not be possible. That being said, if kidney biopsy notes diagnosis which can be addressed and treated, will be very aggressive  in care and management. Hyperfiltration focal segmental glomerulosclerosis  This is a presumed diagnosis, as noted above, patient will proceed with a kidney biopsy. Stage 3b chronic kidney disease ([address])  Lab Results   Component Value Date    EGFR 15 11/10/2023    EGFR 16 09/16/2023    EGFR 17 08/19/2023    CREATININE 3.26 (H) 11/10/2023    CREATININE 3.06 (H) 09/16/2023    CREATININE 2.98 (H) 08/19/2023     As noted previously, patient may be a new baseline creatinine, will continue to aggressively care and manage for all medical conditions. Patient continues to require the use of omeprazole, that being said, urine eosinophils were performed previously which came back within normal limits. Proteinuria  We will perform a full serologic work-up in anticipation for kidney biopsy in the next week or two. We will look to find a definitive answer to etiology with kidney biopsy. Essential hypertension  Blood pressure slightly elevated at this time, home blood pressures have been better controlled.   Continue to monitor for now, consider increasing amlodipine or adding an additional agent depending on she progresses from a clinical standpoint. GERD without esophagitis  Continue with omeprazole         Problem List Items Addressed This Visit          Digestive    GERD without esophagitis (Chronic)     Continue with omeprazole            Cardiovascular and Mediastinum    Essential hypertension (Chronic)     Blood pressure slightly elevated at this time, home blood pressures have been better controlled. Continue to monitor for now, consider increasing amlodipine or adding an additional agent depending on she progresses from a clinical standpoint. Genitourinary    Acute renal failure (ARF) ([address]) - Primary     Unfortunately, the patient continues to decline from the kidney function standpoint. At this time it is unclear if the patient is currently in acute kidney injury or if this is simply a rapid progression of chronic kidney disease. After discussion, we have agreed to proceed with a kidney biopsy to further evaluate and help to delineate what may or may not be going on. I am afraid that the patient may simply be experiencing accelerated chronic kidney function loss, and positive medical intervention may not be possible. That being said, if kidney biopsy notes diagnosis which can be addressed and treated, will be very aggressive  in care and management.          Relevant Orders    NASIMA BY MULTIPLEX IMMUNOASSAY,REFLEX TO 5-BIOMARKER PROFILE    Anticardiolipin Ab, IgG/M, Qn    Anti-neutrophilic cytoplasmic antibody    ASO Screen w/ Reflex to Titer    Glomerular basement membrane antibodies    Cryoglobulin    Ambulatory referral to Interventional Radiology    CBC and differential    Stage 3b chronic kidney disease ([address])     Lab Results   Component Value Date    EGFR 15 11/10/2023    EGFR 16 09/16/2023    EGFR 17 08/19/2023    CREATININE 3.26 (H) 11/10/2023    CREATININE 3.06 (H) 09/16/2023    CREATININE 2.98 (H) 08/19/2023   As noted previously, patient may be a new baseline creatinine, will continue to aggressively care and manage for all medical conditions. Patient continues to require the use of omeprazole, that being said, urine eosinophils were performed previously which came back within normal limits. Hyperfiltration focal segmental glomerulosclerosis     This is a presumed diagnosis, as noted above, patient will proceed with a kidney biopsy. Other    Proteinuria     We will perform a full serologic work-up in anticipation for kidney biopsy in the next week or two. We will look to find a definitive answer to etiology with kidney biopsy. After considerable discussion, we have agreed to proceed with a kidney biopsy to further evaluate and determine etiology of continued kidney functional loss. Patient at high risk for progression to the point of necessitating extracorporeal renal replacement therapy given patient's current kidney function and rate of kidney function loss. Arrangements were made for ambulatory interventional radiology referral and kidney biopsy. Biopsy form be filled out with tissue being sent to [facility] for appropriate and medical examination. We will see the patient back for regular appointment in a few weeks to follow-up on the kidney biopsy. Additional testing as well as treatments may be indicated depending on those results as they come through. Subjective:      Patient ID: Jeffery Mayorga is a 48 y.o. female. Patient presents for follow-up appointment. We reviewed the patient's labs in detail, patient continues to slowly lose kidney function with an estimated GFR now down to 15 mL/min. There are no significant electrolyte abnormalities noted. Patient's serum bicarbonate has also declined, now down to 18 mmol/L. She is taking all medications as prescribed with no specific side effects at this time. She denies use of nonsteroid anti-inflammatory medications.     Patient is consuming protein as directed by physician. She has been focusing on protein high in derivation from meat. Patient continues to do an excellent job in losing weight, she is now down an additional 8-9 pounds since her last visit. Hypertension  This is a chronic problem. The current episode started more than 1 year ago. The problem is unchanged. The problem is controlled. Pertinent negatives include no chest pain, orthopnea or peripheral edema. There are no associated agents to hypertension. Risk factors for coronary artery disease include obesity and post-menopausal state. Past treatments include lifestyle changes and calcium channel blockers. There are no compliance problems. Hypertensive end-organ damage includes kidney disease. Identifiable causes of hypertension include chronic renal disease. The following portions of the patient's history were reviewed and updated as appropriate: allergies, current medications, past family history, past medical history, past social history, past surgical history and problem list.    Review of Systems   Cardiovascular:  Negative for chest pain and orthopnea. All other systems reviewed and are negative.         Social History     Socioeconomic History    Marital status: /Civil Union     Spouse name: None    Number of children: None    Years of education: None    Highest education level: None   Occupational History    None   Tobacco Use    Smoking status: Former     Types: Cigarettes     Quit date:      Years since quittin.8    Smokeless tobacco: Never   Vaping Use    Vaping Use: Never used   Substance and Sexual Activity    Alcohol use: Never    Drug use: No    Sexual activity: None   Other Topics Concern    None   Social History Narrative    None     Social Determinants of Health     Financial Resource Strain: Not on file   Food Insecurity: Not on file   Transportation Needs: Not on file   Physical Activity: Not on file   Stress: Not on file   Social Connections: Not on file   Intimate Partner Violence: Not on file   Housing Stability: Not on file     Past Medical History:   Diagnosis Date    GERD (gastroesophageal reflux disease)     Hypertension     Neuropathy      Past Surgical History:   Procedure Laterality Date    APPENDECTOMY      CHOLECYSTECTOMY      FOOT POSTERIOR RELEASE Left     around ankle    TUBAL LIGATION         Current Outpatient Medications:     amLODIPine (NORVASC) 5 mg tablet, Take 1 tablet (5 mg total) by mouth daily, Disp: 90 tablet, Rfl: 3    Ascorbic Acid (VITAMIN C) 1000 MG tablet, Take 1,000 mg by mouth daily, Disp: , Rfl:     b complex vitamins capsule, Take 1 capsule by mouth daily, Disp: , Rfl:     colestipol (COLESTID) 1 g tablet, , Disp: , Rfl:     ergocalciferol (ERGOCALCIFEROL) 1.25 MG (91782 UT) capsule, Take 1 capsule (50,000 Units total) by mouth once a week, Disp: 12 capsule, Rfl: 1    multivitamin (THERAGRAN) TABS, Take 1 tablet by mouth daily, Disp: , Rfl:     omeprazole (PriLOSEC) 20 mg delayed release capsule, Take 1 capsule (20 mg total) by mouth daily, Disp: 90 capsule, Rfl: 3    rosuvastatin (CRESTOR) 10 MG tablet, Take 1 tablet (10 mg total) by mouth daily, Disp: 90 tablet, Rfl: 3    Xeljanz 5 MG TABS, , Disp: , Rfl:     terazosin (HYTRIN) 1 mg capsule, Take 2 capsules (2 mg total) by mouth daily at bedtime, Disp: 180 capsule, Rfl: 0    Lab Results   Component Value Date    SODIUM 138 11/10/2023    K 4.2 11/10/2023     11/10/2023    CO2 18 (L) 11/10/2023    AGAP 14 11/10/2023    BUN 46 (H) 11/10/2023    CREATININE 3.26 (H) 11/10/2023    GLUC 124 07/01/2023    GLUF 90 11/10/2023    CALCIUM 9.1 11/10/2023    AST 17 11/10/2023    ALT 19 11/10/2023    ALKPHOS 58 11/10/2023    TP 6.9 11/10/2023    TBILI 0.35 11/10/2023    EGFR 15 11/10/2023     Lab Results   Component Value Date    WBC 8.65 11/10/2023    HGB 9.2 (L) 11/10/2023    HCT 26.7 (L) 11/10/2023    MCV 91 11/10/2023     11/10/2023     Lab Results   Component Value Date CHOLESTEROL 154 08/19/2023    CHOLESTEROL 214 (H) 06/23/2023    CHOLESTEROL 200 07/01/2022     Lab Results   Component Value Date    HDL 69 08/19/2023    HDL 59 06/23/2023    HDL 63 07/01/2022     Lab Results   Component Value Date    LDLCALC 54 08/19/2023    LDLCALC 110 (H) 06/23/2023    LDLCALC 81 07/01/2022     Lab Results   Component Value Date    TRIG 153 (H) 08/19/2023    TRIG 224 (H) 06/23/2023    TRIG 279 (H) 07/01/2022     No results found for: "CHOLHDL"  Lab Results   Component Value Date    POT7MXRPJATA 2.618 06/23/2023     Lab Results   Component Value Date    .2 (H) 07/01/2023    CALCIUM 9.1 11/10/2023    PHOS 3.4 07/01/2023     Lab Results   Component Value Date    SPEP See Comment 07/01/2023    UPEP See Comment 07/01/2023     No results found for: "Neptali Garces", "WILFREDO"        Objective:      /82 (BP Location: Left arm, Patient Position: Sitting, Cuff Size: Large)   Pulse 73   Ht 5' 6" (1.676 m)   Wt 105 kg (232 lb)   SpO2 98%   BMI 37.45 kg/m²          Physical Exam  Vitals reviewed. Constitutional:       General: She is not in acute distress. Appearance: She is well-developed. HENT:      Head: Normocephalic and atraumatic. Eyes:      Conjunctiva/sclera: Conjunctivae normal.   Cardiovascular:      Rate and Rhythm: Normal rate and regular rhythm. Pulmonary:      Effort: Pulmonary effort is normal.      Breath sounds: Normal breath sounds. Abdominal:      Palpations: Abdomen is soft. Musculoskeletal:      Cervical back: Neck supple. Skin:     General: Skin is warm. Findings: No rash. Neurological:      Mental Status: She is alert and oriented to person, place, and time. Cranial Nerves: No cranial nerve deficit.    Psychiatric:         Behavior: Behavior normal.

## 2023-11-14 NOTE — H&P (VIEW-ONLY)
[facility] of [address] [place] [address]    Name: Cristina Wells  YOB: 1970      Assessment/Plan:    Acute renal failure (ARF) [facility])  Unfortunately, the patient continues to decline from the kidney function standpoint. At this time it is unclear if the patient is currently in acute kidney injury or if this is simply a rapid progression of chronic kidney disease. After discussion, we have agreed to proceed with a kidney biopsy to further evaluate and help to delineate what may or may not be going on. I am afraid that the patient may simply be experiencing accelerated chronic kidney function loss, and positive medical intervention may not be possible. That being said, if kidney biopsy notes diagnosis which can be addressed and treated, will be very aggressive  in care and management. Hyperfiltration focal segmental glomerulosclerosis  This is a presumed diagnosis, as noted above, patient will proceed with a kidney biopsy. Stage 3b chronic kidney disease ([address])  Lab Results   Component Value Date    EGFR 15 11/10/2023    EGFR 16 09/16/2023    EGFR 17 08/19/2023    CREATININE 3.26 (H) 11/10/2023    CREATININE 3.06 (H) 09/16/2023    CREATININE 2.98 (H) 08/19/2023     As noted previously, patient may be a new baseline creatinine, will continue to aggressively care and manage for all medical conditions. Patient continues to require the use of omeprazole, that being said, urine eosinophils were performed previously which came back within normal limits. Proteinuria  We will perform a full serologic work-up in anticipation for kidney biopsy in the next week or two. We will look to find a definitive answer to etiology with kidney biopsy. Essential hypertension  Blood pressure slightly elevated at this time, home blood pressures have been better controlled.   Continue to monitor for now, consider increasing amlodipine or adding an additional agent depending on she progresses from a clinical standpoint. GERD without esophagitis  Continue with omeprazole         Problem List Items Addressed This Visit          Digestive    GERD without esophagitis (Chronic)     Continue with omeprazole            Cardiovascular and Mediastinum    Essential hypertension (Chronic)     Blood pressure slightly elevated at this time, home blood pressures have been better controlled. Continue to monitor for now, consider increasing amlodipine or adding an additional agent depending on she progresses from a clinical standpoint. Genitourinary    Acute renal failure (ARF) ([address]) - Primary     Unfortunately, the patient continues to decline from the kidney function standpoint. At this time it is unclear if the patient is currently in acute kidney injury or if this is simply a rapid progression of chronic kidney disease. After discussion, we have agreed to proceed with a kidney biopsy to further evaluate and help to delineate what may or may not be going on. I am afraid that the patient may simply be experiencing accelerated chronic kidney function loss, and positive medical intervention may not be possible. That being said, if kidney biopsy notes diagnosis which can be addressed and treated, will be very aggressive  in care and management.          Relevant Orders    NASIMA BY MULTIPLEX IMMUNOASSAY,REFLEX TO 5-BIOMARKER PROFILE    Anticardiolipin Ab, IgG/M, Qn    Anti-neutrophilic cytoplasmic antibody    ASO Screen w/ Reflex to Titer    Glomerular basement membrane antibodies    Cryoglobulin    Ambulatory referral to Interventional Radiology    CBC and differential    Stage 3b chronic kidney disease ([address])     Lab Results   Component Value Date    EGFR 15 11/10/2023    EGFR 16 09/16/2023    EGFR 17 08/19/2023    CREATININE 3.26 (H) 11/10/2023    CREATININE 3.06 (H) 09/16/2023    CREATININE 2.98 (H) 08/19/2023   As noted previously, patient may be a new baseline creatinine, will continue to aggressively care and manage for all medical conditions. Patient continues to require the use of omeprazole, that being said, urine eosinophils were performed previously which came back within normal limits. Hyperfiltration focal segmental glomerulosclerosis     This is a presumed diagnosis, as noted above, patient will proceed with a kidney biopsy. Other    Proteinuria     We will perform a full serologic work-up in anticipation for kidney biopsy in the next week or two. We will look to find a definitive answer to etiology with kidney biopsy. After considerable discussion, we have agreed to proceed with a kidney biopsy to further evaluate and determine etiology of continued kidney functional loss. Patient at high risk for progression to the point of necessitating extracorporeal renal replacement therapy given patient's current kidney function and rate of kidney function loss. Arrangements were made for ambulatory interventional radiology referral and kidney biopsy. Biopsy form be filled out with tissue being sent to [facility] for appropriate and medical examination. We will see the patient back for regular appointment in a few weeks to follow-up on the kidney biopsy. Additional testing as well as treatments may be indicated depending on those results as they come through. Subjective:      Patient ID: Nestor Sena is a 48 y.o. female. Patient presents for follow-up appointment. We reviewed the patient's labs in detail, patient continues to slowly lose kidney function with an estimated GFR now down to 15 mL/min. There are no significant electrolyte abnormalities noted. Patient's serum bicarbonate has also declined, now down to 18 mmol/L. She is taking all medications as prescribed with no specific side effects at this time. She denies use of nonsteroid anti-inflammatory medications.     Patient is consuming protein as directed by physician. She has been focusing on protein high in derivation from meat. Patient continues to do an excellent job in losing weight, she is now down an additional 8-9 pounds since her last visit. Hypertension  This is a chronic problem. The current episode started more than 1 year ago. The problem is unchanged. The problem is controlled. Pertinent negatives include no chest pain, orthopnea or peripheral edema. There are no associated agents to hypertension. Risk factors for coronary artery disease include obesity and post-menopausal state. Past treatments include lifestyle changes and calcium channel blockers. There are no compliance problems. Hypertensive end-organ damage includes kidney disease. Identifiable causes of hypertension include chronic renal disease. The following portions of the patient's history were reviewed and updated as appropriate: allergies, current medications, past family history, past medical history, past social history, past surgical history and problem list.    Review of Systems   Cardiovascular:  Negative for chest pain and orthopnea. All other systems reviewed and are negative.         Social History     Socioeconomic History    Marital status: /Civil Union     Spouse name: None    Number of children: None    Years of education: None    Highest education level: None   Occupational History    None   Tobacco Use    Smoking status: Former     Types: Cigarettes     Quit date:      Years since quittin.8    Smokeless tobacco: Never   Vaping Use    Vaping Use: Never used   Substance and Sexual Activity    Alcohol use: Never    Drug use: No    Sexual activity: None   Other Topics Concern    None   Social History Narrative    None     Social Determinants of Health     Financial Resource Strain: Not on file   Food Insecurity: Not on file   Transportation Needs: Not on file   Physical Activity: Not on file   Stress: Not on file   Social Connections: Not on file   Intimate Partner Violence: Not on file   Housing Stability: Not on file     Past Medical History:   Diagnosis Date    GERD (gastroesophageal reflux disease)     Hypertension     Neuropathy      Past Surgical History:   Procedure Laterality Date    APPENDECTOMY      CHOLECYSTECTOMY      FOOT POSTERIOR RELEASE Left     around ankle    TUBAL LIGATION         Current Outpatient Medications:     amLODIPine (NORVASC) 5 mg tablet, Take 1 tablet (5 mg total) by mouth daily, Disp: 90 tablet, Rfl: 3    Ascorbic Acid (VITAMIN C) 1000 MG tablet, Take 1,000 mg by mouth daily, Disp: , Rfl:     b complex vitamins capsule, Take 1 capsule by mouth daily, Disp: , Rfl:     colestipol (COLESTID) 1 g tablet, , Disp: , Rfl:     ergocalciferol (ERGOCALCIFEROL) 1.25 MG (98438 UT) capsule, Take 1 capsule (50,000 Units total) by mouth once a week, Disp: 12 capsule, Rfl: 1    multivitamin (THERAGRAN) TABS, Take 1 tablet by mouth daily, Disp: , Rfl:     omeprazole (PriLOSEC) 20 mg delayed release capsule, Take 1 capsule (20 mg total) by mouth daily, Disp: 90 capsule, Rfl: 3    rosuvastatin (CRESTOR) 10 MG tablet, Take 1 tablet (10 mg total) by mouth daily, Disp: 90 tablet, Rfl: 3    Xeljanz 5 MG TABS, , Disp: , Rfl:     terazosin (HYTRIN) 1 mg capsule, Take 2 capsules (2 mg total) by mouth daily at bedtime, Disp: 180 capsule, Rfl: 0    Lab Results   Component Value Date    SODIUM 138 11/10/2023    K 4.2 11/10/2023     11/10/2023    CO2 18 (L) 11/10/2023    AGAP 14 11/10/2023    BUN 46 (H) 11/10/2023    CREATININE 3.26 (H) 11/10/2023    GLUC 124 07/01/2023    GLUF 90 11/10/2023    CALCIUM 9.1 11/10/2023    AST 17 11/10/2023    ALT 19 11/10/2023    ALKPHOS 58 11/10/2023    TP 6.9 11/10/2023    TBILI 0.35 11/10/2023    EGFR 15 11/10/2023     Lab Results   Component Value Date    WBC 8.65 11/10/2023    HGB 9.2 (L) 11/10/2023    HCT 26.7 (L) 11/10/2023    MCV 91 11/10/2023     11/10/2023     Lab Results   Component Value Date CHOLESTEROL 154 08/19/2023    CHOLESTEROL 214 (H) 06/23/2023    CHOLESTEROL 200 07/01/2022     Lab Results   Component Value Date    HDL 69 08/19/2023    HDL 59 06/23/2023    HDL 63 07/01/2022     Lab Results   Component Value Date    LDLCALC 54 08/19/2023    LDLCALC 110 (H) 06/23/2023    LDLCALC 81 07/01/2022     Lab Results   Component Value Date    TRIG 153 (H) 08/19/2023    TRIG 224 (H) 06/23/2023    TRIG 279 (H) 07/01/2022     No results found for: "CHOLHDL"  Lab Results   Component Value Date    XDS4PWWGQJDL 2.618 06/23/2023     Lab Results   Component Value Date    .2 (H) 07/01/2023    CALCIUM 9.1 11/10/2023    PHOS 3.4 07/01/2023     Lab Results   Component Value Date    SPEP See Comment 07/01/2023    UPEP See Comment 07/01/2023     No results found for: "Lawerence Raveling", "[id number]"        Objective:      /82 (BP Location: Left arm, Patient Position: Sitting, Cuff Size: Large)   Pulse 73   Ht 5' 6" (1.676 m)   Wt 105 kg (232 lb)   SpO2 98%   BMI 37.45 kg/m²          Physical Exam  Vitals reviewed. Constitutional:       General: She is not in acute distress. Appearance: She is well-developed. HENT:      Head: Normocephalic and atraumatic. Eyes:      Conjunctiva/sclera: Conjunctivae normal.   Cardiovascular:      Rate and Rhythm: Normal rate and regular rhythm. Pulmonary:      Effort: Pulmonary effort is normal.      Breath sounds: Normal breath sounds. Abdominal:      Palpations: Abdomen is soft. Musculoskeletal:      Cervical back: Neck supple. Skin:     General: Skin is warm. Findings: No rash. Neurological:      Mental Status: She is alert and oriented to person, place, and time. Cranial Nerves: No cranial nerve deficit.    Psychiatric:         Behavior: Behavior normal.

## 2023-11-15 ENCOUNTER — TELEPHONE (OUTPATIENT)
Dept: PAIN MEDICINE | Facility: CLINIC | Age: 53
End: 2023-11-15

## 2023-11-15 ENCOUNTER — PREP FOR PROCEDURE (OUTPATIENT)
Dept: RADIOLOGY | Facility: HOSPITAL | Age: 53
End: 2023-11-15

## 2023-11-15 DIAGNOSIS — I10 ESSENTIAL HYPERTENSION: Chronic | ICD-10-CM

## 2023-11-15 DIAGNOSIS — N17.9 ACUTE RENAL FAILURE, UNSPECIFIED ACUTE RENAL FAILURE TYPE (HCC): Primary | ICD-10-CM

## 2023-11-15 RX ORDER — TERAZOSIN 1 MG/1
2 CAPSULE ORAL
Qty: 180 CAPSULE | Refills: 0 | Status: SHIPPED | OUTPATIENT
Start: 2023-11-15

## 2023-11-15 RX ORDER — SODIUM CHLORIDE 9 MG/ML
30 INJECTION, SOLUTION INTRAVENOUS CONTINUOUS
OUTPATIENT
Start: 2023-11-15

## 2023-11-15 NOTE — TELEPHONE ENCOUNTER
Dr. Jordan Edwards has changed office days from Wednesday to Thursday  [facility] to make patient aware to cb and reschedule 1/10/24 appointment

## 2023-11-16 NOTE — ASSESSMENT & PLAN NOTE
Blood pressure slightly elevated at this time, home blood pressures have been better controlled. Continue to monitor for now, consider increasing amlodipine or adding an additional agent depending on she progresses from a clinical standpoint.

## 2023-11-16 NOTE — ASSESSMENT & PLAN NOTE
We will perform a full serologic work-up in anticipation for kidney biopsy in the next week or two. We will look to find a definitive answer to etiology with kidney biopsy.

## 2023-11-16 NOTE — ASSESSMENT & PLAN NOTE
Lab Results   Component Value Date    EGFR 15 11/10/2023    EGFR 16 09/16/2023    EGFR 17 08/19/2023    CREATININE 3.26 (H) 11/10/2023    CREATININE 3.06 (H) 09/16/2023    CREATININE 2.98 (H) 08/19/2023     As noted previously, patient may be a new baseline creatinine, will continue to aggressively care and manage for all medical conditions. Patient continues to require the use of omeprazole, that being said, urine eosinophils were performed previously which came back within normal limits.

## 2023-11-16 NOTE — ASSESSMENT & PLAN NOTE
Unfortunately, the patient continues to decline from the kidney function standpoint. At this time it is unclear if the patient is currently in acute kidney injury or if this is simply a rapid progression of chronic kidney disease. After discussion, we have agreed to proceed with a kidney biopsy to further evaluate and help to delineate what may or may not be going on. I am afraid that the patient may simply be experiencing accelerated chronic kidney function loss, and positive medical intervention may not be possible. That being said, if kidney biopsy notes diagnosis which can be addressed and treated, will be very aggressive  in care and management.

## 2023-11-17 ENCOUNTER — APPOINTMENT (OUTPATIENT)
Dept: LAB | Facility: HOSPITAL | Age: 53
End: 2023-11-17
Payer: COMMERCIAL

## 2023-11-17 DIAGNOSIS — N18.4 ANEMIA DUE TO STAGE 4 CHRONIC KIDNEY DISEASE: ICD-10-CM

## 2023-11-17 DIAGNOSIS — N17.9 ACUTE RENAL FAILURE, UNSPECIFIED ACUTE RENAL FAILURE TYPE (HCC): ICD-10-CM

## 2023-11-17 DIAGNOSIS — D63.1 ANEMIA DUE TO STAGE 4 CHRONIC KIDNEY DISEASE: ICD-10-CM

## 2023-11-17 LAB
ALBUMIN SERPL BCP-MCNC: 4.7 G/DL (ref 3.5–5)
ALP SERPL-CCNC: 63 U/L (ref 34–104)
ALT SERPL W P-5'-P-CCNC: 20 U/L (ref 7–52)
ANA SER QL IA: NEGATIVE
ANION GAP SERPL CALCULATED.3IONS-SCNC: 11 MMOL/L
AST SERPL W P-5'-P-CCNC: 17 U/L (ref 13–39)
BASOPHILS # BLD AUTO: 0.03 THOUSANDS/ÂΜL (ref 0–0.1)
BASOPHILS NFR BLD AUTO: 0 % (ref 0–1)
BILIRUB SERPL-MCNC: 0.42 MG/DL (ref 0.2–1)
BUN SERPL-MCNC: 35 MG/DL (ref 5–25)
CALCIUM SERPL-MCNC: 9 MG/DL (ref 8.4–10.2)
CHLORIDE SERPL-SCNC: 104 MMOL/L (ref 96–108)
CO2 SERPL-SCNC: 21 MMOL/L (ref 21–32)
CREAT SERPL-MCNC: 3.18 MG/DL (ref 0.6–1.3)
EOSINOPHIL # BLD AUTO: 0.11 THOUSAND/ÂΜL (ref 0–0.61)
EOSINOPHIL NFR BLD AUTO: 1 % (ref 0–6)
ERYTHROCYTE [DISTWIDTH] IN BLOOD BY AUTOMATED COUNT: 13.2 % (ref 11.6–15.1)
GFR SERPL CREATININE-BSD FRML MDRD: 15 ML/MIN/1.73SQ M
GLUCOSE P FAST SERPL-MCNC: 88 MG/DL (ref 65–99)
HCT VFR BLD AUTO: 28.5 % (ref 34.8–46.1)
HGB BLD-MCNC: 9.3 G/DL (ref 11.5–15.4)
IMM GRANULOCYTES # BLD AUTO: 0.03 THOUSAND/UL (ref 0–0.2)
IMM GRANULOCYTES NFR BLD AUTO: 0 % (ref 0–2)
LYMPHOCYTES # BLD AUTO: 0.98 THOUSANDS/ÂΜL (ref 0.6–4.47)
LYMPHOCYTES NFR BLD AUTO: 11 % (ref 14–44)
MCH RBC QN AUTO: 29.5 PG (ref 26.8–34.3)
MCHC RBC AUTO-ENTMCNC: 32.6 G/DL (ref 31.4–37.4)
MCV RBC AUTO: 91 FL (ref 82–98)
MONOCYTES # BLD AUTO: 0.48 THOUSAND/ÂΜL (ref 0.17–1.22)
MONOCYTES NFR BLD AUTO: 6 % (ref 4–12)
NEUTROPHILS # BLD AUTO: 7.14 THOUSANDS/ÂΜL (ref 1.85–7.62)
NEUTS SEG NFR BLD AUTO: 82 % (ref 43–75)
NRBC BLD AUTO-RTO: 0 /100 WBCS
PLATELET # BLD AUTO: 262 THOUSANDS/UL (ref 149–390)
PMV BLD AUTO: 10.7 FL (ref 8.9–12.7)
POTASSIUM SERPL-SCNC: 3.8 MMOL/L (ref 3.5–5.3)
PROT SERPL-MCNC: 7.6 G/DL (ref 6.4–8.4)
RBC # BLD AUTO: 3.15 MILLION/UL (ref 3.81–5.12)
SODIUM SERPL-SCNC: 136 MMOL/L (ref 135–147)
WBC # BLD AUTO: 8.77 THOUSAND/UL (ref 4.31–10.16)

## 2023-11-17 PROCEDURE — 36415 COLL VENOUS BLD VENIPUNCTURE: CPT

## 2023-11-17 PROCEDURE — 80053 COMPREHEN METABOLIC PANEL: CPT

## 2023-11-17 PROCEDURE — 86147 CARDIOLIPIN ANTIBODY EA IG: CPT

## 2023-11-17 PROCEDURE — 86063 ANTISTREPTOLYSIN O SCREEN: CPT

## 2023-11-17 PROCEDURE — 83520 IMMUNOASSAY QUANT NOS NONAB: CPT

## 2023-11-17 PROCEDURE — 82595 ASSAY OF CRYOGLOBULIN: CPT

## 2023-11-17 PROCEDURE — 86037 ANCA TITER EACH ANTIBODY: CPT

## 2023-11-17 PROCEDURE — 86038 ANTINUCLEAR ANTIBODIES: CPT

## 2023-11-17 PROCEDURE — 85025 COMPLETE CBC W/AUTO DIFF WBC: CPT

## 2023-11-18 LAB — ASO AB TITR SER LA: NORMAL {TITER}

## 2023-11-20 LAB
C-ANCA TITR SER IF: NORMAL TITER
GBM AB SER IA-ACNC: <0.2 UNITS (ref 0–0.9)
MYELOPEROXIDASE AB SER IA-ACNC: <0.2 UNITS (ref 0–0.9)
P-ANCA ATYPICAL TITR SER IF: NORMAL TITER
P-ANCA TITR SER IF: NORMAL TITER
PROTEINASE3 AB SER IA-ACNC: <0.2 UNITS (ref 0–0.9)

## 2023-11-21 LAB
CARDIOLIPIN IGA SER IA-ACNC: 1.5
CARDIOLIPIN IGG SER IA-ACNC: 1.4
CARDIOLIPIN IGM SER IA-ACNC: 0.9

## 2023-11-24 LAB — CRYOGLOB SER QL 1D COLD INC: NORMAL

## 2023-11-29 ENCOUNTER — TELEPHONE (OUTPATIENT)
Dept: NEPHROLOGY | Facility: CLINIC | Age: 53
End: 2023-11-29

## 2023-12-04 NOTE — PROGRESS NOTES
Spoke with patients  to go over the pre-procedure instructions for her kidney biopsy on 12/12/23 at the [facility]. Plan to arrive for 0930, have a ride to and from, and NPO after midnight the night prior. Also made him aware of the 4 hour post observation period. Answered all questions, consult complete.

## 2023-12-12 ENCOUNTER — HOSPITAL ENCOUNTER (OUTPATIENT)
Dept: CT IMAGING | Facility: HOSPITAL | Age: 53
Discharge: HOME | End: 2023-12-12
Attending: RADIOLOGY
Payer: COMMERCIAL

## 2023-12-12 VITALS
DIASTOLIC BLOOD PRESSURE: 70 MMHG | BODY MASS INDEX: 37.28 KG/M2 | HEART RATE: 86 BPM | WEIGHT: 232 LBS | RESPIRATION RATE: 18 BRPM | TEMPERATURE: 97.1 F | HEIGHT: 66 IN | SYSTOLIC BLOOD PRESSURE: 135 MMHG | OXYGEN SATURATION: 99 %

## 2023-12-12 DIAGNOSIS — N17.9 ACUTE RENAL FAILURE, UNSPECIFIED ACUTE RENAL FAILURE TYPE (HCC): ICD-10-CM

## 2023-12-12 LAB
BASOPHILS # BLD AUTO: 0.02 THOUSANDS/ÂΜL (ref 0–0.1)
BASOPHILS NFR BLD AUTO: 0 % (ref 0–1)
EOSINOPHIL # BLD AUTO: 0.1 THOUSAND/ÂΜL (ref 0–0.61)
EOSINOPHIL NFR BLD AUTO: 1 % (ref 0–6)
ERYTHROCYTE [DISTWIDTH] IN BLOOD BY AUTOMATED COUNT: 13.2 % (ref 11.6–15.1)
HCT VFR BLD AUTO: 26.9 % (ref 34.8–46.1)
HGB BLD-MCNC: 9.2 G/DL (ref 11.5–15.4)
IMM GRANULOCYTES # BLD AUTO: 0.03 THOUSAND/UL (ref 0–0.2)
IMM GRANULOCYTES NFR BLD AUTO: 0 % (ref 0–2)
INR PPP: 1.02 (ref 0.84–1.19)
LYMPHOCYTES # BLD AUTO: 1.3 THOUSANDS/ÂΜL (ref 0.6–4.47)
LYMPHOCYTES NFR BLD AUTO: 16 % (ref 14–44)
MCH RBC QN AUTO: 30.6 PG (ref 26.8–34.3)
MCHC RBC AUTO-ENTMCNC: 34.2 G/DL (ref 31.4–37.4)
MCV RBC AUTO: 89 FL (ref 82–98)
MONOCYTES # BLD AUTO: 0.41 THOUSAND/ÂΜL (ref 0.17–1.22)
MONOCYTES NFR BLD AUTO: 5 % (ref 4–12)
NEUTROPHILS # BLD AUTO: 6.24 THOUSANDS/ÂΜL (ref 1.85–7.62)
NEUTS SEG NFR BLD AUTO: 78 % (ref 43–75)
NRBC BLD AUTO-RTO: 0 /100 WBCS
PLATELET # BLD AUTO: 213 THOUSANDS/UL (ref 149–390)
PMV BLD AUTO: 11 FL (ref 8.9–12.7)
PROTHROMBIN TIME: 13.5 SECONDS (ref 11.6–14.5)
RBC # BLD AUTO: 3.01 MILLION/UL (ref 3.81–5.12)
WBC # BLD AUTO: 8.1 THOUSAND/UL (ref 4.31–10.16)

## 2023-12-12 PROCEDURE — 77012 CT SCAN FOR NEEDLE BIOPSY: CPT

## 2023-12-12 PROCEDURE — 76942 ECHO GUIDE FOR BIOPSY: CPT

## 2023-12-12 PROCEDURE — 99152 MOD SED SAME PHYS/QHP 5/>YRS: CPT

## 2023-12-12 PROCEDURE — 88348 ELECTRON MICROSCOPY DX: CPT | Performed by: INTERNAL MEDICINE

## 2023-12-12 PROCEDURE — 50200 RENAL BIOPSY PERQ: CPT | Performed by: RADIOLOGY

## 2023-12-12 PROCEDURE — 85025 COMPLETE CBC W/AUTO DIFF WBC: CPT | Performed by: RADIOLOGY

## 2023-12-12 PROCEDURE — 88305 TISSUE EXAM BY PATHOLOGIST: CPT | Performed by: INTERNAL MEDICINE

## 2023-12-12 PROCEDURE — 88346 IMFLUOR 1ST 1ANTB STAIN PX: CPT | Performed by: INTERNAL MEDICINE

## 2023-12-12 PROCEDURE — 77012 CT SCAN FOR NEEDLE BIOPSY: CPT | Performed by: RADIOLOGY

## 2023-12-12 PROCEDURE — 88313 SPECIAL STAINS GROUP 2: CPT | Performed by: INTERNAL MEDICINE

## 2023-12-12 PROCEDURE — 99152 MOD SED SAME PHYS/QHP 5/>YRS: CPT | Performed by: RADIOLOGY

## 2023-12-12 PROCEDURE — 99153 MOD SED SAME PHYS/QHP EA: CPT

## 2023-12-12 PROCEDURE — 50200 RENAL BIOPSY PERQ: CPT

## 2023-12-12 PROCEDURE — 88350 IMFLUOR EA ADDL 1ANTB STN PX: CPT | Performed by: INTERNAL MEDICINE

## 2023-12-12 PROCEDURE — 85610 PROTHROMBIN TIME: CPT | Performed by: RADIOLOGY

## 2023-12-12 RX ORDER — FENTANYL CITRATE 50 UG/ML
INJECTION, SOLUTION INTRAMUSCULAR; INTRAVENOUS AS NEEDED
Status: COMPLETED | OUTPATIENT
Start: 2023-12-12 | End: 2023-12-12

## 2023-12-12 RX ORDER — LIDOCAINE HYDROCHLORIDE 10 MG/ML
INJECTION, SOLUTION EPIDURAL; INFILTRATION; INTRACAUDAL; PERINEURAL AS NEEDED
Status: COMPLETED | OUTPATIENT
Start: 2023-12-12 | End: 2023-12-12

## 2023-12-12 RX ORDER — SODIUM CHLORIDE 9 MG/ML
30 INJECTION, SOLUTION INTRAVENOUS CONTINUOUS
Status: DISCONTINUED | OUTPATIENT
Start: 2023-12-12 | End: 2023-12-16 | Stop reason: HOSPADM

## 2023-12-12 RX ORDER — HYDRALAZINE HYDROCHLORIDE 20 MG/ML
INJECTION INTRAMUSCULAR; INTRAVENOUS AS NEEDED
Status: COMPLETED | OUTPATIENT
Start: 2023-12-12 | End: 2023-12-12

## 2023-12-12 RX ORDER — MIDAZOLAM HYDROCHLORIDE 2 MG/2ML
INJECTION, SOLUTION INTRAMUSCULAR; INTRAVENOUS AS NEEDED
Status: COMPLETED | OUTPATIENT
Start: 2023-12-12 | End: 2023-12-12

## 2023-12-12 RX ADMIN — MIDAZOLAM 1 MG: 1 INJECTION INTRAMUSCULAR; INTRAVENOUS at 11:11

## 2023-12-12 RX ADMIN — MIDAZOLAM 1 MG: 1 INJECTION INTRAMUSCULAR; INTRAVENOUS at 11:03

## 2023-12-12 RX ADMIN — SODIUM CHLORIDE 30 ML/HR: 0.9 INJECTION, SOLUTION INTRAVENOUS at 10:03

## 2023-12-12 RX ADMIN — FENTANYL CITRATE 50 MCG: 50 INJECTION INTRAMUSCULAR; INTRAVENOUS at 10:59

## 2023-12-12 RX ADMIN — LIDOCAINE HYDROCHLORIDE 10 ML: 10 INJECTION, SOLUTION EPIDURAL; INFILTRATION; INTRACAUDAL; PERINEURAL at 11:12

## 2023-12-12 RX ADMIN — MIDAZOLAM 1 MG: 1 INJECTION INTRAMUSCULAR; INTRAVENOUS at 10:59

## 2023-12-12 RX ADMIN — FENTANYL CITRATE 50 MCG: 50 INJECTION INTRAMUSCULAR; INTRAVENOUS at 11:11

## 2023-12-12 RX ADMIN — FENTANYL CITRATE 50 MCG: 50 INJECTION INTRAMUSCULAR; INTRAVENOUS at 11:03

## 2023-12-12 RX ADMIN — HYDRALAZINE HYDROCHLORIDE 10 MG: 20 INJECTION INTRAMUSCULAR; INTRAVENOUS at 11:08

## 2023-12-12 NOTE — DISCHARGE INSTRUCTIONS
[address] [facility]  Interventional Radiology  [phone]          Percutaneous Kidney Biopsy   WHAT YOU NEED TO KNOW:   A percutaneous kidney biopsy is a procedure to remove a small sample of kidney tissue. It may also be done to check for kidney disease or cancer. DISCHARGE INSTRUCTIONS:   Follow up with your healthcare provider as directed:  Write down your questions so you remember to ask them during your visits. Wound care: The Band-Aid may be removed in 24 hours. For more information:   National Kidney and Urologic Diseases Information Clearinghouse  [address] [place] [id number]  Phone: [phone]  Web Address: http://kidney. niddk.nih.gov/   Care after your procedure:    1. Limit your activities for 36 hours after your biopsy. 2. No driving day of biopsy. 3. Return to your normal diet. Flat sips of flat soda helps with mild nausea. 4. Remove band-aid or dressing 24 hours after procedure. Contact Interventional Radiology at [phone]    Sarah PATIENTS: Contact Interventional Radiology at [phone]) Francia Garcia PATIENTS: Contact Interventional Radiology at [phone]) if:    1. Difficulty breathing, nausea or vomiting. 2  You feel weak or dizzy. 3. Chills or fever above 101 degrees F. You have persistent nausea or vomiting    4. You have severe pain in your abdomen or where your procedure was done. You feel weak or dizzy. 5  You have blood in your urine. You urinate small amounts or not at all. 4. Develop any redness, swelling, heat, unusual drainage, heavy bruising or bleeding from biopsy site.

## 2023-12-12 NOTE — NURSING NOTE
Ambulated to bathroom. Voided clear freddy urine. Tolerated well. Slight discomfort at biopsy site. No medication needed.

## 2023-12-12 NOTE — BRIEF OP NOTE (RAD/CATH)
INTERVENTIONAL RADIOLOGY PROCEDURE NOTE    Date: 12/12/2023    Procedure:   Procedure Summary       Date: 12/12/23 Room / Location: [address] [facility] CAT Scan    Anesthesia Start:  Anesthesia Stop:     Procedure: IR BIOPSY KIDNEY RANDOM Diagnosis:       Acute renal failure, unspecified acute renal failure type ([address])      (LEYDI/proteinuria)    Scheduled Providers:  Responsible Provider:     Anesthesia Type: Not recorded ASA Status: Not recorded            Preoperative diagnosis:   1. Acute renal failure, unspecified acute renal failure type ([address])         Postoperative diagnosis: Same. Surgeon: Geraldo Minor MD     Assistant: None. No qualified resident was available. Blood loss: 0    Specimens: 18g core x3     Findings: right renal biopsy    Gelfoam tratc hemostasis    Hydralazine given    Complications: None immediate.     Anesthesia: conscious sedation

## 2023-12-12 NOTE — INTERVAL H&P NOTE
Update: (This section must be completed if the H&P was completed greater than 24 hrs to procedure or admission)    H&P reviewed. After examining the patient, I find no changed to the H&P since it had been written. Renal biopsy for diagnosis    Risks benefits alternatives reviewed    Informed consent obtained    Prior autoimmune disease    Mp2 asa3    Patient re-evaluated.  Accept as history and physical.    Umberto Sullivan MD/December 12, 2023/10:52 AM

## 2023-12-14 ENCOUNTER — TELEPHONE (OUTPATIENT)
Dept: NEPHROLOGY | Facility: CLINIC | Age: 53
End: 2023-12-14

## 2023-12-14 ENCOUNTER — VBI (OUTPATIENT)
Dept: ADMINISTRATIVE | Facility: OTHER | Age: 53
End: 2023-12-14

## 2023-12-14 NOTE — TELEPHONE ENCOUNTER
Patient asking if she should reschedule her appointment for 12/18/23. She said she had her biopsy done on 12/12/23 and they told her it wouldn't be back for 8 days.   Please advise

## 2023-12-15 DIAGNOSIS — E55.9 VITAMIN D DEFICIENCY: ICD-10-CM

## 2023-12-15 RX ORDER — ERGOCALCIFEROL 1.25 MG/1
50000 CAPSULE ORAL WEEKLY
Qty: 12 CAPSULE | Refills: 1 | Status: SHIPPED | OUTPATIENT
Start: 2023-12-15 | End: 2023-12-18 | Stop reason: SDUPTHER

## 2023-12-18 ENCOUNTER — TELEPHONE (OUTPATIENT)
Dept: NEPHROLOGY | Facility: CLINIC | Age: 53
End: 2023-12-18

## 2023-12-18 ENCOUNTER — OFFICE VISIT (OUTPATIENT)
Dept: NEPHROLOGY | Facility: CLINIC | Age: 53
End: 2023-12-18
Payer: COMMERCIAL

## 2023-12-18 VITALS
BODY MASS INDEX: 36.48 KG/M2 | WEIGHT: 227 LBS | DIASTOLIC BLOOD PRESSURE: 78 MMHG | OXYGEN SATURATION: 99 % | SYSTOLIC BLOOD PRESSURE: 130 MMHG | HEIGHT: 66 IN | HEART RATE: 64 BPM

## 2023-12-18 DIAGNOSIS — E55.9 VITAMIN D DEFICIENCY: ICD-10-CM

## 2023-12-18 DIAGNOSIS — N18.5 CKD (CHRONIC KIDNEY DISEASE) STAGE 5, GFR LESS THAN 15 ML/MIN (HCC): Primary | ICD-10-CM

## 2023-12-18 DIAGNOSIS — R80.9 PROTEINURIA, UNSPECIFIED TYPE: ICD-10-CM

## 2023-12-18 PROBLEM — N17.9 ACUTE RENAL FAILURE (ARF) (HCC): Status: RESOLVED | Noted: 2023-06-29 | Resolved: 2023-12-18

## 2023-12-18 PROBLEM — N18.32 STAGE 3B CHRONIC KIDNEY DISEASE (HCC): Status: RESOLVED | Noted: 2023-06-30 | Resolved: 2023-12-18

## 2023-12-18 PROCEDURE — 99214 OFFICE O/P EST MOD 30 MIN: CPT | Performed by: INTERNAL MEDICINE

## 2023-12-18 RX ORDER — ERGOCALCIFEROL 1.25 MG/1
50000 CAPSULE ORAL WEEKLY
Qty: 12 CAPSULE | Refills: 1 | Status: SHIPPED | OUTPATIENT
Start: 2023-12-18

## 2023-12-18 NOTE — ASSESSMENT & PLAN NOTE
Patient not a candidate for RAAS inhibition nor SGLT-2 treatment due to advanced chronic kidney disease.  Will monitor urine studies from time to time, no further workup at this time.

## 2023-12-18 NOTE — PROGRESS NOTES
[facility]  Yuri Hennessy DO    Name: Saige Haji  YOB: 1970      Assessment/Plan:    CKD (chronic kidney disease) stage 5, GFR less than 15 ml/min ([facility])  Lab Results   Component Value Date    EGFR 15 11/17/2023    EGFR 15 11/10/2023    EGFR 16 09/16/2023    CREATININE 3.18 (H) 11/17/2023    CREATININE 3.26 (H) 11/10/2023    CREATININE 3.06 (H) 09/16/2023     Patient most likely not in acute kidney injury but rather has progressive loss of kidney function at an accelerated rate.  She certainly falls within the clinical definition of a rapidly progressive loss of kidney function, however, no overt signs of glomerulonephritis, and unfortunately the patient's kidney biopsy only had 4 glomeruli which were globally sclerosed.  At this point, given kidney function and other clinical options, I believe the best way forward is to recommend for the patient to have a kidney transplant evaluation and then proceed as soon as possible with a living donor.  She appears to be losing about 3 mL/minute of estimated GFR every 3 months or so at this time.    She remains at high risk for progression to end-stage renal disease in the next 12-16 months.    Proteinuria  Patient not a candidate for RAAS inhibition nor SGLT-2 treatment due to advanced chronic kidney disease.  Will monitor urine studies from time to time, no further workup at this time.    BMI 36.0-36.9,adult  Patient continues to lose weight, she is doing a great job with focusing on caloric restriction and increasing physical activity.         Problem List Items Addressed This Visit          Genitourinary    CKD (chronic kidney disease) stage 5, GFR less than 15 ml/min ([facility]) - Primary     Lab Results   Component Value Date    EGFR 15 11/17/2023    EGFR 15 11/10/2023    EGFR 16 09/16/2023    CREATININE 3.18 (H) 11/17/2023    CREATININE 3.26 (H) 11/10/2023    CREATININE 3.06 (H) 09/16/2023     Patient most likely  not in acute kidney injury but rather has progressive loss of kidney function at an accelerated rate.  She certainly falls within the clinical definition of a rapidly progressive loss of kidney function, however, no overt signs of glomerulonephritis, and unfortunately the patient's kidney biopsy only had 4 glomeruli which were globally sclerosed.  At this point, given kidney function and other clinical options, I believe the best way forward is to recommend for the patient to have a kidney transplant evaluation and then proceed as soon as possible with a living donor.  She appears to be losing about 3 mL/minute of estimated GFR every 3 months or so at this time.    She remains at high risk for progression to end-stage renal disease in the next 12-16 months.         Relevant Orders    Ambulatory referral to renal transplant evaluation    Magnesium    Phosphorus    Comprehensive metabolic panel    CBC and differential    PTH, intact       Other    Proteinuria     Patient not a candidate for RAAS inhibition nor SGLT-2 treatment due to advanced chronic kidney disease.  Will monitor urine studies from time to time, no further workup at this time.         Vitamin D deficiency    Relevant Medications    ergocalciferol (VITAMIN D2) 50,000 units    BMI 36.0-36.9,adult     Patient continues to lose weight, she is doing a great job with focusing on caloric restriction and increasing physical activity.            Unfortunately, the patient continues to slowly progress from the kidney dysfunction standpoint.  Kidney biopsy was unable to identify etiology for current kidney process.  Please refer above for details.  Will proceed with planning for the patient care as kidney function continues to decline.  Transplant referral made per patient preference which was with [facility] kidney transplant program.  My hope is that they can expedite workup and the patient will try to locate a living donor who could be available as soon as  is needed for the patient to proceed with her procedure in order to avoid starting dialysis.    We will check labs every month for now, patient to continue to work towards losing weight, and then we will see her back toward the end of March 2023 for next regular appointment.    Subjective:      Patient ID: Saige Haji is a 53 y.o. female.    Patient presents for follow-up appointment.    Labs were reviewed in detail including kidney pathology status post biopsy.  Unfortunately patient only had 4 glomeruli on a biopsy all of which were sclerosed with the rest of the biopsy mostly including medulla.  Most recent creatinine is 3.2 mg/dL, GFR is 15 mL/min.    Hypertension  This is a chronic problem. The current episode started more than 1 year ago. The problem is unchanged. The problem is controlled. Pertinent negatives include no chest pain, orthopnea or peripheral edema. There are no associated agents to hypertension. Risk factors for coronary artery disease include post-menopausal state and obesity. Past treatments include lifestyle changes and calcium channel blockers. There are no compliance problems.  Hypertensive end-organ damage includes kidney disease. Identifiable causes of hypertension include chronic renal disease.       The following portions of the patient's history were reviewed and updated as appropriate: allergies, current medications, past family history, past medical history, past social history, past surgical history and problem list.    Review of Systems   Cardiovascular:  Negative for chest pain and orthopnea.   All other systems reviewed and are negative.        Social History     Socioeconomic History    Marital status: /Civil Union     Spouse name: None    Number of children: None    Years of education: None    Highest education level: None   Occupational History    None   Tobacco Use    Smoking status: Former     Current packs/day: 0.00     Types: Cigarettes     Quit date: 1992      Years since quittin.9    Smokeless tobacco: Never   Vaping Use    Vaping status: Never Used   Substance and Sexual Activity    Alcohol use: Never    Drug use: No    Sexual activity: None   Other Topics Concern    None   Social History Narrative    None     Social Determinants of Health     Financial Resource Strain: Not on file   Food Insecurity: Not on file   Transportation Needs: Not on file   Physical Activity: Not on file   Stress: Not on file   Social Connections: Not on file   Intimate Partner Violence: Not on file   Housing Stability: Not on file     Past Medical History:   Diagnosis Date    Acute renal failure (ARF) (HCC)     GERD (gastroesophageal reflux disease)     Hypertension     Neuropathy     Stage 3b chronic kidney disease (HCC)      Past Surgical History:   Procedure Laterality Date    APPENDECTOMY      CHOLECYSTECTOMY      FOOT POSTERIOR RELEASE Left     around ankle    IR BIOPSY KIDNEY RANDOM  2023    TUBAL LIGATION         Current Outpatient Medications:     amLODIPine (NORVASC) 5 mg tablet, Take 1 tablet (5 mg total) by mouth daily, Disp: 90 tablet, Rfl: 3    Ascorbic Acid (VITAMIN C) 1000 MG tablet, Take 1,000 mg by mouth daily, Disp: , Rfl:     b complex vitamins capsule, Take 1 capsule by mouth daily, Disp: , Rfl:     ergocalciferol (VITAMIN D2) 50,000 units, Take 1 capsule (50,000 Units total) by mouth once a week On the same day each week, Disp: 12 capsule, Rfl: 1    rosuvastatin (CRESTOR) 10 MG tablet, Take 1 tablet (10 mg total) by mouth daily, Disp: 90 tablet, Rfl: 3    terazosin (HYTRIN) 1 mg capsule, Take 2 capsules (2 mg total) by mouth daily at bedtime, Disp: 180 capsule, Rfl: 0    Xeljanz 5 MG TABS, , Disp: , Rfl:     multivitamin (THERAGRAN) TABS, Take 1 tablet by mouth daily, Disp: , Rfl:     Lab Results   Component Value Date    SODIUM 136 2023    K 3.8 2023     2023    CO2 21 2023    AGAP 11 2023    BUN 35 (H) 2023     "CREATININE 3.18 (H) 11/17/2023    GLUC 124 07/01/2023    GLUF 88 11/17/2023    CALCIUM 9.0 11/17/2023    AST 17 11/17/2023    ALT 20 11/17/2023    ALKPHOS 63 11/17/2023    TP 7.6 11/17/2023    TBILI 0.42 11/17/2023    EGFR 15 11/17/2023     Lab Results   Component Value Date    WBC 8.10 12/12/2023    HGB 9.2 (L) 12/12/2023    HCT 26.9 (L) 12/12/2023    MCV 89 12/12/2023     12/12/2023     Lab Results   Component Value Date    CHOLESTEROL 154 08/19/2023    CHOLESTEROL 214 (H) 06/23/2023    CHOLESTEROL 200 07/01/2022     Lab Results   Component Value Date    HDL 69 08/19/2023    HDL 59 06/23/2023    HDL 63 07/01/2022     Lab Results   Component Value Date    LDLCALC 54 08/19/2023    LDLCALC 110 (H) 06/23/2023    LDLCALC 81 07/01/2022     Lab Results   Component Value Date    TRIG 153 (H) 08/19/2023    TRIG 224 (H) 06/23/2023    TRIG 279 (H) 07/01/2022     No results found for: \"CHOLHDL\"  Lab Results   Component Value Date    BWW5ZUVQGRVT 2.618 06/23/2023     Lab Results   Component Value Date    .2 (H) 07/01/2023    CALCIUM 9.0 11/17/2023    PHOS 3.4 07/01/2023     Lab Results   Component Value Date    SPEP See Comment 07/01/2023    UPEP See Comment 07/01/2023     No results found for: \"MICROALBUR\", \"[id number]\"        Objective:      /78 (BP Location: Left arm, Patient Position: Sitting, Cuff Size: Standard)   Pulse 64   Ht 5' 6\" (1.676 m)   Wt 103 kg (227 lb)   LMP  (LMP Unknown) Comment: menopause - one year ago  SpO2 99%   BMI 36.64 kg/m²          Physical Exam  Vitals reviewed.   Constitutional:       General: She is not in acute distress.     Appearance: Normal appearance.   HENT:      Head: Normocephalic and atraumatic.      Right Ear: External ear normal.      Left Ear: External ear normal.   Eyes:      Conjunctiva/sclera: Conjunctivae normal.   Cardiovascular:      Rate and Rhythm: Normal rate and regular rhythm.      Heart sounds: Normal heart sounds.   Pulmonary:      Effort: No " respiratory distress.      Breath sounds: No wheezing.   Abdominal:      Palpations: Abdomen is soft.   Skin:     General: Skin is warm and dry.   Neurological:      General: No focal deficit present.      Mental Status: She is alert and oriented to person, place, and time.   Psychiatric:         Mood and Affect: Mood normal.         Behavior: Behavior normal.

## 2023-12-18 NOTE — ASSESSMENT & PLAN NOTE
Patient continues to lose weight, she is doing a great job with focusing on caloric restriction and increasing physical activity.

## 2023-12-18 NOTE — ASSESSMENT & PLAN NOTE
Lab Results   Component Value Date    EGFR 15 11/17/2023    EGFR 15 11/10/2023    EGFR 16 09/16/2023    CREATININE 3.18 (H) 11/17/2023    CREATININE 3.26 (H) 11/10/2023    CREATININE 3.06 (H) 09/16/2023     Patient most likely not in acute kidney injury but rather has progressive loss of kidney function at an accelerated rate.  She certainly falls within the clinical definition of a rapidly progressive loss of kidney function, however, no overt signs of glomerulonephritis, and unfortunately the patient's kidney biopsy only had 4 glomeruli which were globally sclerosed.  At this point, given kidney function and other clinical options, I believe the best way forward is to recommend for the patient to have a kidney transplant evaluation and then proceed as soon as possible with a living donor.  She appears to be losing about 3 mL/minute of estimated GFR every 3 months or so at this time.    She remains at high risk for progression to end-stage renal disease in the next 12-16 months.

## 2023-12-26 ENCOUNTER — TELEPHONE (OUTPATIENT)
Dept: NEPHROLOGY | Facility: CLINIC | Age: 53
End: 2023-12-26

## 2023-12-26 NOTE — TELEPHONE ENCOUNTER
Attempted to reach pt to see if interested in scheduling transplant eval with Dr. Patricio. Unable to leave message. Patient has started process with [facility]

## 2023-12-29 NOTE — TELEPHONE ENCOUNTER
Pt called back and wants to see if [facility] will call her back for her transplant appointment and will call us back if needed/figure out how she can get to [facility] if needed.

## 2023-12-30 ENCOUNTER — APPOINTMENT (OUTPATIENT)
Dept: LAB | Facility: CLINIC | Age: 53
End: 2023-12-30
Payer: COMMERCIAL

## 2023-12-30 DIAGNOSIS — N17.9 ACUTE RENAL FAILURE, UNSPECIFIED ACUTE RENAL FAILURE TYPE (HCC): Primary | ICD-10-CM

## 2023-12-30 LAB
ALBUMIN SERPL BCP-MCNC: 4.1 G/DL (ref 3.5–5)
ALP SERPL-CCNC: 50 U/L (ref 34–104)
ALT SERPL W P-5'-P-CCNC: 18 U/L (ref 7–52)
ANION GAP SERPL CALCULATED.3IONS-SCNC: 12 MMOL/L
AST SERPL W P-5'-P-CCNC: 17 U/L (ref 13–39)
BASOPHILS # BLD AUTO: 0.02 THOUSANDS/ÂΜL (ref 0–0.1)
BASOPHILS NFR BLD AUTO: 0 % (ref 0–1)
BILIRUB SERPL-MCNC: 0.38 MG/DL (ref 0.2–1)
BUN SERPL-MCNC: 45 MG/DL (ref 5–25)
CALCIUM SERPL-MCNC: 8.8 MG/DL (ref 8.4–10.2)
CHLORIDE SERPL-SCNC: 105 MMOL/L (ref 96–108)
CO2 SERPL-SCNC: 21 MMOL/L (ref 21–32)
CREAT SERPL-MCNC: 3.33 MG/DL (ref 0.6–1.3)
EOSINOPHIL # BLD AUTO: 0.11 THOUSAND/ÂΜL (ref 0–0.61)
EOSINOPHIL NFR BLD AUTO: 1 % (ref 0–6)
ERYTHROCYTE [DISTWIDTH] IN BLOOD BY AUTOMATED COUNT: 13.3 % (ref 11.6–15.1)
GFR SERPL CREATININE-BSD FRML MDRD: 15 ML/MIN/1.73SQ M
GLUCOSE P FAST SERPL-MCNC: 90 MG/DL (ref 65–99)
HCT VFR BLD AUTO: 27.8 % (ref 34.8–46.1)
HGB BLD-MCNC: 9.1 G/DL (ref 11.5–15.4)
IMM GRANULOCYTES # BLD AUTO: 0.04 THOUSAND/UL (ref 0–0.2)
IMM GRANULOCYTES NFR BLD AUTO: 1 % (ref 0–2)
LYMPHOCYTES # BLD AUTO: 0.86 THOUSANDS/ÂΜL (ref 0.6–4.47)
LYMPHOCYTES NFR BLD AUTO: 10 % (ref 14–44)
MAGNESIUM SERPL-MCNC: 2 MG/DL (ref 1.9–2.7)
MCH RBC QN AUTO: 29.9 PG (ref 26.8–34.3)
MCHC RBC AUTO-ENTMCNC: 32.7 G/DL (ref 31.4–37.4)
MCV RBC AUTO: 91 FL (ref 82–98)
MONOCYTES # BLD AUTO: 0.48 THOUSAND/ÂΜL (ref 0.17–1.22)
MONOCYTES NFR BLD AUTO: 6 % (ref 4–12)
NEUTROPHILS # BLD AUTO: 6.72 THOUSANDS/ÂΜL (ref 1.85–7.62)
NEUTS SEG NFR BLD AUTO: 82 % (ref 43–75)
NRBC BLD AUTO-RTO: 0 /100 WBCS
PHOSPHATE SERPL-MCNC: 4.4 MG/DL (ref 2.7–4.5)
PLATELET # BLD AUTO: 245 THOUSANDS/UL (ref 149–390)
PMV BLD AUTO: 11.2 FL (ref 8.9–12.7)
POTASSIUM SERPL-SCNC: 4.3 MMOL/L (ref 3.5–5.3)
PROT SERPL-MCNC: 6.8 G/DL (ref 6.4–8.4)
PTH-INTACT SERPL-MCNC: 219.4 PG/ML (ref 12–88)
RBC # BLD AUTO: 3.04 MILLION/UL (ref 3.81–5.12)
SODIUM SERPL-SCNC: 138 MMOL/L (ref 135–147)
WBC # BLD AUTO: 8.23 THOUSAND/UL (ref 4.31–10.16)

## 2023-12-30 PROCEDURE — 84100 ASSAY OF PHOSPHORUS: CPT | Performed by: INTERNAL MEDICINE

## 2023-12-30 PROCEDURE — 36415 COLL VENOUS BLD VENIPUNCTURE: CPT | Performed by: INTERNAL MEDICINE

## 2023-12-30 PROCEDURE — 83970 ASSAY OF PARATHORMONE: CPT | Performed by: INTERNAL MEDICINE

## 2023-12-30 PROCEDURE — 83735 ASSAY OF MAGNESIUM: CPT | Performed by: INTERNAL MEDICINE

## 2023-12-30 PROCEDURE — 85025 COMPLETE CBC W/AUTO DIFF WBC: CPT | Performed by: INTERNAL MEDICINE

## 2023-12-30 PROCEDURE — 80053 COMPREHEN METABOLIC PANEL: CPT | Performed by: INTERNAL MEDICINE

## 2023-12-30 PROCEDURE — 86147 CARDIOLIPIN ANTIBODY EA IG: CPT

## 2024-01-02 LAB
CARDIOLIPIN IGA SER IA-ACNC: 1.5
CARDIOLIPIN IGG SER IA-ACNC: 1.1
CARDIOLIPIN IGM SER IA-ACNC: <0.9

## 2024-01-20 ENCOUNTER — APPOINTMENT (OUTPATIENT)
Dept: LAB | Facility: CLINIC | Age: 54
End: 2024-01-20
Payer: COMMERCIAL

## 2024-01-23 DIAGNOSIS — N17.9 AKI (ACUTE KIDNEY INJURY) (HCC): Primary | ICD-10-CM

## 2024-01-23 DIAGNOSIS — E61.1 IRON DEFICIENCY: ICD-10-CM

## 2024-01-30 ENCOUNTER — TELEPHONE (OUTPATIENT)
Dept: NEPHROLOGY | Facility: CLINIC | Age: 54
End: 2024-01-30

## 2024-02-02 NOTE — TELEPHONE ENCOUNTER
I called and left a detailed message for patient of stable labs.   
I spoke with patient's , he stated patient singed the paper to release her labs to [facility]   
I tried to get on the [facility] system to review labs and could not, she needs to give the ok for other systems to access her information next time she goes to get labs.
Looks like there was a day of delay, labs reviewed and look stable
Patient called ans stated she met with the transplant doctor at [facility] and had more labs done. Patient just wanted to let you know incase you wanted to look over the labs. Please advise.   
30 or less

## 2024-02-06 DIAGNOSIS — M05.79 RHEUMATOID ARTHRITIS INVOLVING MULTIPLE SITES WITH POSITIVE RHEUMATOID FACTOR (HCC): ICD-10-CM

## 2024-02-06 DIAGNOSIS — D50.8 IRON DEFICIENCY ANEMIA SECONDARY TO INADEQUATE DIETARY IRON INTAKE: ICD-10-CM

## 2024-02-06 DIAGNOSIS — I10 ESSENTIAL HYPERTENSION: Chronic | ICD-10-CM

## 2024-02-06 DIAGNOSIS — K21.9 GERD WITHOUT ESOPHAGITIS: Chronic | ICD-10-CM

## 2024-02-06 DIAGNOSIS — E78.2 MIXED HYPERLIPIDEMIA: ICD-10-CM

## 2024-02-06 DIAGNOSIS — G62.9 PERIPHERAL POLYNEUROPATHY: ICD-10-CM

## 2024-02-06 DIAGNOSIS — Z12.31 VISIT FOR SCREENING MAMMOGRAM: ICD-10-CM

## 2024-02-07 RX ORDER — TERAZOSIN 1 MG/1
2 CAPSULE ORAL
Qty: 180 CAPSULE | Refills: 3 | Status: SHIPPED | OUTPATIENT
Start: 2024-02-07

## 2024-02-07 RX ORDER — SACCHAROMYCES BOULARDII 250 MG
250 CAPSULE ORAL DAILY
COMMUNITY

## 2024-02-07 RX ORDER — ROSUVASTATIN CALCIUM 10 MG/1
10 TABLET, COATED ORAL DAILY
Qty: 90 TABLET | Refills: 3 | Status: SHIPPED | OUTPATIENT
Start: 2024-02-07

## 2024-03-08 ENCOUNTER — APPOINTMENT (OUTPATIENT)
Dept: LAB | Facility: HOSPITAL | Age: 54
End: 2024-03-08
Payer: COMMERCIAL

## 2024-03-15 ENCOUNTER — OFFICE VISIT (OUTPATIENT)
Dept: NEPHROLOGY | Facility: CLINIC | Age: 54
End: 2024-03-15
Payer: COMMERCIAL

## 2024-03-15 VITALS
OXYGEN SATURATION: 99 % | SYSTOLIC BLOOD PRESSURE: 136 MMHG | BODY MASS INDEX: 36 KG/M2 | HEART RATE: 77 BPM | DIASTOLIC BLOOD PRESSURE: 80 MMHG | HEIGHT: 66 IN | WEIGHT: 224 LBS

## 2024-03-15 DIAGNOSIS — E87.20 ACIDOSIS, METABOLIC: ICD-10-CM

## 2024-03-15 DIAGNOSIS — R80.9 PROTEINURIA, UNSPECIFIED TYPE: ICD-10-CM

## 2024-03-15 DIAGNOSIS — D63.1 ANEMIA DUE TO STAGE 4 CHRONIC KIDNEY DISEASE: ICD-10-CM

## 2024-03-15 DIAGNOSIS — N18.5 CKD (CHRONIC KIDNEY DISEASE) STAGE 5, GFR LESS THAN 15 ML/MIN (HCC): Primary | ICD-10-CM

## 2024-03-15 DIAGNOSIS — N18.4 ANEMIA DUE TO STAGE 4 CHRONIC KIDNEY DISEASE: ICD-10-CM

## 2024-03-15 DIAGNOSIS — I10 ESSENTIAL HYPERTENSION: Chronic | ICD-10-CM

## 2024-03-15 DIAGNOSIS — N03.1 HYPERFILTRATION FOCAL SEGMENTAL GLOMERULOSCLEROSIS: ICD-10-CM

## 2024-03-15 PROCEDURE — 99214 OFFICE O/P EST MOD 30 MIN: CPT | Performed by: INTERNAL MEDICINE

## 2024-03-15 RX ORDER — SODIUM BICARBONATE 650 MG/1
650 TABLET ORAL
Qty: 90 TABLET | Refills: 3 | Status: SHIPPED | OUTPATIENT
Start: 2024-03-15

## 2024-03-15 NOTE — PROGRESS NOTES
[facility]  Yuri Hennessy DO    Name: Saige Haji  YOB: 1970      Assessment/Plan:    CKD (chronic kidney disease) stage 5, GFR less than 15 ml/min ([facility])  Lab Results   Component Value Date    EGFR 13 03/08/2024    EGFR 15 (L) 01/29/2024    EGFR 12 01/20/2024    CREATININE 3.68 (H) 03/08/2024    CREATININE 3.39 (H) 01/29/2024    CREATININE 3.93 (H) 01/20/2024     Kidney function continues to slowly decline.  Thankfully at this time, the patient does not have overt uremic symptoms.  She has started the process of transplant, unclear if she will be cleared as a candidate to proceed with a transplant and have a living donor set up quickly enough prior to the need to initiate dialysis.    With this in mind, after discussion regarding hemodialysis versus peritoneal dialysis, the patient has agreed to proceed with peritoneal dialysis so long as she is a candidate for catheter placement.  Referral in the chart to our surgeon, Dr. House, was placed an order for the patient to be evaluated for potential peritoneal dialysis catheter.    As mentioned in my previous note, my expectorations for the patient to need to start dialysis in the next 10-14 months.  Sooner depending on how she progresses clinically, at this time she appears to be doing well.    Hyperfiltration focal segmental glomerulosclerosis  This is a presumed diagnosis, as reminder, the patient had a kidney biopsy in December 2023 but unfortunately the specimen was inadequate in order to positively identify a pathologic process and FSGS cannot be proven at that time.  Continue to optimize care.  Patient has lost a significant amount of weight which is to her credit, unfortunately kidney function continues to decline at this time.      Essential hypertension  Blood pressure remains well-controlled, continue to encourage low-sodium diet, continue current medications including amlodipine.    Acidosis,  metabolic  Will initiate sodium bicarbonate 650 mg 3 times daily.  Patient was made aware that this will add a small sodium load to her overall 24-hour intake.  At the same time our goal is to have the patient's serum bicarbonate level at 20 mmol/liter or above.         Problem List Items Addressed This Visit          Cardiovascular and Mediastinum    Essential hypertension (Chronic)     Blood pressure remains well-controlled, continue to encourage low-sodium diet, continue current medications including amlodipine.            Genitourinary    Hyperfiltration focal segmental glomerulosclerosis     This is a presumed diagnosis, as reminder, the patient had a kidney biopsy in December 2023 but unfortunately the specimen was inadequate in order to positively identify a pathologic process and FSGS cannot be proven at that time.  Continue to optimize care.  Patient has lost a significant amount of weight which is to her credit, unfortunately kidney function continues to decline at this time.           CKD (chronic kidney disease) stage 5, GFR less than 15 ml/min ([facility]) - Primary     Lab Results   Component Value Date    EGFR 13 03/08/2024    EGFR 15 (L) 01/29/2024    EGFR 12 01/20/2024    CREATININE 3.68 (H) 03/08/2024    CREATININE 3.39 (H) 01/29/2024    CREATININE 3.93 (H) 01/20/2024     Kidney function continues to slowly decline.  Thankfully at this time, the patient does not have overt uremic symptoms.  She has started the process of transplant, unclear if she will be cleared as a candidate to proceed with a transplant and have a living donor set up quickly enough prior to the need to initiate dialysis.    With this in mind, after discussion regarding hemodialysis versus peritoneal dialysis, the patient has agreed to proceed with peritoneal dialysis so long as she is a candidate for catheter placement.  Referral in the chart to our surgeon, Dr. House, was placed an order for the patient to be evaluated for potential  peritoneal dialysis catheter.    As mentioned in my previous note, my expectorations for the patient to need to start dialysis in the next 10-14 months.  Sooner depending on how she progresses clinically, at this time she appears to be doing well.         Relevant Orders    Ambulatory Referral to General Surgery    Comprehensive metabolic panel    CBC and differential    Phosphorus    PTH, intact       Blood    Anemia due to stage 4 chronic kidney disease     Relevant Orders    Iron Panel (Includes Ferritin, Iron Sat%, Iron, and TIBC)       Other    Proteinuria    BMI 36.0-36.9,adult    Acidosis, metabolic     Will initiate sodium bicarbonate 650 mg 3 times daily.  Patient was made aware that this will add a small sodium load to her overall 24-hour intake.  At the same time our goal is to have the patient's serum bicarbonate level at 20 mmol/liter or above.         Relevant Medications    sodium bicarbonate 650 mg tablet       Patient at this time remains asymptomatic from the uremia standpoint.  She was made aware of some of the common effects of uremia to be aware of including insomnia, change in taste, early satiety, and difficulty in concentration.  We will refer the patient to Dr. House for evaluation regarding potential PD catheter placement.  The patient has a preference for peritoneal dialysis if this is required prior to her having kidney transplantation.    Will continue to see the patient every 2-3 months, follow labs monthly.    Total time required for office visit was 65 minutes.    Subjective:      Patient ID: Saige Haji is a 54 y.o. female.    Patient presents for follow up.    We reviewed labs in detail, most recent creatinine noted to be 3.68 mg/dL, gives her an estimated GFR of 13 mL/min.  This is slightly lower than previous check but consistent with overall kidney function recently.    Patient denies overt symptoms consistent with uremia.    There were no significant electrolyte  abnormalities noted.  Patient is taking all medications as prescribed with no specific side effects and denies use of nonsteroid anti-inflammatory medications.          Hypertension  This is a chronic problem. The current episode started more than 1 year ago. The problem is unchanged. The problem is controlled. Associated symptoms include peripheral edema. Pertinent negatives include no chest pain or orthopnea. There are no associated agents to hypertension. Risk factors for coronary artery disease include obesity and post-menopausal state. Past treatments include lifestyle changes and calcium channel blockers. There are no compliance problems.  Hypertensive end-organ damage includes kidney disease. Identifiable causes of hypertension include chronic renal disease.       The following portions of the patient's history were reviewed and updated as appropriate: allergies, current medications, past family history, past medical history, past social history, past surgical history and problem list.    Review of Systems   Cardiovascular:  Negative for chest pain and orthopnea.   All other systems reviewed and are negative.        Social History     Socioeconomic History    Marital status: /Civil Union     Spouse name: None    Number of children: None    Years of education: None    Highest education level: None   Occupational History    None   Tobacco Use    Smoking status: Former     Current packs/day: 0.00     Types: Cigarettes     Quit date:      Years since quittin.2    Smokeless tobacco: Never   Vaping Use    Vaping status: Never Used   Substance and Sexual Activity    Alcohol use: Never    Drug use: No    Sexual activity: None   Other Topics Concern    None   Social History Narrative    None     Social Determinants of Health     Financial Resource Strain: Not on file   Food Insecurity: Not on file   Transportation Needs: Not on file   Physical Activity: Not on file   Stress: Not on file   Social  Connections: Not on file   Intimate Partner Violence: Not on file   Housing Stability: Not on file     Past Medical History:   Diagnosis Date    Acute renal failure (ARF) (HCC)     GERD (gastroesophageal reflux disease)     Hypertension     Neuropathy     Stage 3b chronic kidney disease (HCC)      Past Surgical History:   Procedure Laterality Date    APPENDECTOMY      CHOLECYSTECTOMY      FOOT POSTERIOR RELEASE Left     around ankle    IR BIOPSY KIDNEY RANDOM  12/12/2023    TUBAL LIGATION         Current Outpatient Medications:     amLODIPine (NORVASC) 5 mg tablet, Take 1 tablet (5 mg total) by mouth daily, Disp: 90 tablet, Rfl: 3    Ascorbic Acid (VITAMIN C) 1000 MG tablet, Take 1,000 mg by mouth daily, Disp: , Rfl:     b complex vitamins capsule, Take 1 capsule by mouth daily, Disp: , Rfl:     ergocalciferol (VITAMIN D2) 50,000 units, Take 1 capsule (50,000 Units total) by mouth once a week On the same day each week, Disp: 12 capsule, Rfl: 1    multivitamin (THERAGRAN) TABS, Take 1 tablet by mouth daily, Disp: , Rfl:     rosuvastatin (CRESTOR) 10 MG tablet, TAKE 1 TABLET DAILY, Disp: 90 tablet, Rfl: 3    saccharomyces boulardii (Florastor) 250 mg capsule, Take 250 mg by mouth daily, Disp: , Rfl:     sodium bicarbonate 650 mg tablet, Take 1 tablet (650 mg total) by mouth 3 (three) times a day with meals, Disp: 90 tablet, Rfl: 3    terazosin (HYTRIN) 1 mg capsule, TAKE 2 CAPSULES DAILY AT BEDTIME, Disp: 180 capsule, Rfl: 3    Xeljanz 5 MG TABS, , Disp: , Rfl:     Lab Results   Component Value Date    SODIUM 136 03/08/2024    K 3.9 03/08/2024     03/08/2024    CO2 18 (L) 03/08/2024    AGAP 12 03/08/2024    BUN 45 (H) 03/08/2024    CREATININE 3.68 (H) 03/08/2024    GLUC 93 01/29/2024    GLUF 97 03/08/2024    CALCIUM 8.8 03/08/2024    AST 14 03/08/2024    ALT 13 03/08/2024    ALKPHOS 59 03/08/2024    TP 7.0 03/08/2024    TBILI 0.36 03/08/2024    EGFR 13 03/08/2024     Lab Results   Component Value Date    WBC  "8.46 03/08/2024    HGB 8.6 (L) 03/08/2024    HCT 26.1 (L) 03/08/2024    MCV 90 03/08/2024     03/08/2024     Lab Results   Component Value Date    CHOLESTEROL 154 08/19/2023    CHOLESTEROL 214 (H) 06/23/2023    CHOLESTEROL 200 07/01/2022     Lab Results   Component Value Date    HDL 69 08/19/2023    HDL 59 06/23/2023    HDL 63 07/01/2022     Lab Results   Component Value Date    LDLCALC 54 08/19/2023    LDLCALC 110 (H) 06/23/2023    LDLCALC 81 07/01/2022     Lab Results   Component Value Date    TRIG 153 (H) 08/19/2023    TRIG 224 (H) 06/23/2023    TRIG 279 (H) 07/01/2022     No results found for: \"CHOLHDL\"  Lab Results   Component Value Date    RVW3XRAHIYLR 2.618 06/23/2023     Lab Results   Component Value Date    .7 (H) 03/08/2024    CALCIUM 8.8 03/08/2024    PHOS 5.5 (H) 03/08/2024     Lab Results   Component Value Date    SPEP See Comment 07/01/2023    UPEP See Comment 07/01/2023     No results found for: \"MICROALBUR\", \"[id number]\"        Objective:      /80 (BP Location: Left arm, Patient Position: Sitting, Cuff Size: Large)   Pulse 77   Ht 5' 6\" (1.676 m)   Wt 102 kg (224 lb)   SpO2 99%   BMI 36.15 kg/m²          Physical Exam  Vitals reviewed.   Constitutional:       General: She is not in acute distress.     Appearance: Normal appearance.   HENT:      Head: Normocephalic and atraumatic.      Right Ear: External ear normal.      Left Ear: External ear normal.   Eyes:      Conjunctiva/sclera: Conjunctivae normal.   Cardiovascular:      Rate and Rhythm: Normal rate and regular rhythm.      Heart sounds: Normal heart sounds.   Pulmonary:      Effort: No respiratory distress.      Breath sounds: No wheezing.   Abdominal:      Palpations: Abdomen is soft.   Skin:     General: Skin is warm and dry.   Neurological:      General: No focal deficit present.      Mental Status: She is alert and oriented to person, place, and time.   Psychiatric:         Mood and Affect: Mood normal.         " Behavior: Behavior normal.

## 2024-03-16 NOTE — ASSESSMENT & PLAN NOTE
Will initiate sodium bicarbonate 650 mg 3 times daily.  Patient was made aware that this will add a small sodium load to her overall 24-hour intake.  At the same time our goal is to have the patient's serum bicarbonate level at 20 mmol/liter or above.

## 2024-03-16 NOTE — ASSESSMENT & PLAN NOTE
Blood pressure remains well-controlled, continue to encourage low-sodium diet, continue current medications including amlodipine.

## 2024-03-16 NOTE — ASSESSMENT & PLAN NOTE
This is a presumed diagnosis, as reminder, the patient had a kidney biopsy in December 2023 but unfortunately the specimen was inadequate in order to positively identify a pathologic process and FSGS cannot be proven at that time.  Continue to optimize care.  Patient has lost a significant amount of weight which is to her credit, unfortunately kidney function continues to decline at this time.

## 2024-03-16 NOTE — ASSESSMENT & PLAN NOTE
Lab Results   Component Value Date    EGFR 13 03/08/2024    EGFR 15 (L) 01/29/2024    EGFR 12 01/20/2024    CREATININE 3.68 (H) 03/08/2024    CREATININE 3.39 (H) 01/29/2024    CREATININE 3.93 (H) 01/20/2024     Kidney function continues to slowly decline.  Thankfully at this time, the patient does not have overt uremic symptoms.  She has started the process of transplant, unclear if she will be cleared as a candidate to proceed with a transplant and have a living donor set up quickly enough prior to the need to initiate dialysis.    With this in mind, after discussion regarding hemodialysis versus peritoneal dialysis, the patient has agreed to proceed with peritoneal dialysis so long as she is a candidate for catheter placement.  Referral in the chart to our surgeon, Dr. House, was placed in order for the patient to be evaluated for potential peritoneal dialysis catheter.    As mentioned in my previous note, my expectation is for the patient to need to start dialysis in the next 10-14 months.  Sooner depending on how she progresses clinically, at this time she appears to be doing well.

## 2024-04-05 ENCOUNTER — TELEPHONE (OUTPATIENT)
Dept: NEPHROLOGY | Facility: CLINIC | Age: 54
End: 2024-04-05

## 2024-04-05 NOTE — TELEPHONE ENCOUNTER
Patient is scheduled as an overbook with you at 10 am in [place] is this okay to keep? It looks like she is scheduled with you every 3 months.

## 2024-04-10 ENCOUNTER — CONSULT (OUTPATIENT)
Dept: SURGERY | Facility: CLINIC | Age: 54
End: 2024-04-10
Payer: COMMERCIAL

## 2024-04-10 VITALS
SYSTOLIC BLOOD PRESSURE: 152 MMHG | TEMPERATURE: 98.7 F | HEIGHT: 66 IN | HEART RATE: 81 BPM | BODY MASS INDEX: 36.32 KG/M2 | OXYGEN SATURATION: 98 % | WEIGHT: 226 LBS | DIASTOLIC BLOOD PRESSURE: 86 MMHG

## 2024-04-10 DIAGNOSIS — N18.5 CKD (CHRONIC KIDNEY DISEASE) STAGE 5, GFR LESS THAN 15 ML/MIN (HCC): ICD-10-CM

## 2024-04-10 PROCEDURE — 99204 OFFICE O/P NEW MOD 45 MIN: CPT | Performed by: SURGERY

## 2024-04-10 NOTE — PROGRESS NOTES
Assessment/Plan:    CKD (chronic kidney disease) stage 5, GFR less than 15 ml/min (HCC)  Lab Results   Component Value Date    EGFR 13 03/08/2024    EGFR 15 (L) 01/29/2024    EGFR 12 01/20/2024    CREATININE 3.68 (H) 03/08/2024    CREATININE 3.39 (H) 01/29/2024    CREATININE 3.93 (H) 01/20/2024       - patient is a candidate for PD cath placement  - discussed details of the procedure along with the risk, benefits, and alternatives  - consent obtained for surgery  - patient to f/u with nephrology to determine timing of when PD cath will be needed  - patient to call office to schedule surgery for PD cath placement when timing is appropriate  - all additional questions and concerns were appropriately addressed       Diagnoses and all orders for this visit:    CKD (chronic kidney disease) stage 5, GFR less than 15 ml/min (HCC)  -     Ambulatory Referral to General Surgery        Notes:  - reviewed 3/8 CBC and CMP, patient with EGFR of 13, reviewed with patient that further discussion is needed with nephrology about when PD cath is needed  - reviewed 2/2 CT renal stone AP which was negative for right or left inguinal hernia repair  - consented patient for above stated procedure  - answered questions pertaining to HD vs. PD catheter such as how they function, level of independence, and where they are placed/how the surgery is performed    Subjective:      Patient ID: Saige Haji is a 54 y.o. female.    HPI    The patient presents for consultation regarding possible peritoneal dialysis catheter placement. Patient recently seen by nephrology. Still making urine without difficulty. Only symptom is fatigue which has been present for years. Most recent eGFR is 13. Currently not on any forms of dialysis. Patient denies having nausea, vomiting, fevers, chills, chest pain, shortness of breath. Tolerating PO intake. Having bowel movements and passing flatus without difficulty. Endorses some fatigue at times. PMHX pertinent for  "HTN, ESRD, psoriatic arthritis, FSGS. PSHX includes open appendectomy, laparoscopic cholecystectomy, and tubal ligation. Patient is not on any forms of anticoagulation or antiplatelet therapy. Patient appears to be a good surgical candidate for PD cath placement as she is functional and has additional family support at home. See above for assessment and plan:    The following portions of the patient's history were reviewed and updated as appropriate: allergies, current medications, past family history, past medical history, past social history, past surgical history, and problem list.    Review of Systems   Constitutional:  Positive for fatigue. Negative for activity change, appetite change, chills and fever.   HENT:  Negative for congestion, sinus pressure and sinus pain.    Eyes:  Negative for photophobia and pain.   Respiratory:  Negative for chest tightness and shortness of breath.    Cardiovascular:  Negative for chest pain and palpitations.   Gastrointestinal:  Negative for abdominal pain, nausea and vomiting.   Endocrine: Negative for polydipsia and polyuria.   Genitourinary:  Negative for difficulty urinating and flank pain.   Musculoskeletal:  Negative for back pain.   Skin:  Negative for wound.   Neurological:  Negative for dizziness, light-headedness and headaches.   Psychiatric/Behavioral:  Negative for agitation and confusion.          Objective:      /86 (BP Location: Left arm, Patient Position: Sitting, Cuff Size: Standard)   Pulse 81   Temp 98.7 °F (37.1 °C) (Temporal)   Ht 5' 6\" (1.676 m)   Wt 103 kg (226 lb)   SpO2 98%   BMI 36.48 kg/m²          Physical Exam  Constitutional:       Appearance: Normal appearance.   HENT:      Head: Normocephalic and atraumatic.      Nose: Nose normal.      Mouth/Throat:      Mouth: Mucous membranes are moist.      Pharynx: Oropharynx is clear.   Eyes:      Extraocular Movements: Extraocular movements intact.      Pupils: Pupils are equal, round, and " reactive to light.   Cardiovascular:      Rate and Rhythm: Normal rate and regular rhythm.      Pulses: Normal pulses.      Heart sounds: Normal heart sounds.   Pulmonary:      Effort: Pulmonary effort is normal.      Breath sounds: Normal breath sounds.   Abdominal:      General: Abdomen is flat. There is no distension.      Palpations: Abdomen is soft.      Tenderness: There is no abdominal tenderness. There is no guarding.      Hernia: No hernia is present.   Musculoskeletal:         General: Normal range of motion.      Cervical back: Normal range of motion.   Skin:     General: Skin is warm and dry.   Neurological:      General: No focal deficit present.      Mental Status: She is alert and oriented to person, place, and time.   Psychiatric:         Mood and Affect: Mood normal.         Behavior: Behavior normal.

## 2024-04-10 NOTE — ASSESSMENT & PLAN NOTE
Lab Results   Component Value Date    EGFR 13 03/08/2024    EGFR 15 (L) 01/29/2024    EGFR 12 01/20/2024    CREATININE 3.68 (H) 03/08/2024    CREATININE 3.39 (H) 01/29/2024    CREATININE 3.93 (H) 01/20/2024       - patient is a candidate for PD cath placement  - discussed details of the procedure along with the risk, benefits, and alternatives  - consent obtained for surgery  - patient to f/u with nephrology to determine timing of when PD cath will be needed  - patient to call office to schedule surgery for PD cath placement when timing is appropriate  - all additional questions and concerns were appropriately addressed

## 2024-04-12 ENCOUNTER — TELEPHONE (OUTPATIENT)
Age: 54
End: 2024-04-12

## 2024-04-12 ENCOUNTER — APPOINTMENT (OUTPATIENT)
Dept: LAB | Facility: HOSPITAL | Age: 54
End: 2024-04-12
Payer: COMMERCIAL

## 2024-04-12 DIAGNOSIS — N18.4 ANEMIA DUE TO STAGE 4 CHRONIC KIDNEY DISEASE  (HCC): ICD-10-CM

## 2024-04-12 DIAGNOSIS — N18.5 CKD (CHRONIC KIDNEY DISEASE) STAGE 5, GFR LESS THAN 15 ML/MIN (HCC): ICD-10-CM

## 2024-04-12 DIAGNOSIS — D63.1 ANEMIA DUE TO STAGE 4 CHRONIC KIDNEY DISEASE  (HCC): ICD-10-CM

## 2024-04-12 LAB
ALBUMIN SERPL BCP-MCNC: 4.3 G/DL (ref 3.5–5)
ALP SERPL-CCNC: 57 U/L (ref 34–104)
ALT SERPL W P-5'-P-CCNC: 14 U/L (ref 7–52)
ANION GAP SERPL CALCULATED.3IONS-SCNC: 11 MMOL/L (ref 4–13)
AST SERPL W P-5'-P-CCNC: 13 U/L (ref 13–39)
BASOPHILS # BLD AUTO: 0.02 THOUSANDS/ÂΜL (ref 0–0.1)
BASOPHILS NFR BLD AUTO: 0 % (ref 0–1)
BILIRUB SERPL-MCNC: 0.43 MG/DL (ref 0.2–1)
BUN SERPL-MCNC: 50 MG/DL (ref 5–25)
CALCIUM SERPL-MCNC: 9.2 MG/DL (ref 8.4–10.2)
CHLORIDE SERPL-SCNC: 105 MMOL/L (ref 96–108)
CO2 SERPL-SCNC: 20 MMOL/L (ref 21–32)
CREAT SERPL-MCNC: 3.73 MG/DL (ref 0.6–1.3)
EOSINOPHIL # BLD AUTO: 0.13 THOUSAND/ÂΜL (ref 0–0.61)
EOSINOPHIL NFR BLD AUTO: 2 % (ref 0–6)
ERYTHROCYTE [DISTWIDTH] IN BLOOD BY AUTOMATED COUNT: 12.9 % (ref 11.6–15.1)
FERRITIN SERPL-MCNC: 37 NG/ML (ref 11–307)
GFR SERPL CREATININE-BSD FRML MDRD: 13 ML/MIN/1.73SQ M
GLUCOSE P FAST SERPL-MCNC: 94 MG/DL (ref 65–99)
HCT VFR BLD AUTO: 26.2 % (ref 34.8–46.1)
HGB BLD-MCNC: 8.6 G/DL (ref 11.5–15.4)
IMM GRANULOCYTES # BLD AUTO: 0.03 THOUSAND/UL (ref 0–0.2)
IMM GRANULOCYTES NFR BLD AUTO: 0 % (ref 0–2)
IRON SATN MFR SERPL: 20 % (ref 15–50)
IRON SERPL-MCNC: 65 UG/DL (ref 50–212)
LYMPHOCYTES # BLD AUTO: 0.96 THOUSANDS/ÂΜL (ref 0.6–4.47)
LYMPHOCYTES NFR BLD AUTO: 12 % (ref 14–44)
MCH RBC QN AUTO: 29.8 PG (ref 26.8–34.3)
MCHC RBC AUTO-ENTMCNC: 32.8 G/DL (ref 31.4–37.4)
MCV RBC AUTO: 91 FL (ref 82–98)
MONOCYTES # BLD AUTO: 0.55 THOUSAND/ÂΜL (ref 0.17–1.22)
MONOCYTES NFR BLD AUTO: 7 % (ref 4–12)
NEUTROPHILS # BLD AUTO: 6.3 THOUSANDS/ÂΜL (ref 1.85–7.62)
NEUTS SEG NFR BLD AUTO: 79 % (ref 43–75)
NRBC BLD AUTO-RTO: 0 /100 WBCS
PHOSPHATE SERPL-MCNC: 4.7 MG/DL (ref 2.7–4.5)
PLATELET # BLD AUTO: 220 THOUSANDS/UL (ref 149–390)
PMV BLD AUTO: 10.6 FL (ref 8.9–12.7)
POTASSIUM SERPL-SCNC: 4.3 MMOL/L (ref 3.5–5.3)
PROT SERPL-MCNC: 7.3 G/DL (ref 6.4–8.4)
PTH-INTACT SERPL-MCNC: 195 PG/ML (ref 12–88)
RBC # BLD AUTO: 2.89 MILLION/UL (ref 3.81–5.12)
SODIUM SERPL-SCNC: 136 MMOL/L (ref 135–147)
TIBC SERPL-MCNC: 332 UG/DL (ref 250–450)
UIBC SERPL-MCNC: 267 UG/DL (ref 155–355)
WBC # BLD AUTO: 7.99 THOUSAND/UL (ref 4.31–10.16)

## 2024-04-12 PROCEDURE — 83970 ASSAY OF PARATHORMONE: CPT

## 2024-04-12 PROCEDURE — 83540 ASSAY OF IRON: CPT

## 2024-04-12 PROCEDURE — 80053 COMPREHEN METABOLIC PANEL: CPT

## 2024-04-12 PROCEDURE — 83550 IRON BINDING TEST: CPT

## 2024-04-12 PROCEDURE — 82728 ASSAY OF FERRITIN: CPT

## 2024-04-12 PROCEDURE — 85025 COMPLETE CBC W/AUTO DIFF WBC: CPT

## 2024-04-12 PROCEDURE — 84100 ASSAY OF PHOSPHORUS: CPT

## 2024-04-12 PROCEDURE — 36415 COLL VENOUS BLD VENIPUNCTURE: CPT

## 2024-04-12 NOTE — TELEPHONE ENCOUNTER
Pt was told by Dr Hennessy that she could call us to schedule a Hep B vaccine. I attempted to confirm this with the office but received no answer. Please return patient's call. I am unsure if this is something we would schedule

## 2024-04-26 ENCOUNTER — OFFICE VISIT (OUTPATIENT)
Dept: FAMILY MEDICINE CLINIC | Facility: CLINIC | Age: 54
End: 2024-04-26
Payer: COMMERCIAL

## 2024-04-26 ENCOUNTER — TELEPHONE (OUTPATIENT)
Dept: NEPHROLOGY | Facility: CLINIC | Age: 54
End: 2024-04-26

## 2024-04-26 ENCOUNTER — TELEPHONE (OUTPATIENT)
Dept: FAMILY MEDICINE CLINIC | Facility: CLINIC | Age: 54
End: 2024-04-26

## 2024-04-26 VITALS
DIASTOLIC BLOOD PRESSURE: 75 MMHG | WEIGHT: 219 LBS | SYSTOLIC BLOOD PRESSURE: 144 MMHG | TEMPERATURE: 97.2 F | RESPIRATION RATE: 18 BRPM | OXYGEN SATURATION: 100 % | HEART RATE: 76 BPM | HEIGHT: 66 IN | BODY MASS INDEX: 35.2 KG/M2

## 2024-04-26 DIAGNOSIS — E78.2 MIXED HYPERLIPIDEMIA: ICD-10-CM

## 2024-04-26 DIAGNOSIS — I10 ESSENTIAL HYPERTENSION: Primary | Chronic | ICD-10-CM

## 2024-04-26 DIAGNOSIS — M05.79 RHEUMATOID ARTHRITIS INVOLVING MULTIPLE SITES WITH POSITIVE RHEUMATOID FACTOR (HCC): ICD-10-CM

## 2024-04-26 DIAGNOSIS — K21.9 GERD WITHOUT ESOPHAGITIS: Chronic | ICD-10-CM

## 2024-04-26 DIAGNOSIS — N18.4 ANEMIA DUE TO STAGE 4 CHRONIC KIDNEY DISEASE  (HCC): ICD-10-CM

## 2024-04-26 DIAGNOSIS — D63.1 ANEMIA DUE TO STAGE 4 CHRONIC KIDNEY DISEASE  (HCC): ICD-10-CM

## 2024-04-26 DIAGNOSIS — Z12.31 VISIT FOR SCREENING MAMMOGRAM: ICD-10-CM

## 2024-04-26 DIAGNOSIS — K58.0 IRRITABLE BOWEL SYNDROME WITH DIARRHEA: ICD-10-CM

## 2024-04-26 DIAGNOSIS — D50.8 IRON DEFICIENCY ANEMIA SECONDARY TO INADEQUATE DIETARY IRON INTAKE: ICD-10-CM

## 2024-04-26 DIAGNOSIS — Z23 ENCOUNTER FOR IMMUNIZATION: ICD-10-CM

## 2024-04-26 DIAGNOSIS — N17.9 ACUTE RENAL FAILURE, UNSPECIFIED ACUTE RENAL FAILURE TYPE (HCC): ICD-10-CM

## 2024-04-26 DIAGNOSIS — N18.5 CKD (CHRONIC KIDNEY DISEASE) STAGE 5, GFR LESS THAN 15 ML/MIN (HCC): ICD-10-CM

## 2024-04-26 DIAGNOSIS — N18.6 ESRD (END STAGE RENAL DISEASE) (HCC): ICD-10-CM

## 2024-04-26 DIAGNOSIS — G62.9 PERIPHERAL POLYNEUROPATHY: ICD-10-CM

## 2024-04-26 PROCEDURE — 3077F SYST BP >= 140 MM HG: CPT | Performed by: FAMILY MEDICINE

## 2024-04-26 PROCEDURE — 90471 IMMUNIZATION ADMIN: CPT

## 2024-04-26 PROCEDURE — 90746 HEPB VACCINE 3 DOSE ADULT IM: CPT

## 2024-04-26 PROCEDURE — 3078F DIAST BP <80 MM HG: CPT | Performed by: FAMILY MEDICINE

## 2024-04-26 PROCEDURE — 3725F SCREEN DEPRESSION PERFORMED: CPT | Performed by: FAMILY MEDICINE

## 2024-04-26 PROCEDURE — 99214 OFFICE O/P EST MOD 30 MIN: CPT | Performed by: FAMILY MEDICINE

## 2024-04-26 RX ORDER — TERAZOSIN 1 MG/1
2 CAPSULE ORAL
Qty: 180 CAPSULE | Refills: 3 | Status: SHIPPED | OUTPATIENT
Start: 2024-04-26

## 2024-04-26 RX ORDER — ROSUVASTATIN CALCIUM 10 MG/1
10 TABLET, COATED ORAL DAILY
Qty: 90 TABLET | Refills: 3 | Status: SHIPPED | OUTPATIENT
Start: 2024-04-26

## 2024-04-26 RX ORDER — AMLODIPINE BESYLATE 5 MG/1
5 TABLET ORAL DAILY
Qty: 90 TABLET | Refills: 3 | Status: SHIPPED | OUTPATIENT
Start: 2024-04-26

## 2024-04-26 NOTE — PROGRESS NOTES
Assessment/Plan:       Problem List Items Addressed This Visit          Cardiovascular and Mediastinum    Essential hypertension - Primary (Chronic)     Hypertension stable control continuing with amlodipine along with Hytrin she has been doing better overall and I recommend avoidance of sodium increasing physical activity and exercise would benefit her health overall         Relevant Medications    amLODIPine (NORVASC) 5 mg tablet    rosuvastatin (CRESTOR) 10 MG tablet    terazosin (HYTRIN) 1 mg capsule       Digestive    GERD without esophagitis (Chronic)    Relevant Medications    amLODIPine (NORVASC) 5 mg tablet    rosuvastatin (CRESTOR) 10 MG tablet    Irritable bowel syndrome with diarrhea     IBS symptoms have been stable so far doing well with her current diet continuing with fiber and avoiding excess fiber and raw vegetables.         Relevant Medications    amLODIPine (NORVASC) 5 mg tablet       Nervous and Auditory    Peripheral polyneuropathy    Relevant Medications    amLODIPine (NORVASC) 5 mg tablet    rosuvastatin (CRESTOR) 10 MG tablet       Musculoskeletal and Integument    Rheumatoid arthritis involving multiple sites with positive rheumatoid factor (HCC)     Rheumatoid arthritis stable currently patient has been followed by rheumatology she was on Xeljanz tablets and this is being monitored and treated through her rheumatologist no recent flareups now         Relevant Medications    amLODIPine (NORVASC) 5 mg tablet    rosuvastatin (CRESTOR) 10 MG tablet       Genitourinary    CKD (chronic kidney disease) stage 5, GFR less than 15 ml/min ([facility])     Lab Results   Component Value Date    EGFR 13 04/12/2024    EGFR 13 03/08/2024    EGFR 15 (L) 01/29/2024    CREATININE 3.73 (H) 04/12/2024    CREATININE 3.68 (H) 03/08/2024    CREATININE 3.39 (H) 01/29/2024   Severe chronic kidney disease with GFR 13 and creatinine at 3.73 she has been followed by nephrology for this and appears to be from hyper filtration  focal segmental glomerulosclerosis.  Lab work and urinalysis ordered and followed regularly by nephrology.  She is being evaluated for peritoneal dialysis and also kidney transplant         ESRD (end stage renal disease) (HCC)     Lab Results   Component Value Date    EGFR 13 04/12/2024    EGFR 13 03/08/2024    EGFR 15 (L) 01/29/2024    CREATININE 3.73 (H) 04/12/2024    CREATININE 3.68 (H) 03/08/2024    CREATININE 3.39 (H) 01/29/2024   Followed closely by nephrology workup towards renal transplant now and hepatitis B vaccine for immunity            Blood    Iron deficiency anemia secondary to inadequate dietary iron intake     Longstanding iron deficiency anemia from chronic kidney disease monitor CBC regularly adding iron supplementation as needed         Relevant Medications    rosuvastatin (CRESTOR) 10 MG tablet    Anemia due to stage 4 chronic kidney disease  (HCC)    Relevant Medications    amLODIPine (NORVASC) 5 mg tablet       Other    Mixed hyperlipidemia     Mixed hyperlipidemia is stable with Crestor 10 mg daily         Relevant Medications    amLODIPine (NORVASC) 5 mg tablet    rosuvastatin (CRESTOR) 10 MG tablet    Other Relevant Orders    Lipid panel    TSH, 3rd generation with Free T4 reflex    CBC and differential    Comprehensive metabolic panel     Other Visit Diagnoses       Acute renal failure, unspecified acute renal failure type (HCC)        Relevant Medications    amLODIPine (NORVASC) 5 mg tablet    Visit for screening mammogram        Relevant Medications    rosuvastatin (CRESTOR) 10 MG tablet              Subjective:      Patient ID: Saige Haji is a 54 y.o. female.    Saige is here for her follow-up evaluation and discussion regarding her overall health lab work recent nephrology appointments and she questions need for hepatitis B booster        The following portions of the patient's history were reviewed and updated as appropriate: allergies, current medications, past family history,  "past medical history, past social history, past surgical history and problem list.    Review of Systems   Constitutional:  Negative for chills, fatigue and fever.   HENT:  Negative for congestion, nosebleeds, rhinorrhea, sinus pressure and sore throat.    Eyes:  Negative for discharge and redness.   Respiratory:  Negative for cough and shortness of breath.    Cardiovascular:  Negative for chest pain, palpitations and leg swelling.   Gastrointestinal:  Negative for abdominal pain, blood in stool and nausea.   Endocrine: Negative for cold intolerance, heat intolerance and polyuria.   Genitourinary:  Negative for dysuria and frequency.   Musculoskeletal:  Negative for arthralgias, back pain and myalgias.   Skin:  Negative for rash.   Neurological:  Negative for dizziness, weakness and headaches.   Hematological:  Negative for adenopathy.   Psychiatric/Behavioral:  Negative for behavioral problems and sleep disturbance. The patient is not nervous/anxious.          Objective:      /75 (BP Location: Right arm, Patient Position: Sitting, Cuff Size: Large)   Pulse 76   Temp (!) 97.2 °F (36.2 °C)   Resp 18   Ht 5' 6\" (1.676 m)   Wt 99.3 kg (219 lb)   SpO2 100%   BMI 35.35 kg/m²        Physical Exam  Vitals and nursing note reviewed.   Constitutional:       General: She is not in acute distress.     Appearance: Normal appearance. She is well-developed.   HENT:      Head: Normocephalic and atraumatic.      Right Ear: Tympanic membrane and external ear normal.      Left Ear: Tympanic membrane and external ear normal.      Nose: Nose normal.      Mouth/Throat:      Mouth: Mucous membranes are moist.      Pharynx: Oropharynx is clear. No oropharyngeal exudate.   Eyes:      General: No scleral icterus.        Right eye: No discharge.         Left eye: No discharge.      Conjunctiva/sclera: Conjunctivae normal.      Pupils: Pupils are equal, round, and reactive to light.   Neck:      Thyroid: No thyromegaly.      " Vascular: No JVD.   Cardiovascular:      Rate and Rhythm: Normal rate and regular rhythm.      Heart sounds: Normal heart sounds. No murmur heard.  Pulmonary:      Effort: Pulmonary effort is normal.      Breath sounds: No wheezing or rales.   Chest:      Chest wall: No tenderness.   Abdominal:      General: Bowel sounds are normal. There is no distension.      Palpations: Abdomen is soft. There is no mass.      Tenderness: There is no abdominal tenderness.   Musculoskeletal:         General: No tenderness or deformity. Normal range of motion.      Cervical back: Normal range of motion.   Lymphadenopathy:      Cervical: No cervical adenopathy.   Skin:     General: Skin is warm and dry.      Findings: No rash.   Neurological:      General: No focal deficit present.      Mental Status: She is alert and oriented to person, place, and time.      Cranial Nerves: No cranial nerve deficit.      Coordination: Coordination normal.      Deep Tendon Reflexes: Reflexes are normal and symmetric. Reflexes normal.   Psychiatric:         Mood and Affect: Mood normal.         Behavior: Behavior normal.         Thought Content: Thought content normal.         Judgment: Judgment normal.      I have spent a total time of 45 minutes on 04/26/24 in caring for this patient including Diagnostic results, Prognosis, Risks and benefits of tx options, Instructions for management, Patient and family education, Importance of tx compliance, Risk factor reductions, Impressions, Counseling / Coordination of care, Documenting in the medical record, Reviewing / ordering tests, medicine, procedures  , Obtaining or reviewing history  , and Communicating with other healthcare professionals .        Data:    Laboratory Results: I have personally reviewed the pertinent laboratory results/reports   Radiology/Other Diagnostic Testing Results: I have personally reviewed pertinent reports.       Lab Results   Component Value Date    WBC 7.99 04/12/2024    HGB  "8.6 (L) 04/12/2024    HCT 26.2 (L) 04/12/2024    MCV 91 04/12/2024     04/12/2024     Lab Results   Component Value Date    K 4.3 04/12/2024     04/12/2024    CO2 20 (L) 04/12/2024    BUN 50 (H) 04/12/2024    CREATININE 3.73 (H) 04/12/2024    GLUF 94 04/12/2024    CALCIUM 9.2 04/12/2024    AST 13 04/12/2024    ALT 14 04/12/2024    ALKPHOS 57 04/12/2024    EGFR 13 04/12/2024     Lab Results   Component Value Date    CHOLESTEROL 154 08/19/2023    CHOLESTEROL 214 (H) 06/23/2023    CHOLESTEROL 200 07/01/2022     Lab Results   Component Value Date    HDL 69 08/19/2023    HDL 59 06/23/2023    HDL 63 07/01/2022     Lab Results   Component Value Date    LDLCALC 54 08/19/2023    LDLCALC 110 (H) 06/23/2023    LDLCALC 81 07/01/2022     Lab Results   Component Value Date    TRIG 153 (H) 08/19/2023    TRIG 224 (H) 06/23/2023    TRIG 279 (H) 07/01/2022     No results found for: \"CHOLHDL\"  Lab Results   Component Value Date    MLY4TIBHGHQB 2.618 06/23/2023    TSH 2.54 09/07/2018     Lab Results   Component Value Date    HGBA1C 4.8 01/29/2024     No results found for: \"PSA\"    Cirilo Fowler, DO      "

## 2024-04-26 NOTE — TELEPHONE ENCOUNTER
Call from Yesenia at Dr. Fowler's office.  Patient is there right now for office visit.  Dr. Fowler would like to know if it's ok to give  her the Hep B Vaccine?

## 2024-04-26 NOTE — TELEPHONE ENCOUNTER
Called and spoke to Fareed at [facility]'s office.      She is aware it is ok to give patient Hep B vaccine, as long as she does not have history of anaphylaxis or allergy to the vaccine    What is the specific concern?      Not a live virus and cannot cause hepatitis b     She states there was not a specific concern.  Just that patient was unsure so Dr. Fowler just wanted run it by our office.

## 2024-04-26 NOTE — ASSESSMENT & PLAN NOTE
Hypertension stable control continuing with amlodipine along with Hytrin she has been doing better overall and I recommend avoidance of sodium increasing physical activity and exercise would benefit her health overall  
IBS symptoms have been stable so far doing well with her current diet continuing with fiber and avoiding excess fiber and raw vegetables.  
Lab Results   Component Value Date    EGFR 13 04/12/2024    EGFR 13 03/08/2024    EGFR 15 (L) 01/29/2024    CREATININE 3.73 (H) 04/12/2024    CREATININE 3.68 (H) 03/08/2024    CREATININE 3.39 (H) 01/29/2024   Followed closely by nephrology workup towards renal transplant now and hepatitis B vaccine for immunity  
Lab Results   Component Value Date    EGFR 13 04/12/2024    EGFR 13 03/08/2024    EGFR 15 (L) 01/29/2024    CREATININE 3.73 (H) 04/12/2024    CREATININE 3.68 (H) 03/08/2024    CREATININE 3.39 (H) 01/29/2024   Severe chronic kidney disease with GFR 13 and creatinine at 3.73 she has been followed by nephrology for this and appears to be from hyper filtration focal segmental glomerulosclerosis.  Lab work and urinalysis ordered and followed regularly by nephrology.  She is being evaluated for peritoneal dialysis and also kidney transplant  
Longstanding iron deficiency anemia from chronic kidney disease monitor CBC regularly adding iron supplementation as needed  
Mixed hyperlipidemia is stable with Crestor 10 mg daily  
Rheumatoid arthritis stable currently patient has been followed by rheumatology she was on Xeljanz tablets and this is being monitored and treated through her rheumatologist no recent flareups now  
135

## 2024-04-30 ENCOUNTER — TELEPHONE (OUTPATIENT)
Dept: ADMINISTRATIVE | Facility: OTHER | Age: 54
End: 2024-04-30

## 2024-04-30 NOTE — TELEPHONE ENCOUNTER
----- Message from Derek Steele LPN sent at 4/29/2024 11:59 AM EDT -----  04/29/24 12:00 PM    Hello, our patient Saige Haji has had Pap Smear (HPV) aka Cervical Cancer Screening completed/performed. Please assist in updating the patient chart by pulling the Care Everywhere (CE) document. The date of service is 3/15/24.     Thank you,  LETY Santana PG

## 2024-05-01 NOTE — TELEPHONE ENCOUNTER
Upon review of the In Basket request we were able to locate, review, and update the patient chart as requested for Pap Smear (HPV) aka Cervical Cancer Screening.    Any additional questions or concerns should be emailed to the Practice Liaisons via the appropriate education email address, please do not reply via In Basket.    Thank you  Marshall Herndon MA

## 2024-05-09 ENCOUNTER — OFFICE VISIT (OUTPATIENT)
Dept: URGENT CARE | Facility: CLINIC | Age: 54
End: 2024-05-09
Payer: COMMERCIAL

## 2024-05-09 VITALS
RESPIRATION RATE: 18 BRPM | TEMPERATURE: 98.5 F | SYSTOLIC BLOOD PRESSURE: 128 MMHG | OXYGEN SATURATION: 100 % | DIASTOLIC BLOOD PRESSURE: 71 MMHG | HEART RATE: 81 BPM

## 2024-05-09 DIAGNOSIS — H57.11 ACUTE RIGHT EYE PAIN: Primary | ICD-10-CM

## 2024-05-09 PROCEDURE — 99213 OFFICE O/P EST LOW 20 MIN: CPT

## 2024-05-09 NOTE — LETTER
May 9, 2024     Patient: Saige Haji   YOB: 1970   Date of Visit: 5/9/2024       To Whom it May Concern:    Saige Haji was seen in my clinic on 5/9/2024.     If you have any questions or concerns, please don't hesitate to call.         Sincerely,          ADILENE Bowers        CC: No Recipients

## 2024-05-09 NOTE — PATIENT INSTRUCTIONS
Follow up with ophthalmology.    Follow up with PCP in 3-5 days.  Proceed to the ER with worsening symptoms.

## 2024-05-09 NOTE — PROGRESS NOTES
[facility]        NAME: Saige Haji is a 54 y.o. female  : 1970    MRN: [id number]  DATE: May 9, 2024  TIME: 10:21 AM    Assessment and Plan   Acute right eye pain [H57.11]  1. Acute right eye pain          After patient consent was obtained, 1 drop of tetracaine and fluorescein stain was administered into R eye. Direct visualization was achieved with UV light without abrasion. R eyelid was everted without evidence of foreign body. R eye was irrigated with saline solution. Patient tolerated procedure without incident.   No evidence of corneal abrasion, conjunctivitis, or stye. Recommend follow up with ophthalmologist for further evaluation. Called [facility] on patient's behalf. Office put in an emergency ticket for ophthalmologist and will call patient if appointment if available. Instructed patient to proceed to the ER if unable to be seen by ophthalmologist today.   Work note given.     Patient Instructions     Follow up with ophthalmology.    Follow up with PCP in 3-5 days.  Proceed to the ER with worsening symptoms.    Chief Complaint     Chief Complaint   Patient presents with    Eye Pain     Patient with right eye pain, drainage, sensitive to light since this morning.          History of Present Illness       The patient presents today with complaints of R eye pain, photophobia, blurred vision, wateriness that started this morning. States it is not itchy and was not crusted shut this morning. Does not wear contacts, and denies history of eye issues. Does not feel as though there is a foreign body in her eye.         Review of Systems   Review of Systems   Constitutional:  Negative for chills and fever.   HENT:  Negative for congestion, ear pain, postnasal drip, rhinorrhea and sore throat.    Eyes:  Positive for photophobia (R eye), pain (R eye), discharge (R eye clear watery) and visual disturbance (R eye). Negative for redness and itching.   Respiratory:  Negative for cough.           Current Medications       Current Outpatient Medications:     amLODIPine (NORVASC) 5 mg tablet, Take 1 tablet (5 mg total) by mouth daily, Disp: 90 tablet, Rfl: 3    Ascorbic Acid (VITAMIN C) 1000 MG tablet, Take 1,000 mg by mouth daily, Disp: , Rfl:     b complex vitamins capsule, Take 1 capsule by mouth daily, Disp: , Rfl:     ergocalciferol (VITAMIN D2) 50,000 units, Take 1 capsule (50,000 Units total) by mouth once a week On the same day each week, Disp: 12 capsule, Rfl: 1    multivitamin (THERAGRAN) TABS, Take 1 tablet by mouth daily, Disp: , Rfl:     rosuvastatin (CRESTOR) 10 MG tablet, Take 1 tablet (10 mg total) by mouth daily, Disp: 90 tablet, Rfl: 3    saccharomyces boulardii (Florastor) 250 mg capsule, Take 250 mg by mouth daily, Disp: , Rfl:     sodium bicarbonate 650 mg tablet, Take 1 tablet (650 mg total) by mouth 3 (three) times a day with meals, Disp: 90 tablet, Rfl: 3    terazosin (HYTRIN) 1 mg capsule, Take 2 capsules (2 mg total) by mouth daily at bedtime, Disp: 180 capsule, Rfl: 3    Xeljanz 5 MG TABS, , Disp: , Rfl:     Current Allergies     Allergies as of 05/09/2024 - Reviewed 05/09/2024   Allergen Reaction Noted    Methotrexate Lip Swelling, Other (See Comments), and Swelling 07/14/2017    Cephalexin GI Intolerance 03/02/2012            The following portions of the patient's history were reviewed and updated as appropriate: allergies, current medications, past family history, past medical history, past social history, past surgical history and problem list.     Past Medical History:   Diagnosis Date    Acute renal failure (ARF) (HCC)     GERD (gastroesophageal reflux disease)     Hypertension     Neuropathy     Stage 3b chronic kidney disease (HCC)        Past Surgical History:   Procedure Laterality Date    APPENDECTOMY      CHOLECYSTECTOMY      FOOT POSTERIOR RELEASE Left     around ankle    IR BIOPSY KIDNEY RANDOM  12/12/2023    TUBAL LIGATION         Family History   Problem  Relation Age of Onset    Hypertension Mother     Cancer Mother     Alzheimer's disease Mother     Parkinsonism Mother     Diabetes Father     Hypertension Father     Cancer Father     Breast cancer Sister 50    No Known Problems Daughter     No Known Problems Maternal Grandmother     No Known Problems Paternal Grandmother     No Known Problems Sister     No Known Problems Sister     No Known Problems Maternal Aunt     No Known Problems Maternal Aunt     No Known Problems Maternal Aunt     No Known Problems Paternal Aunt     Breast cancer Paternal Aunt     Breast cancer Paternal Aunt     No Known Problems Paternal Aunt          Medications have been verified.        Objective   /71   Pulse 81   Temp 98.5 °F (36.9 °C)   Resp 18   SpO2 100%        Physical Exam     Physical Exam  Vitals and nursing note reviewed.   Constitutional:       General: She is not in acute distress.     Appearance: Normal appearance. She is not ill-appearing.   HENT:      Head: Normocephalic and atraumatic.      Right Ear: Tympanic membrane, ear canal and external ear normal.      Left Ear: Tympanic membrane, ear canal and external ear normal.      Nose: Nose normal. No congestion or rhinorrhea.      Mouth/Throat:      Lips: Pink.      Mouth: Mucous membranes are moist.      Pharynx: No oropharyngeal exudate or posterior oropharyngeal erythema.      Tonsils: No tonsillar exudate.   Eyes:      General: Lids are everted, no foreign bodies appreciated. Vision grossly intact.         Right eye: No foreign body or hordeolum.      Extraocular Movements: Extraocular movements intact.      Conjunctiva/sclera:      Right eye: Right conjunctiva is not injected. Exudate (clear watery) present. No chemosis or hemorrhage.     Pupils: Pupils are equal, round, and reactive to light.      Right eye: Fluorescein uptake present. No corneal abrasion.      Comments: R upper eyelid to outer edge with possible skin tag which patient states is not new.   R  eye is painful to the touch.    Cardiovascular:      Rate and Rhythm: Normal rate and regular rhythm.      Heart sounds: Normal heart sounds. No murmur heard.  Pulmonary:      Effort: Pulmonary effort is normal. No respiratory distress.      Breath sounds: Normal breath sounds. No decreased air movement. No decreased breath sounds, wheezing, rhonchi or rales.   Musculoskeletal:         General: Normal range of motion.      Cervical back: Normal range of motion.   Lymphadenopathy:      Cervical: No cervical adenopathy.   Skin:     General: Skin is warm.      Findings: No rash.   Neurological:      Mental Status: She is alert and oriented to person, place, and time.      Motor: Motor function is intact.      Gait: Gait is intact.   Psychiatric:         Attention and Perception: Attention normal.         Mood and Affect: Mood normal.

## 2024-05-11 ENCOUNTER — APPOINTMENT (OUTPATIENT)
Dept: LAB | Facility: HOSPITAL | Age: 54
End: 2024-05-11
Payer: COMMERCIAL

## 2024-05-11 DIAGNOSIS — N18.5 CKD (CHRONIC KIDNEY DISEASE) STAGE 5, GFR LESS THAN 15 ML/MIN (HCC): ICD-10-CM

## 2024-05-11 DIAGNOSIS — E61.1 IRON DEFICIENCY: ICD-10-CM

## 2024-05-11 DIAGNOSIS — N17.9 AKI (ACUTE KIDNEY INJURY) (HCC): ICD-10-CM

## 2024-05-11 DIAGNOSIS — E78.2 MIXED HYPERLIPIDEMIA: ICD-10-CM

## 2024-05-11 LAB
ALBUMIN SERPL BCP-MCNC: 4.4 G/DL (ref 3.5–5)
ALP SERPL-CCNC: 55 U/L (ref 34–104)
ALT SERPL W P-5'-P-CCNC: 12 U/L (ref 7–52)
ANION GAP SERPL CALCULATED.3IONS-SCNC: 9 MMOL/L (ref 4–13)
AST SERPL W P-5'-P-CCNC: 12 U/L (ref 13–39)
BASOPHILS # BLD AUTO: 0.03 THOUSANDS/ÂΜL (ref 0–0.1)
BASOPHILS NFR BLD AUTO: 0 % (ref 0–1)
BILIRUB SERPL-MCNC: 0.36 MG/DL (ref 0.2–1)
BUN SERPL-MCNC: 48 MG/DL (ref 5–25)
CALCIUM SERPL-MCNC: 9.1 MG/DL (ref 8.4–10.2)
CHLORIDE SERPL-SCNC: 105 MMOL/L (ref 96–108)
CHOLEST SERPL-MCNC: 153 MG/DL
CO2 SERPL-SCNC: 21 MMOL/L (ref 21–32)
CREAT SERPL-MCNC: 3.7 MG/DL (ref 0.6–1.3)
EOSINOPHIL # BLD AUTO: 0.16 THOUSAND/ÂΜL (ref 0–0.61)
EOSINOPHIL NFR BLD AUTO: 2 % (ref 0–6)
ERYTHROCYTE [DISTWIDTH] IN BLOOD BY AUTOMATED COUNT: 13.2 % (ref 11.6–15.1)
FERRITIN SERPL-MCNC: 36 NG/ML (ref 11–307)
GFR SERPL CREATININE-BSD FRML MDRD: 13 ML/MIN/1.73SQ M
GLUCOSE P FAST SERPL-MCNC: 90 MG/DL (ref 65–99)
HCT VFR BLD AUTO: 26.3 % (ref 34.8–46.1)
HDLC SERPL-MCNC: 49 MG/DL
HGB BLD-MCNC: 8.7 G/DL (ref 11.5–15.4)
IMM GRANULOCYTES # BLD AUTO: 0.03 THOUSAND/UL (ref 0–0.2)
IMM GRANULOCYTES NFR BLD AUTO: 0 % (ref 0–2)
IRON SATN MFR SERPL: 16 % (ref 15–50)
IRON SERPL-MCNC: 54 UG/DL (ref 50–212)
LDLC SERPL CALC-MCNC: 76 MG/DL (ref 0–100)
LYMPHOCYTES # BLD AUTO: 1.2 THOUSANDS/ÂΜL (ref 0.6–4.47)
LYMPHOCYTES NFR BLD AUTO: 14 % (ref 14–44)
MCH RBC QN AUTO: 30 PG (ref 26.8–34.3)
MCHC RBC AUTO-ENTMCNC: 33.1 G/DL (ref 31.4–37.4)
MCV RBC AUTO: 91 FL (ref 82–98)
MONOCYTES # BLD AUTO: 0.55 THOUSAND/ÂΜL (ref 0.17–1.22)
MONOCYTES NFR BLD AUTO: 6 % (ref 4–12)
NEUTROPHILS # BLD AUTO: 6.69 THOUSANDS/ÂΜL (ref 1.85–7.62)
NEUTS SEG NFR BLD AUTO: 78 % (ref 43–75)
NONHDLC SERPL-MCNC: 104 MG/DL
NRBC BLD AUTO-RTO: 0 /100 WBCS
PHOSPHATE SERPL-MCNC: 4.9 MG/DL (ref 2.7–4.5)
PLATELET # BLD AUTO: 240 THOUSANDS/UL (ref 149–390)
PMV BLD AUTO: 10.4 FL (ref 8.9–12.7)
POTASSIUM SERPL-SCNC: 3.8 MMOL/L (ref 3.5–5.3)
PROT SERPL-MCNC: 7.4 G/DL (ref 6.4–8.4)
PTH-INTACT SERPL-MCNC: 124.6 PG/ML (ref 12–88)
RBC # BLD AUTO: 2.9 MILLION/UL (ref 3.81–5.12)
SODIUM SERPL-SCNC: 135 MMOL/L (ref 135–147)
TIBC SERPL-MCNC: 343 UG/DL (ref 250–450)
TRIGL SERPL-MCNC: 142 MG/DL
TSH SERPL DL<=0.05 MIU/L-ACNC: 2.91 UIU/ML (ref 0.45–4.5)
UIBC SERPL-MCNC: 289 UG/DL (ref 155–355)
WBC # BLD AUTO: 8.66 THOUSAND/UL (ref 4.31–10.16)

## 2024-05-11 PROCEDURE — 83970 ASSAY OF PARATHORMONE: CPT

## 2024-05-11 PROCEDURE — 36415 COLL VENOUS BLD VENIPUNCTURE: CPT

## 2024-05-11 PROCEDURE — 84100 ASSAY OF PHOSPHORUS: CPT

## 2024-05-11 PROCEDURE — 84443 ASSAY THYROID STIM HORMONE: CPT

## 2024-05-11 PROCEDURE — 85025 COMPLETE CBC W/AUTO DIFF WBC: CPT

## 2024-05-11 PROCEDURE — 82728 ASSAY OF FERRITIN: CPT

## 2024-05-11 PROCEDURE — 80061 LIPID PANEL: CPT

## 2024-05-11 PROCEDURE — 83540 ASSAY OF IRON: CPT

## 2024-05-11 PROCEDURE — 80053 COMPREHEN METABOLIC PANEL: CPT

## 2024-05-11 PROCEDURE — 83550 IRON BINDING TEST: CPT

## 2024-05-13 ENCOUNTER — TELEPHONE (OUTPATIENT)
Dept: NEPHROLOGY | Facility: CLINIC | Age: 54
End: 2024-05-13

## 2024-05-13 NOTE — TELEPHONE ENCOUNTER
I called and left a VM to please return a call to the office to discuss the following:    ----- Message from ADILENE Jurado sent at 5/13/2024  9:22 AM EDT -----  Kidney function is unchanged from 1 month ago. Electrolytes are normal except mildly elevated phosphorus. Parathyroid hormone remains mildly elevated but improved from 1 month ago. No changes to be made at present

## 2024-06-07 ENCOUNTER — APPOINTMENT (OUTPATIENT)
Dept: LAB | Facility: HOSPITAL | Age: 54
End: 2024-06-07
Payer: COMMERCIAL

## 2024-06-07 DIAGNOSIS — N18.5 CKD (CHRONIC KIDNEY DISEASE) STAGE 5, GFR LESS THAN 15 ML/MIN (HCC): ICD-10-CM

## 2024-06-07 LAB
ALBUMIN SERPL BCP-MCNC: 4.2 G/DL (ref 3.5–5)
ALP SERPL-CCNC: 54 U/L (ref 34–104)
ALT SERPL W P-5'-P-CCNC: 11 U/L (ref 7–52)
ANION GAP SERPL CALCULATED.3IONS-SCNC: 10 MMOL/L (ref 4–13)
AST SERPL W P-5'-P-CCNC: 10 U/L (ref 13–39)
BASOPHILS # BLD AUTO: 0.03 THOUSANDS/ÂΜL (ref 0–0.1)
BASOPHILS NFR BLD AUTO: 0 % (ref 0–1)
BILIRUB SERPL-MCNC: 0.42 MG/DL (ref 0.2–1)
BUN SERPL-MCNC: 54 MG/DL (ref 5–25)
CALCIUM SERPL-MCNC: 8.9 MG/DL (ref 8.4–10.2)
CHLORIDE SERPL-SCNC: 106 MMOL/L (ref 96–108)
CO2 SERPL-SCNC: 20 MMOL/L (ref 21–32)
CREAT SERPL-MCNC: 4.11 MG/DL (ref 0.6–1.3)
EOSINOPHIL # BLD AUTO: 0.21 THOUSAND/ÂΜL (ref 0–0.61)
EOSINOPHIL NFR BLD AUTO: 3 % (ref 0–6)
ERYTHROCYTE [DISTWIDTH] IN BLOOD BY AUTOMATED COUNT: 12.9 % (ref 11.6–15.1)
GFR SERPL CREATININE-BSD FRML MDRD: 11 ML/MIN/1.73SQ M
GLUCOSE P FAST SERPL-MCNC: 90 MG/DL (ref 65–99)
HCT VFR BLD AUTO: 25.3 % (ref 34.8–46.1)
HGB BLD-MCNC: 8.4 G/DL (ref 11.5–15.4)
IMM GRANULOCYTES # BLD AUTO: 0.03 THOUSAND/UL (ref 0–0.2)
IMM GRANULOCYTES NFR BLD AUTO: 0 % (ref 0–2)
LYMPHOCYTES # BLD AUTO: 1.26 THOUSANDS/ÂΜL (ref 0.6–4.47)
LYMPHOCYTES NFR BLD AUTO: 16 % (ref 14–44)
MCH RBC QN AUTO: 29.9 PG (ref 26.8–34.3)
MCHC RBC AUTO-ENTMCNC: 33.2 G/DL (ref 31.4–37.4)
MCV RBC AUTO: 90 FL (ref 82–98)
MONOCYTES # BLD AUTO: 0.53 THOUSAND/ÂΜL (ref 0.17–1.22)
MONOCYTES NFR BLD AUTO: 7 % (ref 4–12)
NEUTROPHILS # BLD AUTO: 5.76 THOUSANDS/ÂΜL (ref 1.85–7.62)
NEUTS SEG NFR BLD AUTO: 74 % (ref 43–75)
NRBC BLD AUTO-RTO: 0 /100 WBCS
PHOSPHATE SERPL-MCNC: 5.2 MG/DL (ref 2.7–4.5)
PLATELET # BLD AUTO: 224 THOUSANDS/UL (ref 149–390)
PMV BLD AUTO: 10.7 FL (ref 8.9–12.7)
POTASSIUM SERPL-SCNC: 4.2 MMOL/L (ref 3.5–5.3)
PROT SERPL-MCNC: 7 G/DL (ref 6.4–8.4)
PTH-INTACT SERPL-MCNC: 155 PG/ML (ref 12–88)
RBC # BLD AUTO: 2.81 MILLION/UL (ref 3.81–5.12)
SODIUM SERPL-SCNC: 136 MMOL/L (ref 135–147)
WBC # BLD AUTO: 7.82 THOUSAND/UL (ref 4.31–10.16)

## 2024-06-07 PROCEDURE — 85025 COMPLETE CBC W/AUTO DIFF WBC: CPT

## 2024-06-07 PROCEDURE — 80053 COMPREHEN METABOLIC PANEL: CPT

## 2024-06-07 PROCEDURE — 83970 ASSAY OF PARATHORMONE: CPT

## 2024-06-07 PROCEDURE — 84100 ASSAY OF PHOSPHORUS: CPT

## 2024-06-07 PROCEDURE — 36415 COLL VENOUS BLD VENIPUNCTURE: CPT

## 2024-06-10 ENCOUNTER — TELEPHONE (OUTPATIENT)
Dept: NEPHROLOGY | Facility: CLINIC | Age: 54
End: 2024-06-10

## 2024-06-10 NOTE — TELEPHONE ENCOUNTER
I called and left a message for patient to call back to obtain results.     ----- Message from Yuri Hennessy DO sent at 6/9/2024  9:19 PM EDT -----  Please ask if she is eating and drinking ok  Kidney fn is reduced, she may be feeling the effect of this.  If we feel this is what is going on, we should contact Dr. House's office and have her scheduled for a PD catheter placement.

## 2024-06-10 NOTE — TELEPHONE ENCOUNTER
Patient returning call. Relayed above message. She states she feels tired/fatigued but is still eating and drinking. States she has an appt Friday with Dr. Hennessy.   Please advise

## 2024-06-11 NOTE — TELEPHONE ENCOUNTER
I called and left a VM inquiring if she would like to schedule to have the PD catheter placed or if she would like to wait until her upcoming appointment with Dr Hennessy to discuss then make a decision. I asked her to call the office back if she needs help scheduling or if she has any questions/concerns.

## 2024-06-14 ENCOUNTER — OFFICE VISIT (OUTPATIENT)
Dept: NEPHROLOGY | Facility: CLINIC | Age: 54
End: 2024-06-14
Payer: COMMERCIAL

## 2024-06-14 VITALS
SYSTOLIC BLOOD PRESSURE: 142 MMHG | OXYGEN SATURATION: 99 % | HEART RATE: 71 BPM | WEIGHT: 217 LBS | HEIGHT: 66 IN | BODY MASS INDEX: 34.87 KG/M2 | DIASTOLIC BLOOD PRESSURE: 78 MMHG

## 2024-06-14 DIAGNOSIS — I10 ESSENTIAL HYPERTENSION: Chronic | ICD-10-CM

## 2024-06-14 DIAGNOSIS — D50.8 IRON DEFICIENCY ANEMIA SECONDARY TO INADEQUATE DIETARY IRON INTAKE: ICD-10-CM

## 2024-06-14 DIAGNOSIS — N18.5 ANEMIA OF CHRONIC KIDNEY FAILURE, STAGE 5  (HCC): ICD-10-CM

## 2024-06-14 DIAGNOSIS — D63.1 ANEMIA OF CHRONIC KIDNEY FAILURE, STAGE 5  (HCC): ICD-10-CM

## 2024-06-14 DIAGNOSIS — E87.20 ACIDOSIS, METABOLIC: ICD-10-CM

## 2024-06-14 DIAGNOSIS — N18.5 CKD (CHRONIC KIDNEY DISEASE) STAGE 5, GFR LESS THAN 15 ML/MIN (HCC): Primary | ICD-10-CM

## 2024-06-14 PROCEDURE — 99214 OFFICE O/P EST MOD 30 MIN: CPT | Performed by: INTERNAL MEDICINE

## 2024-06-14 NOTE — ASSESSMENT & PLAN NOTE
Lab Results   Component Value Date    EGFR 11 06/07/2024    EGFR 13 05/11/2024    EGFR 13 04/12/2024    CREATININE 4.11 (H) 06/07/2024    CREATININE 3.70 (H) 05/11/2024    CREATININE 3.73 (H) 04/12/2024     Kidney function a little lower than previous check at 11 mL/min.  Patient is feeling increased lethargy, but no other overt uremic symptoms at this time.  Will continue to monitor the patient closely from the clinical standpoint as well as from the lab standpoint.  We had been getting monthly labs, will check blood work in 2 weeks from now to ensure kidney function and electrolytes are stable.  Patient will let us know if uremic symptoms, or worsen at which time we will arrange for PD catheter placement by Dr. House.

## 2024-06-14 NOTE — ASSESSMENT & PLAN NOTE
Continue with bicarbonate supplementation, serum bicarb slightly reduced, will simply reevaluate in 1 week.

## 2024-06-14 NOTE — PATIENT INSTRUCTIONS
"Please go on line and purchase an iron product is is made of \"heme iron\".  Go to search bar and \"heme iron\" and get a supplement with at least 10 mg of iron.    "

## 2024-06-14 NOTE — ASSESSMENT & PLAN NOTE
Will arrange for the patient to have Epogen injections 20,000 units once monthly for now.  Will continue to monitor CBC once monthly.

## 2024-06-14 NOTE — ASSESSMENT & PLAN NOTE
Recommended for the patient to start a heme iron supplement daily for now.  Continue to monitor iron stores every 2 or 3 months.

## 2024-06-14 NOTE — PROGRESS NOTES
[facility]  Yuri Hennessy DO    Name: Saige Haji  YOB: 1970      Assessment/Plan:    CKD (chronic kidney disease) stage 5, GFR less than 15 ml/min ([facility])  Lab Results   Component Value Date    EGFR 11 06/07/2024    EGFR 13 05/11/2024    EGFR 13 04/12/2024    CREATININE 4.11 (H) 06/07/2024    CREATININE 3.70 (H) 05/11/2024    CREATININE 3.73 (H) 04/12/2024     Kidney function a little lower than previous check at 11 mL/min.  Patient is feeling increased lethargy, but no other overt uremic symptoms at this time.  Will continue to monitor the patient closely from the clinical standpoint as well as from the lab standpoint.  We had been getting monthly labs, will check blood work in 2 weeks from now to ensure kidney function and electrolytes are stable.  Patient will let us know if uremic symptoms, or worsen at which time we will arrange for PD catheter placement by Dr. House.    Anemia of chronic kidney failure, stage 5  (HCC)  Will arrange for the patient to have Epogen injections 20,000 units once monthly for now.  Will continue to monitor CBC once monthly.    Iron deficiency anemia secondary to inadequate dietary iron intake  Recommended for the patient to start a heme iron supplement daily for now.  Continue to monitor iron stores every 2 or 3 months.    Essential hypertension  Blood pressure controlled at this time, continue to monitor for now.    Acidosis, metabolic  Continue with bicarbonate supplementation, serum bicarb slightly reduced, will simply reevaluate in 1 week.         Problem List Items Addressed This Visit          Cardiovascular and Mediastinum    Essential hypertension (Chronic)     Blood pressure controlled at this time, continue to monitor for now.            Genitourinary    CKD (chronic kidney disease) stage 5, GFR less than 15 ml/min ([facility]) - Primary     Lab Results   Component Value Date    EGFR 11 06/07/2024    EGFR 13 05/11/2024     EGFR 13 04/12/2024    CREATININE 4.11 (H) 06/07/2024    CREATININE 3.70 (H) 05/11/2024    CREATININE 3.73 (H) 04/12/2024     Kidney function a little lower than previous check at 11 mL/min.  Patient is feeling increased lethargy, but no other overt uremic symptoms at this time.  Will continue to monitor the patient closely from the clinical standpoint as well as from the lab standpoint.  We had been getting monthly labs, will check blood work in 2 weeks from now to ensure kidney function and electrolytes are stable.  Patient will let us know if uremic symptoms, or worsen at which time we will arrange for PD catheter placement by Dr. House.         Relevant Orders    Basic metabolic panel       Blood    Iron deficiency anemia secondary to inadequate dietary iron intake     Recommended for the patient to start a heme iron supplement daily for now.  Continue to monitor iron stores every 2 or 3 months.         Anemia of chronic kidney failure, stage 5  (HCC)     Will arrange for the patient to have Epogen injections 20,000 units once monthly for now.  Will continue to monitor CBC once monthly.            Other    BMI 35.0-35.9,adult    Acidosis, metabolic     Continue with bicarbonate supplementation, serum bicarb slightly reduced, will simply reevaluate in 1 week.            Patient continues to have mild uremic symptoms but is controlled at this time.  Will continue to maximize medical care management.  Will follow blood work closely, with next of labs next week, with the patient let us know if uremic symptoms worsen.  She may be approaching the need to have a PD catheter placed.    Subjective:      Patient ID: Saige Haji is a 54 y.o. female.    Patient presents for follow up.    We reviewed labs in detail, most recent creatinine noted to be 4.11 mg/dL, please estimate GFR at 11 mL/min.  Serum bicarbonate is 20 mmol/L.    There were no significant electrolyte abnormalities noted.  Patient is taking all  medications as prescribed with no specific side effects and denies use of nonsteroid anti-inflammatory medications.          Hypertension  This is a chronic problem. The current episode started more than 1 year ago. The problem is unchanged. The problem is controlled. Pertinent negatives include no chest pain, orthopnea or peripheral edema. There are no associated agents to hypertension. Risk factors for coronary artery disease include post-menopausal state and diabetes mellitus. Past treatments include lifestyle changes and calcium channel blockers. There are no compliance problems.  Hypertensive end-organ damage includes kidney disease. Identifiable causes of hypertension include chronic renal disease.       The following portions of the patient's history were reviewed and updated as appropriate: allergies, current medications, past family history, past medical history, past social history, past surgical history and problem list.    Review of Systems   Cardiovascular:  Negative for chest pain and orthopnea.   All other systems reviewed and are negative.        Social History     Socioeconomic History    Marital status: /Civil Union     Spouse name: None    Number of children: None    Years of education: None    Highest education level: None   Occupational History    None   Tobacco Use    Smoking status: Former     Current packs/day: 0.00     Average packs/day: 0.5 packs/day for 4.0 years (2.0 ttl pk-yrs)     Types: Cigarettes     Start date:      Quit date:      Years since quittin.4    Smokeless tobacco: Never   Vaping Use    Vaping status: Never Used   Substance and Sexual Activity    Alcohol use: Never    Drug use: No    Sexual activity: None   Other Topics Concern    None   Social History Narrative    None     Social Determinants of Health     Financial Resource Strain: Not on file   Food Insecurity: Not on file   Transportation Needs: Not on file   Physical Activity: Not on file   Stress:  Not on file   Social Connections: Not on file   Intimate Partner Violence: Not on file   Housing Stability: Not on file     Past Medical History:   Diagnosis Date    Acute renal failure (ARF) (HCC)     GERD (gastroesophageal reflux disease)     Hypertension     Neuropathy     Stage 3b chronic kidney disease (HCC)      Past Surgical History:   Procedure Laterality Date    APPENDECTOMY      CHOLECYSTECTOMY      FOOT POSTERIOR RELEASE Left     around ankle    IR BIOPSY KIDNEY RANDOM  12/12/2023    TUBAL LIGATION         Current Outpatient Medications:     amLODIPine (NORVASC) 5 mg tablet, Take 1 tablet (5 mg total) by mouth daily, Disp: 90 tablet, Rfl: 3    Ascorbic Acid (VITAMIN C) 1000 MG tablet, Take 1,000 mg by mouth daily, Disp: , Rfl:     b complex vitamins capsule, Take 1 capsule by mouth daily, Disp: , Rfl:     ergocalciferol (VITAMIN D2) 50,000 units, Take 1 capsule (50,000 Units total) by mouth once a week On the same day each week, Disp: 12 capsule, Rfl: 1    multivitamin (THERAGRAN) TABS, Take 1 tablet by mouth daily, Disp: , Rfl:     rosuvastatin (CRESTOR) 10 MG tablet, Take 1 tablet (10 mg total) by mouth daily, Disp: 90 tablet, Rfl: 3    saccharomyces boulardii (Florastor) 250 mg capsule, Take 250 mg by mouth daily, Disp: , Rfl:     sodium bicarbonate 650 mg tablet, Take 1 tablet (650 mg total) by mouth 3 (three) times a day with meals, Disp: 90 tablet, Rfl: 3    terazosin (HYTRIN) 1 mg capsule, Take 2 capsules (2 mg total) by mouth daily at bedtime, Disp: 180 capsule, Rfl: 3    Lab Results   Component Value Date    SODIUM 136 06/07/2024    K 4.2 06/07/2024     06/07/2024    CO2 20 (L) 06/07/2024    AGAP 10 06/07/2024    BUN 54 (H) 06/07/2024    CREATININE 4.11 (H) 06/07/2024    GLUC 93 01/29/2024    GLUF 90 06/07/2024    CALCIUM 8.9 06/07/2024    AST 10 (L) 06/07/2024    ALT 11 06/07/2024    ALKPHOS 54 06/07/2024    TP 7.0 06/07/2024    TBILI 0.42 06/07/2024    EGFR 11 06/07/2024     Lab Results  "  Component Value Date    WBC 7.82 06/07/2024    HGB 8.4 (L) 06/07/2024    HCT 25.3 (L) 06/07/2024    MCV 90 06/07/2024     06/07/2024     Lab Results   Component Value Date    CHOLESTEROL 153 05/11/2024    CHOLESTEROL 154 08/19/2023    CHOLESTEROL 214 (H) 06/23/2023     Lab Results   Component Value Date    HDL 49 (L) 05/11/2024    HDL 69 08/19/2023    HDL 59 06/23/2023     Lab Results   Component Value Date    LDLCALC 76 05/11/2024    LDLCALC 54 08/19/2023    LDLCALC 110 (H) 06/23/2023     Lab Results   Component Value Date    TRIG 142 05/11/2024    TRIG 153 (H) 08/19/2023    TRIG 224 (H) 06/23/2023     No results found for: \"CHOLHDL\"  Lab Results   Component Value Date    HZK4RUZQCKPO 2.910 05/11/2024    TSH 2.54 09/07/2018     Lab Results   Component Value Date    .0 (H) 06/07/2024    CALCIUM 8.9 06/07/2024    PHOS 5.2 (H) 06/07/2024     Lab Results   Component Value Date    SPEP See Comment 07/01/2023    UPEP See Comment 07/01/2023     No results found for: \"MICROALBUR\", \"[id number]\"        Objective:      /78 (BP Location: Right arm, Patient Position: Sitting, Cuff Size: Large)   Pulse 71   Ht 5' 6\" (1.676 m)   Wt 98.4 kg (217 lb)   SpO2 99%   BMI 35.02 kg/m²          Physical Exam  Vitals reviewed.   Constitutional:       General: She is not in acute distress.     Appearance: Normal appearance.   HENT:      Head: Normocephalic and atraumatic.      Right Ear: External ear normal.      Left Ear: External ear normal.   Eyes:      Conjunctiva/sclera: Conjunctivae normal.   Cardiovascular:      Rate and Rhythm: Normal rate and regular rhythm.      Heart sounds: Normal heart sounds.   Pulmonary:      Effort: No respiratory distress.      Breath sounds: No wheezing.   Abdominal:      Palpations: Abdomen is soft.   Skin:     General: Skin is warm and dry.   Neurological:      General: No focal deficit present.      Mental Status: She is alert and oriented to person, place, and time. "   Psychiatric:         Mood and Affect: Mood normal.         Behavior: Behavior normal.

## 2024-06-21 ENCOUNTER — APPOINTMENT (OUTPATIENT)
Dept: LAB | Facility: HOSPITAL | Age: 54
End: 2024-06-21
Payer: COMMERCIAL

## 2024-06-21 ENCOUNTER — TELEPHONE (OUTPATIENT)
Dept: NEPHROLOGY | Facility: CLINIC | Age: 54
End: 2024-06-21

## 2024-06-21 DIAGNOSIS — N18.5 ANEMIA OF CHRONIC KIDNEY FAILURE, STAGE 5  (HCC): ICD-10-CM

## 2024-06-21 DIAGNOSIS — N18.5 ANEMIA OF CHRONIC KIDNEY FAILURE, STAGE 5  (HCC): Primary | ICD-10-CM

## 2024-06-21 DIAGNOSIS — D63.1 ANEMIA OF CHRONIC KIDNEY FAILURE, STAGE 5  (HCC): Primary | ICD-10-CM

## 2024-06-21 DIAGNOSIS — N18.5 CKD (CHRONIC KIDNEY DISEASE) STAGE 5, GFR LESS THAN 15 ML/MIN (HCC): ICD-10-CM

## 2024-06-21 DIAGNOSIS — D63.1 ANEMIA OF CHRONIC KIDNEY FAILURE, STAGE 5  (HCC): ICD-10-CM

## 2024-06-21 LAB
ALBUMIN SERPL BCG-MCNC: 4.2 G/DL (ref 3.5–5)
ALP SERPL-CCNC: 51 U/L (ref 34–104)
ALT SERPL W P-5'-P-CCNC: 11 U/L (ref 7–52)
ANION GAP SERPL CALCULATED.3IONS-SCNC: 10 MMOL/L (ref 4–13)
AST SERPL W P-5'-P-CCNC: 11 U/L (ref 13–39)
BILIRUB SERPL-MCNC: 0.36 MG/DL (ref 0.2–1)
BUN SERPL-MCNC: 56 MG/DL (ref 5–25)
CALCIUM SERPL-MCNC: 8.7 MG/DL (ref 8.4–10.2)
CHLORIDE SERPL-SCNC: 106 MMOL/L (ref 96–108)
CO2 SERPL-SCNC: 20 MMOL/L (ref 21–32)
CREAT SERPL-MCNC: 3.85 MG/DL (ref 0.6–1.3)
GFR SERPL CREATININE-BSD FRML MDRD: 12 ML/MIN/1.73SQ M
GLUCOSE P FAST SERPL-MCNC: 93 MG/DL (ref 65–99)
HGB BLD-MCNC: 8.2 G/DL (ref 11.5–15.4)
POTASSIUM SERPL-SCNC: 4.6 MMOL/L (ref 3.5–5.3)
PROT SERPL-MCNC: 6.9 G/DL (ref 6.4–8.4)
SODIUM SERPL-SCNC: 136 MMOL/L (ref 135–147)

## 2024-06-21 PROCEDURE — 36415 COLL VENOUS BLD VENIPUNCTURE: CPT

## 2024-06-21 PROCEDURE — 85018 HEMOGLOBIN: CPT

## 2024-06-21 PROCEDURE — 80053 COMPREHEN METABOLIC PANEL: CPT

## 2024-06-21 NOTE — TELEPHONE ENCOUNTER
----- Message from Yuri Hennessy DO sent at 6/21/2024 10:16 AM EDT -----  Kidney function slightly improved back up to 12 from 11.  No other major electrolyte issues noted.  Hemoglobin remained stable although there is a slight downtrend with it at 8.2 g.    Continue with injections to help bring up blood counts as well as iron supplement

## 2024-06-21 NOTE — TELEPHONE ENCOUNTER
Patient returning call. Made aware:    ----- Message from Yuri Hennessy DO sent at 6/21/2024 10:16 AM EDT -----  Kidney function slightly improved back up to 12 from 11.  No other major electrolyte issues noted.  Hemoglobin remained stable although there is a slight downtrend with it at 8.2 g.     Continue with injections to help bring up blood counts as well as iron supplemen       Patient verbalized understanding.

## 2024-06-24 ENCOUNTER — HOSPITAL ENCOUNTER (OUTPATIENT)
Dept: INFUSION CENTER | Facility: HOSPITAL | Age: 54
Discharge: HOME | End: 2024-06-24
Attending: INTERNAL MEDICINE
Payer: COMMERCIAL

## 2024-06-24 VITALS
RESPIRATION RATE: 18 BRPM | DIASTOLIC BLOOD PRESSURE: 78 MMHG | OXYGEN SATURATION: 100 % | TEMPERATURE: 97.6 F | HEART RATE: 81 BPM | SYSTOLIC BLOOD PRESSURE: 168 MMHG

## 2024-06-24 DIAGNOSIS — N18.5 ANEMIA OF CHRONIC KIDNEY FAILURE, STAGE 5  (HCC): Primary | ICD-10-CM

## 2024-06-24 DIAGNOSIS — D63.1 ANEMIA OF CHRONIC KIDNEY FAILURE, STAGE 5  (HCC): Primary | ICD-10-CM

## 2024-06-24 RX ADMIN — EPOETIN ALFA 20000 UNITS: 20000 SOLUTION INTRAVENOUS; SUBCUTANEOUS at 09:16

## 2024-06-24 NOTE — PROGRESS NOTES
New patient intake completed.  No show policy signed.  Patient tolerated Epogen injection in right arm without reaction or issues.      Saige Haji is aware of future appt on 7/26/24 at 1100 Patient requested this date for treatment.  Date change request sent by tech Carol.     AVS - No (Declined by Saige Haji) Patient has mychart.  Work note provided for patient.    Patient ambulated off unit without incident.  All personal belongings taken with patient.

## 2024-07-19 ENCOUNTER — APPOINTMENT (OUTPATIENT)
Dept: LAB | Facility: HOSPITAL | Age: 54
End: 2024-07-19
Payer: COMMERCIAL

## 2024-07-19 DIAGNOSIS — N18.5 CKD (CHRONIC KIDNEY DISEASE) STAGE 5, GFR LESS THAN 15 ML/MIN (HCC): ICD-10-CM

## 2024-07-19 DIAGNOSIS — D63.1 ANEMIA OF CHRONIC KIDNEY FAILURE, STAGE 5  (HCC): ICD-10-CM

## 2024-07-19 DIAGNOSIS — N18.5 ANEMIA OF CHRONIC KIDNEY FAILURE, STAGE 5  (HCC): ICD-10-CM

## 2024-07-19 LAB
BASOPHILS # BLD AUTO: 0.04 THOUSANDS/ÂΜL (ref 0–0.1)
BASOPHILS NFR BLD AUTO: 1 % (ref 0–1)
EOSINOPHIL # BLD AUTO: 0.24 THOUSAND/ÂΜL (ref 0–0.61)
EOSINOPHIL NFR BLD AUTO: 3 % (ref 0–6)
ERYTHROCYTE [DISTWIDTH] IN BLOOD BY AUTOMATED COUNT: 12.8 % (ref 11.6–15.1)
HCT VFR BLD AUTO: 27.9 % (ref 34.8–46.1)
HGB BLD-MCNC: 9.3 G/DL (ref 11.5–15.4)
IMM GRANULOCYTES # BLD AUTO: 0.03 THOUSAND/UL (ref 0–0.2)
IMM GRANULOCYTES NFR BLD AUTO: 0 % (ref 0–2)
LYMPHOCYTES # BLD AUTO: 1.28 THOUSANDS/ÂΜL (ref 0.6–4.47)
LYMPHOCYTES NFR BLD AUTO: 18 % (ref 14–44)
MCH RBC QN AUTO: 30 PG (ref 26.8–34.3)
MCHC RBC AUTO-ENTMCNC: 33.3 G/DL (ref 31.4–37.4)
MCV RBC AUTO: 90 FL (ref 82–98)
MONOCYTES # BLD AUTO: 0.41 THOUSAND/ÂΜL (ref 0.17–1.22)
MONOCYTES NFR BLD AUTO: 6 % (ref 4–12)
NEUTROPHILS # BLD AUTO: 5.07 THOUSANDS/ÂΜL (ref 1.85–7.62)
NEUTS SEG NFR BLD AUTO: 72 % (ref 43–75)
NRBC BLD AUTO-RTO: 0 /100 WBCS
PHOSPHATE SERPL-MCNC: 5.2 MG/DL (ref 2.7–4.5)
PLATELET # BLD AUTO: 229 THOUSANDS/UL (ref 149–390)
PMV BLD AUTO: 10.6 FL (ref 8.9–12.7)
PTH-INTACT SERPL-MCNC: 75.6 PG/ML (ref 12–88)
RBC # BLD AUTO: 3.1 MILLION/UL (ref 3.81–5.12)
WBC # BLD AUTO: 7.07 THOUSAND/UL (ref 4.31–10.16)

## 2024-07-19 PROCEDURE — 83970 ASSAY OF PARATHORMONE: CPT

## 2024-07-19 PROCEDURE — 85025 COMPLETE CBC W/AUTO DIFF WBC: CPT

## 2024-07-19 PROCEDURE — 84100 ASSAY OF PHOSPHORUS: CPT

## 2024-07-19 PROCEDURE — 36415 COLL VENOUS BLD VENIPUNCTURE: CPT

## 2024-07-22 ENCOUNTER — TELEPHONE (OUTPATIENT)
Age: 54
End: 2024-07-22

## 2024-07-22 NOTE — TELEPHONE ENCOUNTER
Patient called and said that she had her lab work Friday and just noticed there was no CMP, please advise if CMP was also needed?  Patient has appointment on Friday.

## 2024-07-22 NOTE — TELEPHONE ENCOUNTER
I called and spoke with Saige's . He is aware she is not required to complete anymore lab work at this time as she completed a CMP in the last 30 days.

## 2024-07-26 ENCOUNTER — OFFICE VISIT (OUTPATIENT)
Dept: NEPHROLOGY | Facility: CLINIC | Age: 54
End: 2024-07-26

## 2024-07-26 ENCOUNTER — HOSPITAL ENCOUNTER (OUTPATIENT)
Dept: INFUSION CENTER | Facility: HOSPITAL | Age: 54
End: 2024-07-26
Attending: INTERNAL MEDICINE
Payer: COMMERCIAL

## 2024-07-26 VITALS
HEART RATE: 75 BPM | TEMPERATURE: 95.6 F | HEIGHT: 66 IN | DIASTOLIC BLOOD PRESSURE: 86 MMHG | WEIGHT: 218.2 LBS | OXYGEN SATURATION: 95 % | SYSTOLIC BLOOD PRESSURE: 158 MMHG | BODY MASS INDEX: 35.07 KG/M2

## 2024-07-26 DIAGNOSIS — N18.5 ANEMIA OF CHRONIC KIDNEY FAILURE, STAGE 5  (HCC): Primary | ICD-10-CM

## 2024-07-26 DIAGNOSIS — N18.5 CKD (CHRONIC KIDNEY DISEASE) STAGE 5, GFR LESS THAN 15 ML/MIN (HCC): Primary | ICD-10-CM

## 2024-07-26 DIAGNOSIS — D63.1 ANEMIA OF CHRONIC KIDNEY FAILURE, STAGE 5  (HCC): Primary | ICD-10-CM

## 2024-07-26 PROCEDURE — 96372 THER/PROPH/DIAG INJ SC/IM: CPT

## 2024-07-26 RX ADMIN — ERYTHROPOIETIN 20000 UNITS: 20000 INJECTION, SOLUTION INTRAVENOUS; SUBCUTANEOUS at 10:40

## 2024-07-26 NOTE — PATIENT INSTRUCTIONS
It was nice seeing you today. Your kidney function is stable. Please follow up with us in 2 months. I have ordered lab work for you to get done before then. In the meantime, please avoid NSAIDs and have a healthy diet and exercise.  Get fasting labwork checked tomorrow to evaluate your kidney function for this month.

## 2024-07-26 NOTE — PROGRESS NOTES
Saige Haji  tolerated epogen injection well with no complications. Hgb 9.3.    Saige Haji is aware of future appt on 8/23 at  8:30AM.    AVS declined by Saige Haji.

## 2024-07-26 NOTE — PROGRESS NOTES
OFFICE FOLLOW UP - Nephrology   Saige Haji 54 y.o. female MRN: [id number]         Interim HPI:   Saige Haji has a PMH of CKD stage 5, anemia of CKD, iron deficiency anemia and returns today in the renal clinic for the continued management of CKD stage 5.  Patient was last seen on 6/14/24 with Dr Hennessy and was stable from renal standpoint. Since the last visit, there has been no ER visits or hospitilizations. Patient has no complaints at this time and is feeling well. Patient denies any chest pain, shortness of breath or swelling. The last blood work was done on 6/21/24 which we have reviewed together.      ASSESSMENT and PLAN:  Saige was seen today for follow-up.    Diagnoses and all orders for this visit:    CKD (chronic kidney disease) stage 5, GFR less than 15 ml/min (HCC)  -     Comprehensive metabolic panel; Standing  -     CBC; Standing  -     Phosphorus; Future  -     Comprehensive metabolic panel; Future        Chronic kidney disease stage 5:  Etiology: presumed FSGS based off biopsy in December 2023 however specimen was inadequate and cannot be proven  Baseline creatinine has recently been in the 3s  Current creatinine 3.85 mg/dL  Patient admits to not taking NSAIDs  She denies uremic signs or symptoms such as nausea, vomiting anorexia, weight loss, dysgeusia, chest pain, palpitations, dyspnea, muscle cramps, pruritus, mental status changes or tremors.  She has normal urine output.  She has mild bilateral lower extremity edema.  She stays well-hydrated at 64 ounces daily.  Previously she had complained of fatigue but after receiving her Epogen injection last month she admits to feeling much more energized.  She is due for another Epogen injection today and she tells me she has been feeling a bit fatigued recently but the hope is that she will start to feel energized once again once she gets the injection.    She was evaluated by general surgery on April 10, 2024 and was deemed appropriate to  have a PD catheter placement.  Will continue to follow monthly CMP and CBC with return to office in 2 months    Hypertension:  Current /86, 138/80 on recheck  Currently on amlodipine 5 mg daily, terazosin 2 mg daily. Takes BP pills at night  Low sodium diet recommended  Home BP monitoring recommended  Recommend diet and low impact exercise weight loss and health benefits    Anemia of Chronic Kidney Disease:  Current HGB 9.3. Patient getting monthly injections of epogen 20,000 units.     Iron deficiency anemia  Patient supplementing with daily heme iron polypeptide.     Bone mineral disease of Chronic Kidney Disease:  Secondary Hyperparathyroidism:    Acid/Base/Electrolytes:  Due to CKD/dietary acid load - patient maintained on sodium bicarbonate 650 mg once a day. The three times a day dose caused worsening swelling.         Age related screening:   Your primary caregiver may do yearly screening for colorectal cancer. It is recommended in all men and women over 45 years old. You may have screening earlier if you have colon disease or a family history of colorectal cancer.       Patient Instructions   It was nice seeing you today. Your kidney function is stable. Please follow up with us in 2 months. I have ordered lab work for you to get done before then. In the meantime, please avoid NSAIDs and have a healthy diet and exercise.  Get fasting labwork checked tomorrow to evaluate your kidney function for this month.           ROS:   Review of Systems   Constitutional:  Negative for activity change, appetite change, chills and fever.   Respiratory:  Negative for cough and shortness of breath.    Cardiovascular:  Negative for chest pain.   Gastrointestinal:  Negative for abdominal pain, constipation, diarrhea, nausea and vomiting.   Genitourinary:  Negative for difficulty urinating.   Neurological:  Negative for dizziness and headaches.        Allergies: Methotrexate and Cephalexin    Medications:   Current  Outpatient Medications:     amLODIPine (NORVASC) 5 mg tablet, Take 1 tablet (5 mg total) by mouth daily, Disp: 90 tablet, Rfl: 3    Ascorbic Acid (VITAMIN C) 1000 MG tablet, Take 1,000 mg by mouth daily, Disp: , Rfl:     b complex vitamins capsule, Take 1 capsule by mouth daily, Disp: , Rfl:     ergocalciferol (VITAMIN D2) 50,000 units, Take 1 capsule (50,000 Units total) by mouth once a week On the same day each week, Disp: 12 capsule, Rfl: 1    IRON HEME POLYPEPTIDE PO, Take 1 capsule by mouth in the morning OTC med., Disp: , Rfl:     multivitamin (THERAGRAN) TABS, Take 1 tablet by mouth daily, Disp: , Rfl:     rosuvastatin (CRESTOR) 10 MG tablet, Take 1 tablet (10 mg total) by mouth daily, Disp: 90 tablet, Rfl: 3    saccharomyces boulardii (Florastor) 250 mg capsule, Take 250 mg by mouth daily, Disp: , Rfl:     sodium bicarbonate 650 mg tablet, Take 1 tablet (650 mg total) by mouth 3 (three) times a day with meals, Disp: 90 tablet, Rfl: 3    terazosin (HYTRIN) 1 mg capsule, Take 2 capsules (2 mg total) by mouth daily at bedtime, Disp: 180 capsule, Rfl: 3  No current facility-administered medications for this visit.    Past Medical History:   Diagnosis Date    Acute renal failure (ARF) (HCC)     GERD (gastroesophageal reflux disease)     Hypertension     Neuropathy     Stage 3b chronic kidney disease (HCC)      Past Surgical History:   Procedure Laterality Date    APPENDECTOMY      CHOLECYSTECTOMY      FOOT POSTERIOR RELEASE Left     around ankle    IR BIOPSY KIDNEY RANDOM  12/12/2023    TUBAL LIGATION       Family History   Problem Relation Age of Onset    Hypertension Mother     Cancer Mother     Alzheimer's disease Mother     Parkinsonism Mother     Diabetes Father     Hypertension Father     Cancer Father     Breast cancer Sister 50    No Known Problems Daughter     No Known Problems Maternal Grandmother     No Known Problems Paternal Grandmother     No Known Problems Sister     No Known Problems Sister     No  "Known Problems Maternal Aunt     No Known Problems Maternal Aunt     No Known Problems Maternal Aunt     No Known Problems Paternal Aunt     Breast cancer Paternal Aunt     Breast cancer Paternal Aunt     No Known Problems Paternal Aunt       reports that she quit smoking about 32 years ago. Her smoking use included cigarettes. She started smoking about 36 years ago. She has a 2 pack-year smoking history. She has never used smokeless tobacco. She reports that she does not drink alcohol and does not use drugs.      Physical Exam:   Vitals:    07/26/24 0859   BP: 158/86   BP Location: Left arm   Patient Position: Sitting   Cuff Size: Standard   Pulse: 75   Temp: (!) 95.6 °F (35.3 °C)   SpO2: 95%   Weight: 99 kg (218 lb 3.2 oz)   Height: 5' 6\" (1.676 m)     Body mass index is 35.22 kg/m².  Physical Exam  Constitutional:       General: She is not in acute distress.  HENT:      Head: Normocephalic and atraumatic.   Cardiovascular:      Rate and Rhythm: Normal rate and regular rhythm.      Pulses: Normal pulses.      Heart sounds: No murmur heard.  Pulmonary:      Effort: Pulmonary effort is normal. No respiratory distress.      Breath sounds: Normal breath sounds.   Abdominal:      General: Bowel sounds are normal.      Palpations: Abdomen is soft.      Tenderness: There is no abdominal tenderness.   Musculoskeletal:         General: Swelling present.      Comments: Trace bilateral edema, wears compression socks   Skin:     General: Skin is warm and dry.   Neurological:      General: No focal deficit present.      Mental Status: She is alert.   Psychiatric:         Mood and Affect: Mood normal.         Behavior: Behavior normal.            Lab Results:  Results for orders placed or performed in visit on 07/19/24   CBC and differential   Result Value Ref Range    WBC 7.07 4.31 - 10.16 Thousand/uL    RBC 3.10 (L) 3.81 - 5.12 Million/uL    Hemoglobin 9.3 (L) 11.5 - 15.4 g/dL    Hematocrit 27.9 (L) 34.8 - 46.1 %    MCV 90 82 " "- 98 fL    MCH 30.0 26.8 - 34.3 pg    MCHC 33.3 31.4 - 37.4 g/dL    RDW 12.8 11.6 - 15.1 %    MPV 10.6 8.9 - 12.7 fL    Platelets 229 149 - 390 Thousands/uL    nRBC 0 /100 WBCs    Segmented % 72 43 - 75 %    Immature Grans % 0 0 - 2 %    Lymphocytes % 18 14 - 44 %    Monocytes % 6 4 - 12 %    Eosinophils Relative 3 0 - 6 %    Basophils Relative 1 0 - 1 %    Absolute Neutrophils 5.07 1.85 - 7.62 Thousands/µL    Absolute Immature Grans 0.03 0.00 - 0.20 Thousand/uL    Absolute Lymphocytes 1.28 0.60 - 4.47 Thousands/µL    Absolute Monocytes 0.41 0.17 - 1.22 Thousand/µL    Eosinophils Absolute 0.24 0.00 - 0.61 Thousand/µL    Basophils Absolute 0.04 0.00 - 0.10 Thousands/µL   Phosphorus   Result Value Ref Range    Phosphorus 5.2 (H) 2.7 - 4.5 mg/dL   PTH, intact   Result Value Ref Range    PTH 75.6 12.0 - 88.0 pg/mL             Invalid input(s): \"ALBUMIN\"        Portions of the record may have been created with voice recognition software. Occasional wrong word or \"sound a like\" substitutions may have occurred due to the inherent limitations of voice recognition software. Read the chart carefully and recognize,    "

## 2024-07-27 ENCOUNTER — APPOINTMENT (OUTPATIENT)
Dept: LAB | Facility: HOSPITAL | Age: 54
End: 2024-07-27
Payer: COMMERCIAL

## 2024-07-27 DIAGNOSIS — N18.5 CKD (CHRONIC KIDNEY DISEASE) STAGE 5, GFR LESS THAN 15 ML/MIN (HCC): ICD-10-CM

## 2024-07-27 LAB
ALBUMIN SERPL BCG-MCNC: 4.3 G/DL (ref 3.5–5)
ALP SERPL-CCNC: 58 U/L (ref 34–104)
ALT SERPL W P-5'-P-CCNC: 11 U/L (ref 7–52)
ANION GAP SERPL CALCULATED.3IONS-SCNC: 8 MMOL/L (ref 4–13)
AST SERPL W P-5'-P-CCNC: 12 U/L (ref 13–39)
BILIRUB SERPL-MCNC: 0.35 MG/DL (ref 0.2–1)
BUN SERPL-MCNC: 46 MG/DL (ref 5–25)
CALCIUM SERPL-MCNC: 9.1 MG/DL (ref 8.4–10.2)
CHLORIDE SERPL-SCNC: 106 MMOL/L (ref 96–108)
CO2 SERPL-SCNC: 23 MMOL/L (ref 21–32)
CREAT SERPL-MCNC: 3.92 MG/DL (ref 0.6–1.3)
GFR SERPL CREATININE-BSD FRML MDRD: 12 ML/MIN/1.73SQ M
GLUCOSE P FAST SERPL-MCNC: 86 MG/DL (ref 65–99)
POTASSIUM SERPL-SCNC: 4.5 MMOL/L (ref 3.5–5.3)
PROT SERPL-MCNC: 6.8 G/DL (ref 6.4–8.4)
SODIUM SERPL-SCNC: 137 MMOL/L (ref 135–147)

## 2024-07-27 PROCEDURE — 36415 COLL VENOUS BLD VENIPUNCTURE: CPT

## 2024-07-27 PROCEDURE — 80053 COMPREHEN METABOLIC PANEL: CPT

## 2024-08-21 ENCOUNTER — APPOINTMENT (OUTPATIENT)
Dept: LAB | Facility: HOSPITAL | Age: 54
End: 2024-08-21
Payer: COMMERCIAL

## 2024-08-21 DIAGNOSIS — N18.5 ANEMIA OF CHRONIC KIDNEY FAILURE, STAGE 5  (HCC): ICD-10-CM

## 2024-08-21 DIAGNOSIS — D63.1 ANEMIA OF CHRONIC KIDNEY FAILURE, STAGE 5  (HCC): ICD-10-CM

## 2024-08-21 DIAGNOSIS — N18.5 CKD (CHRONIC KIDNEY DISEASE) STAGE 5, GFR LESS THAN 15 ML/MIN (HCC): ICD-10-CM

## 2024-08-21 LAB
ALBUMIN SERPL BCG-MCNC: 4.3 G/DL (ref 3.5–5)
ALP SERPL-CCNC: 53 U/L (ref 34–104)
ALT SERPL W P-5'-P-CCNC: 13 U/L (ref 7–52)
ANION GAP SERPL CALCULATED.3IONS-SCNC: 9 MMOL/L (ref 4–13)
AST SERPL W P-5'-P-CCNC: 13 U/L (ref 13–39)
BILIRUB SERPL-MCNC: 0.39 MG/DL (ref 0.2–1)
BUN SERPL-MCNC: 52 MG/DL (ref 5–25)
CALCIUM SERPL-MCNC: 9.1 MG/DL (ref 8.4–10.2)
CHLORIDE SERPL-SCNC: 104 MMOL/L (ref 96–108)
CO2 SERPL-SCNC: 22 MMOL/L (ref 21–32)
CREAT SERPL-MCNC: 3.91 MG/DL (ref 0.6–1.3)
ERYTHROCYTE [DISTWIDTH] IN BLOOD BY AUTOMATED COUNT: 13.2 % (ref 11.6–15.1)
GFR SERPL CREATININE-BSD FRML MDRD: 12 ML/MIN/1.73SQ M
GLUCOSE P FAST SERPL-MCNC: 91 MG/DL (ref 65–99)
HCT VFR BLD AUTO: 29 % (ref 34.8–46.1)
HGB BLD-MCNC: 9.5 G/DL (ref 11.5–15.4)
MCH RBC QN AUTO: 29.6 PG (ref 26.8–34.3)
MCHC RBC AUTO-ENTMCNC: 32.8 G/DL (ref 31.4–37.4)
MCV RBC AUTO: 90 FL (ref 82–98)
PHOSPHATE SERPL-MCNC: 5.5 MG/DL (ref 2.7–4.5)
PLATELET # BLD AUTO: 227 THOUSANDS/UL (ref 149–390)
PMV BLD AUTO: 10.5 FL (ref 8.9–12.7)
POTASSIUM SERPL-SCNC: 4.5 MMOL/L (ref 3.5–5.3)
PROT SERPL-MCNC: 7.2 G/DL (ref 6.4–8.4)
RBC # BLD AUTO: 3.21 MILLION/UL (ref 3.81–5.12)
SODIUM SERPL-SCNC: 135 MMOL/L (ref 135–147)
WBC # BLD AUTO: 7.82 THOUSAND/UL (ref 4.31–10.16)

## 2024-08-21 PROCEDURE — 80053 COMPREHEN METABOLIC PANEL: CPT

## 2024-08-21 PROCEDURE — 84100 ASSAY OF PHOSPHORUS: CPT

## 2024-08-21 PROCEDURE — 36415 COLL VENOUS BLD VENIPUNCTURE: CPT

## 2024-08-21 PROCEDURE — 85027 COMPLETE CBC AUTOMATED: CPT

## 2024-08-23 ENCOUNTER — HOSPITAL ENCOUNTER (OUTPATIENT)
Dept: INFUSION CENTER | Facility: HOSPITAL | Age: 54
End: 2024-08-23
Attending: INTERNAL MEDICINE
Payer: COMMERCIAL

## 2024-08-23 VITALS
HEART RATE: 75 BPM | TEMPERATURE: 98.4 F | SYSTOLIC BLOOD PRESSURE: 155 MMHG | DIASTOLIC BLOOD PRESSURE: 70 MMHG | OXYGEN SATURATION: 100 % | RESPIRATION RATE: 18 BRPM

## 2024-08-23 DIAGNOSIS — N18.5 ANEMIA OF CHRONIC KIDNEY FAILURE, STAGE 5  (HCC): Primary | ICD-10-CM

## 2024-08-23 DIAGNOSIS — D63.1 ANEMIA OF CHRONIC KIDNEY FAILURE, STAGE 5  (HCC): Primary | ICD-10-CM

## 2024-08-23 PROCEDURE — 96372 THER/PROPH/DIAG INJ SC/IM: CPT

## 2024-08-23 RX ADMIN — EPOETIN ALFA 20000 UNITS: 20000 SOLUTION INTRAVENOUS; SUBCUTANEOUS at 08:19

## 2024-08-23 NOTE — PROGRESS NOTES
Preprocedure diagnosis:  1. Atherosclerosis of native arteries bilateral lower extremities with claudication  2. High-grade right superficial femoral artery in-stent stenosis on duplex surveillance  3. History of left femoral to below-knee popliteal artery bypass status post coil embolization of a large greater saphenous vein branch approximately 6 months prior    Post procedure diagnosis: Same    Procedure:  1. Ultrasound-guided cannulation left common femoral artery with 5 Western Ester and later 7 Western Ester destination sheath  2. Selective right lower extremity arteriograms  3. Placement of right popliteal artery distal embolic protection device (emboshield filter)  4. Atherectomy of right superficial femoral artery in-stent stenosis (jetstream 2.1/3.0)  5. Balloon angioplasty right superficial femoral artery in-stent stenosis ( 6 mm x 40 mm, Lutonix 6 mm x 60 mm drug-coated balloon)  6. Completion right lower extremity arteriograms  7. Left lower extremity arteriograms  8. Mynx closure left common femoral artery puncture site    Surgeon: Shelby Severino MD    Anesthesia: Intravenous/moderate sedation plus local    Estimated blood loss: Less than 50 mL    Findings:  1. There is an 80 to 90% fairly focal stenosis in the proximal 3 cm of the right superficial femoral artery stents. The right popliteal, anterior tibial, tibioperoneal trunk, peroneal, and posterior tibial arteries are all fairly widely patent. 2.  The patient has a widely patent mid/distal left femoral to below-knee popliteal artery bypass. The branch that was previously embolized with coils is no longer patent. The distal anastomosis is widely patent. Procedure in detail:    After the patient was consented and given intravenous antibiotics, he was brought to angiography and placed on the angiography suite table supine position.   Intravenous/moderate sedation was administered and both groins were prepped and draped in usual sterile Saige Haji  tolerated treatment well with no complications.  Epogen to ERIC.  Hgb 9.5.    Saige Haji is aware of future appt on 9/20 at 8 am.     AVS declined by Saige Haji.     distal embolization. The long 7 [place] sheath was removed and replaced with a short 7 Western Ester sheath. Selective left lower extremity arteriograms were performed with the above findings. The left common femoral artery sheath was removed and the puncture site closed with a minx device. The patient tolerated the procedure well. He was brought to cath holding in good condition.

## 2024-09-06 ENCOUNTER — OFFICE VISIT (OUTPATIENT)
Dept: NEPHROLOGY | Facility: CLINIC | Age: 54
End: 2024-09-06
Payer: COMMERCIAL

## 2024-09-06 VITALS
WEIGHT: 221 LBS | DIASTOLIC BLOOD PRESSURE: 98 MMHG | BODY MASS INDEX: 34.69 KG/M2 | TEMPERATURE: 97.4 F | OXYGEN SATURATION: 98 % | HEART RATE: 78 BPM | HEIGHT: 67 IN | SYSTOLIC BLOOD PRESSURE: 158 MMHG

## 2024-09-06 DIAGNOSIS — I10 ESSENTIAL HYPERTENSION: Chronic | ICD-10-CM

## 2024-09-06 DIAGNOSIS — D63.1 ANEMIA OF CHRONIC KIDNEY FAILURE, STAGE 5  (HCC): ICD-10-CM

## 2024-09-06 DIAGNOSIS — N03.1 HYPERFILTRATION FOCAL SEGMENTAL GLOMERULOSCLEROSIS: ICD-10-CM

## 2024-09-06 DIAGNOSIS — N18.5 CKD (CHRONIC KIDNEY DISEASE) STAGE 5, GFR LESS THAN 15 ML/MIN (HCC): Primary | ICD-10-CM

## 2024-09-06 DIAGNOSIS — N18.5 ANEMIA OF CHRONIC KIDNEY FAILURE, STAGE 5  (HCC): ICD-10-CM

## 2024-09-06 DIAGNOSIS — E87.20 ACIDOSIS, METABOLIC: ICD-10-CM

## 2024-09-06 DIAGNOSIS — E87.20 ACIDOSIS: ICD-10-CM

## 2024-09-06 PROBLEM — N18.4 ANEMIA DUE TO STAGE 4 CHRONIC KIDNEY DISEASE  (HCC): Status: RESOLVED | Noted: 2023-06-29 | Resolved: 2024-09-06

## 2024-09-06 PROCEDURE — 99214 OFFICE O/P EST MOD 30 MIN: CPT | Performed by: INTERNAL MEDICINE

## 2024-09-06 RX ORDER — SODIUM BICARBONATE 650 MG/1
650 TABLET ORAL DAILY
Start: 2024-09-06

## 2024-09-06 NOTE — ASSESSMENT & PLAN NOTE
Continue with ELIZABETH and injection once monthly.  Hemoglobin has increased up to 9.5 g/deciliter.  Goal is to have the patient between 10-11 g/deciliter.

## 2024-09-06 NOTE — ASSESSMENT & PLAN NOTE
This is a presumed diagnosis, as noted previously, the patient had a biopsy December 2023 but there was not an adequate sample and at this point we have decided to not have a repeat attempt for renal biopsy.

## 2024-09-06 NOTE — ASSESSMENT & PLAN NOTE
Continue sodium bicarb and supplementation, patient's current bicarbonate of 22 mmol/L is at goal.

## 2024-09-06 NOTE — ASSESSMENT & PLAN NOTE
Lab Results   Component Value Date    EGFR 12 08/21/2024    EGFR 12 07/27/2024    EGFR 12 06/21/2024    CREATININE 3.91 (H) 08/21/2024    CREATININE 3.92 (H) 07/27/2024    CREATININE 3.85 (H) 06/21/2024     Patient remains asymptomatic from the uremia standpoint, estimate GFR has been stable at around 12 mL/min.  Continue to monitor blood work once a month for now, with the patient knowing to give us a call if she becomes uremic.  Will try to time this to allow the patient to have PD catheter placed prior to needing to have urgent dialysis.    Discussed preemptive PD catheter placement

## 2024-09-06 NOTE — PROGRESS NOTES
[facility]  Yuri Hennessy DO    Name: Saige Haji  YOB: 1970      Assessment/Plan:    CKD (chronic kidney disease) stage 5, GFR less than 15 ml/min (Formerly [facility])  Lab Results   Component Value Date    EGFR 12 08/21/2024    EGFR 12 07/27/2024    EGFR 12 06/21/2024    CREATININE 3.91 (H) 08/21/2024    CREATININE 3.92 (H) 07/27/2024    CREATININE 3.85 (H) 06/21/2024     Patient remains asymptomatic from the uremia standpoint, estimate GFR has been stable at around 12 mL/min.  Continue to monitor blood work once a month for now, with the patient knowing to give us a call if she becomes uremic.  Will try to time this to allow the patient to have PD catheter placed prior to needing to have urgent dialysis.    Discussed preemptive PD catheter placement    Hyperfiltration focal segmental glomerulosclerosis  This is a presumed diagnosis, as noted previously, the patient had a biopsy December 2023 but there was not an adequate sample and at this point we have decided to not have a repeat attempt for renal biopsy.    Anemia of chronic kidney failure, stage 5  (Formerly [facility])  Continue with ELIZABETH and injection once monthly.  Hemoglobin has increased up to 9.5 g/deciliter.  Goal is to have the patient between 10-11 g/deciliter.    Essential hypertension  Blood pressure controlled at this time, continue to with low-sodium diet, and amlodipine.    Acidosis  Continue sodium bicarb and supplementation, patient's current bicarbonate of 22 mmol/L is at goal.         Problem List Items Addressed This Visit          Cardiovascular and Mediastinum    Essential hypertension (Chronic)     Blood pressure controlled at this time, continue to with low-sodium diet, and amlodipine.            Genitourinary    Hyperfiltration focal segmental glomerulosclerosis     This is a presumed diagnosis, as noted previously, the patient had a biopsy December 2023 but there was not an adequate sample and at this  point we have decided to not have a repeat attempt for renal biopsy.         CKD (chronic kidney disease) stage 5, GFR less than 15 ml/min (HCC) - Primary     Lab Results   Component Value Date    EGFR 12 08/21/2024    EGFR 12 07/27/2024    EGFR 12 06/21/2024    CREATININE 3.91 (H) 08/21/2024    CREATININE 3.92 (H) 07/27/2024    CREATININE 3.85 (H) 06/21/2024     Patient remains asymptomatic from the uremia standpoint, estimate GFR has been stable at around 12 mL/min.  Continue to monitor blood work once a month for now, with the patient knowing to give us a call if she becomes uremic.  Will try to time this to allow the patient to have PD catheter placed prior to needing to have urgent dialysis.    Discussed preemptive PD catheter placement            Blood    Anemia of chronic kidney failure, stage 5  (HCC)     Continue with ELIZABETH and injection once monthly.  Hemoglobin has increased up to 9.5 g/deciliter.  Goal is to have the patient between 10-11 g/deciliter.            Other    Acidosis     Continue sodium bicarb and supplementation, patient's current bicarbonate of 22 mmol/L is at goal.          Other Visit Diagnoses       Acidosis, metabolic        Relevant Medications    sodium bicarbonate 650 mg tablet            Patient is a over the renal standpoint, we will see her back for regular appointment in 3 months, sooner if clinically indicated.    With respect to PD catheter placement patient may need to have procedure done sooner rather than later.  May consider bearing the catheter, however, depending on how she progresses from the transplant standpoint, it may not be necessary.    Subjective:      Patient ID: Saige Haji is a 54 y.o. female.    Patient presents for follow up.    We reviewed labs in detail, most recent creatinine noted to be 3.91 mg/dL, estimate GFR is 12 mL/min.    There were no significant electrolyte abnormalities noted.  Patient is taking all medications as prescribed with no specific  side effects and denies use of nonsteroid anti-inflammatory medications.              The following portions of the patient's history were reviewed and updated as appropriate: allergies, current medications, past family history, past medical history, past social history, past surgical history and problem list.    Review of Systems   All other systems reviewed and are negative.        Social History     Socioeconomic History    Marital status: /Civil Union     Spouse name: Not on file    Number of children: Not on file    Years of education: Not on file    Highest education level: Not on file   Occupational History    Not on file   Tobacco Use    Smoking status: Former     Current packs/day: 0.00     Average packs/day: 0.5 packs/day for 4.0 years (2.0 ttl pk-yrs)     Types: Cigarettes     Start date:      Quit date:      Years since quittin.7    Smokeless tobacco: Never   Vaping Use    Vaping status: Never Used   Substance and Sexual Activity    Alcohol use: Never    Drug use: No    Sexual activity: Not on file   Other Topics Concern    Not on file   Social History Narrative    Not on file     Social Determinants of Health     Financial Resource Strain: Not on file   Food Insecurity: Not on file   Transportation Needs: Not on file   Physical Activity: Not on file   Stress: Not on file   Social Connections: Unknown (2024)    Received from [employer] [facility]     How often do you feel lonely or isolated from those around you? (Adult - for ages 18 years and over): Not on file   Intimate Partner Violence: Not on file   Housing Stability: Not on file     Past Medical History:   Diagnosis Date    Acute renal failure (ARF) (HCC)     GERD (gastroesophageal reflux disease)     Hypertension     Neuropathy     Stage 3b chronic kidney disease (HCC)      Past Surgical History:   Procedure Laterality Date    APPENDECTOMY      CHOLECYSTECTOMY      FOOT POSTERIOR RELEASE Left     around ankle     IR BIOPSY KIDNEY RANDOM  12/12/2023    TUBAL LIGATION         Current Outpatient Medications:     amLODIPine (NORVASC) 5 mg tablet, Take 1 tablet (5 mg total) by mouth daily, Disp: 90 tablet, Rfl: 3    b complex vitamins capsule, Take 1 capsule by mouth daily, Disp: , Rfl:     ergocalciferol (VITAMIN D2) 50,000 units, Take 1 capsule (50,000 Units total) by mouth once a week On the same day each week, Disp: 12 capsule, Rfl: 1    IRON HEME POLYPEPTIDE PO, Take 1 capsule by mouth in the morning OTC med., Disp: , Rfl:     multivitamin (THERAGRAN) TABS, Take 1 tablet by mouth daily, Disp: , Rfl:     rosuvastatin (CRESTOR) 10 MG tablet, Take 1 tablet (10 mg total) by mouth daily, Disp: 90 tablet, Rfl: 3    saccharomyces boulardii (Florastor) 250 mg capsule, Take 250 mg by mouth daily, Disp: , Rfl:     sodium bicarbonate 650 mg tablet, Take 1 tablet (650 mg total) by mouth in the morning, Disp: , Rfl:     terazosin (HYTRIN) 1 mg capsule, Take 2 capsules (2 mg total) by mouth daily at bedtime, Disp: 180 capsule, Rfl: 3    Lab Results   Component Value Date    SODIUM 135 08/21/2024    K 4.5 08/21/2024     08/21/2024    CO2 22 08/21/2024    AGAP 9 08/21/2024    BUN 52 (H) 08/21/2024    CREATININE 3.91 (H) 08/21/2024    GLUC 93 01/29/2024    GLUF 91 08/21/2024    CALCIUM 9.1 08/21/2024    AST 13 08/21/2024    ALT 13 08/21/2024    ALKPHOS 53 08/21/2024    TP 7.2 08/21/2024    TBILI 0.39 08/21/2024    EGFR 12 08/21/2024     Lab Results   Component Value Date    WBC 7.82 08/21/2024    HGB 9.5 (L) 08/21/2024    HCT 29.0 (L) 08/21/2024    MCV 90 08/21/2024     08/21/2024     Lab Results   Component Value Date    CHOLESTEROL 153 05/11/2024    CHOLESTEROL 154 08/19/2023    CHOLESTEROL 214 (H) 06/23/2023     Lab Results   Component Value Date    HDL 49 (L) 05/11/2024    HDL 69 08/19/2023    HDL 59 06/23/2023     Lab Results   Component Value Date    LDLCALC 76 05/11/2024    LDLCALC 54 08/19/2023    LDLCALC 110 (H)  "06/23/2023     Lab Results   Component Value Date    TRIG 142 05/11/2024    TRIG 153 (H) 08/19/2023    TRIG 224 (H) 06/23/2023     No results found for: \"CHOLHDL\"  Lab Results   Component Value Date    THW7VFDQMCSE 2.910 05/11/2024    TSH 2.54 09/07/2018     Lab Results   Component Value Date    PTH 75.6 07/19/2024    CALCIUM 9.1 08/21/2024    PHOS 5.5 (H) 08/21/2024     Lab Results   Component Value Date    SPEP See Comment 07/01/2023    UPEP See Comment 07/01/2023     No results found for: \"MICROALBUR\", \"AMCP60AAJ\"        Objective:      /98 (BP Location: Left arm, Patient Position: Sitting, Cuff Size: Standard)   Pulse 78   Temp (!) 97.4 °F (36.3 °C) (Temporal)   Ht 5' 7\" (1.702 m)   Wt 100 kg (221 lb)   SpO2 98%   BMI 34.61 kg/m²          Physical Exam  Vitals reviewed.   Constitutional:       General: She is not in acute distress.     Appearance: Normal appearance.   HENT:      Head: Normocephalic and atraumatic.      Right Ear: External ear normal.      Left Ear: External ear normal.   Eyes:      Conjunctiva/sclera: Conjunctivae normal.   Cardiovascular:      Rate and Rhythm: Normal rate and regular rhythm.      Heart sounds: Normal heart sounds.   Pulmonary:      Effort: No respiratory distress.      Breath sounds: No wheezing.   Abdominal:      Palpations: Abdomen is soft.   Skin:     General: Skin is warm and dry.   Neurological:      General: No focal deficit present.      Mental Status: She is alert and oriented to person, place, and time.   Psychiatric:         Mood and Affect: Mood normal.         Behavior: Behavior normal.         "

## 2024-09-14 ENCOUNTER — APPOINTMENT (OUTPATIENT)
Dept: LAB | Facility: HOSPITAL | Age: 54
End: 2024-09-14
Payer: COMMERCIAL

## 2024-09-14 DIAGNOSIS — D63.1 ANEMIA OF CHRONIC KIDNEY FAILURE, STAGE 5  (HCC): ICD-10-CM

## 2024-09-14 DIAGNOSIS — N18.5 ANEMIA OF CHRONIC KIDNEY FAILURE, STAGE 5  (HCC): ICD-10-CM

## 2024-09-14 DIAGNOSIS — N18.5 CKD (CHRONIC KIDNEY DISEASE) STAGE 5, GFR LESS THAN 15 ML/MIN (HCC): ICD-10-CM

## 2024-09-14 LAB
ALBUMIN SERPL BCG-MCNC: 4.4 G/DL (ref 3.5–5)
ALP SERPL-CCNC: 56 U/L (ref 34–104)
ALT SERPL W P-5'-P-CCNC: 13 U/L (ref 7–52)
ANION GAP SERPL CALCULATED.3IONS-SCNC: 9 MMOL/L (ref 4–13)
AST SERPL W P-5'-P-CCNC: 13 U/L (ref 13–39)
BILIRUB SERPL-MCNC: 0.42 MG/DL (ref 0.2–1)
BUN SERPL-MCNC: 65 MG/DL (ref 5–25)
CALCIUM SERPL-MCNC: 9.1 MG/DL (ref 8.4–10.2)
CHLORIDE SERPL-SCNC: 106 MMOL/L (ref 96–108)
CO2 SERPL-SCNC: 21 MMOL/L (ref 21–32)
CREAT SERPL-MCNC: 4.38 MG/DL (ref 0.6–1.3)
ERYTHROCYTE [DISTWIDTH] IN BLOOD BY AUTOMATED COUNT: 13.4 % (ref 11.6–15.1)
GFR SERPL CREATININE-BSD FRML MDRD: 10 ML/MIN/1.73SQ M
GLUCOSE P FAST SERPL-MCNC: 96 MG/DL (ref 65–99)
HCT VFR BLD AUTO: 30.7 % (ref 34.8–46.1)
HGB BLD-MCNC: 10.1 G/DL (ref 11.5–15.4)
MCH RBC QN AUTO: 29.5 PG (ref 26.8–34.3)
MCHC RBC AUTO-ENTMCNC: 32.9 G/DL (ref 31.4–37.4)
MCV RBC AUTO: 90 FL (ref 82–98)
PLATELET # BLD AUTO: 227 THOUSANDS/UL (ref 149–390)
PMV BLD AUTO: 10.1 FL (ref 8.9–12.7)
POTASSIUM SERPL-SCNC: 4.4 MMOL/L (ref 3.5–5.3)
PROT SERPL-MCNC: 7.3 G/DL (ref 6.4–8.4)
RBC # BLD AUTO: 3.42 MILLION/UL (ref 3.81–5.12)
SODIUM SERPL-SCNC: 136 MMOL/L (ref 135–147)
WBC # BLD AUTO: 7.49 THOUSAND/UL (ref 4.31–10.16)

## 2024-09-14 PROCEDURE — 80053 COMPREHEN METABOLIC PANEL: CPT

## 2024-09-14 PROCEDURE — 85027 COMPLETE CBC AUTOMATED: CPT

## 2024-09-14 PROCEDURE — 36415 COLL VENOUS BLD VENIPUNCTURE: CPT

## 2024-09-16 ENCOUNTER — TELEPHONE (OUTPATIENT)
Dept: NEPHROLOGY | Facility: CLINIC | Age: 54
End: 2024-09-16

## 2024-09-16 NOTE — TELEPHONE ENCOUNTER
Patient called- I read her Dr. Munoz's note:    ---- Message from Emory Munoz PA-C sent at 9/16/2024 12:25 PM EDT -----  Patient's kidney function is worse at this time, with her GFR declining from 12 to 10. If the patient is becoming uremic or having signs/symptoms of uremia please let us know. If this is the case we may need to start thinking about dialysis soon.      She verbalized understanding. She is not having any symptoms of uremia or any symptoms in general. She said her hgb came back at 10.1, should she still get the Epogen shot on Friday or hold off? Please advise, [phone].

## 2024-09-16 NOTE — TELEPHONE ENCOUNTER
----- Message from Emory Munoz PA-C sent at 9/16/2024 12:25 PM EDT -----  Patient's kidney function is worse at this time, with her GFR declining from 12 to 10. If the patient is becoming uremic or having signs/symptoms of uremia please let us know. If this is the case we may need to start thinking about dialysis soon.

## 2024-09-17 ENCOUNTER — TELEPHONE (OUTPATIENT)
Age: 54
End: 2024-09-17

## 2024-09-17 DIAGNOSIS — N18.5 CKD (CHRONIC KIDNEY DISEASE) STAGE 5, GFR LESS THAN 15 ML/MIN (HCC): Primary | ICD-10-CM

## 2024-09-17 NOTE — TELEPHONE ENCOUNTER
Discussed with Dr PALAFOX regarding decreased in GFR from 12 to 10. Please call patient and have her check another blood test in 2 weeks. If GFR is less than 10 at that point we will have her start the process of getting a PD catheter placed. Thank you

## 2024-09-17 NOTE — TELEPHONE ENCOUNTER
"Called and spoke to pt, regarding the following message:    \"Discussed with Dr PALAFOX regarding decreased in GFR from 12 to 10. Please call patient and have her check another blood test in 2 weeks. If GFR is less than 10 at that point we will have her start the process of getting a PD catheter placed. Thank you.\"        Pt verbalized understanding, she will get Labwork completed in 2 weeks.  "

## 2024-09-20 ENCOUNTER — HOSPITAL ENCOUNTER (OUTPATIENT)
Dept: INFUSION CENTER | Facility: HOSPITAL | Age: 54
End: 2024-09-20
Attending: INTERNAL MEDICINE
Payer: COMMERCIAL

## 2024-09-20 VITALS — TEMPERATURE: 97.1 F

## 2024-09-20 DIAGNOSIS — D63.1 ANEMIA OF CHRONIC KIDNEY FAILURE, STAGE 5  (HCC): Primary | ICD-10-CM

## 2024-09-20 DIAGNOSIS — N18.5 ANEMIA OF CHRONIC KIDNEY FAILURE, STAGE 5  (HCC): Primary | ICD-10-CM

## 2024-09-20 PROCEDURE — 96372 THER/PROPH/DIAG INJ SC/IM: CPT

## 2024-09-20 RX ADMIN — EPOETIN ALFA 20000 UNITS: 20000 SOLUTION INTRAVENOUS; SUBCUTANEOUS at 08:01

## 2024-09-20 NOTE — PROGRESS NOTES
Saige Haji  tolerated Epogen injection to right arm well with no complications.      Saige Haji is aware of future appt on 10/18 at 9:30AM.     AVS printed and given to Saige Haji: No (Declined by Saige Haji).

## 2024-09-27 ENCOUNTER — APPOINTMENT (OUTPATIENT)
Dept: LAB | Facility: HOSPITAL | Age: 54
End: 2024-09-27
Payer: COMMERCIAL

## 2024-09-27 DIAGNOSIS — N18.5 CKD (CHRONIC KIDNEY DISEASE) STAGE 5, GFR LESS THAN 15 ML/MIN (HCC): ICD-10-CM

## 2024-09-27 LAB
ALBUMIN SERPL BCG-MCNC: 4.4 G/DL (ref 3.5–5)
ALP SERPL-CCNC: 59 U/L (ref 34–104)
ALT SERPL W P-5'-P-CCNC: 12 U/L (ref 7–52)
ANION GAP SERPL CALCULATED.3IONS-SCNC: 10 MMOL/L (ref 4–13)
AST SERPL W P-5'-P-CCNC: 13 U/L (ref 13–39)
BILIRUB SERPL-MCNC: 0.4 MG/DL (ref 0.2–1)
BUN SERPL-MCNC: 55 MG/DL (ref 5–25)
CALCIUM SERPL-MCNC: 9.2 MG/DL (ref 8.4–10.2)
CHLORIDE SERPL-SCNC: 103 MMOL/L (ref 96–108)
CO2 SERPL-SCNC: 20 MMOL/L (ref 21–32)
CREAT SERPL-MCNC: 4.25 MG/DL (ref 0.6–1.3)
ERYTHROCYTE [DISTWIDTH] IN BLOOD BY AUTOMATED COUNT: 14.1 % (ref 11.6–15.1)
GFR SERPL CREATININE-BSD FRML MDRD: 11 ML/MIN/1.73SQ M
GLUCOSE P FAST SERPL-MCNC: 91 MG/DL (ref 65–99)
HCT VFR BLD AUTO: 32.1 % (ref 34.8–46.1)
HGB BLD-MCNC: 10.4 G/DL (ref 11.5–15.4)
MCH RBC QN AUTO: 29.5 PG (ref 26.8–34.3)
MCHC RBC AUTO-ENTMCNC: 32.4 G/DL (ref 31.4–37.4)
MCV RBC AUTO: 91 FL (ref 82–98)
PLATELET # BLD AUTO: 276 THOUSANDS/UL (ref 149–390)
PMV BLD AUTO: 10.4 FL (ref 8.9–12.7)
POTASSIUM SERPL-SCNC: 4.9 MMOL/L (ref 3.5–5.3)
PROT SERPL-MCNC: 7.3 G/DL (ref 6.4–8.4)
RBC # BLD AUTO: 3.53 MILLION/UL (ref 3.81–5.12)
SODIUM SERPL-SCNC: 133 MMOL/L (ref 135–147)
WBC # BLD AUTO: 8.84 THOUSAND/UL (ref 4.31–10.16)

## 2024-09-27 PROCEDURE — 80053 COMPREHEN METABOLIC PANEL: CPT

## 2024-09-27 PROCEDURE — 85027 COMPLETE CBC AUTOMATED: CPT

## 2024-09-27 PROCEDURE — 36415 COLL VENOUS BLD VENIPUNCTURE: CPT

## 2024-09-30 ENCOUNTER — TELEPHONE (OUTPATIENT)
Dept: NEPHROLOGY | Facility: CLINIC | Age: 54
End: 2024-09-30

## 2024-09-30 NOTE — TELEPHONE ENCOUNTER
I called and spoke with Saige regarding the following:    ----- Message from Emory Munoz PA-C sent at 9/30/2024  3:28 PM EDT -----  Lab results are stable, GFR is down to 11 but up from 10 2 weeks ago    She is aware of results. She had no questions or concerns.

## 2024-10-04 NOTE — TELEPHONE ENCOUNTER
If she is not feeling well, then we should definitely have the PD catheter placed in preparation for initiation of peritoneal dialysis.  If she is feeling okay, then we can continue to hold off for now, check labs in a few weeks and go from there.  I would err on the side of caution, so if she is not feeling well, then please have the catheter placed.

## 2024-10-04 NOTE — TELEPHONE ENCOUNTER
Called and spoke with patient:    If she is not feeling well, then we should definitely have the PD catheter placed in preparation for initiation of peritoneal dialysis.  If she is feeling okay, then we can continue to hold off for now, check labs in a few weeks and go from there.  I would err on the side of caution, so if she is not feeling well, then please have the catheter placed.     She has an appt on 10/9/24 she will keep with Dr. House for consult.

## 2024-10-04 NOTE — TELEPHONE ENCOUNTER
Patient calling asking who she should contact the get the port placed? Is it Dr. Hennessy or Dr. House. Please advise.

## 2024-10-09 ENCOUNTER — TELEPHONE (OUTPATIENT)
Dept: SURGERY | Facility: CLINIC | Age: 54
End: 2024-10-09

## 2024-10-09 ENCOUNTER — OFFICE VISIT (OUTPATIENT)
Dept: SURGERY | Facility: CLINIC | Age: 54
End: 2024-10-09
Payer: COMMERCIAL

## 2024-10-09 ENCOUNTER — PREP FOR PROCEDURE (OUTPATIENT)
Dept: SURGERY | Facility: CLINIC | Age: 54
End: 2024-10-09

## 2024-10-09 VITALS
HEIGHT: 67 IN | HEART RATE: 72 BPM | BODY MASS INDEX: 34.53 KG/M2 | TEMPERATURE: 98.4 F | DIASTOLIC BLOOD PRESSURE: 86 MMHG | WEIGHT: 220 LBS | SYSTOLIC BLOOD PRESSURE: 140 MMHG | OXYGEN SATURATION: 99 %

## 2024-10-09 DIAGNOSIS — N18.5 CHRONIC KIDNEY DISEASE, STAGE V (HCC): Primary | ICD-10-CM

## 2024-10-09 DIAGNOSIS — N18.5 CKD (CHRONIC KIDNEY DISEASE) STAGE 5, GFR LESS THAN 15 ML/MIN (HCC): Primary | ICD-10-CM

## 2024-10-09 PROCEDURE — 99214 OFFICE O/P EST MOD 30 MIN: CPT | Performed by: SURGERY

## 2024-10-09 NOTE — TELEPHONE ENCOUNTER
LM for patient re: procedure date, asked for call back for confirmation and to schedule a 2wk post op.

## 2024-10-11 ENCOUNTER — TELEPHONE (OUTPATIENT)
Dept: OTHER | Facility: HOSPITAL | Age: 54
End: 2024-10-11

## 2024-10-11 ENCOUNTER — TELEPHONE (OUTPATIENT)
Dept: NEPHROLOGY | Facility: CLINIC | Age: 54
End: 2024-10-11

## 2024-10-11 ENCOUNTER — APPOINTMENT (OUTPATIENT)
Dept: LAB | Facility: HOSPITAL | Age: 54
End: 2024-10-11
Payer: COMMERCIAL

## 2024-10-11 ENCOUNTER — TELEPHONE (OUTPATIENT)
Age: 54
End: 2024-10-11

## 2024-10-11 DIAGNOSIS — N18.5 ANEMIA OF CHRONIC KIDNEY FAILURE, STAGE 5  (HCC): ICD-10-CM

## 2024-10-11 DIAGNOSIS — D63.1 ANEMIA OF CHRONIC KIDNEY FAILURE, STAGE 5  (HCC): ICD-10-CM

## 2024-10-11 DIAGNOSIS — N18.6 ESRD ON PERITONEAL DIALYSIS (HCC): Primary | ICD-10-CM

## 2024-10-11 DIAGNOSIS — Z99.2 ESRD ON PERITONEAL DIALYSIS (HCC): Primary | ICD-10-CM

## 2024-10-11 DIAGNOSIS — N18.5 CKD (CHRONIC KIDNEY DISEASE) STAGE 5, GFR LESS THAN 15 ML/MIN (HCC): ICD-10-CM

## 2024-10-11 LAB
ALBUMIN SERPL BCG-MCNC: 4.3 G/DL (ref 3.5–5)
ALP SERPL-CCNC: 57 U/L (ref 34–104)
ALT SERPL W P-5'-P-CCNC: 15 U/L (ref 7–52)
ANION GAP SERPL CALCULATED.3IONS-SCNC: 9 MMOL/L (ref 4–13)
AST SERPL W P-5'-P-CCNC: 14 U/L (ref 13–39)
BILIRUB SERPL-MCNC: 0.42 MG/DL (ref 0.2–1)
BUN SERPL-MCNC: 58 MG/DL (ref 5–25)
CALCIUM SERPL-MCNC: 9.1 MG/DL (ref 8.4–10.2)
CHLORIDE SERPL-SCNC: 106 MMOL/L (ref 96–108)
CO2 SERPL-SCNC: 21 MMOL/L (ref 21–32)
CREAT SERPL-MCNC: 3.87 MG/DL (ref 0.6–1.3)
ERYTHROCYTE [DISTWIDTH] IN BLOOD BY AUTOMATED COUNT: 13.7 % (ref 11.6–15.1)
GFR SERPL CREATININE-BSD FRML MDRD: 12 ML/MIN/1.73SQ M
GLUCOSE P FAST SERPL-MCNC: 93 MG/DL (ref 65–99)
HCT VFR BLD AUTO: 32.6 % (ref 34.8–46.1)
HGB BLD-MCNC: 10.5 G/DL (ref 11.5–15.4)
MCH RBC QN AUTO: 29.1 PG (ref 26.8–34.3)
MCHC RBC AUTO-ENTMCNC: 32.2 G/DL (ref 31.4–37.4)
MCV RBC AUTO: 90 FL (ref 82–98)
PLATELET # BLD AUTO: 208 THOUSANDS/UL (ref 149–390)
PMV BLD AUTO: 9.8 FL (ref 8.9–12.7)
POTASSIUM SERPL-SCNC: 4.3 MMOL/L (ref 3.5–5.3)
PROT SERPL-MCNC: 7.2 G/DL (ref 6.4–8.4)
RBC # BLD AUTO: 3.61 MILLION/UL (ref 3.81–5.12)
SODIUM SERPL-SCNC: 136 MMOL/L (ref 135–147)
WBC # BLD AUTO: 7.55 THOUSAND/UL (ref 4.31–10.16)

## 2024-10-11 PROCEDURE — 36415 COLL VENOUS BLD VENIPUNCTURE: CPT

## 2024-10-11 PROCEDURE — 80053 COMPREHEN METABOLIC PANEL: CPT

## 2024-10-11 PROCEDURE — 85027 COMPLETE CBC AUTOMATED: CPT

## 2024-10-11 RX ORDER — SODIUM CHLORIDE 9 MG/ML
75 INJECTION, SOLUTION INTRAVENOUS CONTINUOUS
OUTPATIENT
Start: 2024-10-11

## 2024-10-11 RX ORDER — HEPARIN SODIUM 5000 [USP'U]/ML
5000 INJECTION, SOLUTION INTRAVENOUS; SUBCUTANEOUS ONCE
OUTPATIENT
Start: 2024-10-11 | End: 2024-10-11

## 2024-10-11 NOTE — TELEPHONE ENCOUNTER
[facility] Admission paperwork started.  Admission form, H&P, and face sheet faxed to [facility] 10-11-24.

## 2024-10-11 NOTE — TELEPHONE ENCOUNTER
I calleLabs continue to remain stable.  In fact there is a slight improvement in kidney function this month.       ----- Message from Emory Munoz PA-C sent at 10/11/2024  9:35 AM EDT -----  Labs continue to remain stable.  In fact there is a slight improvement in kidney function this month

## 2024-10-11 NOTE — TELEPHONE ENCOUNTER
Raffy, nurse from [facility], states patient will be having a PD catheter placed on 10-25 and started dialysis 11-1. Requests admission to be started. Please advise, thank you.

## 2024-10-12 NOTE — PROGRESS NOTES
Ambulatory Visit  Name: Saige Haji      : 1970      MRN: [id number]  Encounter Provider: Cirilo House DO  Encounter Date: 10/9/2024   Encounter department: [facility]    Assessment & Plan  CKD (chronic kidney disease) stage 5, GFR less than 15 ml/min ([facility])  Lab Results   Component Value Date    EGFR 12 10/11/2024    EGFR 11 2024    EGFR 10 2024    CREATININE 3.87 (H) 10/11/2024    CREATININE 4.25 (H) 2024    CREATININE 4.38 (H) 2024     Patient with stage V chronic kidney disease.  Initially asymptomatic when she saw Dr. Hennessy earlier in September.  However now she is becoming symptomatic.  Patient wishes to proceed with placement of the peritoneal dialysis catheter.  I do think this is reasonable.  Based on the physical exam I do feel patient is a candidate for peritoneal dialysis catheter placement.  She will be scheduled for a laparoscopic insertion of a peritoneal dialysis catheter.  The procedure itself including all associated risks including bleeding, infection, catheter dysfunction, injury to try structures were all discussed in great detail.  Patient verbalized an understanding of these risks and is willing to proceed, consent was signed.  Recent blood work reviewed.  No other additional preoperative workup required.  Previous echo from earlier this year also reviewed.           Cardiology:    Recent nuclear stress test from 2024 report was personally viewing.  Most recent echocardiogram from 2024 was personally reviewed.    Notes:    Recent telephone note from nephrology dated 2024 was personally viewing.    Blood work:    Recent CBC and CMP dated 2024 was personally reviewed.    History of Present Illness     Saige Haji is a 54 y.o. female who presents today for follow-up visit regarding need for peritoneal dialysis catheter.  Patient was seen earlier this year in April regarding  peritoneal dialysis.  At time it was an instructional visit to discuss peritoneal dialysis as an option.  At that time she was not clinically ready to pursue dialysis.  She recently saw her nephrologist in September at that time even though she was late stage chronic kidney disease she was feeling well was asymptomatic.  The plan was to hold off on dialysis until she became symptomatic.  She comes to the office today overall feeling lethargic and tired.  She had briefly spoken with the nephrologist office via telephone who felt given her now symptoms she should likely go ahead and get the dialysis catheter placed.  She states she is willing to proceed.  Currently feels lethargic.  No nausea vomit.  No fevers or chills.  No other associate symptoms at this time.    History obtained from : patient  Review of Systems    Review of systems completed, all negative except as above HPI.    Past Medical History   Past Medical History:   Diagnosis Date    Acute renal failure (ARF) (HCC)     GERD (gastroesophageal reflux disease)     Hypertension     Neuropathy     Stage 3b chronic kidney disease (HCC)      Past Surgical History:   Procedure Laterality Date    APPENDECTOMY      CHOLECYSTECTOMY      FOOT POSTERIOR RELEASE Left     around ankle    IR BIOPSY KIDNEY RANDOM  12/12/2023    TUBAL LIGATION       Family History   Problem Relation Age of Onset    Hypertension Mother     Cancer Mother     Alzheimer's disease Mother     Parkinsonism Mother     Diabetes Father     Hypertension Father     Cancer Father     Breast cancer Sister 50    No Known Problems Daughter     No Known Problems Maternal Grandmother     No Known Problems Paternal Grandmother     No Known Problems Sister     No Known Problems Sister     No Known Problems Maternal Aunt     No Known Problems Maternal Aunt     No Known Problems Maternal Aunt     No Known Problems Paternal Aunt     Breast cancer Paternal Aunt     Breast cancer Paternal Aunt     No Known  Problems Paternal Aunt      Current Outpatient Medications on File Prior to Visit   Medication Sig Dispense Refill    amLODIPine (NORVASC) 5 mg tablet Take 1 tablet (5 mg total) by mouth daily 90 tablet 3    b complex vitamins capsule Take 1 capsule by mouth daily      ergocalciferol (VITAMIN D2) 50,000 units Take 1 capsule (50,000 Units total) by mouth once a week On the same day each week 12 capsule 1    IRON HEME POLYPEPTIDE PO Take 1 capsule by mouth in the morning OTC med.      multivitamin (THERAGRAN) TABS Take 1 tablet by mouth daily      rosuvastatin (CRESTOR) 10 MG tablet Take 1 tablet (10 mg total) by mouth daily 90 tablet 3    saccharomyces boulardii (Florastor) 250 mg capsule Take 250 mg by mouth daily      sodium bicarbonate 650 mg tablet Take 1 tablet (650 mg total) by mouth in the morning      terazosin (HYTRIN) 1 mg capsule Take 2 capsules (2 mg total) by mouth daily at bedtime 180 capsule 3     No current facility-administered medications on file prior to visit.     Allergies   Allergen Reactions    Methotrexate Lip Swelling, Other (See Comments) and Swelling     LIPS  SWELLING- LIPS OOZING.    Cephalexin GI Intolerance      Current Outpatient Medications on File Prior to Visit   Medication Sig Dispense Refill    amLODIPine (NORVASC) 5 mg tablet Take 1 tablet (5 mg total) by mouth daily 90 tablet 3    b complex vitamins capsule Take 1 capsule by mouth daily      ergocalciferol (VITAMIN D2) 50,000 units Take 1 capsule (50,000 Units total) by mouth once a week On the same day each week 12 capsule 1    IRON HEME POLYPEPTIDE PO Take 1 capsule by mouth in the morning OTC med.      multivitamin (THERAGRAN) TABS Take 1 tablet by mouth daily      rosuvastatin (CRESTOR) 10 MG tablet Take 1 tablet (10 mg total) by mouth daily 90 tablet 3    saccharomyces boulardii (Florastor) 250 mg capsule Take 250 mg by mouth daily      sodium bicarbonate 650 mg tablet Take 1 tablet (650 mg total) by mouth in the morning    "   terazosin (HYTRIN) 1 mg capsule Take 2 capsules (2 mg total) by mouth daily at bedtime 180 capsule 3     No current facility-administered medications on file prior to visit.      Social History     Tobacco Use    Smoking status: Former     Current packs/day: 0.00     Average packs/day: 0.5 packs/day for 4.0 years (2.0 ttl pk-yrs)     Types: Cigarettes     Start date:      Quit date:      Years since quittin.8    Smokeless tobacco: Never   Vaping Use    Vaping status: Never Used   Substance and Sexual Activity    Alcohol use: Never    Drug use: No    Sexual activity: Not on file         Objective     /86 (BP Location: Left arm, Patient Position: Sitting, Cuff Size: Standard)   Pulse 72   Temp 98.4 °F (36.9 °C) (Temporal)   Ht 5' 7\" (1.702 m)   Wt 99.8 kg (220 lb)   SpO2 99%   BMI 34.46 kg/m²     Physical Exam  Vitals reviewed.   Constitutional:       General: She is not in acute distress.     Appearance: Normal appearance. She is not ill-appearing, toxic-appearing or diaphoretic.   HENT:      Head: Normocephalic and atraumatic.      Right Ear: External ear normal.      Left Ear: External ear normal.   Eyes:      General: No scleral icterus.        Right eye: No discharge.         Left eye: No discharge.   Cardiovascular:      Rate and Rhythm: Normal rate and regular rhythm.      Heart sounds: Normal heart sounds.      No gallop.   Pulmonary:      Effort: Pulmonary effort is normal. No respiratory distress.      Breath sounds: Normal breath sounds.   Abdominal:      General: Bowel sounds are normal. There is no distension.      Palpations: Abdomen is soft.   Musculoskeletal:         General: No swelling, tenderness, deformity or signs of injury. Normal range of motion.      Cervical back: Normal range of motion.   Skin:     General: Skin is warm and dry.      Coloration: Skin is not jaundiced or pale.      Findings: No bruising.   Neurological:      General: No focal deficit present.      " Mental Status: She is alert and oriented to person, place, and time.      Cranial Nerves: No cranial nerve deficit.   Psychiatric:         Mood and Affect: Mood normal.         Behavior: Behavior normal.         Thought Content: Thought content normal.         Judgment: Judgment normal.

## 2024-10-12 NOTE — H&P
Ambulatory Visit  Name: Saige Haji      : 1970      MRN: [id number]  Encounter Provider: Cirilo House DO  Encounter Date: 10/9/2024   Encounter department: [facility]    Assessment & Plan  CKD (chronic kidney disease) stage 5, GFR less than 15 ml/min ([facility])  Lab Results   Component Value Date    EGFR 12 10/11/2024    EGFR 11 2024    EGFR 10 2024    CREATININE 3.87 (H) 10/11/2024    CREATININE 4.25 (H) 2024    CREATININE 4.38 (H) 2024     Patient with stage V chronic kidney disease.  Initially asymptomatic when she saw Dr. Hennessy earlier in September.  However now she is becoming symptomatic.  Patient wishes to proceed with placement of the peritoneal dialysis catheter.  I do think this is reasonable.  Based on the physical exam I do feel patient is a candidate for peritoneal dialysis catheter placement.  She will be scheduled for a laparoscopic insertion of a peritoneal dialysis catheter.  The procedure itself including all associated risks including bleeding, infection, catheter dysfunction, injury to try structures were all discussed in great detail.  Patient verbalized an understanding of these risks and is willing to proceed, consent was signed.  Recent blood work reviewed.  No other additional preoperative workup required.  Previous echo from earlier this year also reviewed.           Cardiology:    Recent nuclear stress test from 2024 report was personally viewing.  Most recent echocardiogram from 2024 was personally reviewed.    Notes:    Recent telephone note from nephrology dated 2024 was personally viewing.    Blood work:    Recent CBC and CMP dated 2024 was personally reviewed.    History of Present Illness    Saige Haji is a 54 y.o. female who presents today for follow-up visit regarding need for peritoneal dialysis catheter.  Patient was seen earlier this year in April regarding  peritoneal dialysis.  At time it was an instructional visit to discuss peritoneal dialysis as an option.  At that time she was not clinically ready to pursue dialysis.  She recently saw her nephrologist in September at that time even though she was late stage chronic kidney disease she was feeling well was asymptomatic.  The plan was to hold off on dialysis until she became symptomatic.  She comes to the office today overall feeling lethargic and tired.  She had briefly spoken with the nephrologist office via telephone who felt given her now symptoms she should likely go ahead and get the dialysis catheter placed.  She states she is willing to proceed.  Currently feels lethargic.  No nausea vomit.  No fevers or chills.  No other associate symptoms at this time.    History obtained from : patient  Review of Systems    Review of systems completed, all negative except as above HPI.    Past Medical History  Past Medical History:   Diagnosis Date    Acute renal failure (ARF) (HCC)     GERD (gastroesophageal reflux disease)     Hypertension     Neuropathy     Stage 3b chronic kidney disease (HCC)      Past Surgical History:   Procedure Laterality Date    APPENDECTOMY      CHOLECYSTECTOMY      FOOT POSTERIOR RELEASE Left     around ankle    IR BIOPSY KIDNEY RANDOM  12/12/2023    TUBAL LIGATION       Family History   Problem Relation Age of Onset    Hypertension Mother     Cancer Mother     Alzheimer's disease Mother     Parkinsonism Mother     Diabetes Father     Hypertension Father     Cancer Father     Breast cancer Sister 50    No Known Problems Daughter     No Known Problems Maternal Grandmother     No Known Problems Paternal Grandmother     No Known Problems Sister     No Known Problems Sister     No Known Problems Maternal Aunt     No Known Problems Maternal Aunt     No Known Problems Maternal Aunt     No Known Problems Paternal Aunt     Breast cancer Paternal Aunt     Breast cancer Paternal Aunt     No Known  Problems Paternal Aunt      Current Outpatient Medications on File Prior to Visit   Medication Sig Dispense Refill    amLODIPine (NORVASC) 5 mg tablet Take 1 tablet (5 mg total) by mouth daily 90 tablet 3    b complex vitamins capsule Take 1 capsule by mouth daily      ergocalciferol (VITAMIN D2) 50,000 units Take 1 capsule (50,000 Units total) by mouth once a week On the same day each week 12 capsule 1    IRON HEME POLYPEPTIDE PO Take 1 capsule by mouth in the morning OTC med.      multivitamin (THERAGRAN) TABS Take 1 tablet by mouth daily      rosuvastatin (CRESTOR) 10 MG tablet Take 1 tablet (10 mg total) by mouth daily 90 tablet 3    saccharomyces boulardii (Florastor) 250 mg capsule Take 250 mg by mouth daily      sodium bicarbonate 650 mg tablet Take 1 tablet (650 mg total) by mouth in the morning      terazosin (HYTRIN) 1 mg capsule Take 2 capsules (2 mg total) by mouth daily at bedtime 180 capsule 3     No current facility-administered medications on file prior to visit.     Allergies   Allergen Reactions    Methotrexate Lip Swelling, Other (See Comments) and Swelling     LIPS  SWELLING- LIPS OOZING.    Cephalexin GI Intolerance      Current Outpatient Medications on File Prior to Visit   Medication Sig Dispense Refill    amLODIPine (NORVASC) 5 mg tablet Take 1 tablet (5 mg total) by mouth daily 90 tablet 3    b complex vitamins capsule Take 1 capsule by mouth daily      ergocalciferol (VITAMIN D2) 50,000 units Take 1 capsule (50,000 Units total) by mouth once a week On the same day each week 12 capsule 1    IRON HEME POLYPEPTIDE PO Take 1 capsule by mouth in the morning OTC med.      multivitamin (THERAGRAN) TABS Take 1 tablet by mouth daily      rosuvastatin (CRESTOR) 10 MG tablet Take 1 tablet (10 mg total) by mouth daily 90 tablet 3    saccharomyces boulardii (Florastor) 250 mg capsule Take 250 mg by mouth daily      sodium bicarbonate 650 mg tablet Take 1 tablet (650 mg total) by mouth in the morning    "   terazosin (HYTRIN) 1 mg capsule Take 2 capsules (2 mg total) by mouth daily at bedtime 180 capsule 3     No current facility-administered medications on file prior to visit.      Social History     Tobacco Use    Smoking status: Former     Current packs/day: 0.00     Average packs/day: 0.5 packs/day for 4.0 years (2.0 ttl pk-yrs)     Types: Cigarettes     Start date:      Quit date:      Years since quittin.8    Smokeless tobacco: Never   Vaping Use    Vaping status: Never Used   Substance and Sexual Activity    Alcohol use: Never    Drug use: No    Sexual activity: Not on file         Objective    /86 (BP Location: Left arm, Patient Position: Sitting, Cuff Size: Standard)   Pulse 72   Temp 98.4 °F (36.9 °C) (Temporal)   Ht 5' 7\" (1.702 m)   Wt 99.8 kg (220 lb)   SpO2 99%   BMI 34.46 kg/m²     Physical Exam  Vitals reviewed.   Constitutional:       General: She is not in acute distress.     Appearance: Normal appearance. She is not ill-appearing, toxic-appearing or diaphoretic.   HENT:      Head: Normocephalic and atraumatic.      Right Ear: External ear normal.      Left Ear: External ear normal.   Eyes:      General: No scleral icterus.        Right eye: No discharge.         Left eye: No discharge.   Cardiovascular:      Rate and Rhythm: Normal rate and regular rhythm.      Heart sounds: Normal heart sounds.      No gallop.   Pulmonary:      Effort: Pulmonary effort is normal. No respiratory distress.      Breath sounds: Normal breath sounds.   Abdominal:      General: Bowel sounds are normal. There is no distension.      Palpations: Abdomen is soft.   Musculoskeletal:         General: No swelling, tenderness, deformity or signs of injury. Normal range of motion.      Cervical back: Normal range of motion.   Skin:     General: Skin is warm and dry.      Coloration: Skin is not jaundiced or pale.      Findings: No bruising.   Neurological:      General: No focal deficit present.      " Mental Status: She is alert and oriented to person, place, and time.      Cranial Nerves: No cranial nerve deficit.   Psychiatric:         Mood and Affect: Mood normal.         Behavior: Behavior normal.         Thought Content: Thought content normal.         Judgment: Judgment normal.

## 2024-10-12 NOTE — ASSESSMENT & PLAN NOTE
Lab Results   Component Value Date    EGFR 12 10/11/2024    EGFR 11 09/27/2024    EGFR 10 09/14/2024    CREATININE 3.87 (H) 10/11/2024    CREATININE 4.25 (H) 09/27/2024    CREATININE 4.38 (H) 09/14/2024     Patient with stage V chronic kidney disease.  Initially asymptomatic when she saw Dr. Hennessy earlier in September.  However now she is becoming symptomatic.  Patient wishes to proceed with placement of the peritoneal dialysis catheter.  I do think this is reasonable.  Based on the physical exam I do feel patient is a candidate for peritoneal dialysis catheter placement.  She will be scheduled for a laparoscopic insertion of a peritoneal dialysis catheter.  The procedure itself including all associated risks including bleeding, infection, catheter dysfunction, injury to try structures were all discussed in great detail.  Patient verbalized an understanding of these risks and is willing to proceed, consent was signed.  Recent blood work reviewed.  No other additional preoperative workup required.  Previous echo from earlier this year also reviewed.

## 2024-10-12 NOTE — H&P (VIEW-ONLY)
Ambulatory Visit  Name: Saige Haji      : 1970      MRN: [id number]  Encounter Provider: Cirilo House DO  Encounter Date: 10/9/2024   Encounter department: [facility] [place]    Assessment & Plan  CKD (chronic kidney disease) stage 5, GFR less than 15 ml/min ([facility])  Lab Results   Component Value Date    EGFR 12 10/11/2024    EGFR 11 2024    EGFR 10 2024    CREATININE 3.87 (H) 10/11/2024    CREATININE 4.25 (H) 2024    CREATININE 4.38 (H) 2024     Patient with stage V chronic kidney disease.  Initially asymptomatic when she saw Dr. Hennessy earlier in September.  However now she is becoming symptomatic.  Patient wishes to proceed with placement of the peritoneal dialysis catheter.  I do think this is reasonable.  Based on the physical exam I do feel patient is a candidate for peritoneal dialysis catheter placement.  She will be scheduled for a laparoscopic insertion of a peritoneal dialysis catheter.  The procedure itself including all associated risks including bleeding, infection, catheter dysfunction, injury to try structures were all discussed in great detail.  Patient verbalized an understanding of these risks and is willing to proceed, consent was signed.  Recent blood work reviewed.  No other additional preoperative workup required.  Previous echo from earlier this year also reviewed.           Cardiology:    Recent nuclear stress test from 2024 report was personally viewing.  Most recent echocardiogram from 2024 was personally reviewed.    Notes:    Recent telephone note from nephrology dated 2024 was personally viewing.    Blood work:    Recent CBC and CMP dated 2024 was personally reviewed.    History of Present Illness    Saige Haji is a 54 y.o. female who presents today for follow-up visit regarding need for peritoneal dialysis catheter.  Patient was seen earlier this year in April regarding  peritoneal dialysis.  At time it was an instructional visit to discuss peritoneal dialysis as an option.  At that time she was not clinically ready to pursue dialysis.  She recently saw her nephrologist in September at that time even though she was late stage chronic kidney disease she was feeling well was asymptomatic.  The plan was to hold off on dialysis until she became symptomatic.  She comes to the office today overall feeling lethargic and tired.  She had briefly spoken with the nephrologist office via telephone who felt given her now symptoms she should likely go ahead and get the dialysis catheter placed.  She states she is willing to proceed.  Currently feels lethargic.  No nausea vomit.  No fevers or chills.  No other associate symptoms at this time.    History obtained from : patient  Review of Systems    Review of systems completed, all negative except as above HPI.    Past Medical History  Past Medical History:   Diagnosis Date    Acute renal failure (ARF) (HCC)     GERD (gastroesophageal reflux disease)     Hypertension     Neuropathy     Stage 3b chronic kidney disease (HCC)      Past Surgical History:   Procedure Laterality Date    APPENDECTOMY      CHOLECYSTECTOMY      FOOT POSTERIOR RELEASE Left     around ankle    IR BIOPSY KIDNEY RANDOM  12/12/2023    TUBAL LIGATION       Family History   Problem Relation Age of Onset    Hypertension Mother     Cancer Mother     Alzheimer's disease Mother     Parkinsonism Mother     Diabetes Father     Hypertension Father     Cancer Father     Breast cancer Sister 50    No Known Problems Daughter     No Known Problems Maternal Grandmother     No Known Problems Paternal Grandmother     No Known Problems Sister     No Known Problems Sister     No Known Problems Maternal Aunt     No Known Problems Maternal Aunt     No Known Problems Maternal Aunt     No Known Problems Paternal Aunt     Breast cancer Paternal Aunt     Breast cancer Paternal Aunt     No Known  Problems Paternal Aunt      Current Outpatient Medications on File Prior to Visit   Medication Sig Dispense Refill    amLODIPine (NORVASC) 5 mg tablet Take 1 tablet (5 mg total) by mouth daily 90 tablet 3    b complex vitamins capsule Take 1 capsule by mouth daily      ergocalciferol (VITAMIN D2) 50,000 units Take 1 capsule (50,000 Units total) by mouth once a week On the same day each week 12 capsule 1    IRON HEME POLYPEPTIDE PO Take 1 capsule by mouth in the morning OTC med.      multivitamin (THERAGRAN) TABS Take 1 tablet by mouth daily      rosuvastatin (CRESTOR) 10 MG tablet Take 1 tablet (10 mg total) by mouth daily 90 tablet 3    saccharomyces boulardii (Florastor) 250 mg capsule Take 250 mg by mouth daily      sodium bicarbonate 650 mg tablet Take 1 tablet (650 mg total) by mouth in the morning      terazosin (HYTRIN) 1 mg capsule Take 2 capsules (2 mg total) by mouth daily at bedtime 180 capsule 3     No current facility-administered medications on file prior to visit.     Allergies   Allergen Reactions    Methotrexate Lip Swelling, Other (See Comments) and Swelling     LIPS  SWELLING- LIPS OOZING.    Cephalexin GI Intolerance      Current Outpatient Medications on File Prior to Visit   Medication Sig Dispense Refill    amLODIPine (NORVASC) 5 mg tablet Take 1 tablet (5 mg total) by mouth daily 90 tablet 3    b complex vitamins capsule Take 1 capsule by mouth daily      ergocalciferol (VITAMIN D2) 50,000 units Take 1 capsule (50,000 Units total) by mouth once a week On the same day each week 12 capsule 1    IRON HEME POLYPEPTIDE PO Take 1 capsule by mouth in the morning OTC med.      multivitamin (THERAGRAN) TABS Take 1 tablet by mouth daily      rosuvastatin (CRESTOR) 10 MG tablet Take 1 tablet (10 mg total) by mouth daily 90 tablet 3    saccharomyces boulardii (Florastor) 250 mg capsule Take 250 mg by mouth daily      sodium bicarbonate 650 mg tablet Take 1 tablet (650 mg total) by mouth in the morning    "   terazosin (HYTRIN) 1 mg capsule Take 2 capsules (2 mg total) by mouth daily at bedtime 180 capsule 3     No current facility-administered medications on file prior to visit.      Social History     Tobacco Use    Smoking status: Former     Current packs/day: 0.00     Average packs/day: 0.5 packs/day for 4.0 years (2.0 ttl pk-yrs)     Types: Cigarettes     Start date:      Quit date:      Years since quittin.8    Smokeless tobacco: Never   Vaping Use    Vaping status: Never Used   Substance and Sexual Activity    Alcohol use: Never    Drug use: No    Sexual activity: Not on file         Objective    /86 (BP Location: Left arm, Patient Position: Sitting, Cuff Size: Standard)   Pulse 72   Temp 98.4 °F (36.9 °C) (Temporal)   Ht 5' 7\" (1.702 m)   Wt 99.8 kg (220 lb)   SpO2 99%   BMI 34.46 kg/m²     Physical Exam  Vitals reviewed.   Constitutional:       General: She is not in acute distress.     Appearance: Normal appearance. She is not ill-appearing, toxic-appearing or diaphoretic.   HENT:      Head: Normocephalic and atraumatic.      Right Ear: External ear normal.      Left Ear: External ear normal.   Eyes:      General: No scleral icterus.        Right eye: No discharge.         Left eye: No discharge.   Cardiovascular:      Rate and Rhythm: Normal rate and regular rhythm.      Heart sounds: Normal heart sounds.      No gallop.   Pulmonary:      Effort: Pulmonary effort is normal. No respiratory distress.      Breath sounds: Normal breath sounds.   Abdominal:      General: Bowel sounds are normal. There is no distension.      Palpations: Abdomen is soft.   Musculoskeletal:         General: No swelling, tenderness, deformity or signs of injury. Normal range of motion.      Cervical back: Normal range of motion.   Skin:     General: Skin is warm and dry.      Coloration: Skin is not jaundiced or pale.      Findings: No bruising.   Neurological:      General: No focal deficit present.      " Mental Status: She is alert and oriented to person, place, and time.      Cranial Nerves: No cranial nerve deficit.   Psychiatric:         Mood and Affect: Mood normal.         Behavior: Behavior normal.         Thought Content: Thought content normal.         Judgment: Judgment normal.

## 2024-10-14 ENCOUNTER — TELEPHONE (OUTPATIENT)
Age: 54
End: 2024-10-14

## 2024-10-14 NOTE — TELEPHONE ENCOUNTER
Jefferson from [facility] called in wanting to get an update on pt's labs. I informed him that pt has not had her HepB lab order done yet. He also requested to know if she had her chest xrays done yet. Please call pt as soon possible to get an update as to when she would be getting them done. Jefferson also asked for Dr. Hennessy to have a history of pt forwarded to him.    Jefferson will call back later on during the week to check on an update.

## 2024-10-15 NOTE — TELEPHONE ENCOUNTER
Jefferson from  [facility] call back he stated  that do the  limit  supplies  the  patient would  need to come  to the  [facility] and once they get the necessary  supplies they would be able to accommodated her. Please  call patient to  let her  know  Contact  person [phone]

## 2024-10-16 ENCOUNTER — TELEPHONE (OUTPATIENT)
Age: 54
End: 2024-10-16

## 2024-10-16 NOTE — TELEPHONE ENCOUNTER
"\"The admission for [facility] was put in as hemodialysis. Can you please call them to change to PD. Thank you!!\"  I called and spoke with an admissions rep for [facility]. She will let Erick know to change the admission to PD instead of Hemodialysis.  I called and reminded Saige to complete her XR and Heb B panel at her earliest convenience.  "

## 2024-10-16 NOTE — TELEPHONE ENCOUNTER
I spoke with Saige regarding doing her labs for Dialysis (chest xray and Hep B Panel). She was not aware this needed to be completed. She states she will go at her earliest convenience.  She was inquiring if she should still get the Epogen injection this week? She states her numbers were at 10.5 and she knows she is not to go over 11.    Please advise.

## 2024-10-17 ENCOUNTER — TELEPHONE (OUTPATIENT)
Age: 54
End: 2024-10-17

## 2024-10-17 NOTE — TELEPHONE ENCOUNTER
I will leave it up to her, if she feels good at the hemoglobin of 10.5 then she can hold for now, but she can certainly get another injection at this time to help keep bringing her hemoglobin levels up

## 2024-10-17 NOTE — TELEPHONE ENCOUNTER
Erick from [facility] called in confirming that the patient is scheduled to see Dr Hennessy in [place].    He would like to know if this referral is for Regular In Sensor Hemodialysis or Peritoneal?    He also explained that if Dr. Hennessy does not want the patient starting until December then they can put everything on hold until December beause there's no point of having a referral open now. They cannot leave it for 2 months.    Patient would like a call back with clarification.
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
## 2024-10-18 ENCOUNTER — HOSPITAL ENCOUNTER (OUTPATIENT)
Dept: INFUSION CENTER | Facility: HOSPITAL | Age: 54
Discharge: HOME | End: 2024-10-18
Attending: INTERNAL MEDICINE
Payer: COMMERCIAL

## 2024-10-18 VITALS — HEART RATE: 80 BPM | DIASTOLIC BLOOD PRESSURE: 79 MMHG | SYSTOLIC BLOOD PRESSURE: 138 MMHG

## 2024-10-18 DIAGNOSIS — D63.1 ANEMIA OF CHRONIC KIDNEY FAILURE, STAGE 5  (HCC): Primary | ICD-10-CM

## 2024-10-18 DIAGNOSIS — N18.5 ANEMIA OF CHRONIC KIDNEY FAILURE, STAGE 5  (HCC): Primary | ICD-10-CM

## 2024-10-18 NOTE — PROGRESS NOTES
Saige Haji  tolerated treatment well with no complications.  Epogen to [facility].    Saige Haji is aware of future appt on 11/15 at 8:30 am.     AVS declined by Saige Haji.

## 2024-10-18 NOTE — TELEPHONE ENCOUNTER
Erick from [facility] called in for an update update.    I did inform him of the message from Dr. Hennessy. He said that in this case, he is putting he referral on hold until future direction. He cannot leave the referral open until they meet in December.     He will wait on an update once we contact the patient.

## 2024-10-23 ENCOUNTER — PREP FOR PROCEDURE (OUTPATIENT)
Dept: INTERVENTIONAL RADIOLOGY/VASCULAR | Facility: CLINIC | Age: 54
End: 2024-10-23

## 2024-10-23 ENCOUNTER — TELEPHONE (OUTPATIENT)
Dept: SURGERY | Facility: CLINIC | Age: 54
End: 2024-10-23

## 2024-10-23 DIAGNOSIS — N18.6 ESRD (END STAGE RENAL DISEASE) (HCC): Primary | ICD-10-CM

## 2024-10-23 NOTE — TELEPHONE ENCOUNTER
Called and spoke with IR.  A soon as referral in [facility] they will reach out to patient to schedule for perma cath.
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
## 2024-10-23 NOTE — TELEPHONE ENCOUNTER
PT called developed a rash on lower abdominal across the whole abdomen. It is itchy, she is using hydrocortisone cream. I advised she needs to be evaluated by PCP or urgent care, I also advised I would let you know and we still may need to reschedule PD cath placement for Friday. Please advise.

## 2024-10-23 NOTE — TELEPHONE ENCOUNTER
Discussed with patient.    I advised her to contact Dr. House's office and proceed with peritoneal dialysis catheter placement as long as the rash is okay and deferment of the procedure does not need to occur.    With respect to initiation of dialysis, she may need to be done sooner rather than later, in which case she will let us know.  I will place a consultation with interventional radiology for a PermCath and then initiate dialysis at the [facility] in [facility].

## 2024-10-24 ENCOUNTER — ANESTHESIA EVENT (OUTPATIENT)
Dept: PERIOP | Facility: HOSPITAL | Age: 54
End: 2024-10-24
Payer: COMMERCIAL

## 2024-10-24 ENCOUNTER — PREP FOR PROCEDURE (OUTPATIENT)
Dept: SURGERY | Facility: CLINIC | Age: 54
End: 2024-10-24

## 2024-10-24 DIAGNOSIS — N28.9 KIDNEY DYSFUNCTION: Primary | ICD-10-CM

## 2024-10-24 NOTE — PRE-PROCEDURE INSTRUCTIONS
Pre-Surgery Instructions:   Medication Instructions    amLODIPine (NORVASC) 5 mg tablet Take night before surgery    b complex vitamins capsule Hold day of surgery.    ergocalciferol (VITAMIN D2) 50,000 units Hold day of surgery.    IRON HEME POLYPEPTIDE PO Hold day of surgery.    multivitamin (THERAGRAN) TABS Hold day of surgery.    rosuvastatin (CRESTOR) 10 MG tablet Take night before surgery    saccharomyces boulardii (Florastor) 250 mg capsule Hold day of surgery.    sodium bicarbonate 650 mg tablet Take night before surgery    terazosin (HYTRIN) 1 mg capsule Take night before surgery    Medication instructions for day surgery reviewed. Please use only a sip of water to take your instructed medications. Avoid all over the counter vitamins, supplements and NSAIDS for one week prior to surgery per anesthesia guidelines. Tylenol is ok to take as needed.     You will receive a call one business day prior to surgery with an arrival time and hospital directions. If your surgery is scheduled on a Monday, the hospital will be calling you on the Friday prior to your surgery. If you have not heard from anyone by 8pm, please call the hospital supervisor through the hospital  at [phone]. ([place] [phone] or [place] [phone]).    Do not eat or drink anything after midnight the night before your surgery, including candy, mints, lifesavers, or chewing gum. Do not drink alcohol 24hrs before your surgery. Try not to smoke at least 24hrs before your surgery.       Follow the pre surgery showering instructions as listed in the “My Surgical Experience Booklet” or otherwise provided by your surgeon's office. Do not use a blade to shave the surgical area 1 week before surgery. It is okay to use a clean electric clippers up to 24 hours before surgery. Do not apply any lotions, creams, including makeup, cologne, deodorant, or perfumes after showering on the day of your surgery. Do not use dry shampoo, hair  spray, hair gel, or any type of hair products.     No contact lenses, eye make-up, or artificial eyelashes. Remove nail polish, including gel polish, and any artificial, gel, or acrylic nails if possible. Remove all jewelry including rings and body piercing jewelry.     Wear causal clothing that is easy to take on and off. Consider your type of surgery.    Keep any valuables, jewelry, piercings at home. Please bring any specially ordered equipment (sling, braces) if indicated.    Arrange for a responsible person to drive you to and from the hospital on the day of your surgery. Please confirm the visitor policy for the day of your procedure when you receive your phone call with an arrival time.     Call the surgeon's office with any new illnesses, exposures, or additional questions prior to surgery.    Please reference your “My Surgical Experience Booklet” for additional information to prepare for your upcoming surgery.    Pt has surgical soap.

## 2024-10-24 NOTE — TELEPHONE ENCOUNTER
Patient called, she spoke with nephrology they are going to hold off until January to place pd cath at this point, surgery canceled.

## 2024-10-25 ENCOUNTER — HOSPITAL ENCOUNTER (OUTPATIENT)
Facility: HOSPITAL | Age: 54
Setting detail: OUTPATIENT SURGERY
Discharge: HOME | End: 2024-10-25
Attending: SURGERY | Admitting: SURGERY
Payer: COMMERCIAL

## 2024-10-25 ENCOUNTER — ANESTHESIA (OUTPATIENT)
Dept: PERIOP | Facility: HOSPITAL | Age: 54
End: 2024-10-25
Payer: COMMERCIAL

## 2024-10-25 VITALS
HEART RATE: 81 BPM | BODY MASS INDEX: 34.84 KG/M2 | SYSTOLIC BLOOD PRESSURE: 144 MMHG | RESPIRATION RATE: 20 BRPM | DIASTOLIC BLOOD PRESSURE: 85 MMHG | OXYGEN SATURATION: 98 % | WEIGHT: 222 LBS | HEIGHT: 67 IN | TEMPERATURE: 98.5 F

## 2024-10-25 DIAGNOSIS — N18.6 ESRD (END STAGE RENAL DISEASE) (HCC): Primary | ICD-10-CM

## 2024-10-25 PROCEDURE — 49324 LAP INSERT TUNNEL IP CATH: CPT

## 2024-10-25 PROCEDURE — 49324 LAP INSERT TUNNEL IP CATH: CPT | Performed by: SURGERY

## 2024-10-25 PROCEDURE — C1750 CATH, HEMODIALYSIS,LONG-TERM: HCPCS | Performed by: SURGERY

## 2024-10-25 DEVICE — PERITONEAL DIALYSIS CATHETER, SWAN NECK CURL CATH, 2 CUFFS RIGHT
Type: IMPLANTABLE DEVICE | Site: ABDOMEN | Status: FUNCTIONAL
Brand: ARGYLE

## 2024-10-25 RX ORDER — FENTANYL CITRATE/PF 50 MCG/ML
25 SYRINGE (ML) INJECTION
Status: DISCONTINUED | OUTPATIENT
Start: 2024-10-25 | End: 2024-10-25 | Stop reason: HOSPADM

## 2024-10-25 RX ORDER — ONDANSETRON 2 MG/ML
INJECTION INTRAMUSCULAR; INTRAVENOUS AS NEEDED
Status: DISCONTINUED | OUTPATIENT
Start: 2024-10-25 | End: 2024-10-25

## 2024-10-25 RX ORDER — BUPIVACAINE HYDROCHLORIDE 5 MG/ML
INJECTION, SOLUTION EPIDURAL; INTRACAUDAL AS NEEDED
Status: DISCONTINUED | OUTPATIENT
Start: 2024-10-25 | End: 2024-10-25 | Stop reason: HOSPADM

## 2024-10-25 RX ORDER — FENTANYL CITRATE 50 UG/ML
INJECTION, SOLUTION INTRAMUSCULAR; INTRAVENOUS AS NEEDED
Status: DISCONTINUED | OUTPATIENT
Start: 2024-10-25 | End: 2024-10-25

## 2024-10-25 RX ORDER — SODIUM CHLORIDE 9 MG/ML
75 INJECTION, SOLUTION INTRAVENOUS CONTINUOUS
Status: DISCONTINUED | OUTPATIENT
Start: 2024-10-25 | End: 2024-10-25 | Stop reason: HOSPADM

## 2024-10-25 RX ORDER — HEPARIN SODIUM 5000 [USP'U]/ML
5000 INJECTION, SOLUTION INTRAVENOUS; SUBCUTANEOUS ONCE
Status: COMPLETED | OUTPATIENT
Start: 2024-10-25 | End: 2024-10-25

## 2024-10-25 RX ORDER — HYDROMORPHONE HCL/PF 1 MG/ML
0.5 SYRINGE (ML) INJECTION
Status: DISCONTINUED | OUTPATIENT
Start: 2024-10-25 | End: 2024-10-25 | Stop reason: HOSPADM

## 2024-10-25 RX ORDER — PROPOFOL 10 MG/ML
INJECTION, EMULSION INTRAVENOUS AS NEEDED
Status: DISCONTINUED | OUTPATIENT
Start: 2024-10-25 | End: 2024-10-25

## 2024-10-25 RX ORDER — ROCURONIUM BROMIDE 10 MG/ML
INJECTION, SOLUTION INTRAVENOUS AS NEEDED
Status: DISCONTINUED | OUTPATIENT
Start: 2024-10-25 | End: 2024-10-25

## 2024-10-25 RX ORDER — OXYCODONE AND ACETAMINOPHEN 5; 325 MG/1; MG/1
2 TABLET ORAL EVERY 4 HOURS PRN
Status: DISCONTINUED | OUTPATIENT
Start: 2024-10-25 | End: 2024-10-25 | Stop reason: HOSPADM

## 2024-10-25 RX ORDER — SODIUM CHLORIDE, SODIUM LACTATE, POTASSIUM CHLORIDE, CALCIUM CHLORIDE 600; 310; 30; 20 MG/100ML; MG/100ML; MG/100ML; MG/100ML
75 INJECTION, SOLUTION INTRAVENOUS CONTINUOUS
Status: DISCONTINUED | OUTPATIENT
Start: 2024-10-25 | End: 2024-10-25 | Stop reason: HOSPADM

## 2024-10-25 RX ORDER — SODIUM CHLORIDE, SODIUM LACTATE, POTASSIUM CHLORIDE, CALCIUM CHLORIDE 600; 310; 30; 20 MG/100ML; MG/100ML; MG/100ML; MG/100ML
INJECTION, SOLUTION INTRAVENOUS CONTINUOUS PRN
Status: DISCONTINUED | OUTPATIENT
Start: 2024-10-25 | End: 2024-10-25

## 2024-10-25 RX ORDER — DEXAMETHASONE SODIUM PHOSPHATE 10 MG/ML
INJECTION, SOLUTION INTRAMUSCULAR; INTRAVENOUS AS NEEDED
Status: DISCONTINUED | OUTPATIENT
Start: 2024-10-25 | End: 2024-10-25

## 2024-10-25 RX ORDER — EPHEDRINE SULFATE 50 MG/ML
INJECTION INTRAVENOUS AS NEEDED
Status: DISCONTINUED | OUTPATIENT
Start: 2024-10-25 | End: 2024-10-25

## 2024-10-25 RX ORDER — ONDANSETRON 2 MG/ML
4 INJECTION INTRAMUSCULAR; INTRAVENOUS ONCE AS NEEDED
Status: DISCONTINUED | OUTPATIENT
Start: 2024-10-25 | End: 2024-10-25 | Stop reason: HOSPADM

## 2024-10-25 RX ORDER — LIDOCAINE HYDROCHLORIDE 20 MG/ML
INJECTION, SOLUTION EPIDURAL; INFILTRATION; INTRACAUDAL; PERINEURAL AS NEEDED
Status: DISCONTINUED | OUTPATIENT
Start: 2024-10-25 | End: 2024-10-25

## 2024-10-25 RX ORDER — OXYCODONE AND ACETAMINOPHEN 5; 325 MG/1; MG/1
1 TABLET ORAL EVERY 4 HOURS PRN
Qty: 20 TABLET | Refills: 0 | Status: SHIPPED | OUTPATIENT
Start: 2024-10-25

## 2024-10-25 RX ORDER — CEFAZOLIN SODIUM 2 G/50ML
2000 SOLUTION INTRAVENOUS ONCE
Status: COMPLETED | OUTPATIENT
Start: 2024-10-25 | End: 2024-10-25

## 2024-10-25 RX ORDER — ONDANSETRON 2 MG/ML
4 INJECTION INTRAMUSCULAR; INTRAVENOUS EVERY 6 HOURS PRN
Status: DISCONTINUED | OUTPATIENT
Start: 2024-10-25 | End: 2024-10-25 | Stop reason: HOSPADM

## 2024-10-25 RX ORDER — MIDAZOLAM HYDROCHLORIDE 2 MG/2ML
INJECTION, SOLUTION INTRAMUSCULAR; INTRAVENOUS AS NEEDED
Status: DISCONTINUED | OUTPATIENT
Start: 2024-10-25 | End: 2024-10-25

## 2024-10-25 RX ADMIN — MIDAZOLAM 2 MG: 1 INJECTION INTRAMUSCULAR; INTRAVENOUS at 10:08

## 2024-10-25 RX ADMIN — FENTANYL CITRATE 25 MCG: 50 INJECTION INTRAMUSCULAR; INTRAVENOUS at 11:01

## 2024-10-25 RX ADMIN — OXYCODONE HYDROCHLORIDE AND ACETAMINOPHEN 2 TABLET: 5; 325 TABLET ORAL at 12:43

## 2024-10-25 RX ADMIN — ONDANSETRON 4 MG: 2 INJECTION INTRAMUSCULAR; INTRAVENOUS at 11:05

## 2024-10-25 RX ADMIN — FENTANYL CITRATE 25 MCG: 50 INJECTION INTRAMUSCULAR; INTRAVENOUS at 12:04

## 2024-10-25 RX ADMIN — SUGAMMADEX 200 MG: 100 INJECTION, SOLUTION INTRAVENOUS at 11:25

## 2024-10-25 RX ADMIN — DEXAMETHASONE SODIUM PHOSPHATE 10 MG: 10 INJECTION, SOLUTION INTRAMUSCULAR; INTRAVENOUS at 10:17

## 2024-10-25 RX ADMIN — LIDOCAINE HYDROCHLORIDE 100 MG: 20 INJECTION, SOLUTION EPIDURAL; INFILTRATION; INTRACAUDAL; PERINEURAL at 10:15

## 2024-10-25 RX ADMIN — FENTANYL CITRATE 50 MCG: 50 INJECTION INTRAMUSCULAR; INTRAVENOUS at 10:13

## 2024-10-25 RX ADMIN — FENTANYL CITRATE 25 MCG: 50 INJECTION INTRAMUSCULAR; INTRAVENOUS at 10:47

## 2024-10-25 RX ADMIN — EPHEDRINE SULFATE 10 MG: 50 INJECTION, SOLUTION INTRAVENOUS at 10:58

## 2024-10-25 RX ADMIN — PROPOFOL 140 MG: 10 INJECTION, EMULSION INTRAVENOUS at 10:15

## 2024-10-25 RX ADMIN — CEFAZOLIN SODIUM 2000 MG: 2 SOLUTION INTRAVENOUS at 09:54

## 2024-10-25 RX ADMIN — SODIUM CHLORIDE, SODIUM LACTATE, POTASSIUM CHLORIDE, AND CALCIUM CHLORIDE: .6; .31; .03; .02 INJECTION, SOLUTION INTRAVENOUS at 09:55

## 2024-10-25 RX ADMIN — FENTANYL CITRATE 25 MCG: 50 INJECTION INTRAMUSCULAR; INTRAVENOUS at 12:10

## 2024-10-25 RX ADMIN — HYDROMORPHONE HYDROCHLORIDE 0.5 MG: 1 INJECTION, SOLUTION INTRAMUSCULAR; INTRAVENOUS; SUBCUTANEOUS at 12:20

## 2024-10-25 RX ADMIN — HEPARIN SODIUM 5000 UNITS: 5000 INJECTION, SOLUTION INTRAVENOUS; SUBCUTANEOUS at 09:46

## 2024-10-25 RX ADMIN — ROCURONIUM BROMIDE 50 MG: 10 INJECTION, SOLUTION INTRAVENOUS at 10:17

## 2024-10-25 NOTE — INTERVAL H&P NOTE
H&P reviewed. After examining the patient I find no changes in the patients condition since the H&P had been written.    Vitals:    10/25/24 0909   BP: 140/69   Pulse: 76   Resp: 16   Temp: 98 °F (36.7 °C)   SpO2: 99%

## 2024-10-25 NOTE — NURSING NOTE
Spoke with Dr. House and Dr. Stokes regarding potassium level. Both decided not medically necessary at this time.

## 2024-10-25 NOTE — DISCHARGE INSTR - AVS FIRST PAGE
Follow-up with Dr. House in 2 weeks as per appointment.    Regular diet as tolerated.    Low intense activity as tolerated, no heavy lifting, nothing greater than 10 pounds for 2 weeks.    No dressing needed.  You may shower tomorrow.  No baths or swimming.    If develop fever, nausea vomit, worsening pain, redness or drainage from the incisions then call the office or go to the ER.

## 2024-10-25 NOTE — ANESTHESIA PREPROCEDURE EVALUATION
"Procedure:  INSERTION PERITONEAL CATHETER DIALYSIS LAPAROSCOPIC (Abdomen)    Relevant Problems   CARDIO   (+) Chest pain   (+) Essential hypertension   (+) Mixed hyperlipidemia      GI/HEPATIC   (+) GERD without esophagitis      /RENAL   (+) CKD (chronic kidney disease) stage 5, GFR less than 15 ml/min (HCC)   (+) ESRD (end stage renal disease) (HCC)   (+) Hyperfiltration focal segmental glomerulosclerosis      HEMATOLOGY   (+) Anemia   (+) Anemia of chronic kidney failure, stage 5  (HCC)   (+) Iron deficiency anemia secondary to inadequate dietary iron intake      MUSCULOSKELETAL   (+) Acute left-sided low back pain with left-sided sciatica   (+) Rheumatoid arthritis involving multiple sites with positive rheumatoid factor (HCC)      PULMONARY   (+) Shortness of breath      Orthopedic/Musculoskeletal   (+) Thoracic region somatic dysfunction        Physical Exam    Airway    Mallampati score: II  TM Distance: >3 FB  Neck ROM: full     Dental   No notable dental hx     Cardiovascular      Pulmonary   No wheezes    Other Findings  post-pubertal.    Anesthesia Plan  ASA Score- 3     Anesthesia Type- general with ASA Monitors.         Additional Monitors:     Airway Plan: LMA.           Plan Factors-    Chart reviewed. EKG reviewed. Imaging results reviewed. Existing labs reviewed. Patient summary reviewed.    Patient is not a current smoker.  Patient did not smoke on day of surgery.            Induction- intravenous.    Postoperative Plan- Plan for postoperative opioid use. Planned trial extubation    Perioperative Resuscitation Plan - Level 1 - Full Code.       Informed Consent- Anesthetic plan and risks discussed with patient.  I personally reviewed this patient with the CRNA. Discussed and agreed on the Anesthesia Plan with the CRNA..        VITALS  /69   Pulse 76   Temp 98 °F (36.7 °C) (Temporal)   Resp 16   Ht 5' 7\" (1.702 m)   Wt 101 kg (222 lb)   LMP  (LMP Unknown) Comment: menopause - one year " ago  SpO2 99%   BMI 34.77 kg/m²  BP Readings from Last 3 Encounters:   10/25/24 140/69   10/18/24 138/79   10/09/24 140/86        LABS  Results from Last 12 Months   Lab Units 10/11/24  0727 09/27/24  0738   WBC Thousand/uL 7.55 8.84   HEMOGLOBIN g/dL 10.5* 10.4*   HEMATOCRIT % 32.6* 32.1*   PLATELETS Thousands/uL 208 276     Results from Last 12 Months   Lab Units 10/11/24  0727 09/27/24  0738 09/14/24  0729 08/21/24  0732 07/27/24  0758 07/19/24  0732 04/12/24  0736 03/08/24  0715 01/29/24  1224   SODIUM mmol/L 136 133*   < > 135   < >  --    < > 136 142   POTASSIUM mmol/L 4.3 4.9   < > 4.5   < >  --    < > 3.9 4.2   CHLORIDE mmol/L 106 103   < > 104   < >  --    < > 106 109   CO2 mmol/L 21 20*   < > 22   < >  --    < > 18* 21   ANION GAP mmol/L 9 10   < > 9   < >  --    < > 12 12*   BUN mg/dL 58* 55*   < > 52*   < >  --    < > 45* 48*   CREATININE mg/dL 3.87* 4.25*   < > 3.91*   < >  --    < > 3.68* 3.39*   CALCIUM mg/dL 9.1 9.2   < > 9.1   < >  --    < > 8.8 9.2   GLUCOSE RANDOM mg/dL  --   --   --   --   --   --   --   --  93   PHOSPHORUS mg/dL  --   --   --  5.5*  --  5.2*   < > 5.5* 5.0*   XMAGNESIUM mg/dL  --   --   --   --   --   --   --   --  2.1   MAGNESIUM mg/dL  --   --   --   --   --   --   --  2.1  --    HEMOGLOBIN A1C %  --   --   --   --   --   --   --   --  4.8    < > = values in this interval not displayed.     Results from Last 12 Months   Lab Units 01/29/24  1224 12/12/23  1000   APTT sec 27.7  --    INR  1.0 1.02       ECG      ECHOCARDIOGRAPHY OR OTHER TESTING/IMAGING  Normal sinus rhythm  Low voltage QRS  Incomplete right bundle branch block  Cannot rule out Anterior infarct (cited on or before 03-JAN-2020)  Abnormal ECG  When compared with ECG of 03-JAN-2020 10:56, (unconfirmed)  No significant change was found  Confirmed by Jose Manuel Perla ([id number]) on 1/3/2020 3:28:46 PMNo results found for this or any previous visit (from the past 4464 hour(s)).  No results found for this or any previous  visit. This result has an attachment that is not available.   •  Left Atrium: Left atrium cavity size is normal. Left atrium volume   index is normal.   •  Left Ventricle: Left ventricle is normal in size. Wall thickness is   normal. Systolic function is normal with an ejection fraction of 60-65%.   Wall motion is within normal limits. There is normal diastolic function.   •  Right Ventricle: Right ventricle cavity is normal. Systolic function is   normal.   • Right Atrium: Right atrium cavity is normal.   •  No significant valvlular disease.   •  IVC/SVC: The inferior vena cava demonstrates a diameter of >21 mm and   collapses >50%; therefore, the right atrial pressure is estimated at 10-15   mmHg.   • Pulmonic Valve: The estimated pulmonary artery systolic pressure is   16.9 mmHg.      ------- ANESTHESIA RISK-BENEFIT DISCUSSION -------  BENEFITS OF A SPECIALIZED ANESTHESIA TEAM INCLUDE (NBK [id number], PMID 01129666):  (1) Reduce mortality and morbidity for major surgeries/procedures. (2) The team provides analgesia/sedation/amnesia/akinesia as safely as possible. (3) The team strives to reduce discomfort as safely as possible.    RISKS, AND PLANS TO MITIGATE RISKS, INCLUDE:    - Neurologic system: IntraOp awareness (Risk is ~1:1,000 - 1:14,000; PMID 93811699), Stroke (Risk ~<0.1-2% for most cases; PMID 06967917), nerve injury, vision loss, and POCD.     - Airway and Pulmonary system: Dental or mouth injury, throat pain, critical hypoxia, pneumothorax, prolonged intubation, post-op respiratory compromise.  Airway/Intubation risks and prior data: No prior advanced airway notes in [facility] EMR  Major ARISCAT risk factors for pulmonary complications include: none, yielding a score of 0-1= Low risk, 1.6%.  - Cardiovascular system: Hypotension, arrhythmias, cardiac injury or arrest, blood clots, bleeding, infection, vascular injuries.  Juan's RCRI score items: Cr>2, yielding an RCRI Score of 1= 0.6% risk of MACE  Are maria alejandra-op  or intra-op beta blockers indicated? (PMID 85545975): no  - FEN/GI system: Aspiration risk (~0.5% per PMC 3193284) and PONV (10-80% per Apfel score) especially if the patient has not fasted.  ASA NPO guideline compliance?: Yes  - Medication risk assessment: Allergic reactions, excessive bleeding with anticoagulant use, overdoses, drug-drug interactions, injury to a fetus or  in pregnant or breastfeeding patients, maria alejandra-procedural sedation including while driving/operating machinery.  Recent relevant medications: See MAR or Med Review  Personal or family history of anesthesia complications: no  Pregnancy Status: N/A  - Estimate mortality risks associated with anesthesia based on ASA-PS (PMID 48688152): ASA-PS III: 1:3,500

## 2024-10-25 NOTE — ANESTHESIA POSTPROCEDURE EVALUATION
Post-Op Assessment Note    CV Status:  Stable    Pain management: satisfactory to patient       Mental Status:  Awake and alert   Hydration Status:  Stable   PONV Controlled:  None   Airway Patency:  Patent     Post Op Vitals Reviewed: Yes    No anethesia notable event occurred.    Staff: Anesthesiologist           Last Filed PACU Vitals:  Vitals Value Taken Time   Temp 98.5 °F (36.9 °C) 10/25/24 1143   Pulse 80 10/25/24 1227   /60 10/25/24 1227   Resp 23 10/25/24 1227   SpO2 99 % 10/25/24 1227       Modified Rashida:  Activity: 2 (10/25/2024  1:15 PM)  Respiration: 2 (10/25/2024  1:15 PM)  Circulation: 2 (10/25/2024  1:15 PM)  Consciousness: 2 (10/25/2024  1:15 PM)  Oxygen Saturation: 2 (10/25/2024  1:15 PM)  Modified Rashida Score: 10 (10/25/2024  1:15 PM)      Pain controlled with hydromorphone.

## 2024-10-25 NOTE — ANESTHESIA POSTPROCEDURE EVALUATION
Post-Op Assessment Note    CV Status:  Stable  Pain Score: 0    Pain management: adequate       Mental Status:  Sleepy   Hydration Status:  Stable   PONV Controlled:  None   Airway Patency:  Patent     Post Op Vitals Reviewed: Yes    No anethesia notable event occurred.            Last Filed PACU Vitals:  Vitals Value Taken Time   Temp 98.5    Pulse 86 10/25/24 1143   /76 10/25/24 1143   Resp 20    SpO2 99 % 10/25/24 1143   Vitals shown include unfiled device data.    Modified Rashida:  No data recorded

## 2024-10-25 NOTE — OP NOTE
OPERATIVE REPORT  PATIENT NAME: Saige Haji    :  1970  MRN: [id number]  Pt Location: [facility] OR ROOM 02    SURGERY DATE: 10/25/2024    Surgeons and Role:     * Cirilo House DO - Primary     * Ester Puga PA-C - Assisting    Preop Diagnosis:  Kidney dysfunction [N28.9]    Post-Op Diagnosis Codes:     * Kidney dysfunction [N28.9]    Procedure(s):  INSERTION PERITONEAL CATHETER DIALYSIS LAPAROSCOPIC    Specimen(s):  * No specimens in log *    Estimated Blood Loss:   Minimal    Drains:  Urethral Catheter Latex 16 Fr. (Active)   Number of days: 0       [REMOVED] NG/OG/Enteral Tube Orogastric 16 Fr Center mouth (Removed)   Number of days: 0       Anesthesia Type:   General    Operative Indications:  Kidney dysfunction [N28.9]      Operative Findings:  Small amount of adhesions of the omentum the right lower quadrant to the anterior abdominal wall likely secondary to previous open appendectomy.  This provided a nice omentopexy.  Successful placement of a laparoscopically inserted peritoneal dialysis catheter on the right side.  The catheter was buried.  Good inflow outflow.      Complications:   None    Procedure and Technique:  Patient was brought to the operating arena and placed in supine position.  All the regular monitor devices were connected.  The patient underwent general esthesia with endotracheal ovation without complication.  She received perioperative antibiotics.  The patient received subcutaneous heparin in addition to bilateral lower sequential compression devices for DVT prophylaxis.  The patient underwent Gaming catheter placement using aseptic technique.  Using the appropriate stencil the patient's abdomen was marked for the laparoscopic insertion of the peritoneal dialysis catheter on the right side of the abdomen.  The patient is then prepped and draped usual sterile fashion.  Timeout was performed to verify correct patient, procedure, position, site.  In the left upper quadrant at \[facility] [place] [facility]\""  point a 5 mm incision was made using 15 blade scalpel.  A Veress needle was inserted and once there is a positive saline drop test the abdomen was insufflated to a pressure of 12 to 14 mmHg with carbon dioxide.  The patient tarted insulation well.  Using the Optiview technique a 5 mm trocar and a 30 degree scope were then inserted through the incision and under direct visualization trocar was placed without complication.  Once inserted the abdomen was inspected.  Be no injury.  An additional trocar was then placed in the right upper quadrant.  This was done under direct visualization.  The abdomen was inspected and the patient was placed in Trendelenburg position.  The pelvis appeared to be free of adhesions and appeared appropriate for placement of the catheter.  There was adhesions of the omentum to the in the right lower quadrant likely sequela of open appendectomy.  This provided nice omentopexy and therefore no additional omentopexy was needed.  It was decided to place the catheter as planned.  At the previous marked site incision was made approximately 8 mm.  Trocar was then inserted through the fascia we did not driscoll the peritoneum.  The catheter was then tunneled inferiorly approximately 3 to 4 cm before piercing the peritoneum.  Using a stylette the peritoneal dialysis catheter was then inserted making sure the pigtail was facing to the right side.  Once the deep cuff was noted the stylette was removed and the catheter was appropriately pulled back until the deep cuff large in the rectus muscle.  The catheter was then tunneled subcutaneously and exited through the appropriate skin marking.  500 cc of normal saline was then instilled in of the abdomen and flowed very easily.  Then approximately 300 cc of saline was removed and therefore there was good inflow and outflow.  The appropriate x-rays were then added to the catheter.  The trocars were then removed and the abdomen allowed to desufflate.  The exit  site of the catheter was then opened approximately 2 cm and a subcutaneous pocket was then created.  The catheter was then coiled and placed gently into the cavity.  The cavity was then closed in layers with a 3-0 Vicryl for the deep dermis and subcutaneous fat.  The 8 mm incision was then also closed in layers with the dermis being closed using interrupted 3-0 Vicryl.  The skin incisions were then all approximated using 4-0 Monocryl.  Surgical glue was then placed.    The patient Toller procedure well taken the postanesthesia care unit in stable condition.  All lap, needle, and instrument counts were correct.     I was present for the entire procedure., A qualified resident physician was not available., and A physician assistant was required during the procedure for retraction, tissue handling, dissection and suturing.    Patient Disposition:  PACU              SIGNATURE: Cirilo House DO  DATE: October 25, 2024  TIME: 11:30 AM

## 2024-10-28 ENCOUNTER — TELEPHONE (OUTPATIENT)
Dept: INTERVENTIONAL RADIOLOGY/VASCULAR | Facility: HOSPITAL | Age: 54
End: 2024-10-28

## 2024-10-28 NOTE — PROGRESS NOTES
Left message for Saige regarding her appointment at the [facility] on 11/8. Instructions left to arrive at 0945, have nothing to eat or drink after midnight, and have a ride to and from. Instructions also sent in my chart.

## 2024-10-31 ENCOUNTER — TELEPHONE (OUTPATIENT)
Age: 54
End: 2024-10-31

## 2024-10-31 NOTE — TELEPHONE ENCOUNTER
Patient calling as she is going to have her port placed on 11/8 and wants to know when Dr. Velasquez wants her to start dialysis? She has more questions she would like to discuss with the doctor. Please advise.

## 2024-11-01 ENCOUNTER — OFFICE VISIT (OUTPATIENT)
Dept: FAMILY MEDICINE CLINIC | Facility: CLINIC | Age: 54
End: 2024-11-01
Payer: COMMERCIAL

## 2024-11-01 VITALS
OXYGEN SATURATION: 99 % | RESPIRATION RATE: 20 BRPM | WEIGHT: 220.8 LBS | HEART RATE: 77 BPM | BODY MASS INDEX: 34.65 KG/M2 | TEMPERATURE: 97.6 F | HEIGHT: 67 IN | SYSTOLIC BLOOD PRESSURE: 122 MMHG | DIASTOLIC BLOOD PRESSURE: 70 MMHG

## 2024-11-01 DIAGNOSIS — Z23 ENCOUNTER FOR IMMUNIZATION: Primary | ICD-10-CM

## 2024-11-01 DIAGNOSIS — D50.8 IRON DEFICIENCY ANEMIA SECONDARY TO INADEQUATE DIETARY IRON INTAKE: ICD-10-CM

## 2024-11-01 DIAGNOSIS — Z00.00 ANNUAL PHYSICAL EXAM: ICD-10-CM

## 2024-11-01 DIAGNOSIS — N17.9 ACUTE RENAL FAILURE, UNSPECIFIED ACUTE RENAL FAILURE TYPE (HCC): ICD-10-CM

## 2024-11-01 DIAGNOSIS — M05.79 RHEUMATOID ARTHRITIS INVOLVING MULTIPLE SITES WITH POSITIVE RHEUMATOID FACTOR (HCC): ICD-10-CM

## 2024-11-01 DIAGNOSIS — I10 ESSENTIAL HYPERTENSION: Chronic | ICD-10-CM

## 2024-11-01 DIAGNOSIS — Z12.31 VISIT FOR SCREENING MAMMOGRAM: ICD-10-CM

## 2024-11-01 DIAGNOSIS — N18.4 ANEMIA DUE TO STAGE 4 CHRONIC KIDNEY DISEASE  (HCC): ICD-10-CM

## 2024-11-01 DIAGNOSIS — N18.5 ANEMIA OF CHRONIC KIDNEY FAILURE, STAGE 5  (HCC): ICD-10-CM

## 2024-11-01 DIAGNOSIS — N18.6 ESRD (END STAGE RENAL DISEASE) (HCC): ICD-10-CM

## 2024-11-01 DIAGNOSIS — N03.1 HYPERFILTRATION FOCAL SEGMENTAL GLOMERULOSCLEROSIS: ICD-10-CM

## 2024-11-01 DIAGNOSIS — D63.1 ANEMIA OF CHRONIC KIDNEY FAILURE, STAGE 5  (HCC): ICD-10-CM

## 2024-11-01 DIAGNOSIS — E78.2 MIXED HYPERLIPIDEMIA: ICD-10-CM

## 2024-11-01 DIAGNOSIS — G62.9 PERIPHERAL POLYNEUROPATHY: ICD-10-CM

## 2024-11-01 DIAGNOSIS — K58.0 IRRITABLE BOWEL SYNDROME WITH DIARRHEA: ICD-10-CM

## 2024-11-01 DIAGNOSIS — D63.1 ANEMIA DUE TO STAGE 4 CHRONIC KIDNEY DISEASE  (HCC): ICD-10-CM

## 2024-11-01 DIAGNOSIS — K21.9 GERD WITHOUT ESOPHAGITIS: Chronic | ICD-10-CM

## 2024-11-01 PROCEDURE — 99396 PREV VISIT EST AGE 40-64: CPT | Performed by: FAMILY MEDICINE

## 2024-11-01 PROCEDURE — 90632 HEPA VACCINE ADULT IM: CPT

## 2024-11-01 PROCEDURE — 90471 IMMUNIZATION ADMIN: CPT

## 2024-11-01 RX ORDER — ROSUVASTATIN CALCIUM 10 MG/1
10 TABLET, COATED ORAL DAILY
Qty: 90 TABLET | Refills: 3 | Status: SHIPPED | OUTPATIENT
Start: 2024-11-01

## 2024-11-01 RX ORDER — TERAZOSIN 1 MG/1
2 CAPSULE ORAL
Qty: 180 CAPSULE | Refills: 3 | Status: CANCELLED | OUTPATIENT
Start: 2024-11-01

## 2024-11-01 RX ORDER — AMLODIPINE BESYLATE 5 MG/1
5 TABLET ORAL DAILY
Qty: 90 TABLET | Refills: 3 | Status: CANCELLED | OUTPATIENT
Start: 2024-11-01

## 2024-11-01 RX ORDER — TERAZOSIN 1 MG/1
2 CAPSULE ORAL
Qty: 180 CAPSULE | Refills: 3 | Status: SHIPPED | OUTPATIENT
Start: 2024-11-01

## 2024-11-01 RX ORDER — ROSUVASTATIN CALCIUM 10 MG/1
10 TABLET, COATED ORAL DAILY
Qty: 90 TABLET | Refills: 3 | Status: CANCELLED | OUTPATIENT
Start: 2024-11-01

## 2024-11-01 RX ORDER — AMLODIPINE BESYLATE 5 MG/1
5 TABLET ORAL DAILY
Qty: 90 TABLET | Refills: 3 | Status: SHIPPED | OUTPATIENT
Start: 2024-11-01

## 2024-11-01 NOTE — ASSESSMENT & PLAN NOTE
Hypertension stable control with amlodipine 5 mg tablets continuing at same dosage no change at this point she has been doing well with blood pressure at 122/70 follow-up at next visit    Orders:    amLODIPine (NORVASC) 5 mg tablet; Take 1 tablet (5 mg total) by mouth daily    rosuvastatin (CRESTOR) 10 MG tablet; Take 1 tablet (10 mg total) by mouth daily    terazosin (HYTRIN) 1 mg capsule; Take 2 capsules (2 mg total) by mouth daily at bedtime

## 2024-11-01 NOTE — ASSESSMENT & PLAN NOTE
Monitor CBC and iron levels follow-up in 6 months continue to monitor through nephrology service

## 2024-11-01 NOTE — TELEPHONE ENCOUNTER
Discussed with patient in the evening of 10/31, she will start hemodialysis shortly after PermCath placement.  Will contact Nicole in [place] to update.

## 2024-11-01 NOTE — ASSESSMENT & PLAN NOTE
Orders:    rosuvastatin (CRESTOR) 10 MG tablet; Take 1 tablet (10 mg total) by mouth daily    Iron Panel (Includes Ferritin, Iron Sat%, Iron, and TIBC); Future

## 2024-11-01 NOTE — ASSESSMENT & PLAN NOTE
This has created renal failure and patient is in line for renal transplant she is followed by nephrology and is being worked up at [facility] in [place] for kidney transplant

## 2024-11-01 NOTE — PROGRESS NOTES
Adult Annual Physical  Name: Saige Haji      : 1970      MRN: [id number]  Encounter Provider: Cirilo Fowler DO  Encounter Date: 2024   Encounter department: [facility]    Assessment & Plan  Essential hypertension  Hypertension stable control with amlodipine 5 mg tablets continuing at same dosage no change at this point she has been doing well with blood pressure at 122/70 follow-up at next visit    Orders:    amLODIPine (NORVASC) 5 mg tablet; Take 1 tablet (5 mg total) by mouth daily    rosuvastatin (CRESTOR) 10 MG tablet; Take 1 tablet (10 mg total) by mouth daily    terazosin (HYTRIN) 1 mg capsule; Take 2 capsules (2 mg total) by mouth daily at bedtime    Irritable bowel syndrome with diarrhea    Orders:    amLODIPine (NORVASC) 5 mg tablet; Take 1 tablet (5 mg total) by mouth daily    Rheumatoid arthritis involving multiple sites with positive rheumatoid factor (HCC)  Rheumatoid arthritis currently stable she will have flareups at times and she will continue with her current medication regimen she has limitations due to her renal failure    Orders:    amLODIPine (NORVASC) 5 mg tablet; Take 1 tablet (5 mg total) by mouth daily    rosuvastatin (CRESTOR) 10 MG tablet; Take 1 tablet (10 mg total) by mouth daily    Mixed hyperlipidemia  Mixed hyperlipidemia stable continue on Crestor 10 mg daily    Orders:    amLODIPine (NORVASC) 5 mg tablet; Take 1 tablet (5 mg total) by mouth daily    rosuvastatin (CRESTOR) 10 MG tablet; Take 1 tablet (10 mg total) by mouth daily    Lipid panel; Future    CBC and differential; Future    Comprehensive metabolic panel; Future    TSH, 3rd generation with Free T4 reflex; Future    GERD without esophagitis  GERD symptoms currently stable patient doing well without worsening acid indigestion will add additional H2 blocker if necessary follow-up with me in 6 months    Orders:    amLODIPine (NORVASC) 5 mg tablet; Take 1 tablet (5 mg total) by  mouth daily    rosuvastatin (CRESTOR) 10 MG tablet; Take 1 tablet (10 mg total) by mouth daily    Peripheral polyneuropathy    Orders:    amLODIPine (NORVASC) 5 mg tablet; Take 1 tablet (5 mg total) by mouth daily    rosuvastatin (CRESTOR) 10 MG tablet; Take 1 tablet (10 mg total) by mouth daily    Acute renal failure, unspecified acute renal failure type (HCC)    Orders:    amLODIPine (NORVASC) 5 mg tablet; Take 1 tablet (5 mg total) by mouth daily    Anemia due to stage 4 chronic kidney disease  (HCC)    Orders:    amLODIPine (NORVASC) 5 mg tablet; Take 1 tablet (5 mg total) by mouth daily    Iron deficiency anemia secondary to inadequate dietary iron intake    Orders:    rosuvastatin (CRESTOR) 10 MG tablet; Take 1 tablet (10 mg total) by mouth daily    Iron Panel (Includes Ferritin, Iron Sat%, Iron, and TIBC); Future    Visit for screening mammogram    Orders:    rosuvastatin (CRESTOR) 10 MG tablet; Take 1 tablet (10 mg total) by mouth daily    Encounter for immunization    Orders:    HEPATITIS A VACCINE ADULT IM    ESRD (end stage renal disease) (HCC)  Lab Results   Component Value Date    EGFR 12 10/11/2024    EGFR 11 09/27/2024    EGFR 10 09/14/2024    CREATININE 3.87 (H) 10/11/2024    CREATININE 4.25 (H) 09/27/2024    CREATININE 4.38 (H) 09/14/2024   Patient is being followed by nephrology closely and now is in line for working up kidney transplant through [facility].  She is following up with that team and today will receive hepatitis A vaccine.  Repeat laboratory work closely as monitored by nephrology protocols         Hyperfiltration focal segmental glomerulosclerosis  This has created renal failure and patient is in line for renal transplant she is followed by nephrology and is being worked up at [facility] in [place] for kidney transplant         Anemia of chronic kidney failure, stage 5  (HCC)  Monitor CBC and iron levels follow-up in 6 months continue to monitor  through nephrology service         Annual physical exam         Immunizations and preventive care screenings were discussed with patient today. Appropriate education was printed on patient's after visit summary.    Counseling:  Alcohol/drug use: discussed moderation in alcohol intake, the recommendations for healthy alcohol use, and avoidance of illicit drug use.  Dental Health: discussed importance of regular tooth brushing, flossing, and dental visits.  Injury prevention: discussed safety/seat belts, safety helmets, smoke detectors, carbon monoxide detectors, and smoking near bedding or upholstery.  Sexual health: discussed sexually transmitted diseases, partner selection, use of condoms, avoidance of unintended pregnancy, and contraceptive alternatives.  Exercise: the importance of regular exercise/physical activity was discussed. Recommend exercise 3-5 times per week for at least 30 minutes.          History of Present Illness     Adult Annual Physical:  Patient presents for annual physical.     Diet and Physical Activity:  - Diet/Nutrition: well balanced diet.  - Exercise: no formal exercise.    Depression Screening:  - PHQ-2 Score: 0    General Health:  - Sleep: sleeps well.  - Hearing: normal hearing bilateral ears.  - Vision: no vision problems.    /GYN Health:    - Menopause: postmenopausal.     Review of Systems   Constitutional:  Negative for chills, fatigue and fever.   HENT:  Negative for congestion, nosebleeds, rhinorrhea, sinus pressure and sore throat.    Eyes:  Negative for discharge and redness.   Respiratory:  Negative for cough and shortness of breath.    Cardiovascular:  Negative for chest pain, palpitations and leg swelling.   Gastrointestinal:  Negative for abdominal pain, blood in stool and nausea.   Endocrine: Negative for cold intolerance, heat intolerance and polyuria.   Genitourinary:  Negative for dysuria and frequency.   Musculoskeletal:  Negative for arthralgias, back pain and  "myalgias.   Skin:  Negative for rash.   Neurological:  Negative for dizziness, weakness and headaches.   Hematological:  Negative for adenopathy.   Psychiatric/Behavioral:  Negative for behavioral problems and sleep disturbance. The patient is not nervous/anxious.          Objective     /70 (BP Location: Left arm, Patient Position: Sitting, Cuff Size: Standard)   Pulse 77   Temp 97.6 °F (36.4 °C) (Tympanic)   Resp 20   Ht 5' 7\" (1.702 m)   Wt 100 kg (220 lb 12.8 oz)   LMP  (LMP Unknown) Comment: menopause - one year ago  SpO2 99%   BMI 34.58 kg/m²     Physical Exam  Vitals and nursing note reviewed.   Constitutional:       General: She is not in acute distress.     Appearance: Normal appearance. She is well-developed.   HENT:      Head: Normocephalic and atraumatic.      Right Ear: Tympanic membrane, ear canal and external ear normal.      Left Ear: Tympanic membrane, ear canal and external ear normal.      Nose: Nose normal.      Mouth/Throat:      Mouth: Mucous membranes are moist.      Pharynx: Oropharynx is clear. No oropharyngeal exudate.   Eyes:      General: No scleral icterus.        Right eye: No discharge.         Left eye: No discharge.      Conjunctiva/sclera: Conjunctivae normal.      Pupils: Pupils are equal, round, and reactive to light.   Neck:      Thyroid: No thyromegaly.      Vascular: No JVD.   Cardiovascular:      Rate and Rhythm: Normal rate and regular rhythm.      Pulses: Normal pulses.      Heart sounds: Normal heart sounds. No murmur heard.  Pulmonary:      Effort: Pulmonary effort is normal.      Breath sounds: No wheezing or rales.   Chest:      Chest wall: No tenderness.   Abdominal:      General: Bowel sounds are normal. There is no distension.      Palpations: Abdomen is soft. There is no mass.      Tenderness: There is no abdominal tenderness.   Musculoskeletal:         General: No tenderness or deformity. Normal range of motion.      Cervical back: Normal range of " motion.   Lymphadenopathy:      Cervical: No cervical adenopathy.   Skin:     General: Skin is warm and dry.      Capillary Refill: Capillary refill takes less than 2 seconds.      Findings: No rash.   Neurological:      General: No focal deficit present.      Mental Status: She is alert and oriented to person, place, and time. Mental status is at baseline.      Cranial Nerves: No cranial nerve deficit.      Coordination: Coordination normal.      Deep Tendon Reflexes: Reflexes are normal and symmetric. Reflexes normal.   Psychiatric:         Mood and Affect: Mood normal.         Behavior: Behavior normal.         Thought Content: Thought content normal.         Judgment: Judgment normal.

## 2024-11-01 NOTE — ASSESSMENT & PLAN NOTE
GERD symptoms currently stable patient doing well without worsening acid indigestion will add additional H2 blocker if necessary follow-up with me in 6 months    Orders:    amLODIPine (NORVASC) 5 mg tablet; Take 1 tablet (5 mg total) by mouth daily    rosuvastatin (CRESTOR) 10 MG tablet; Take 1 tablet (10 mg total) by mouth daily

## 2024-11-01 NOTE — ASSESSMENT & PLAN NOTE
Rheumatoid arthritis currently stable she will have flareups at times and she will continue with her current medication regimen she has limitations due to her renal failure    Orders:    amLODIPine (NORVASC) 5 mg tablet; Take 1 tablet (5 mg total) by mouth daily    rosuvastatin (CRESTOR) 10 MG tablet; Take 1 tablet (10 mg total) by mouth daily

## 2024-11-01 NOTE — TELEPHONE ENCOUNTER
Paperwork completed and sent again for admission for after 11/8/25 perma cath placement.    Called and spoke to patient aware she need to have CXR and Hep panel completed in order to start.

## 2024-11-01 NOTE — ASSESSMENT & PLAN NOTE
Orders:    amLODIPine (NORVASC) 5 mg tablet; Take 1 tablet (5 mg total) by mouth daily    rosuvastatin (CRESTOR) 10 MG tablet; Take 1 tablet (10 mg total) by mouth daily

## 2024-11-01 NOTE — ASSESSMENT & PLAN NOTE
Lab Results   Component Value Date    EGFR 12 10/11/2024    EGFR 11 09/27/2024    EGFR 10 09/14/2024    CREATININE 3.87 (H) 10/11/2024    CREATININE 4.25 (H) 09/27/2024    CREATININE 4.38 (H) 09/14/2024   Patient is being followed by nephrology closely and now is in line for working up kidney transplant through [facility].  She is following up with that team and today will receive hepatitis A vaccine.  Repeat laboratory work closely as monitored by nephrology protocols

## 2024-11-02 ENCOUNTER — APPOINTMENT (OUTPATIENT)
Dept: RADIOLOGY | Facility: CLINIC | Age: 54
End: 2024-11-02
Payer: COMMERCIAL

## 2024-11-02 DIAGNOSIS — Z99.2 ESRD ON PERITONEAL DIALYSIS (HCC): ICD-10-CM

## 2024-11-02 DIAGNOSIS — N18.6 ESRD ON PERITONEAL DIALYSIS (HCC): ICD-10-CM

## 2024-11-02 PROCEDURE — 71046 X-RAY EXAM CHEST 2 VIEWS: CPT

## 2024-11-04 ENCOUNTER — TELEPHONE (OUTPATIENT)
Age: 54
End: 2024-11-04

## 2024-11-04 NOTE — TELEPHONE ENCOUNTER
[facility] admissions .     They need hep B panel and chest xray results. to admit pt to [facility]. please fax- [phone]

## 2024-11-04 NOTE — TELEPHONE ENCOUNTER
XR done but still waiting on HEP B panels to be completed before faxing to [facility] for Admissions.

## 2024-11-06 NOTE — TELEPHONE ENCOUNTER
Mar from [facility] Admissions calling for an update. Made aware:    Yesenia Bueno MA Medical Assistant Signed 11/4/2024       XR done but still waiting on HEP B panels to be completed before faxing to [facility] for Admissions.        Mar asking if we know when patient will be completing labs. Please advise, thank you

## 2024-11-07 ENCOUNTER — TELEPHONE (OUTPATIENT)
Age: 54
End: 2024-11-07

## 2024-11-07 ENCOUNTER — TELEPHONE (OUTPATIENT)
Dept: SURGERY | Facility: CLINIC | Age: 54
End: 2024-11-07

## 2024-11-07 ENCOUNTER — TELEPHONE (OUTPATIENT)
Dept: OTHER | Facility: HOSPITAL | Age: 54
End: 2024-11-07

## 2024-11-07 ENCOUNTER — OFFICE VISIT (OUTPATIENT)
Dept: SURGERY | Facility: CLINIC | Age: 54
End: 2024-11-07

## 2024-11-07 VITALS
OXYGEN SATURATION: 96 % | HEART RATE: 74 BPM | SYSTOLIC BLOOD PRESSURE: 136 MMHG | TEMPERATURE: 97.6 F | HEIGHT: 67 IN | DIASTOLIC BLOOD PRESSURE: 83 MMHG | WEIGHT: 223 LBS | BODY MASS INDEX: 35 KG/M2

## 2024-11-07 DIAGNOSIS — N18.6 ESRD (END STAGE RENAL DISEASE) (HCC): ICD-10-CM

## 2024-11-07 DIAGNOSIS — N18.5 CKD (CHRONIC KIDNEY DISEASE) STAGE 5, GFR LESS THAN 15 ML/MIN (HCC): Primary | ICD-10-CM

## 2024-11-07 PROCEDURE — 99024 POSTOP FOLLOW-UP VISIT: CPT

## 2024-11-07 NOTE — ASSESSMENT & PLAN NOTE
Lab Results   Component Value Date    EGFR 12 10/11/2024    EGFR 11 09/27/2024    EGFR 10 09/14/2024    CREATININE 3.87 (H) 10/11/2024    CREATININE 4.25 (H) 09/27/2024    CREATININE 4.38 (H) 09/14/2024

## 2024-11-07 NOTE — TELEPHONE ENCOUNTER
Mar calling again from [facility] to see if patient did labs.  No results yet.

## 2024-11-07 NOTE — TELEPHONE ENCOUNTER
Chart reviewed. Ir contacted already and procedure will be cancelled tomorrow.  Contacted general surgery, seen by AP today but unaware the plan had changed. Will need PD cath unroofed and start training.

## 2024-11-07 NOTE — TELEPHONE ENCOUNTER
Per Dr. Hennessy, IR procedure cnxld for tomorrow.  Patient now doing PD, able to get supplies.  Called and spoke with Claire at IR.  She will remove from  tomorrows schedule.

## 2024-11-07 NOTE — ASSESSMENT & PLAN NOTE
50-year-old female with history of end-stage renal disease with anticipated upcoming outpatient dialysis followed by Dr. Yuri Hennessy from nephrology underwent laparoscopic placement of a peritoneal dialysis catheter by Dr. Cirilo House on 10/25/2024.  The catheter was left buried.  She is to have a port placed by IR this week to initiate outpatient dialysis.  Most recent creatinine was 3.78 on .  PCP is Dr. Cirilo Fowler.  The catheter was left buried because of national shortage of hemodialysis solution.  Plan is to proceed with HD via port rather than initiating home PD until dialysis solution is more readily available.  The patient will need eventual unroofing of the catheter when home PD will be initiated.    Incisions x 2 clean and dry, some remanent skin glue.  No erythema or tenderness.    No further follow-up with general surgery for further wound check  Catheter unroofing will be needed in the future for initiation of home peritoneal dialysis  Port placement by IR 2024  Follow-up with nephrology as scheduled to initiate outpatient HD  Follow-up with PCP as scheduled or as needed.    Evelio Gonzalez    OPERATIVE REPORT  PATIENT NAME: Saige Haji    :  1970  MRN: [id number]  Pt Location: [facility] OR ROOM 02     SURGERY DATE: 10/25/2024     Surgeons and Role:     * Cirilo House DO - Primary     * Ester Puga PA-C - Assisting     Preop Diagnosis:  Kidney dysfunction [N28.9]     Post-Op Diagnosis Codes:     * Kidney dysfunction [N28.9]     Procedure(s):  INSERTION PERITONEAL CATHETER DIALYSIS LAPAROSCOPIC     Specimen(s):  * No specimens in log *     Estimated Blood Loss:   Minimal         Lab Results   Component Value Date    EGFR 12 10/11/2024    EGFR 11 2024    EGFR 10 2024    CREATININE 3.87 (H) 10/11/2024    CREATININE 4.25 (H) 2024    CREATININE 4.38 (H) 2024

## 2024-11-07 NOTE — PROGRESS NOTES
Ambulatory Visit  Name: Saige Haji      : 1970      MRN: [id number]  Encounter Provider: Evelio Gonzalez PA-C  Encounter Date: 2024   Encounter department: [facility]    Assessment & Plan  CKD (chronic kidney disease) stage 5, GFR less than 15 ml/min ([facility])  Lab Results   Component Value Date    EGFR 12 10/11/2024    EGFR 11 2024    EGFR 10 2024    CREATININE 3.87 (H) 10/11/2024    CREATININE 4.25 (H) 2024    CREATININE 4.38 (H) 2024          ESRD (end stage renal disease) (HCC)  50-year-old female with history of end-stage renal disease with anticipated upcoming outpatient dialysis followed by Dr. Yuri Hennessy from nephrology underwent laparoscopic placement of a peritoneal dialysis catheter by Dr. Cirilo House on 10/25/2024.  The catheter was left buried.  She is to have a port placed by IR this week to initiate outpatient dialysis.  Most recent creatinine was 3.78 on .  PCP is Dr. Cirilo Fowler.  The catheter was left buried because of national shortage of hemodialysis solution.  Plan is to proceed with HD via port rather than initiating home PD until dialysis solution is more readily available.  The patient will need eventual unroofing of the catheter when home PD will be initiated.    Incisions x 2 clean and dry, some remanent skin glue.  No erythema or tenderness.    No further follow-up with general surgery for further wound check  Catheter unroofing will be needed in the future for initiation of home peritoneal dialysis  Port placement by IR 2024  Follow-up with nephrology as scheduled to initiate outpatient HD  Follow-up with PCP as scheduled or as needed.    Evelio Gonzalez    OPERATIVE REPORT  PATIENT NAME: Saige Haji    :  1970  MRN: [id number]  Pt Location: [facility] OR ROOM 02     SURGERY DATE: 10/25/2024     Surgeons and Role:     * Cirilo House DO - Primary     * Ester Puga PA-C - Assisting     Preop  Diagnosis:  Kidney dysfunction [N28.9]     Post-Op Diagnosis Codes:     * Kidney dysfunction [N28.9]     Procedure(s):  INSERTION PERITONEAL CATHETER DIALYSIS LAPAROSCOPIC     Specimen(s):  * No specimens in log *     Estimated Blood Loss:   Minimal         Lab Results   Component Value Date    EGFR 12 10/11/2024    EGFR 11 09/27/2024    EGFR 10 09/14/2024    CREATININE 3.87 (H) 10/11/2024    CREATININE 4.25 (H) 09/27/2024    CREATININE 4.38 (H) 09/14/2024            History of Present Illness     Saige Haji is a 54 y.o. female with history of anemia of chronic kidney disease and end-stage renal failure requiring upcoming hemodialysis seen in the office today for wound check.  She underwent laparoscopic placement of peritoneal dialysis catheter on October 25.  No wound healing problems.  Some remanent of skin glue.  Offers no complaints at this time.  Seen in the office with her .  Plan for IR port placement to initiate outpatient HD in the next week.  Most recent creatinine was 3.78.  Eventual unroofing of the PD catheter in the future for use once home PD dialysis will be started, delayed right now because of national shortage of dialysis solution.    History obtained from : patient  Review of Systems   Respiratory:  Negative for shortness of breath.    Cardiovascular:  Negative for chest pain, palpitations and leg swelling.   Gastrointestinal:  Negative for constipation and diarrhea.   Genitourinary:  Positive for decreased urine volume. Negative for difficulty urinating, dysuria and frequency.   Skin:  Negative for color change, pallor, rash and wound.   Hematological:  Negative for adenopathy. Does not bruise/bleed easily.   All other systems reviewed and are negative.      Medical History Reviewed by provider this encounter:       Past Medical History   Past Medical History:   Diagnosis Date    Acute renal failure (ARF) (HCC)     Arthritis     Back pain     Chronic kidney disease     GERD  (gastroesophageal reflux disease)     Hyperlipidemia     Hypertension     Neck pain     Neuropathy     Skin abnormality     on abdomen - Dr. House aware    Stage 3b chronic kidney disease (HCC)     Wears reading eyeglasses      Past Surgical History:   Procedure Laterality Date    APPENDECTOMY      CHOLECYSTECTOMY      COLONOSCOPY      FOOT POSTERIOR RELEASE Left     around ankle    IR BIOPSY KIDNEY RANDOM  12/12/2023    IL LAPS INSERTION TUNNELED INTRAPERITONEAL CATHETER N/A 10/25/2024    Procedure: INSERTION PERITONEAL CATHETER DIALYSIS LAPAROSCOPIC;  Surgeon: Cirilo House DO;  Location: [facility] MAIN [facility];  Service: General    TUBAL LIGATION       Family History   Problem Relation Age of Onset    Hypertension Mother     Cancer Mother     Alzheimer's disease Mother     Parkinsonism Mother     Diabetes Father     Hypertension Father     Cancer Father     Breast cancer Sister 50    No Known Problems Daughter     No Known Problems Maternal Grandmother     No Known Problems Paternal Grandmother     No Known Problems Sister     No Known Problems Sister     No Known Problems Maternal Aunt     No Known Problems Maternal Aunt     No Known Problems Maternal Aunt     No Known Problems Paternal Aunt     Breast cancer Paternal Aunt     Breast cancer Paternal Aunt     No Known Problems Paternal Aunt      Current Outpatient Medications on File Prior to Visit   Medication Sig Dispense Refill    amLODIPine (NORVASC) 5 mg tablet Take 1 tablet (5 mg total) by mouth daily 90 tablet 3    b complex vitamins capsule Take 1 capsule by mouth daily      ergocalciferol (VITAMIN D2) 50,000 units Take 1 capsule (50,000 Units total) by mouth once a week On the same day each week 12 capsule 1    IRON HEME POLYPEPTIDE PO Take 1 capsule by mouth in the morning OTC med.      multivitamin (THERAGRAN) TABS Take 1 tablet by mouth daily      oxyCODONE-acetaminophen (Percocet) 5-325 mg per tablet Take 1 tablet by mouth every 4 (four) hours as needed for  moderate pain Max Daily Amount: 6 tablets 20 tablet 0    rosuvastatin (CRESTOR) 10 MG tablet Take 1 tablet (10 mg total) by mouth daily 90 tablet 3    saccharomyces boulardii (Florastor) 250 mg capsule Take 250 mg by mouth daily      sodium bicarbonate 650 mg tablet Take 1 tablet (650 mg total) by mouth in the morning      terazosin (HYTRIN) 1 mg capsule Take 2 capsules (2 mg total) by mouth daily at bedtime 180 capsule 3     No current facility-administered medications on file prior to visit.     Allergies   Allergen Reactions    Methotrexate Lip Swelling, Other (See Comments) and Swelling     LIPS  SWELLING- LIPS OOZING.    Cephalexin GI Intolerance      Current Outpatient Medications on File Prior to Visit   Medication Sig Dispense Refill    amLODIPine (NORVASC) 5 mg tablet Take 1 tablet (5 mg total) by mouth daily 90 tablet 3    b complex vitamins capsule Take 1 capsule by mouth daily      ergocalciferol (VITAMIN D2) 50,000 units Take 1 capsule (50,000 Units total) by mouth once a week On the same day each week 12 capsule 1    IRON HEME POLYPEPTIDE PO Take 1 capsule by mouth in the morning OTC med.      multivitamin (THERAGRAN) TABS Take 1 tablet by mouth daily      oxyCODONE-acetaminophen (Percocet) 5-325 mg per tablet Take 1 tablet by mouth every 4 (four) hours as needed for moderate pain Max Daily Amount: 6 tablets 20 tablet 0    rosuvastatin (CRESTOR) 10 MG tablet Take 1 tablet (10 mg total) by mouth daily 90 tablet 3    saccharomyces boulardii (Florastor) 250 mg capsule Take 250 mg by mouth daily      sodium bicarbonate 650 mg tablet Take 1 tablet (650 mg total) by mouth in the morning      terazosin (HYTRIN) 1 mg capsule Take 2 capsules (2 mg total) by mouth daily at bedtime 180 capsule 3     No current facility-administered medications on file prior to visit.      Social History     Tobacco Use    Smoking status: Former     Current packs/day: 0.00     Average packs/day: 0.5 packs/day for 4.0 years (2.0  "ttl pk-yrs)     Types: Cigarettes     Start date:      Quit date:      Years since quittin.8    Smokeless tobacco: Never   Vaping Use    Vaping status: Never Used   Substance and Sexual Activity    Alcohol use: Never    Drug use: Never    Sexual activity: Not on file       Objective     /83 (BP Location: Left arm, Patient Position: Sitting, Cuff Size: Standard)   Pulse 74   Temp 97.6 °F (36.4 °C) (Temporal)   Ht 5' 7\" (1.702 m)   Wt 101 kg (223 lb)   LMP  (LMP Unknown) Comment: menopause - one year ago  SpO2 96%   BMI 34.93 kg/m²     Physical Exam  Vitals reviewed.   Constitutional:       General: She is not in acute distress.     Appearance: Normal appearance.   HENT:      Head: Normocephalic.      Nose: Nose normal.      Mouth/Throat:      Pharynx: Oropharynx is clear.   Cardiovascular:      Rate and Rhythm: Normal rate and regular rhythm.      Heart sounds: Normal heart sounds. No murmur heard.  Pulmonary:      Effort: Pulmonary effort is normal.      Breath sounds: Normal breath sounds. No wheezing.   Abdominal:      General: Bowel sounds are normal. There is no distension.      Palpations: Abdomen is soft. There is no mass.      Tenderness: There is no abdominal tenderness. There is no guarding or rebound.      Hernia: No hernia is present.      Comments: Abdominal incisions x 2 appear to be clean, dry, no wound disruption or erythema.  Nontender.  Some skin glue remains.  No bruising or sign of any wound infection.  PD catheter in the right mid abdomen is buried.   Musculoskeletal:      Right lower leg: No edema.      Left lower leg: No edema.   Skin:     Coloration: Skin is not pale.      Findings: No erythema or rash.   Neurological:      Mental Status: She is oriented to person, place, and time.      Cranial Nerves: No cranial nerve deficit.      Gait: Gait normal.   Psychiatric:         Mood and Affect: Mood normal.         Thought Content: Thought content normal. "       Administrative Statements   15I have spent a total time of 15 minutes in caring for this patient on the day of the visit/encounter including Diagnostic results, Prognosis, Risks and benefits of tx options, Instructions for management, Patient and family education, Importance of tx compliance, Risk factor reductions, Impressions, Counseling / Coordination of care, Documenting in the medical record, Reviewing / ordering tests, medicine, procedures  , and Obtaining or reviewing history  .    Evelio Gonzalez PA-C

## 2024-11-07 NOTE — TELEPHONE ENCOUNTER
I called and spoke with Saige regarding completing the Hep B panel for Horacio. She states she is going to the [facility] tomorrow morning to complete lab work and get her port placed. I called and updated Mar with [facility]. She states Erick or herself will be checking back in with the office by Monday if they do not see any progress with the patient.

## 2024-11-08 ENCOUNTER — TELEPHONE (OUTPATIENT)
Age: 54
End: 2024-11-08

## 2024-11-08 ENCOUNTER — APPOINTMENT (OUTPATIENT)
Dept: LAB | Facility: HOSPITAL | Age: 54
End: 2024-11-08
Payer: COMMERCIAL

## 2024-11-08 DIAGNOSIS — N18.6 ESRD ON PERITONEAL DIALYSIS (HCC): ICD-10-CM

## 2024-11-08 DIAGNOSIS — N18.5 ANEMIA OF CHRONIC KIDNEY FAILURE, STAGE 5  (HCC): ICD-10-CM

## 2024-11-08 DIAGNOSIS — D63.1 ANEMIA OF CHRONIC KIDNEY FAILURE, STAGE 5  (HCC): ICD-10-CM

## 2024-11-08 DIAGNOSIS — N18.5 CKD (CHRONIC KIDNEY DISEASE) STAGE 5, GFR LESS THAN 15 ML/MIN (HCC): ICD-10-CM

## 2024-11-08 DIAGNOSIS — D50.8 IRON DEFICIENCY ANEMIA SECONDARY TO INADEQUATE DIETARY IRON INTAKE: ICD-10-CM

## 2024-11-08 DIAGNOSIS — Z99.2 ESRD ON PERITONEAL DIALYSIS (HCC): ICD-10-CM

## 2024-11-08 DIAGNOSIS — E78.2 MIXED HYPERLIPIDEMIA: ICD-10-CM

## 2024-11-08 LAB
ALBUMIN SERPL BCG-MCNC: 4.2 G/DL (ref 3.5–5)
ALP SERPL-CCNC: 56 U/L (ref 34–104)
ALT SERPL W P-5'-P-CCNC: 8 U/L (ref 7–52)
ANION GAP SERPL CALCULATED.3IONS-SCNC: 9 MMOL/L (ref 4–13)
AST SERPL W P-5'-P-CCNC: 11 U/L (ref 13–39)
BASOPHILS # BLD AUTO: 0.02 THOUSANDS/ÂΜL (ref 0–0.1)
BASOPHILS NFR BLD AUTO: 0 % (ref 0–1)
BILIRUB SERPL-MCNC: 0.46 MG/DL (ref 0.2–1)
BUN SERPL-MCNC: 63 MG/DL (ref 5–25)
CALCIUM SERPL-MCNC: 8.8 MG/DL (ref 8.4–10.2)
CHLORIDE SERPL-SCNC: 106 MMOL/L (ref 96–108)
CHOLEST SERPL-MCNC: 161 MG/DL
CO2 SERPL-SCNC: 20 MMOL/L (ref 21–32)
CREAT SERPL-MCNC: 3.96 MG/DL (ref 0.6–1.3)
EOSINOPHIL # BLD AUTO: 0.24 THOUSAND/ÂΜL (ref 0–0.61)
EOSINOPHIL NFR BLD AUTO: 3 % (ref 0–6)
ERYTHROCYTE [DISTWIDTH] IN BLOOD BY AUTOMATED COUNT: 13.9 % (ref 11.6–15.1)
FERRITIN SERPL-MCNC: 62 NG/ML (ref 11–307)
GFR SERPL CREATININE-BSD FRML MDRD: 12 ML/MIN/1.73SQ M
GLUCOSE P FAST SERPL-MCNC: 85 MG/DL (ref 65–99)
HBV SURFACE AB SER-ACNC: 30.6 MIU/ML
HBV SURFACE AG SER QL: NORMAL
HCT VFR BLD AUTO: 31.9 % (ref 34.8–46.1)
HDLC SERPL-MCNC: 56 MG/DL
HGB BLD-MCNC: 10.4 G/DL (ref 11.5–15.4)
IMM GRANULOCYTES # BLD AUTO: 0.02 THOUSAND/UL (ref 0–0.2)
IMM GRANULOCYTES NFR BLD AUTO: 0 % (ref 0–2)
IRON SATN MFR SERPL: 26 % (ref 15–50)
IRON SERPL-MCNC: 84 UG/DL (ref 50–212)
LDLC SERPL CALC-MCNC: 88 MG/DL (ref 0–100)
LYMPHOCYTES # BLD AUTO: 1.29 THOUSANDS/ÂΜL (ref 0.6–4.47)
LYMPHOCYTES NFR BLD AUTO: 16 % (ref 14–44)
MCH RBC QN AUTO: 29.9 PG (ref 26.8–34.3)
MCHC RBC AUTO-ENTMCNC: 32.6 G/DL (ref 31.4–37.4)
MCV RBC AUTO: 92 FL (ref 82–98)
MONOCYTES # BLD AUTO: 0.39 THOUSAND/ÂΜL (ref 0.17–1.22)
MONOCYTES NFR BLD AUTO: 5 % (ref 4–12)
NEUTROPHILS # BLD AUTO: 6.36 THOUSANDS/ÂΜL (ref 1.85–7.62)
NEUTS SEG NFR BLD AUTO: 76 % (ref 43–75)
NONHDLC SERPL-MCNC: 105 MG/DL
NRBC BLD AUTO-RTO: 0 /100 WBCS
PLATELET # BLD AUTO: 222 THOUSANDS/UL (ref 149–390)
PMV BLD AUTO: 10.4 FL (ref 8.9–12.7)
POTASSIUM SERPL-SCNC: 4.5 MMOL/L (ref 3.5–5.3)
PROT SERPL-MCNC: 6.7 G/DL (ref 6.4–8.4)
RBC # BLD AUTO: 3.48 MILLION/UL (ref 3.81–5.12)
SODIUM SERPL-SCNC: 135 MMOL/L (ref 135–147)
TIBC SERPL-MCNC: 329 UG/DL (ref 250–450)
TRIGL SERPL-MCNC: 87 MG/DL
TSH SERPL DL<=0.05 MIU/L-ACNC: 1.73 UIU/ML (ref 0.45–4.5)
UIBC SERPL-MCNC: 245 UG/DL (ref 155–355)
WBC # BLD AUTO: 8.32 THOUSAND/UL (ref 4.31–10.16)

## 2024-11-08 PROCEDURE — 36415 COLL VENOUS BLD VENIPUNCTURE: CPT

## 2024-11-08 PROCEDURE — 80061 LIPID PANEL: CPT

## 2024-11-08 PROCEDURE — 83540 ASSAY OF IRON: CPT

## 2024-11-08 PROCEDURE — 80053 COMPREHEN METABOLIC PANEL: CPT

## 2024-11-08 PROCEDURE — 83550 IRON BINDING TEST: CPT

## 2024-11-08 PROCEDURE — 82728 ASSAY OF FERRITIN: CPT

## 2024-11-08 PROCEDURE — 87340 HEPATITIS B SURFACE AG IA: CPT

## 2024-11-08 PROCEDURE — 85025 COMPLETE CBC W/AUTO DIFF WBC: CPT

## 2024-11-08 PROCEDURE — 86706 HEP B SURFACE ANTIBODY: CPT

## 2024-11-08 PROCEDURE — 84443 ASSAY THYROID STIM HORMONE: CPT

## 2024-11-08 NOTE — TELEPHONE ENCOUNTER
I called and spoke with Erick in [facility] regarding Saige's imaging and lab panels being completed. He asked when her labs are resulted and her PD cath is placed on 11-12-24 to fax him the access report, hep b panel results, and XR results to the fax number [phone].

## 2024-11-08 NOTE — TELEPHONE ENCOUNTER
Jefferson from [facility].  Still need cxr and Hep Panel.  Also asking for verification on pt cath exposed.  Please call him [phone]

## 2024-11-10 DIAGNOSIS — E55.9 VITAMIN D DEFICIENCY: ICD-10-CM

## 2024-11-11 ENCOUNTER — TELEPHONE (OUTPATIENT)
Age: 54
End: 2024-11-11

## 2024-11-11 RX ORDER — ERGOCALCIFEROL 1.25 MG/1
50000 CAPSULE, LIQUID FILLED ORAL WEEKLY
Qty: 12 CAPSULE | Refills: 3 | Status: SHIPPED | OUTPATIENT
Start: 2024-11-11 | End: 2024-11-18 | Stop reason: SDUPTHER

## 2024-11-11 NOTE — TELEPHONE ENCOUNTER
[facility] admissions  pt getting pd cath tomorrow.  [facility] needs the access report with hep b resutls and chast xray.   fax to [phone]

## 2024-11-12 ENCOUNTER — ANESTHESIA EVENT (OUTPATIENT)
Dept: PERIOP | Facility: HOSPITAL | Age: 54
End: 2024-11-12
Payer: COMMERCIAL

## 2024-11-12 ENCOUNTER — HOSPITAL ENCOUNTER (OUTPATIENT)
Facility: HOSPITAL | Age: 54
Setting detail: OUTPATIENT SURGERY
Discharge: HOME | End: 2024-11-12
Attending: SURGERY | Admitting: SURGERY
Payer: COMMERCIAL

## 2024-11-12 ENCOUNTER — ANESTHESIA (OUTPATIENT)
Dept: PERIOP | Facility: HOSPITAL | Age: 54
End: 2024-11-12
Payer: COMMERCIAL

## 2024-11-12 VITALS
HEART RATE: 76 BPM | RESPIRATION RATE: 18 BRPM | BODY MASS INDEX: 35 KG/M2 | HEIGHT: 67 IN | OXYGEN SATURATION: 99 % | DIASTOLIC BLOOD PRESSURE: 76 MMHG | WEIGHT: 223 LBS | SYSTOLIC BLOOD PRESSURE: 164 MMHG | TEMPERATURE: 98.2 F

## 2024-11-12 PROCEDURE — 49999 UNLISTED PX ABD PERTM&OMN: CPT | Performed by: SURGERY

## 2024-11-12 RX ORDER — SODIUM CHLORIDE, SODIUM LACTATE, POTASSIUM CHLORIDE, CALCIUM CHLORIDE 600; 310; 30; 20 MG/100ML; MG/100ML; MG/100ML; MG/100ML
75 INJECTION, SOLUTION INTRAVENOUS CONTINUOUS
Status: DISCONTINUED | OUTPATIENT
Start: 2024-11-12 | End: 2024-11-12 | Stop reason: HOSPADM

## 2024-11-12 RX ORDER — CEFAZOLIN SODIUM 1 G/3ML
INJECTION, POWDER, FOR SOLUTION INTRAMUSCULAR; INTRAVENOUS AS NEEDED
Status: DISCONTINUED | OUTPATIENT
Start: 2024-11-12 | End: 2024-11-12

## 2024-11-12 RX ORDER — BUPIVACAINE HYDROCHLORIDE 5 MG/ML
INJECTION, SOLUTION EPIDURAL; INTRACAUDAL AS NEEDED
Status: DISCONTINUED | OUTPATIENT
Start: 2024-11-12 | End: 2024-11-12 | Stop reason: HOSPADM

## 2024-11-12 RX ORDER — DEXAMETHASONE SODIUM PHOSPHATE 10 MG/ML
INJECTION, SOLUTION INTRAMUSCULAR; INTRAVENOUS AS NEEDED
Status: DISCONTINUED | OUTPATIENT
Start: 2024-11-12 | End: 2024-11-12

## 2024-11-12 RX ORDER — PHENYLEPHRINE HCL IN 0.9% NACL 1 MG/10 ML
SYRINGE (ML) INTRAVENOUS AS NEEDED
Status: DISCONTINUED | OUTPATIENT
Start: 2024-11-12 | End: 2024-11-12

## 2024-11-12 RX ORDER — HEPARIN SODIUM 5000 [USP'U]/ML
INJECTION, SOLUTION INTRAVENOUS; SUBCUTANEOUS AS NEEDED
Status: DISCONTINUED | OUTPATIENT
Start: 2024-11-12 | End: 2024-11-12

## 2024-11-12 RX ORDER — FENTANYL CITRATE 50 UG/ML
INJECTION, SOLUTION INTRAMUSCULAR; INTRAVENOUS AS NEEDED
Status: DISCONTINUED | OUTPATIENT
Start: 2024-11-12 | End: 2024-11-12

## 2024-11-12 RX ORDER — HEPARIN 100 UNIT/ML
SYRINGE INTRAVENOUS AS NEEDED
Status: DISCONTINUED | OUTPATIENT
Start: 2024-11-12 | End: 2024-11-12 | Stop reason: HOSPADM

## 2024-11-12 RX ORDER — FENTANYL CITRATE/PF 50 MCG/ML
25 SYRINGE (ML) INJECTION
Status: DISCONTINUED | OUTPATIENT
Start: 2024-11-12 | End: 2024-11-12 | Stop reason: HOSPADM

## 2024-11-12 RX ORDER — PROPOFOL 10 MG/ML
INJECTION, EMULSION INTRAVENOUS AS NEEDED
Status: DISCONTINUED | OUTPATIENT
Start: 2024-11-12 | End: 2024-11-12

## 2024-11-12 RX ORDER — OXYCODONE AND ACETAMINOPHEN 5; 325 MG/1; MG/1
2 TABLET ORAL EVERY 4 HOURS PRN
Status: DISCONTINUED | OUTPATIENT
Start: 2024-11-12 | End: 2024-11-12 | Stop reason: HOSPADM

## 2024-11-12 RX ORDER — LIDOCAINE HYDROCHLORIDE 10 MG/ML
INJECTION, SOLUTION EPIDURAL; INFILTRATION; INTRACAUDAL; PERINEURAL AS NEEDED
Status: DISCONTINUED | OUTPATIENT
Start: 2024-11-12 | End: 2024-11-12

## 2024-11-12 RX ORDER — ONDANSETRON 2 MG/ML
4 INJECTION INTRAMUSCULAR; INTRAVENOUS EVERY 8 HOURS PRN
Status: DISCONTINUED | OUTPATIENT
Start: 2024-11-12 | End: 2024-11-12 | Stop reason: HOSPADM

## 2024-11-12 RX ORDER — SODIUM CHLORIDE, SODIUM LACTATE, POTASSIUM CHLORIDE, CALCIUM CHLORIDE 600; 310; 30; 20 MG/100ML; MG/100ML; MG/100ML; MG/100ML
20 INJECTION, SOLUTION INTRAVENOUS CONTINUOUS
Status: DISCONTINUED | OUTPATIENT
Start: 2024-11-12 | End: 2024-11-12 | Stop reason: HOSPADM

## 2024-11-12 RX ORDER — SODIUM CHLORIDE, SODIUM LACTATE, POTASSIUM CHLORIDE, CALCIUM CHLORIDE 600; 310; 30; 20 MG/100ML; MG/100ML; MG/100ML; MG/100ML
INJECTION, SOLUTION INTRAVENOUS CONTINUOUS PRN
Status: DISCONTINUED | OUTPATIENT
Start: 2024-11-12 | End: 2024-11-12

## 2024-11-12 RX ORDER — MAGNESIUM HYDROXIDE 1200 MG/15ML
LIQUID ORAL AS NEEDED
Status: DISCONTINUED | OUTPATIENT
Start: 2024-11-12 | End: 2024-11-12 | Stop reason: HOSPADM

## 2024-11-12 RX ADMIN — HEPARIN SODIUM 5000 UNITS: 5000 INJECTION, SOLUTION INTRAVENOUS; SUBCUTANEOUS at 12:20

## 2024-11-12 RX ADMIN — Medication 100 MCG: at 12:32

## 2024-11-12 RX ADMIN — FENTANYL CITRATE 25 MCG: 50 INJECTION INTRAMUSCULAR; INTRAVENOUS at 12:40

## 2024-11-12 RX ADMIN — PROPOFOL 200 MG: 10 INJECTION, EMULSION INTRAVENOUS at 12:13

## 2024-11-12 RX ADMIN — CEFAZOLIN 2000 MG: 1 INJECTION, POWDER, FOR SOLUTION INTRAMUSCULAR; INTRAVENOUS at 12:19

## 2024-11-12 RX ADMIN — DEXAMETHASONE SODIUM PHOSPHATE 10 MG: 10 INJECTION, SOLUTION INTRAMUSCULAR; INTRAVENOUS at 12:13

## 2024-11-12 RX ADMIN — LIDOCAINE HYDROCHLORIDE 50 MG: 10 INJECTION, SOLUTION EPIDURAL; INFILTRATION; INTRACAUDAL; PERINEURAL at 12:13

## 2024-11-12 RX ADMIN — FENTANYL CITRATE 25 MCG: 50 INJECTION INTRAMUSCULAR; INTRAVENOUS at 12:23

## 2024-11-12 RX ADMIN — FENTANYL CITRATE 50 MCG: 50 INJECTION INTRAMUSCULAR; INTRAVENOUS at 12:13

## 2024-11-12 RX ADMIN — SODIUM CHLORIDE, SODIUM LACTATE, POTASSIUM CHLORIDE, AND CALCIUM CHLORIDE: .6; .31; .03; .02 INJECTION, SOLUTION INTRAVENOUS at 12:09

## 2024-11-12 NOTE — INTERVAL H&P NOTE
H&P reviewed. After examining the patient I find no changes in the patients condition since the H&P had been written.        Please note patient is to undergo an unroofing of the peritoneal dialysis catheter.  H&P reviewed no changes since prior    Vitals:    11/12/24 0954   BP: 169/81   Pulse: 73   Resp: 18   Temp: 97.6 °F (36.4 °C)   SpO2: 100%

## 2024-11-12 NOTE — ANESTHESIA PREPROCEDURE EVALUATION
Medical History    History Comments   Hypertension    Neuropathy    GERD (gastroesophageal reflux disease)    Stage 3b chronic kidney disease (HCC)    Acute renal failure (ARF) (HCC)    Chronic kidney disease    Arthritis    Hyperlipidemia    Back pain    Neck pain    Wears reading eyeglasses    Skin abnormality on abdomen - Dr. Lacy ricks   Procedure:  REVISION/ UNROOFING PERITONEAL CATHETER DIALYSIS  LAPAROSCOPIC (Abdomen)    Relevant Problems   ANESTHESIA (within normal limits)      CARDIO   (+) Chest pain   (+) Essential hypertension   (+) Mixed hyperlipidemia      GI/HEPATIC   (+) GERD without esophagitis      /RENAL   (+) CKD (chronic kidney disease) stage 5, GFR less than 15 ml/min (HCC)   (+) ESRD (end stage renal disease) (HCC)   (+) Hyperfiltration focal segmental glomerulosclerosis      HEMATOLOGY   (+) Anemia   (+) Anemia of chronic kidney failure, stage 5  (HCC)   (+) Iron deficiency anemia secondary to inadequate dietary iron intake      MUSCULOSKELETAL   (+) Acute left-sided low back pain with left-sided sciatica   (+) Rheumatoid arthritis involving multiple sites with positive rheumatoid factor (HCC)      PULMONARY   (+) Shortness of breath        Physical Exam    Airway    Mallampati score: II  TM Distance: <3 FB  Neck ROM: full     Dental       Cardiovascular  Rate: normal    Pulmonary  Pulmonary exam normal     Other Findings  Per pt denies anything remaining that is loose or removeablepost-pubertal.      Anesthesia Plan  ASA Score- 3     Anesthesia Type- general with ASA Monitors.         Additional Monitors:     Airway Plan: ETT.           Plan Factors-Exercise tolerance (METS): >4 METS.    Chart reviewed.    Patient summary reviewed.    Patient is not a current smoker.              Induction- intravenous.    Postoperative Plan- Plan for postoperative opioid use.         Informed Consent- Anesthetic plan and risks discussed with patient.  I personally reviewed this patient with the CRNA.  Discussed and agreed on the Anesthesia Plan with the CRNA..

## 2024-11-12 NOTE — TELEPHONE ENCOUNTER
"Phone call from Erick at [facility] to confirm that patient was going to have \"access placed today\". Unable to find patient scheduled for appt today on Appt Desk.   Please contact Erick to confirm at [phone] ext. [phone]  "

## 2024-11-12 NOTE — ANESTHESIA POSTPROCEDURE EVALUATION
Post-Op Assessment Note    CV Status:  Stable    Pain management: adequate       Mental Status:  Alert and awake   Hydration Status:  Euvolemic   PONV Controlled:  Controlled   Airway Patency:  Patent     Post Op Vitals Reviewed: Yes    No anethesia notable event occurred.    Staff: CRNA           Last Filed PACU Vitals:  Vitals Value Taken Time   Temp 97.9    Pulse 79 11/12/24 1259   /76 11/12/24 1257   Resp 14 11/12/24 1259   SpO2 97 % 11/12/24 1259   Vitals shown include unfiled device data.    Modified Rashida:  No data recorded

## 2024-11-12 NOTE — TELEPHONE ENCOUNTER
I called and spoke with Erick at [facility]. He states he still needs the chest XR and Saige still needs to complete a HEP B Total Core Antibody panel.    I confirmed with him that Saige is currently getting her access port placed today.

## 2024-11-12 NOTE — DISCHARGE INSTR - AVS FIRST PAGE
Follow up with Dr. House in 2 weeks as per appointment.     Regular diet as tolerated.    Low intensity activity as tolerated. No heavy lifting, nothing greater than 10lbs, for two weeks.    Dressing must stay on. Do not remove until seen by the dialysis nurse.    If develop fever, nausea or vomiting, worsening pain, redness or drainage from incisions call then office or go to the ER

## 2024-11-13 ENCOUNTER — TELEPHONE (OUTPATIENT)
Age: 54
End: 2024-11-13

## 2024-11-13 NOTE — TELEPHONE ENCOUNTER
Erick ([facility]) called into the office requesting a refax of Saige's recent chest XR results. He also stated she only needs to complete a HEP B CORE PANEL as a final task for admissions.    Chest XR Faxed.      I called and spoke with Saige regarding completing HEP B CORE PANEL for [facility] Admissions. She states she will go to the lab on 11/15/24 for lab completion.

## 2024-11-13 NOTE — OP NOTE
OPERATIVE REPORT  PATIENT NAME: Saige Haji    :  1970  MRN: [id number]  Pt Location: [facility] OR ROOM 01    SURGERY DATE: 2024    Surgeons and Role:     * Cirilo House DO - Primary     * Heena Valverde MD - Assisting    Preop Diagnosis:  Chronic kidney disease, stage V (HCC) [N18.5]    Post-Op Diagnosis Codes:     * Chronic kidney disease, stage V (HCC) [N18.5]    Procedure(s):  REVISION/ UNROOFING PERITONEAL CATHETER DIALYSIS    Specimen(s):  * No specimens in log *    Estimated Blood Loss:   Minimal    Drains:  * No LDAs found *    Anesthesia Type:   General    Operative Indications:  Chronic kidney disease, stage V (HCC) [N18.5]    Operative Findings:  Peritoneal dialysis catheter in subcutaneous tissue  Catheter easily flushed     Complications:   None    Procedure and Technique:  After informed consent was obtained explaining the risks/benefits/alternatives of the procedure, the patient was taken to the OR. General anesthesia was induced and the patient was prepped and draped in the usual sterile fashion. A time out was performed to verify correct site and procedure.    Using a #15 blade, previous left lower quadrant incision was re-opened. Dissection was carried down to the subcutaneous fat using cautery. Peritoneal catheter was visualized and grasped with a Siobhan. Adhesions between the catheter and the surrounding tissue were released with electrocautery. The catheter was brought through to the skin surface. Hemostasis was achieved. Cavity was thoroughly irrigated with sterile saline. 3-0 vicryl was used to reapproximate the dermis surrounding the catheter. 4-0 vicryl was then placed in an interrupted fashion.    The catheter was flushed with heparin without difficulty. Catheter was then surrounded by sterile dressings.    The patient was awakened and taken to the PACU without any immediate post op complications.     Dr House was present for the entire procedure.    Patient Disposition:  PACU      SIGNATURE: Heena Valverde MD  DATE: November 12, 2024  TIME: 7:51 PM

## 2024-11-13 NOTE — TELEPHONE ENCOUNTER
[facility]:    pt accepted at [facility] her appt is for tomorrow at 9am.

## 2024-11-14 DIAGNOSIS — Z79.2 PROPHYLACTIC ANTIBIOTIC: Primary | ICD-10-CM

## 2024-11-14 DIAGNOSIS — N18.6 ESRD (END STAGE RENAL DISEASE) ON DIALYSIS (HCC): Primary | ICD-10-CM

## 2024-11-14 DIAGNOSIS — Z99.2 ESRD (END STAGE RENAL DISEASE) ON DIALYSIS (HCC): Primary | ICD-10-CM

## 2024-11-14 RX ORDER — SULFAMETHOXAZOLE AND TRIMETHOPRIM 800; 160 MG/1; MG/1
1 TABLET ORAL EVERY 12 HOURS SCHEDULED
Qty: 6 TABLET | Refills: 0 | Status: SHIPPED | OUTPATIENT
Start: 2024-11-14 | End: 2024-11-20

## 2024-11-14 RX ORDER — GENTAMICIN SULFATE 1 MG/G
CREAM TOPICAL DAILY
Qty: 30 G | Refills: 1 | Status: SHIPPED | OUTPATIENT
Start: 2024-11-14

## 2024-11-14 NOTE — ANESTHESIA POSTPROCEDURE EVALUATION
Post-Op Assessment Note    Last Filed PACU Vitals:  Vitals Value Taken Time   Temp 98.7 °F (37.1 °C) 11/12/24 1325   Pulse 73 11/12/24 1330   /65 11/12/24 1325   Resp 27 11/12/24 1329   SpO2 99 % 11/12/24 1330   Vitals shown include unfiled device data.    Modified Rashida:  No data recorded

## 2024-11-15 ENCOUNTER — APPOINTMENT (OUTPATIENT)
Dept: LAB | Facility: HOSPITAL | Age: 54
End: 2024-11-15
Payer: COMMERCIAL

## 2024-11-15 ENCOUNTER — HOSPITAL ENCOUNTER (OUTPATIENT)
Dept: INFUSION CENTER | Facility: HOSPITAL | Age: 54
Discharge: HOME | End: 2024-11-15
Attending: INTERNAL MEDICINE
Payer: COMMERCIAL

## 2024-11-15 DIAGNOSIS — N18.5 ANEMIA OF CHRONIC KIDNEY FAILURE, STAGE 5  (HCC): ICD-10-CM

## 2024-11-15 DIAGNOSIS — D63.1 ANEMIA OF CHRONIC KIDNEY FAILURE, STAGE 5  (HCC): ICD-10-CM

## 2024-11-15 LAB — HBV CORE AB SER QL: NORMAL

## 2024-11-15 PROCEDURE — 36415 COLL VENOUS BLD VENIPUNCTURE: CPT

## 2024-11-15 PROCEDURE — 86704 HEP B CORE ANTIBODY TOTAL: CPT

## 2024-11-15 NOTE — PROGRESS NOTES
Per Dr Hennessy, Pt will be receiving a long acting sean in dialysis clinic starting next week and we will defer injection today. Pt made aware

## 2024-11-18 ENCOUNTER — DOCUMENTATION (OUTPATIENT)
Dept: OTHER | Facility: HOSPITAL | Age: 54
End: 2024-11-18

## 2024-11-18 DIAGNOSIS — E55.9 VITAMIN D DEFICIENCY: Primary | ICD-10-CM

## 2024-11-18 RX ORDER — ERGOCALCIFEROL 1.25 MG/1
50000 CAPSULE, LIQUID FILLED ORAL WEEKLY
Qty: 12 CAPSULE | Refills: 0 | Status: SHIPPED | OUTPATIENT
Start: 2024-11-18

## 2024-11-19 DIAGNOSIS — Z79.2 PROPHYLACTIC ANTIBIOTIC: ICD-10-CM

## 2024-11-20 RX ORDER — SULFAMETHOXAZOLE AND TRIMETHOPRIM 800; 160 MG/1; MG/1
1 TABLET ORAL EVERY 12 HOURS SCHEDULED
Qty: 6 TABLET | Refills: 0 | Status: SHIPPED | OUTPATIENT
Start: 2024-11-20 | End: 2024-11-23

## 2024-11-25 ENCOUNTER — OFFICE VISIT (OUTPATIENT)
Dept: SURGERY | Facility: CLINIC | Age: 54
End: 2024-11-25

## 2024-11-25 VITALS
DIASTOLIC BLOOD PRESSURE: 68 MMHG | BODY MASS INDEX: 37.86 KG/M2 | TEMPERATURE: 98.3 F | HEIGHT: 67 IN | HEART RATE: 79 BPM | SYSTOLIC BLOOD PRESSURE: 126 MMHG | OXYGEN SATURATION: 99 % | WEIGHT: 241.2 LBS

## 2024-11-25 DIAGNOSIS — N18.6 ESRD (END STAGE RENAL DISEASE) (HCC): Primary | ICD-10-CM

## 2024-11-25 PROCEDURE — 99024 POSTOP FOLLOW-UP VISIT: CPT | Performed by: SURGERY

## 2024-11-25 NOTE — PROGRESS NOTES
Name: Saige Haji      : 1970      MRN: [id number]  Encounter Provider: Cirilo House DO  Encounter Date: 2024   Encounter department: [facility]  :  Assessment & Plan  ESRD (end stage renal disease) ([facility])  Lab Results   Component Value Date    EGFR 12 2024    EGFR 12 10/11/2024    EGFR 11 2024    CREATININE 3.96 (H) 2024    CREATININE 3.87 (H) 10/11/2024    CREATININE 4.25 (H) 2024       Status post unroofing of peritoneal dialysis catheter.  Currently catheter is working very well as per the patient.  Incision healed well.  From a surgical perspective patient can shower.  Patient encouraged to reach out to the dialysis nurse to make sure they do not have any issues with her showering.  Overall doing very well.  She may follow with me on as-needed basis.               History of Present Illness     HPI  Saige Haji is a 54 y.o. female who presents today in follow-up after undergoing unroofing of peritoneal dialysis catheter.  Overall doing well.  No nausea vomiting fevers chills.  Overall feeling better from her uremic standpoint.  Catheter is working and she is undergoing daily dialysis.  No redness or drainage around the catheter.  No fevers or chills.    History obtained from: patient    Review of Systems  Past Medical History   Past Medical History:   Diagnosis Date    Acute renal failure (ARF) ([facility]) 2023    Arthritis     Back pain     Chronic kidney disease     GERD (gastroesophageal reflux disease)     Hyperlipidemia     Hypertension     Neck pain     Neuropathy     Skin abnormality     on abdomen - Dr. House aware    Stage 3b chronic kidney disease ([facility]) 2023    Wears reading eyeglasses      Past Surgical History:   Procedure Laterality Date    APPENDECTOMY      CHOLECYSTECTOMY      COLONOSCOPY      FOOT POSTERIOR RELEASE Left     around ankle    IR BIOPSY KIDNEY RANDOM  2023    VT LAPS INSERTION TUNNELED INTRAPERITONEAL  CATHETER N/A 10/25/2024    Procedure: INSERTION PERITONEAL CATHETER DIALYSIS LAPAROSCOPIC;  Surgeon: Cirilo House DO;  Location: [facility] MAIN OR;  Service: General    DC LAPS W/REVISION INTRAPERITONEAL CATHETER N/A 11/12/2024    Procedure: REVISION/ UNROOFING PERITONEAL CATHETER DIALYSIS;  Surgeon: Cirilo House DO;  Location: [facility] MAIN OR;  Service: General    TUBAL LIGATION       Family History   Problem Relation Age of Onset    Hypertension Mother     Cancer Mother     Alzheimer's disease Mother     Parkinsonism Mother     Diabetes Father     Hypertension Father     Cancer Father     Breast cancer Sister 50    No Known Problems Daughter     No Known Problems Maternal Grandmother     No Known Problems Paternal Grandmother     No Known Problems Sister     No Known Problems Sister     No Known Problems Maternal Aunt     No Known Problems Maternal Aunt     No Known Problems Maternal Aunt     No Known Problems Paternal Aunt     Breast cancer Paternal Aunt     Breast cancer Paternal Aunt     No Known Problems Paternal Aunt       reports that she quit smoking about 32 years ago. Her smoking use included cigarettes. She started smoking about 36 years ago. She has a 2 pack-year smoking history. She has never used smokeless tobacco. She reports that she does not drink alcohol and does not use drugs.  Current Outpatient Medications on File Prior to Visit   Medication Sig Dispense Refill    amLODIPine (NORVASC) 5 mg tablet Take 1 tablet (5 mg total) by mouth daily 90 tablet 3    b complex vitamins capsule Take 1 capsule by mouth daily      ergocalciferol (VITAMIN D2) 50,000 units Take 1 capsule (50,000 Units total) by mouth once a week On the same day each week 12 capsule 0    gentamicin (GARAMYCIN) 0.1 % cream Apply topically daily Topical daily with dressing changes 30 g 1    IRON HEME POLYPEPTIDE PO Take 1 capsule by mouth in the morning OTC med.      multivitamin (THERAGRAN) TABS Take 1 tablet by mouth daily       oxyCODONE-acetaminophen (Percocet) 5-325 mg per tablet Take 1 tablet by mouth every 4 (four) hours as needed for moderate pain Max Daily Amount: 6 tablets 20 tablet 0    rosuvastatin (CRESTOR) 10 MG tablet Take 1 tablet (10 mg total) by mouth daily 90 tablet 3    saccharomyces boulardii (Florastor) 250 mg capsule Take 250 mg by mouth daily      sodium bicarbonate 650 mg tablet Take 1 tablet (650 mg total) by mouth in the morning      terazosin (HYTRIN) 1 mg capsule Take 2 capsules (2 mg total) by mouth daily at bedtime 180 capsule 3     No current facility-administered medications on file prior to visit.     Allergies   Allergen Reactions    Methotrexate Lip Swelling, Other (See Comments) and Swelling     LIPS  SWELLING- LIPS OOZING.    Cephalexin GI Intolerance      Current Outpatient Medications on File Prior to Visit   Medication Sig Dispense Refill    amLODIPine (NORVASC) 5 mg tablet Take 1 tablet (5 mg total) by mouth daily 90 tablet 3    b complex vitamins capsule Take 1 capsule by mouth daily      ergocalciferol (VITAMIN D2) 50,000 units Take 1 capsule (50,000 Units total) by mouth once a week On the same day each week 12 capsule 0    gentamicin (GARAMYCIN) 0.1 % cream Apply topically daily Topical daily with dressing changes 30 g 1    IRON HEME POLYPEPTIDE PO Take 1 capsule by mouth in the morning OTC med.      multivitamin (THERAGRAN) TABS Take 1 tablet by mouth daily      oxyCODONE-acetaminophen (Percocet) 5-325 mg per tablet Take 1 tablet by mouth every 4 (four) hours as needed for moderate pain Max Daily Amount: 6 tablets 20 tablet 0    rosuvastatin (CRESTOR) 10 MG tablet Take 1 tablet (10 mg total) by mouth daily 90 tablet 3    saccharomyces boulardii (Florastor) 250 mg capsule Take 250 mg by mouth daily      sodium bicarbonate 650 mg tablet Take 1 tablet (650 mg total) by mouth in the morning      terazosin (HYTRIN) 1 mg capsule Take 2 capsules (2 mg total) by mouth daily at bedtime 180 capsule 3  "    No current facility-administered medications on file prior to visit.      Social History     Tobacco Use    Smoking status: Former     Current packs/day: 0.00     Average packs/day: 0.5 packs/day for 4.0 years (2.0 ttl pk-yrs)     Types: Cigarettes     Start date:      Quit date:      Years since quittin.9    Smokeless tobacco: Never   Vaping Use    Vaping status: Never Used   Substance and Sexual Activity    Alcohol use: Never    Drug use: Never    Sexual activity: Not on file        Objective   /68 (BP Location: Left arm, Patient Position: Sitting, Cuff Size: Standard)   Pulse 79   Temp 98.3 °F (36.8 °C) (Temporal)   Ht 5' 7\" (1.702 m)   Wt 109 kg (241 lb 3.2 oz)   LMP  (LMP Unknown) Comment: menopause - one year ago  SpO2 99%   BMI 37.78 kg/m²      Physical Exam  Vitals reviewed.   Constitutional:       General: She is not in acute distress.     Appearance: Normal appearance. She is not ill-appearing, toxic-appearing or diaphoretic.   Abdominal:      General: There is no distension.      Palpations: Abdomen is soft. There is no mass.      Tenderness: There is no abdominal tenderness.      Hernia: No hernia is present.      Comments: Right lower quadrant peritoneal dialysis catheter in place.  Incisions clean dry and intact.   Skin:     General: Skin is warm.      Coloration: Skin is not jaundiced or pale.      Findings: No bruising or erythema.   Neurological:      Mental Status: She is alert.           "

## 2024-11-25 NOTE — ASSESSMENT & PLAN NOTE
Lab Results   Component Value Date    EGFR 12 11/08/2024    EGFR 12 10/11/2024    EGFR 11 09/27/2024    CREATININE 3.96 (H) 11/08/2024    CREATININE 3.87 (H) 10/11/2024    CREATININE 4.25 (H) 09/27/2024       Status post unroofing of peritoneal dialysis catheter.  Currently catheter is working very well as per the patient.  Incision healed well.  From a surgical perspective patient can shower.  Patient encouraged to reach out to the dialysis nurse to make sure they do not have any issues with her showering.  Overall doing very well.  She may follow with me on as-needed basis.

## 2025-01-22 ENCOUNTER — DOCUMENTATION (OUTPATIENT)
Dept: OTHER | Facility: HOSPITAL | Age: 55
End: 2025-01-22

## 2025-01-22 DIAGNOSIS — I10 ESSENTIAL HYPERTENSION: Primary | ICD-10-CM

## 2025-01-22 RX ORDER — LISINOPRIL 10 MG/1
10 TABLET ORAL DAILY
Qty: 90 TABLET | Refills: 0 | Status: SHIPPED | OUTPATIENT
Start: 2025-01-22

## 2025-01-27 DIAGNOSIS — N18.6 ESRD (END STAGE RENAL DISEASE) ON DIALYSIS (HCC): ICD-10-CM

## 2025-01-27 DIAGNOSIS — Z99.2 ESRD (END STAGE RENAL DISEASE) ON DIALYSIS (HCC): ICD-10-CM

## 2025-01-28 RX ORDER — GENTAMICIN SULFATE 1 MG/G
CREAM TOPICAL
Qty: 30 G | Refills: 0 | Status: SHIPPED | OUTPATIENT
Start: 2025-01-28

## 2025-03-26 ENCOUNTER — DOCUMENTATION (OUTPATIENT)
Dept: OTHER | Facility: HOSPITAL | Age: 55
End: 2025-03-26

## 2025-03-26 DIAGNOSIS — E83.39 HYPERPHOSPHATEMIA: Primary | ICD-10-CM

## 2025-03-26 RX ORDER — CALCIUM ACETATE 667 MG/1
667 CAPSULE ORAL
Qty: 90 CAPSULE | Refills: 3 | Status: SHIPPED | OUTPATIENT
Start: 2025-03-26

## 2025-03-28 DIAGNOSIS — N18.6 ESRD (END STAGE RENAL DISEASE) ON DIALYSIS (HCC): ICD-10-CM

## 2025-03-28 DIAGNOSIS — Z99.2 ESRD (END STAGE RENAL DISEASE) ON DIALYSIS (HCC): ICD-10-CM

## 2025-03-28 RX ORDER — GENTAMICIN SULFATE 1 MG/G
CREAM TOPICAL
Qty: 30 G | Refills: 0 | Status: SHIPPED | OUTPATIENT
Start: 2025-03-28

## 2025-04-01 DIAGNOSIS — Z99.2 ESRD (END STAGE RENAL DISEASE) ON DIALYSIS (HCC): ICD-10-CM

## 2025-04-01 DIAGNOSIS — N18.6 ESRD (END STAGE RENAL DISEASE) ON DIALYSIS (HCC): ICD-10-CM

## 2025-04-01 RX ORDER — GENTAMICIN SULFATE 1 MG/G
CREAM TOPICAL
Qty: 30 G | Refills: 0 | Status: SHIPPED | OUTPATIENT
Start: 2025-04-01

## 2025-04-16 ENCOUNTER — DOCUMENTATION (OUTPATIENT)
Dept: OTHER | Facility: HOSPITAL | Age: 55
End: 2025-04-16

## 2025-04-16 DIAGNOSIS — E83.39 HYPERPHOSPHATEMIA: ICD-10-CM

## 2025-04-16 DIAGNOSIS — I10 ESSENTIAL HYPERTENSION: ICD-10-CM

## 2025-04-16 RX ORDER — CALCIUM ACETATE 667 MG/1
667 CAPSULE ORAL
Qty: 270 CAPSULE | Refills: 1 | Status: SHIPPED | OUTPATIENT
Start: 2025-04-16

## 2025-04-16 RX ORDER — LISINOPRIL 10 MG/1
10 TABLET ORAL DAILY
Qty: 90 TABLET | Refills: 2 | Status: SHIPPED | OUTPATIENT
Start: 2025-04-16

## 2025-05-08 ENCOUNTER — APPOINTMENT (EMERGENCY)
Dept: RADIOLOGY | Facility: HOSPITAL | Age: 55
End: 2025-05-08
Payer: COMMERCIAL

## 2025-05-08 ENCOUNTER — HOSPITAL ENCOUNTER (EMERGENCY)
Facility: HOSPITAL | Age: 55
Discharge: HOME | End: 2025-05-08
Attending: EMERGENCY MEDICINE
Payer: COMMERCIAL

## 2025-05-08 VITALS
TEMPERATURE: 98.1 F | HEART RATE: 71 BPM | OXYGEN SATURATION: 98 % | DIASTOLIC BLOOD PRESSURE: 63 MMHG | SYSTOLIC BLOOD PRESSURE: 141 MMHG | RESPIRATION RATE: 20 BRPM

## 2025-05-08 DIAGNOSIS — E83.39 HYPERPHOSPHATEMIA: ICD-10-CM

## 2025-05-08 DIAGNOSIS — D64.9 CHRONIC ANEMIA: ICD-10-CM

## 2025-05-08 DIAGNOSIS — E86.0 DEHYDRATION: ICD-10-CM

## 2025-05-08 DIAGNOSIS — R42 LIGHTHEADEDNESS: Primary | ICD-10-CM

## 2025-05-08 DIAGNOSIS — N18.9 CKD (CHRONIC KIDNEY DISEASE): ICD-10-CM

## 2025-05-08 LAB
ALBUMIN SERPL BCG-MCNC: 4.1 G/DL (ref 3.5–5)
ALP SERPL-CCNC: 48 U/L (ref 34–104)
ALT SERPL W P-5'-P-CCNC: 13 U/L (ref 7–52)
ANION GAP SERPL CALCULATED.3IONS-SCNC: 10 MMOL/L (ref 4–13)
APTT PPP: 26 SECONDS (ref 23–34)
AST SERPL W P-5'-P-CCNC: 12 U/L (ref 13–39)
ATRIAL RATE: 84 BPM
BASOPHILS # BLD AUTO: 0.03 THOUSANDS/ÂΜL (ref 0–0.1)
BASOPHILS NFR BLD AUTO: 0 % (ref 0–1)
BILIRUB SERPL-MCNC: 0.38 MG/DL (ref 0.2–1)
BUN SERPL-MCNC: 61 MG/DL (ref 5–25)
CALCIUM SERPL-MCNC: 8.6 MG/DL (ref 8.4–10.2)
CHLORIDE SERPL-SCNC: 108 MMOL/L (ref 96–108)
CO2 SERPL-SCNC: 19 MMOL/L (ref 21–32)
CREAT SERPL-MCNC: 5.75 MG/DL (ref 0.6–1.3)
EOSINOPHIL # BLD AUTO: 0.23 THOUSAND/ÂΜL (ref 0–0.61)
EOSINOPHIL NFR BLD AUTO: 3 % (ref 0–6)
ERYTHROCYTE [DISTWIDTH] IN BLOOD BY AUTOMATED COUNT: 12.6 % (ref 11.6–15.1)
GFR SERPL CREATININE-BSD FRML MDRD: 7 ML/MIN/1.73SQ M
GLUCOSE SERPL-MCNC: 159 MG/DL (ref 65–140)
HCT VFR BLD AUTO: 25.3 % (ref 34.8–46.1)
HGB BLD-MCNC: 8.3 G/DL (ref 11.5–15.4)
IMM GRANULOCYTES # BLD AUTO: 0.04 THOUSAND/UL (ref 0–0.2)
IMM GRANULOCYTES NFR BLD AUTO: 1 % (ref 0–2)
INR PPP: 1.03 (ref 0.85–1.19)
LYMPHOCYTES # BLD AUTO: 1.45 THOUSANDS/ÂΜL (ref 0.6–4.47)
LYMPHOCYTES NFR BLD AUTO: 19 % (ref 14–44)
MAGNESIUM SERPL-MCNC: 2.3 MG/DL (ref 1.9–2.7)
MCH RBC QN AUTO: 31.4 PG (ref 26.8–34.3)
MCHC RBC AUTO-ENTMCNC: 32.8 G/DL (ref 31.4–37.4)
MCV RBC AUTO: 96 FL (ref 82–98)
MONOCYTES # BLD AUTO: 0.42 THOUSAND/ÂΜL (ref 0.17–1.22)
MONOCYTES NFR BLD AUTO: 6 % (ref 4–12)
NEUTROPHILS # BLD AUTO: 5.47 THOUSANDS/ÂΜL (ref 1.85–7.62)
NEUTS SEG NFR BLD AUTO: 71 % (ref 43–75)
NRBC BLD AUTO-RTO: 0 /100 WBCS
P AXIS: 45 DEGREES
PHOSPHATE SERPL-MCNC: 4.8 MG/DL (ref 2.7–4.5)
PLATELET # BLD AUTO: 199 THOUSANDS/UL (ref 149–390)
PMV BLD AUTO: 10.1 FL (ref 8.9–12.7)
POTASSIUM SERPL-SCNC: 5.1 MMOL/L (ref 3.5–5.3)
PR INTERVAL: 168 MS
PROT SERPL-MCNC: 6.5 G/DL (ref 6.4–8.4)
PROTHROMBIN TIME: 14 SECONDS (ref 12.3–15)
QRS AXIS: -20 DEGREES
QRSD INTERVAL: 100 MS
QT INTERVAL: 376 MS
QTC INTERVAL: 444 MS
RBC # BLD AUTO: 2.64 MILLION/UL (ref 3.81–5.12)
SODIUM SERPL-SCNC: 137 MMOL/L (ref 135–147)
T WAVE AXIS: 16 DEGREES
TSH SERPL DL<=0.05 MIU/L-ACNC: 3.22 UIU/ML (ref 0.45–4.5)
VENTRICULAR RATE: 84 BPM
WBC # BLD AUTO: 7.64 THOUSAND/UL (ref 4.31–10.16)

## 2025-05-08 PROCEDURE — 93005 ELECTROCARDIOGRAM TRACING: CPT

## 2025-05-08 PROCEDURE — 85610 PROTHROMBIN TIME: CPT

## 2025-05-08 PROCEDURE — 36415 COLL VENOUS BLD VENIPUNCTURE: CPT

## 2025-05-08 PROCEDURE — 85730 THROMBOPLASTIN TIME PARTIAL: CPT

## 2025-05-08 PROCEDURE — 83735 ASSAY OF MAGNESIUM: CPT

## 2025-05-08 PROCEDURE — 84443 ASSAY THYROID STIM HORMONE: CPT

## 2025-05-08 PROCEDURE — 85025 COMPLETE CBC W/AUTO DIFF WBC: CPT

## 2025-05-08 PROCEDURE — 71046 X-RAY EXAM CHEST 2 VIEWS: CPT

## 2025-05-08 PROCEDURE — 93010 ELECTROCARDIOGRAM REPORT: CPT | Performed by: INTERNAL MEDICINE

## 2025-05-08 PROCEDURE — 99284 EMERGENCY DEPT VISIT MOD MDM: CPT

## 2025-05-08 PROCEDURE — 96365 THER/PROPH/DIAG IV INF INIT: CPT

## 2025-05-08 PROCEDURE — 80053 COMPREHEN METABOLIC PANEL: CPT

## 2025-05-08 PROCEDURE — 99285 EMERGENCY DEPT VISIT HI MDM: CPT

## 2025-05-08 PROCEDURE — 84100 ASSAY OF PHOSPHORUS: CPT

## 2025-05-08 RX ORDER — SODIUM CHLORIDE, SODIUM GLUCONATE, SODIUM ACETATE, POTASSIUM CHLORIDE, MAGNESIUM CHLORIDE, SODIUM PHOSPHATE, DIBASIC, AND POTASSIUM PHOSPHATE .53; .5; .37; .037; .03; .012; .00082 G/100ML; G/100ML; G/100ML; G/100ML; G/100ML; G/100ML; G/100ML
500 INJECTION, SOLUTION INTRAVENOUS ONCE
Status: COMPLETED | OUTPATIENT
Start: 2025-05-08 | End: 2025-05-08

## 2025-05-08 RX ORDER — ERGOCALCIFEROL 1.25 MG/1
CAPSULE, LIQUID FILLED ORAL DAILY
COMMUNITY
Start: 2025-04-28

## 2025-05-08 RX ADMIN — SODIUM CHLORIDE, SODIUM GLUCONATE, SODIUM ACETATE, POTASSIUM CHLORIDE, MAGNESIUM CHLORIDE, SODIUM PHOSPHATE, DIBASIC, AND POTASSIUM PHOSPHATE 500 ML: .53; .5; .37; .037; .03; .012; .00082 INJECTION, SOLUTION INTRAVENOUS at 11:17

## 2025-05-08 NOTE — DISCHARGE INSTRUCTIONS
Immediately return to the emergency room if you experience any new or worsening symptoms or if the symptoms are lasting longer than expected.     Please proceed with the labs tomorrow and follow-up with nephrology as discussed. Stop the amlodipine (Norvasc). Continue recording your blood pressures daily.

## 2025-05-08 NOTE — QUICK NOTE
Requested recommendations from ED provider for patient presenting to ED with dizziness, lightheaded and decreased blood pressure at home the past few days. ESRD on PD patient at [facility] under the care of Dr. Talamantes. Patient examines mildly hypovolemic with dry mucous membranes. Patient also states she has been having cramping with PD, having excessive thirst and dry lips. With PD alternates green and yellow tops with more green for greater UF. Trace nonpitting BLE edema patient states at baseline, lungs clear. Denies cough, SOB, nausea, vomiting, diarrhea or decreased oral intake. Dry weight 101 kg per [facility] records, may require adjusting dry weight. Discussed with Dr. Talamantes, will discontinue amlodipine and use yellow (1.5%) PD solution at home. Will give 500 mL IVF bolus over 2 hours. Discussed with ED ERIS Shepherd and in agreement with recs. Patient has appointment outpatient with [facility] tomorrow for monthly lab draws and follow up.

## 2025-05-08 NOTE — ED PROVIDER NOTES
Time reflects when diagnosis was documented in both MDM as applicable and the Disposition within this note       Time User Action Codes Description Comment    5/8/2025 11:12 AM Jc Shepherd Add [R42] Lightheadedness     5/8/2025 11:12 AM Jc Shepherd Add [N18.9] CKD (chronic kidney disease)     5/8/2025 11:13 AM Jc Shepherd Add [E86.0] Dehydration     5/8/2025 12:04 PM Jc Shepherd Add [E83.39] Hyperphosphatemia     5/8/2025 12:04 PM Jc Shepherd Add [D64.9] Chronic anemia           ED Disposition       ED Disposition   Discharge    Condition   Stable    Date/Time   Thu May 8, 2025 11:11 AM    Comment   Saige Haji discharge to home/self care.                   Assessment & Plan       Medical Decision Making  Patient is a well-appearing 55-year-old female with history of CKD on peritoneal dialysis presenting with dizziness and lightheadedness x 1 week. Her blood pressures have been dipping at home since Saturday (as low as 105/50) and she is lightheaded with standing and ambulating. Symptoms persist so she presents to the ED. Normotensive in the ED and she appears euvolemic to dry on exam. Concern for possible dehydration versus worsening anemia. No focal neurodeficits. Low concern for acute infection.  Plan is to obtain labs and imaging and reach out to nephrology for recommendations. CBC to evaluate the extent of her anemia and chemistry to evaluate for electrolyte abnormality, acute renal failure, glucose abnormality, or transaminitis. Will check electrolytes including mag and phos. Will check thyroid level. Chest x-ray 2 views due to the dyspnea on exertion. EKG to evaluate for arrhythmia. Will check orthostatic vital signs.  See ED course for interpretation of labs, imaging, and further medical decision making.   Labs consistent with her CKD. Does not appear overloaded on chest x-ray. Orthostatics negative. Reached out to nephrology who evaluated her at the bedside and agrees that she  looks dry and recommends a 500 cc fluid bolus, stopping the Norvasc, and discharge home with fluid adjustment. See their quick note. I encouraged that she takes her blood pressure at home and follows up with her nephrologist.  Dispo: Patient is safe/stable for discharge home and was discharged home with strict return precautions. Provided verbal and written supportive care instructions for managing her illness. Advised patient to return to the nearest emergency room if she has new or worsening symptoms or if any questions arise. Advised patient to follow-up with her family doctor and nephrology. Patient is satisfied with care and agrees with management and plan.     Amount and/or Complexity of Data Reviewed  External Data Reviewed: labs, radiology and notes.  Labs: ordered. Decision-making details documented in ED Course.  Radiology: ordered and independent interpretation performed.  ECG/medicine tests: ordered and independent interpretation performed.    Risk  Prescription drug management.        ED Course as of 05/08/25 1249   Thu May 08, 2025   0805 Blood Pressure: 127/60  Vital signs reviewed and within normal limits.   0821 Hemoglobin(!): 8.3  Hgb down from 9.1 g/dL last month.   0832 BUN(!): 61   0832 Creatinine(!): 5.75  Don't have a recent Cr for comparison.   0832 GFR, Calculated: 7   0915 Phosphorus(!): 4.8   0915 MAGNESIUM: 2.3   0939 Reassessed patient and went over labs. She is comfortable. SC sent to nephrology for recs.   0955 TSH 3RD GENERATON: 3.219   0957 Nephrology will come see her shortly at the bedside.   1107 Per nephrology, 500 cc bolus, dc norvasc, gets labs tomorrow.        Medications   multi-electrolyte (Plasmalyte-A/Isolyte-S PH 7.4/Normosol-R) IV bolus 500 mL (0 mL Intravenous Stopped 5/8/25 1244)       ED Risk Strat Scores                    No data recorded        SBIRT 20yo+      Flowsheet Row Most Recent Value   Initial Alcohol Screen: US AUDIT-C     1. How often do you have a drink  containing alcohol? 0 Filed at: 05/08/2025 0800   2. How many drinks containing alcohol do you have on a typical day you are drinking?  0 Filed at: 05/08/2025 0800   3b. FEMALE Any Age, or MALE 65+: How often do you have 4 or more drinks on one occassion? 0 Filed at: 05/08/2025 0800   Audit-C Score 0 Filed at: 05/08/2025 0800   FABIO: How many times in the past year have you...    Used an illegal drug or used a prescription medication for non-medical reasons? Never Filed at: 05/08/2025 0800                            History of Present Illness       Chief Complaint   Patient presents with    Dizziness     Dizziness since Saturday. Reports BP lower than normal at home. Does at home dialysis.        Past Medical History:   Diagnosis Date    Acute renal failure (ARF) (Formerly [facility]) 6/29/2023    Arthritis     Back pain     Chronic kidney disease     GERD (gastroesophageal reflux disease)     Hyperlipidemia     Hypertension     Neck pain     Neuropathy     Skin abnormality     on abdomen - Dr. House aware    Stage 3b chronic kidney disease (Former[facility]) 6/30/2023    Wears reading eyeglasses       Past Surgical History:   Procedure Laterality Date    APPENDECTOMY      CHOLECYSTECTOMY      COLONOSCOPY      FOOT POSTERIOR RELEASE Left     around ankle    IR BIOPSY KIDNEY RANDOM  12/12/2023    VT LAPS INSERTION TUNNELED INTRAPERITONEAL CATHETER N/A 10/25/2024    Procedure: INSERTION PERITONEAL CATHETER DIALYSIS LAPAROSCOPIC;  Surgeon: Cirilo House DO;  Location: [facility];  Service: General    VT LAPS W/REVISION INTRAPERITONEAL CATHETER N/A 11/12/2024    Procedure: REVISION/ UNROOFING PERITONEAL CATHETER DIALYSIS;  Surgeon: Cirilo House DO;  Location: [facility];  Service: General    TUBAL LIGATION        Family History   Problem Relation Age of Onset    Hypertension Mother     Cancer Mother     Alzheimer's disease Mother     Parkinsonism Mother     Diabetes Father     Hypertension Father     Cancer Father     Breast cancer Sister 50     No Known Problems Daughter     No Known Problems Maternal Grandmother     No Known Problems Paternal Grandmother     No Known Problems Sister     No Known Problems Sister     No Known Problems Maternal Aunt     No Known Problems Maternal Aunt     No Known Problems Maternal Aunt     No Known Problems Paternal Aunt     Breast cancer Paternal Aunt     Breast cancer Paternal Aunt     No Known Problems Paternal Aunt       Social History     Tobacco Use    Smoking status: Former     Current packs/day: 0.00     Average packs/day: 0.5 packs/day for 4.0 years (2.0 ttl pk-yrs)     Types: Cigarettes     Start date:      Quit date:      Years since quittin.3    Smokeless tobacco: Never   Vaping Use    Vaping status: Never Used   Substance Use Topics    Alcohol use: Never    Drug use: Never      E-Cigarette/Vaping    E-Cigarette Use Never User       E-Cigarette/Vaping Substances    Nicotine No     THC No     CBD No     Flavoring No     Other No     Unknown No       I have reviewed and agree with the history as documented.     Patient is a 55-year-old female with relevant past medical history of acute renal failure, arthritis, back pain, CKD, GERD, hyperlipidemia, hypertension, neck pain, and neuropathy presenting with dizziness and lightheadedness x 1 week. She has been doing peritoneal dialysis at home 5 days a week since the end of November for CKD. Last night was her off night but two nights ago she took 1035 cc off. Her blood pressures have been dipping at home since Saturday (as low as 105/50 7 AM yesterday morning) and she is lightheaded with standing and ambulating. Systolic blood pressure usually 125-135. Symptoms persist so she presents to the ED. Her home nurse is out sick this week. She deals with anemia and receives injections (Miquel, Ghazal) at the dialysis center for this. Associated symptom of dyspnea on exertion after a long walk or shower. [facility] is on [facility] at [facility]. She denies F/C,  headache, dizziness, chest pain, shortness of breath, abdominal pain, N/V, changes in BMs, or urinary symptoms.          History provided by:  Patient and spouse   used: No    Dizziness  Associated symptoms: no chest pain, no diarrhea, no headaches, no nausea, no palpitations, no shortness of breath and no vomiting        Review of Systems   Constitutional:  Negative for chills and fever.   HENT:  Negative for congestion, ear pain, rhinorrhea and sore throat.    Eyes:  Negative for pain and visual disturbance.   Respiratory:  Negative for cough and shortness of breath.         KELLER.   Cardiovascular:  Negative for chest pain, palpitations and leg swelling.   Gastrointestinal:  Negative for abdominal pain, constipation, diarrhea, nausea and vomiting.   Genitourinary:  Negative for dysuria, frequency, hematuria and urgency.   Musculoskeletal:  Negative for arthralgias and back pain.   Skin:  Negative for color change and rash.   Neurological:  Positive for dizziness and light-headedness. Negative for seizures, syncope and headaches.   Psychiatric/Behavioral:  Negative for agitation and confusion.            Objective       ED Triage Vitals [05/08/25 0758]   Temperature Pulse Blood Pressure Respirations SpO2 Patient Position - Orthostatic VS   98.5 °F (36.9 °C) 91 127/60 18 100 % Sitting      Temp Source Heart Rate Source BP Location FiO2 (%) Pain Score    Temporal Monitor Left arm -- 7      Vitals      Date and Time Temp Pulse SpO2 Resp BP Pain Score FACES Pain Rating User   05/08/25 1230 98.1 °F (36.7 °C) 71 98 % 20 141/63 -- -- S   05/08/25 1200 -- 67 98 % 15 127/60 -- -- [facility]   05/08/25 1114 98 °F (36.7 °C) 79 98 % 18 134/64 -- -- KF   05/08/25 1100 -- 73 100 % 22 132/58 -- -- JCS   05/08/25 0925 -- 83 -- -- 111/57 -- -- DL   05/08/25 0919 -- 77 100 % -- 121/57 -- -- DL   05/08/25 0918 -- 72 100 % -- 121/58 -- -- DL   05/08/25 0900 -- 72 100 % 18 120/58 -- -- S   05/08/25 0845 -- 77 99 % 21  120/68 -- -- JCS   05/08/25 0758 98.5 °F (36.9 °C) 91 100 % 18 127/60 7 -- SG            Physical Exam  Vitals and nursing note reviewed.   Constitutional:       General: She is not in acute distress.     Appearance: She is well-developed. She is obese. She is not ill-appearing.   HENT:      Head: Normocephalic and atraumatic.   Eyes:      Conjunctiva/sclera: Conjunctivae normal.   Cardiovascular:      Rate and Rhythm: Normal rate and regular rhythm.      Heart sounds: No murmur heard.  Pulmonary:      Effort: Pulmonary effort is normal. No respiratory distress.      Breath sounds: Normal breath sounds. No wheezing, rhonchi or rales.   Abdominal:      Palpations: Abdomen is soft.      Tenderness: There is no abdominal tenderness. There is no guarding or rebound.   Musculoskeletal:         General: No swelling.      Cervical back: Neck supple.      Right lower leg: No edema.      Left lower leg: No edema.   Skin:     General: Skin is warm and dry.      Capillary Refill: Capillary refill takes less than 2 seconds.   Neurological:      General: No focal deficit present.      Mental Status: She is alert and oriented to person, place, and time.      GCS: GCS eye subscore is 4. GCS verbal subscore is 5. GCS motor subscore is 6.      Cranial Nerves: Cranial nerves 2-12 are intact.      Sensory: Sensation is intact.      Motor: Motor function is intact.      Coordination: Coordination is intact.      Gait: Gait is intact.   Psychiatric:         Mood and Affect: Mood normal.         Results Reviewed       Procedure Component Value Units Date/Time    TSH [[id number]]  (Normal) Collected: 05/08/25 0808    Lab Status: Final result Specimen: Blood from Arm, Left Updated: 05/08/25 0955     TSH 3RD GENERATON 3.219 uIU/mL     Magnesium [[id number]]  (Normal) Collected: 05/08/25 0808    Lab Status: Final result Specimen: Blood from Arm, Left Updated: 05/08/25 0915     Magnesium 2.3 mg/dL     Phosphorus [[id number]]  (Abnormal)  Collected: 05/08/25 0808    Lab Status: Final result Specimen: Blood from Arm, Left Updated: 05/08/25 0915     Phosphorus 4.8 mg/dL     Protime-INR [[id number]]  (Normal) Collected: 05/08/25 0808    Lab Status: Final result Specimen: Blood from Arm, Left Updated: 05/08/25 0913     Protime 14.0 seconds      INR 1.03    Narrative:      INR Therapeutic Range    Indication                                             INR Range      Atrial Fibrillation                                               2.0-3.0  Hypercoagulable State                                    2.0.2.3  Left Ventricular Asist Device                            2.0-3.0  Mechanical Heart Valve                                  -    Aortic(with afib, MI, embolism, HF, LA enlargement,    and/or coagulopathy)                                     2.0-3.0 (2.5-3.5)     Mitral                                                             2.5-3.5  Prosthetic/Bioprosthetic Heart Valve               2.0-3.0  Venous thromboembolism (VTE: VT, PE        2.0-3.0    APTT [[id number]]  (Normal) Collected: 05/08/25 0808    Lab Status: Final result Specimen: Blood from Arm, Left Updated: 05/08/25 0913     PTT 26 seconds     Comprehensive metabolic panel [[id number]]  (Abnormal) Collected: 05/08/25 0808    Lab Status: Final result Specimen: Blood from Arm, Left Updated: 05/08/25 0830     Sodium 137 mmol/L      Potassium 5.1 mmol/L      Chloride 108 mmol/L      CO2 19 mmol/L      ANION GAP 10 mmol/L      BUN 61 mg/dL      Creatinine 5.75 mg/dL      Glucose 159 mg/dL      Calcium 8.6 mg/dL      AST 12 U/L      ALT 13 U/L      Alkaline Phosphatase 48 U/L      Total Protein 6.5 g/dL      Albumin 4.1 g/dL      Total Bilirubin 0.38 mg/dL      eGFR 7 ml/min/1.73sq m     Narrative:      National Kidney Disease Foundation guidelines for Chronic Kidney Disease (CKD):     Stage 1 with normal or high GFR (GFR > 90 mL/min/1.73 square meters)    Stage 2 Mild CKD (GFR = 60-89 mL/min/1.73 square  meters)    Stage 3A Moderate CKD (GFR = 45-59 mL/min/1.73 square meters)    Stage 3B Moderate CKD (GFR = 30-44 mL/min/1.73 square meters)    Stage 4 Severe CKD (GFR = 15-29 mL/min/1.73 square meters)    Stage 5 End Stage CKD (GFR <15 mL/min/1.73 square meters)  Note: GFR calculation is accurate only with a steady state creatinine    CBC and differential [[id number]]  (Abnormal) Collected: 05/08/25 0808    Lab Status: Final result Specimen: Blood from Arm, Left Updated: 05/08/25 0814     WBC 7.64 Thousand/uL      RBC 2.64 Million/uL      Hemoglobin 8.3 g/dL      Hematocrit 25.3 %      MCV 96 fL      MCH 31.4 pg      MCHC 32.8 g/dL      RDW 12.6 %      MPV 10.1 fL      Platelets 199 Thousands/uL      nRBC 0 /100 WBCs      Segmented % 71 %      Immature Grans % 1 %      Lymphocytes % 19 %      Monocytes % 6 %      Eosinophils Relative 3 %      Basophils Relative 0 %      Absolute Neutrophils 5.47 Thousands/µL      Absolute Immature Grans 0.04 Thousand/uL      Absolute Lymphocytes 1.45 Thousands/µL      Absolute Monocytes 0.42 Thousand/µL      Eosinophils Absolute 0.23 Thousand/µL      Basophils Absolute 0.03 Thousands/µL             XR chest 2 views   ED Interpretation by Jc Shepherd PA-C (05/08 0854)   No acute cardiopulmonary disease.      Final Interpretation by Guru Zeng MD (05/08 0931)      No acute cardiopulmonary disease.            Resident: Jorge Padron I, the attending radiologist, have reviewed the images and agree with the final report above.      Workstation performed: [id number]             ECG 12 Lead Documentation Only    Date/Time: 5/8/2025 8:02 AM    Performed by: Jc Shepherd PA-C  Authorized by: Jc Shepherd PA-C    Indications / Diagnosis:  Lightheadedness.  ECG reviewed by me, the ED Provider: yes    Patient location:  ED  Previous ECG:     Comparison to cardiac monitor: Yes    Interpretation:     Interpretation: abnormal    Rate:     ECG rate:  84     ECG rate assessment: normal    Rhythm:     Rhythm: sinus rhythm    Ectopy:     Ectopy: none    QRS:     QRS axis:  Normal    QRS intervals:  Normal  Conduction:     Conduction: normal    ST segments:     ST segments:  Normal  T waves:     T waves: non-specific        ED Medication and Procedure Management   Prior to Admission Medications   Prescriptions Last Dose Informant Patient Reported? Taking?   IRON HEME POLYPEPTIDE PO  Self Yes No   Sig: Take 1 capsule by mouth in the morning OTC med.   amLODIPine (NORVASC) 5 mg tablet  Self No No   Sig: Take 1 tablet (5 mg total) by mouth daily   b complex vitamins capsule  Self Yes No   Sig: Take 1 capsule by mouth daily   calcium acetate (PHOSLO) capsule   No No   Sig: Take 1 capsule (667 mg total) by mouth 3 (three) times a day with meals   ergocalciferol (VITAMIN D2) 50,000 units   Yes Yes   Sig: Take by mouth in the morning   gentamicin (GARAMYCIN) 0.1 % cream   No No   Sig: APPLY CREAM TOPICALLY DAILY WITH DRESSING CHANGES   lisinopril (ZESTRIL) 10 mg tablet   No No   Sig: Take 1 tablet (10 mg total) by mouth daily   multivitamin (THERAGRAN) TABS  Self Yes No   Sig: Take 1 tablet by mouth daily   oxyCODONE-acetaminophen (Percocet) 5-325 mg per tablet  Self No No   Sig: Take 1 tablet by mouth every 4 (four) hours as needed for moderate pain Max Daily Amount: 6 tablets   rosuvastatin (CRESTOR) 10 MG tablet  Self No No   Sig: Take 1 tablet (10 mg total) by mouth daily   saccharomyces boulardii (Florastor) 250 mg capsule  Self Yes No   Sig: Take 250 mg by mouth daily   sodium bicarbonate 650 mg tablet  Self No No   Sig: Take 1 tablet (650 mg total) by mouth in the morning   terazosin (HYTRIN) 1 mg capsule  Self No No   Sig: Take 2 capsules (2 mg total) by mouth daily at bedtime      Facility-Administered Medications: None     Discharge Medication List as of 5/8/2025 12:23 PM        CONTINUE these medications which have NOT CHANGED    Details   ergocalciferol (VITAMIN D2)  50,000 units Take by mouth in the morning, Starting Mon 4/28/2025, Historical Med      b complex vitamins capsule Take 1 capsule by mouth daily, Historical Med      calcium acetate (PHOSLO) capsule Take 1 capsule (667 mg total) by mouth 3 (three) times a day with meals, Starting Wed 4/16/2025, Normal      gentamicin (GARAMYCIN) 0.1 % cream APPLY CREAM TOPICALLY DAILY WITH DRESSING CHANGES, Normal      IRON HEME POLYPEPTIDE PO Take 1 capsule by mouth in the morning OTC med., Historical Med      lisinopril (ZESTRIL) 10 mg tablet Take 1 tablet (10 mg total) by mouth daily, Starting Wed 4/16/2025, Normal      multivitamin (THERAGRAN) TABS Take 1 tablet by mouth daily, Historical Med      oxyCODONE-acetaminophen (Percocet) 5-325 mg per tablet Take 1 tablet by mouth every 4 (four) hours as needed for moderate pain Max Daily Amount: 6 tablets, Starting Fri 10/25/2024, Normal      rosuvastatin (CRESTOR) 10 MG tablet Take 1 tablet (10 mg total) by mouth daily, Starting Fri 11/1/2024, Normal      saccharomyces boulardii (Florastor) 250 mg capsule Take 250 mg by mouth daily, Historical Med      sodium bicarbonate 650 mg tablet Take 1 tablet (650 mg total) by mouth in the morning, Starting Fri 9/6/2024, No Print      terazosin (HYTRIN) 1 mg capsule Take 2 capsules (2 mg total) by mouth daily at bedtime, Starting Fri 11/1/2024, Normal      amLODIPine (NORVASC) 5 mg tablet Take 1 tablet (5 mg total) by mouth daily, Starting Fri 11/1/2024, Normal           No discharge procedures on file.  ED SEPSIS DOCUMENTATION   Time reflects when diagnosis was documented in both MDM as applicable and the Disposition within this note       Time User Action Codes Description Comment    5/8/2025 11:12 AM Jc Shepherd [R42] Lightheadedness     5/8/2025 11:12 AM Jc Shepherd [N18.9] CKD (chronic kidney disease)     5/8/2025 11:13 AM Jc Shepherd [E86.0] Dehydration     5/8/2025 12:04 PM Jc Shepherd [E83.39]  Hyperphosphatemia     5/8/2025 12:04 PM Jc Shepherd Add [D64.9] Chronic anemia                  Jc Shepherd PA-C  05/08/25 124

## 2025-05-08 NOTE — Clinical Note
Saige Haji was seen and treated in our emergency department on 5/8/2025.                Diagnosis:     Saige  .    She may return on this date: 05/09/2025         If you have any questions or concerns, please don't hesitate to call.      Marek Perry RN    ______________________________           _______________          _______________  Hospital Representative                              Date                                Time

## 2025-05-09 ENCOUNTER — VBI (OUTPATIENT)
Dept: FAMILY MEDICINE CLINIC | Facility: CLINIC | Age: 55
End: 2025-05-09

## 2025-05-09 ENCOUNTER — OFFICE VISIT (OUTPATIENT)
Dept: FAMILY MEDICINE CLINIC | Facility: CLINIC | Age: 55
End: 2025-05-09
Payer: MEDICARE

## 2025-05-09 VITALS
OXYGEN SATURATION: 99 % | RESPIRATION RATE: 20 BRPM | BODY MASS INDEX: 36.88 KG/M2 | WEIGHT: 235 LBS | SYSTOLIC BLOOD PRESSURE: 122 MMHG | HEIGHT: 67 IN | DIASTOLIC BLOOD PRESSURE: 80 MMHG | TEMPERATURE: 97.8 F | HEART RATE: 82 BPM

## 2025-05-09 DIAGNOSIS — K21.9 GERD WITHOUT ESOPHAGITIS: Chronic | ICD-10-CM

## 2025-05-09 DIAGNOSIS — M05.79 RHEUMATOID ARTHRITIS INVOLVING MULTIPLE SITES WITH POSITIVE RHEUMATOID FACTOR (HCC): ICD-10-CM

## 2025-05-09 DIAGNOSIS — I10 ESSENTIAL HYPERTENSION: Chronic | ICD-10-CM

## 2025-05-09 DIAGNOSIS — I95.1 ORTHOSTATIC HYPOTENSION: ICD-10-CM

## 2025-05-09 DIAGNOSIS — D50.8 IRON DEFICIENCY ANEMIA SECONDARY TO INADEQUATE DIETARY IRON INTAKE: ICD-10-CM

## 2025-05-09 DIAGNOSIS — Z12.11 COLON CANCER SCREENING: Primary | ICD-10-CM

## 2025-05-09 DIAGNOSIS — K58.0 IRRITABLE BOWEL SYNDROME WITH DIARRHEA: ICD-10-CM

## 2025-05-09 DIAGNOSIS — G62.9 PERIPHERAL POLYNEUROPATHY: ICD-10-CM

## 2025-05-09 DIAGNOSIS — Z12.31 VISIT FOR SCREENING MAMMOGRAM: ICD-10-CM

## 2025-05-09 DIAGNOSIS — N18.5 CKD (CHRONIC KIDNEY DISEASE) STAGE 5, GFR LESS THAN 15 ML/MIN (HCC): ICD-10-CM

## 2025-05-09 DIAGNOSIS — E78.2 MIXED HYPERLIPIDEMIA: ICD-10-CM

## 2025-05-09 PROCEDURE — G2211 COMPLEX E/M VISIT ADD ON: HCPCS | Performed by: FAMILY MEDICINE

## 2025-05-09 PROCEDURE — 99215 OFFICE O/P EST HI 40 MIN: CPT | Performed by: FAMILY MEDICINE

## 2025-05-09 RX ORDER — ROSUVASTATIN CALCIUM 10 MG/1
10 TABLET, COATED ORAL DAILY
Qty: 90 TABLET | Refills: 3 | Status: SHIPPED | OUTPATIENT
Start: 2025-05-09

## 2025-05-09 RX ORDER — TERAZOSIN 1 MG/1
2 CAPSULE ORAL
Qty: 180 CAPSULE | Refills: 3 | Status: SHIPPED | OUTPATIENT
Start: 2025-05-09

## 2025-05-09 NOTE — ASSESSMENT & PLAN NOTE
Stable at this time bowels are good continuing high-fiber diet followed by gastroenterology.  Been seen by Dr. Elias's office recent colonoscopy and endoscopy done within the past year

## 2025-05-09 NOTE — ASSESSMENT & PLAN NOTE
Lab Results   Component Value Date    EGFR 7 05/08/2025    EGFR 12 11/08/2024    EGFR 12 10/11/2024    CREATININE 5.75 (H) 05/08/2025    CREATININE 3.96 (H) 11/08/2024    CREATININE 3.87 (H) 10/11/2024   Continuing with home peritoneal dialysis 5 days/week reviewed lab work last GFR at 7 with creatinine of 5.75

## 2025-05-09 NOTE — ASSESSMENT & PLAN NOTE
GERD symptoms stable currently no worsening acid reflux EGD done recently within the past year    Orders:    rosuvastatin (CRESTOR) 10 MG tablet; Take 1 tablet (10 mg total) by mouth daily

## 2025-05-09 NOTE — ASSESSMENT & PLAN NOTE
Hypertension under stable control although she has had low readings at times when her systolic drops to 100 she felt dizzy and lightheaded went to the emergency department and was assessed at that point and thought was to stop amlodipine and she will remain off this now she did take it the other day but stopped it as of today blood pressure was 122/80 on presentation and after sitting I rechecked her blood pressure at 110/78.  She will remain off this and we will monitor and track it she may need a half dose every other day depending on if her numbers go up and she is agreeable to monitoring closely.  She is using peritoneal dialysis 5 days a week and is followed closely by nephrology    Orders:    terazosin (HYTRIN) 1 mg capsule; Take 2 capsules (2 mg total) by mouth daily at bedtime    rosuvastatin (CRESTOR) 10 MG tablet; Take 1 tablet (10 mg total) by mouth daily

## 2025-05-09 NOTE — ASSESSMENT & PLAN NOTE
Orders:    rosuvastatin (CRESTOR) 10 MG tablet; Take 1 tablet (10 mg total) by mouth daily

## 2025-05-09 NOTE — PROGRESS NOTES
Name: Saige Haji      : 1970      MRN: [id number]  Encounter Provider: Cirilo Fowler DO  Encounter Date: 2025   Encounter department: [facility]  :  Assessment & Plan  Colon cancer screening    Orders:    Ambulatory Referral to Gastroenterology; Future    Essential hypertension  Hypertension under stable control although she has had low readings at times when her systolic drops to 100 she felt dizzy and lightheaded went to the emergency department and was assessed at that point and thought was to stop amlodipine and she will remain off this now she did take it the other day but stopped it as of today blood pressure was 122/80 on presentation and after sitting I rechecked her blood pressure at 110/78.  She will remain off this and we will monitor and track it she may need a half dose every other day depending on if her numbers go up and she is agreeable to monitoring closely.  She is using peritoneal dialysis 5 days a week and is followed closely by nephrology    Orders:    terazosin (HYTRIN) 1 mg capsule; Take 2 capsules (2 mg total) by mouth daily at bedtime    rosuvastatin (CRESTOR) 10 MG tablet; Take 1 tablet (10 mg total) by mouth daily    Rheumatoid arthritis involving multiple sites with positive rheumatoid factor (HCC)  Stable currently doing well on her medication regimen and she will follow-up with me follow-up rheumatology if symptoms flareup or worsen reevaluate here at next office visit remain on 6-month intervals    Orders:    rosuvastatin (CRESTOR) 10 MG tablet; Take 1 tablet (10 mg total) by mouth daily    Mixed hyperlipidemia    Orders:    rosuvastatin (CRESTOR) 10 MG tablet; Take 1 tablet (10 mg total) by mouth daily    GERD without esophagitis  GERD symptoms stable currently no worsening acid reflux EGD done recently within the past year    Orders:    rosuvastatin (CRESTOR) 10 MG tablet; Take 1 tablet (10 mg total) by mouth daily    Peripheral  polyneuropathy    Orders:    rosuvastatin (CRESTOR) 10 MG tablet; Take 1 tablet (10 mg total) by mouth daily    Iron deficiency anemia secondary to inadequate dietary iron intake        Orders:    rosuvastatin (CRESTOR) 10 MG tablet; Take 1 tablet (10 mg total) by mouth daily    Visit for screening mammogram    Orders:    rosuvastatin (CRESTOR) 10 MG tablet; Take 1 tablet (10 mg total) by mouth daily    Irritable bowel syndrome with diarrhea  Stable at this time bowels are good continuing high-fiber diet followed by gastroenterology.  Been seen by Dr. Elias's office recent colonoscopy and endoscopy done within the past year         CKD (chronic kidney disease) stage 5, GFR less than 15 ml/min ([facility])  Lab Results   Component Value Date    EGFR 7 05/08/2025    EGFR 12 11/08/2024    EGFR 12 10/11/2024    CREATININE 5.75 (H) 05/08/2025    CREATININE 3.96 (H) 11/08/2024    CREATININE 3.87 (H) 10/11/2024   Continuing with home peritoneal dialysis 5 days/week reviewed lab work last GFR at 7 with creatinine of 5.75         Body mass index (BMI) 40.0-44.9, adult ([facility])  Patient is continuing on same diet plan monitoring and will reevaluate with me in 6 months         Orthostatic hypotension  Reviewed emergency department reports nephrology report is also most recently and all lab work and discussed with patient etiology and physiological issues that occur with hypotension and vasovagal events in light of her peritoneal dialysis multiple medications and overall health we will hold off on the amlodipine for now and she will monitor blood pressures                History of Present Illness   Follow-up evaluation 6-month checkup review lab work and health concerns had low blood pressure readings and was at the emergency department yesterday      Review of Systems   Constitutional:  Negative for chills, fatigue and fever.   HENT:  Negative for congestion, nosebleeds, rhinorrhea, sinus pressure and sore throat.    Eyes:  Negative  "for discharge and redness.   Respiratory:  Negative for cough and shortness of breath.    Cardiovascular:  Negative for chest pain, palpitations and leg swelling.   Gastrointestinal:  Negative for abdominal pain, blood in stool and nausea.   Endocrine: Negative for cold intolerance, heat intolerance and polyuria.   Genitourinary:  Negative for dysuria and frequency.   Musculoskeletal:  Negative for arthralgias, back pain and myalgias.   Skin:  Negative for rash.   Neurological:  Negative for dizziness, weakness and headaches.   Hematological:  Negative for adenopathy.   Psychiatric/Behavioral:  Negative for behavioral problems and sleep disturbance. The patient is not nervous/anxious.        Objective   /80 (BP Location: Left arm, Patient Position: Sitting, Cuff Size: Standard)   Pulse 82   Temp 97.8 °F (36.6 °C) (Tympanic)   Resp 20   Ht 5' 7\" (1.702 m)   Wt 107 kg (235 lb)   LMP  (LMP Unknown) Comment: menopause - one year ago  SpO2 99%   BMI 36.81 kg/m²      Physical Exam  Vitals and nursing note reviewed.   Constitutional:       General: She is not in acute distress.     Appearance: Normal appearance. She is well-developed. She is obese.   HENT:      Head: Normocephalic and atraumatic.      Right Ear: Tympanic membrane and external ear normal.      Left Ear: Tympanic membrane and external ear normal.      Nose: Nose normal.      Mouth/Throat:      Mouth: Mucous membranes are moist.      Pharynx: Oropharynx is clear. No oropharyngeal exudate.   Eyes:      General: No scleral icterus.        Right eye: No discharge.         Left eye: No discharge.      Conjunctiva/sclera: Conjunctivae normal.      Pupils: Pupils are equal, round, and reactive to light.   Neck:      Thyroid: No thyromegaly.      Vascular: No JVD.   Cardiovascular:      Rate and Rhythm: Normal rate and regular rhythm.      Heart sounds: Normal heart sounds. No murmur heard.  Pulmonary:      Effort: Pulmonary effort is normal.      " Breath sounds: No wheezing or rales.   Chest:      Chest wall: No tenderness.   Abdominal:      General: Bowel sounds are normal. There is no distension.      Palpations: Abdomen is soft. There is no mass.      Tenderness: There is no abdominal tenderness.   Musculoskeletal:         General: No tenderness or deformity. Normal range of motion.      Cervical back: Normal range of motion.   Lymphadenopathy:      Cervical: No cervical adenopathy.   Skin:     General: Skin is warm and dry.      Findings: No rash.   Neurological:      General: No focal deficit present.      Mental Status: She is alert and oriented to person, place, and time.      Cranial Nerves: No cranial nerve deficit.      Coordination: Coordination normal.      Deep Tendon Reflexes: Reflexes are normal and symmetric. Reflexes normal.   Psychiatric:         Mood and Affect: Mood normal.         Behavior: Behavior normal.         Thought Content: Thought content normal.         Judgment: Judgment normal.     I have spent a total time of 45 minutes in caring for this patient on the day of the visit/encounter including Diagnostic results, Prognosis, Risks and benefits of tx options, Instructions for management, Patient and family education, Importance of tx compliance, Risk factor reductions, Impressions, Counseling / Coordination of care, Documenting in the medical record, Reviewing/placing orders in the medical record (including tests, medications, and/or procedures), Obtaining or reviewing history  , and Communicating with other healthcare professionals .

## 2025-05-09 NOTE — ASSESSMENT & PLAN NOTE
Stable currently doing well on her medication regimen and she will follow-up with me follow-up rheumatology if symptoms flareup or worsen reevaluate here at next office visit remain on 6-month intervals    Orders:    rosuvastatin (CRESTOR) 10 MG tablet; Take 1 tablet (10 mg total) by mouth daily

## 2025-05-09 NOTE — ASSESSMENT & PLAN NOTE
Reviewed emergency department reports nephrology report is also most recently and all lab work and discussed with patient etiology and physiological issues that occur with hypotension and vasovagal events in light of her peritoneal dialysis multiple medications and overall health we will hold off on the amlodipine for now and she will monitor blood pressures

## 2025-05-12 ENCOUNTER — TELEPHONE (OUTPATIENT)
Dept: ADMINISTRATIVE | Facility: OTHER | Age: 55
End: 2025-05-12

## 2025-05-12 NOTE — TELEPHONE ENCOUNTER
Upon review of the In Basket request we were able to locate, review, and update the patient chart as requested for CRC: Colonoscopy.    Any additional questions or concerns should be emailed to the Practice Liaisons via the appropriate education email address, please do not reply via In Basket.    Thank you  Jagruti Paz MA   [facility]

## 2025-05-12 NOTE — TELEPHONE ENCOUNTER
----- Message from Shelby MULLER sent at 5/9/2025  9:29 AM EDT -----  05/09/25 9:29 AM    Hello, our patient Saige Haji has had CRC: Colonoscopy completed/performed. Please assist in updating the patient chart by pulling the Care Everywhere (CE) document. The date of service is 12/5/2024.     Thank you,  Shelby CISNEROS

## 2025-05-21 ENCOUNTER — DOCUMENTATION (OUTPATIENT)
Dept: OTHER | Facility: HOSPITAL | Age: 55
End: 2025-05-21

## 2025-06-04 DIAGNOSIS — N18.6 ESRD (END STAGE RENAL DISEASE) ON DIALYSIS (HCC): ICD-10-CM

## 2025-06-04 DIAGNOSIS — Z99.2 ESRD (END STAGE RENAL DISEASE) ON DIALYSIS (HCC): ICD-10-CM

## 2025-06-04 RX ORDER — GENTAMICIN SULFATE 1 MG/G
CREAM TOPICAL DAILY
Qty: 30 G | Refills: 2 | Status: SHIPPED | OUTPATIENT
Start: 2025-06-04

## 2025-06-16 ENCOUNTER — HOSPITAL ENCOUNTER (INPATIENT)
Facility: HOSPITAL | Age: 55
LOS: 3 days | Discharge: HOME | DRG: 871 | End: 2025-06-19
Attending: EMERGENCY MEDICINE | Admitting: INTERNAL MEDICINE
Payer: MEDICARE

## 2025-06-16 ENCOUNTER — APPOINTMENT (EMERGENCY)
Dept: CT IMAGING | Facility: HOSPITAL | Age: 55
DRG: 871 | End: 2025-06-16
Payer: MEDICARE

## 2025-06-16 ENCOUNTER — APPOINTMENT (INPATIENT)
Dept: ULTRASOUND IMAGING | Facility: HOSPITAL | Age: 55
DRG: 871 | End: 2025-06-16
Payer: MEDICARE

## 2025-06-16 DIAGNOSIS — A41.9 SEPSIS (HCC): ICD-10-CM

## 2025-06-16 DIAGNOSIS — N18.6 ESRD (END STAGE RENAL DISEASE) (HCC): ICD-10-CM

## 2025-06-16 DIAGNOSIS — K57.92 DIVERTICULITIS: Primary | ICD-10-CM

## 2025-06-16 DIAGNOSIS — R93.89 ABNORMAL CT SCAN: ICD-10-CM

## 2025-06-16 PROBLEM — K52.9 COLITIS: Status: ACTIVE | Noted: 2025-06-16

## 2025-06-16 PROBLEM — R10.32 ACUTE LEFT LOWER QUADRANT PAIN: Status: ACTIVE | Noted: 2025-06-16

## 2025-06-16 LAB
ALBUMIN SERPL BCG-MCNC: 4.3 G/DL (ref 3.5–5)
ALP SERPL-CCNC: 45 U/L (ref 34–104)
ALT SERPL W P-5'-P-CCNC: 12 U/L (ref 7–52)
ANION GAP SERPL CALCULATED.3IONS-SCNC: 11 MMOL/L (ref 4–13)
APTT PPP: 30 SECONDS (ref 23–34)
AST SERPL W P-5'-P-CCNC: 11 U/L (ref 13–39)
BACTERIA UR QL AUTO: NORMAL /HPF
BASOPHILS # BLD AUTO: 0.05 THOUSANDS/ÂΜL (ref 0–0.1)
BASOPHILS NFR BLD AUTO: 0 % (ref 0–1)
BILIRUB SERPL-MCNC: 0.56 MG/DL (ref 0.2–1)
BILIRUB UR QL STRIP: NEGATIVE
BUN SERPL-MCNC: 69 MG/DL (ref 5–25)
CALCIUM SERPL-MCNC: 9.4 MG/DL (ref 8.4–10.2)
CHLORIDE SERPL-SCNC: 107 MMOL/L (ref 96–108)
CLARITY UR: CLEAR
CO2 SERPL-SCNC: 18 MMOL/L (ref 21–32)
COLOR UR: YELLOW
CREAT SERPL-MCNC: 5.96 MG/DL (ref 0.6–1.3)
EOSINOPHIL # BLD AUTO: 0.02 THOUSAND/ÂΜL (ref 0–0.61)
EOSINOPHIL NFR BLD AUTO: 0 % (ref 0–6)
ERYTHROCYTE [DISTWIDTH] IN BLOOD BY AUTOMATED COUNT: 13.2 % (ref 11.6–15.1)
GFR SERPL CREATININE-BSD FRML MDRD: 7 ML/MIN/1.73SQ M
GLUCOSE SERPL-MCNC: 117 MG/DL (ref 65–140)
GLUCOSE UR STRIP-MCNC: NEGATIVE MG/DL
HCT VFR BLD AUTO: 27.2 % (ref 34.8–46.1)
HGB BLD-MCNC: 9.2 G/DL (ref 11.5–15.4)
HGB UR QL STRIP.AUTO: ABNORMAL
IMM GRANULOCYTES # BLD AUTO: 0.16 THOUSAND/UL (ref 0–0.2)
IMM GRANULOCYTES NFR BLD AUTO: 1 % (ref 0–2)
INR PPP: 1.09 (ref 0.85–1.19)
KETONES UR STRIP-MCNC: NEGATIVE MG/DL
LACTATE SERPL-SCNC: 1.2 MMOL/L (ref 0.5–2)
LEUKOCYTE ESTERASE UR QL STRIP: ABNORMAL
LYMPHOCYTES # BLD AUTO: 1.31 THOUSANDS/ÂΜL (ref 0.6–4.47)
LYMPHOCYTES NFR BLD AUTO: 5 % (ref 14–44)
MCH RBC QN AUTO: 31.9 PG (ref 26.8–34.3)
MCHC RBC AUTO-ENTMCNC: 33.8 G/DL (ref 31.4–37.4)
MCV RBC AUTO: 94 FL (ref 82–98)
MONOCYTES # BLD AUTO: 1.77 THOUSAND/ÂΜL (ref 0.17–1.22)
MONOCYTES NFR BLD AUTO: 7 % (ref 4–12)
NEUTROPHILS # BLD AUTO: 21.33 THOUSANDS/ÂΜL (ref 1.85–7.62)
NEUTS SEG NFR BLD AUTO: 87 % (ref 43–75)
NITRITE UR QL STRIP: NEGATIVE
NON-SQ EPI CELLS URNS QL MICRO: NORMAL /HPF
NRBC BLD AUTO-RTO: 0 /100 WBCS
PH UR STRIP.AUTO: 6 [PH]
PLATELET # BLD AUTO: 204 THOUSANDS/UL (ref 149–390)
PMV BLD AUTO: 10.5 FL (ref 8.9–12.7)
POTASSIUM SERPL-SCNC: 4.4 MMOL/L (ref 3.5–5.3)
PROCALCITONIN SERPL-MCNC: 1.77 NG/ML
PROT SERPL-MCNC: 6.8 G/DL (ref 6.4–8.4)
PROT UR STRIP-MCNC: ABNORMAL MG/DL
PROTHROMBIN TIME: 14.6 SECONDS (ref 12.3–15)
RBC # BLD AUTO: 2.88 MILLION/UL (ref 3.81–5.12)
RBC #/AREA URNS AUTO: NORMAL /HPF
SODIUM SERPL-SCNC: 136 MMOL/L (ref 135–147)
SP GR UR STRIP.AUTO: 1.01
UROBILINOGEN UR QL STRIP.AUTO: 0.2 E.U./DL
WBC # BLD AUTO: 24.64 THOUSAND/UL (ref 4.31–10.16)
WBC #/AREA URNS AUTO: NORMAL /HPF

## 2025-06-16 PROCEDURE — 87040 BLOOD CULTURE FOR BACTERIA: CPT | Performed by: EMERGENCY MEDICINE

## 2025-06-16 PROCEDURE — 3E1M39Z IRRIGATION OF PERITONEAL CAVITY USING DIALYSATE, PERCUTANEOUS APPROACH: ICD-10-PCS | Performed by: INTERNAL MEDICINE

## 2025-06-16 PROCEDURE — 76775 US EXAM ABDO BACK WALL LIM: CPT

## 2025-06-16 PROCEDURE — 99285 EMERGENCY DEPT VISIT HI MDM: CPT

## 2025-06-16 PROCEDURE — 96376 TX/PRO/DX INJ SAME DRUG ADON: CPT

## 2025-06-16 PROCEDURE — 74176 CT ABD & PELVIS W/O CONTRAST: CPT

## 2025-06-16 PROCEDURE — 96375 TX/PRO/DX INJ NEW DRUG ADDON: CPT

## 2025-06-16 PROCEDURE — 81001 URINALYSIS AUTO W/SCOPE: CPT | Performed by: EMERGENCY MEDICINE

## 2025-06-16 PROCEDURE — 83605 ASSAY OF LACTIC ACID: CPT | Performed by: EMERGENCY MEDICINE

## 2025-06-16 PROCEDURE — 36415 COLL VENOUS BLD VENIPUNCTURE: CPT | Performed by: EMERGENCY MEDICINE

## 2025-06-16 PROCEDURE — 96365 THER/PROPH/DIAG IV INF INIT: CPT

## 2025-06-16 PROCEDURE — 85610 PROTHROMBIN TIME: CPT | Performed by: EMERGENCY MEDICINE

## 2025-06-16 PROCEDURE — 84145 PROCALCITONIN (PCT): CPT | Performed by: EMERGENCY MEDICINE

## 2025-06-16 PROCEDURE — 80053 COMPREHEN METABOLIC PANEL: CPT | Performed by: EMERGENCY MEDICINE

## 2025-06-16 PROCEDURE — 85730 THROMBOPLASTIN TIME PARTIAL: CPT | Performed by: EMERGENCY MEDICINE

## 2025-06-16 PROCEDURE — 87081 CULTURE SCREEN ONLY: CPT | Performed by: INTERNAL MEDICINE

## 2025-06-16 PROCEDURE — 99223 1ST HOSP IP/OBS HIGH 75: CPT | Performed by: NURSE PRACTITIONER

## 2025-06-16 PROCEDURE — 99223 1ST HOSP IP/OBS HIGH 75: CPT | Performed by: INTERNAL MEDICINE

## 2025-06-16 PROCEDURE — 99291 CRITICAL CARE FIRST HOUR: CPT | Performed by: EMERGENCY MEDICINE

## 2025-06-16 PROCEDURE — 85025 COMPLETE CBC W/AUTO DIFF WBC: CPT | Performed by: EMERGENCY MEDICINE

## 2025-06-16 RX ORDER — POLYETHYLENE GLYCOL 3350 17 G/17G
17 POWDER, FOR SOLUTION ORAL DAILY PRN
Status: DISCONTINUED | OUTPATIENT
Start: 2025-06-16 | End: 2025-06-19 | Stop reason: HOSPADM

## 2025-06-16 RX ORDER — HYDROMORPHONE HCL/PF 1 MG/ML
0.5 SYRINGE (ML) INJECTION EVERY 4 HOURS PRN
Status: DISCONTINUED | OUTPATIENT
Start: 2025-06-16 | End: 2025-06-19 | Stop reason: HOSPADM

## 2025-06-16 RX ORDER — GENTAMICIN SULFATE 1 MG/G
CREAM TOPICAL DAILY
Status: DISCONTINUED | OUTPATIENT
Start: 2025-06-17 | End: 2025-06-19 | Stop reason: HOSPADM

## 2025-06-16 RX ORDER — ONDANSETRON 2 MG/ML
4 INJECTION INTRAMUSCULAR; INTRAVENOUS EVERY 8 HOURS PRN
Status: DISCONTINUED | OUTPATIENT
Start: 2025-06-16 | End: 2025-06-19 | Stop reason: HOSPADM

## 2025-06-16 RX ORDER — TERAZOSIN 1 MG/1
2 CAPSULE ORAL
Status: DISCONTINUED | OUTPATIENT
Start: 2025-06-16 | End: 2025-06-19 | Stop reason: HOSPADM

## 2025-06-16 RX ORDER — FENTANYL CITRATE 50 UG/ML
100 INJECTION, SOLUTION INTRAMUSCULAR; INTRAVENOUS ONCE
Refills: 0 | Status: COMPLETED | OUTPATIENT
Start: 2025-06-16 | End: 2025-06-16

## 2025-06-16 RX ORDER — HEPARIN SODIUM 5000 [USP'U]/ML
5000 INJECTION, SOLUTION INTRAVENOUS; SUBCUTANEOUS EVERY 8 HOURS SCHEDULED
Status: DISCONTINUED | OUTPATIENT
Start: 2025-06-16 | End: 2025-06-19 | Stop reason: HOSPADM

## 2025-06-16 RX ORDER — HYDROMORPHONE HCL IN WATER/PF 6 MG/30 ML
0.2 PATIENT CONTROLLED ANALGESIA SYRINGE INTRAVENOUS EVERY 4 HOURS PRN
Status: DISCONTINUED | OUTPATIENT
Start: 2025-06-16 | End: 2025-06-17

## 2025-06-16 RX ORDER — LISINOPRIL 10 MG/1
10 TABLET ORAL DAILY
Status: DISCONTINUED | OUTPATIENT
Start: 2025-06-17 | End: 2025-06-19 | Stop reason: HOSPADM

## 2025-06-16 RX ADMIN — HEPARIN SODIUM 5000 UNITS: 5000 INJECTION INTRAVENOUS; SUBCUTANEOUS at 17:40

## 2025-06-16 RX ADMIN — ONDANSETRON 4 MG: 2 INJECTION INTRAMUSCULAR; INTRAVENOUS at 21:59

## 2025-06-16 RX ADMIN — HYDROMORPHONE HYDROCHLORIDE 0.5 MG: 1 INJECTION, SOLUTION INTRAMUSCULAR; INTRAVENOUS; SUBCUTANEOUS at 14:31

## 2025-06-16 RX ADMIN — PIPERACILLIN AND TAZOBACTAM 4.5 G: 4; .5 INJECTION, POWDER, FOR SOLUTION INTRAVENOUS at 17:40

## 2025-06-16 RX ADMIN — PIPERACILLIN AND TAZOBACTAM 4.5 G: 4; .5 INJECTION, POWDER, FOR SOLUTION INTRAVENOUS at 12:00

## 2025-06-16 RX ADMIN — HYDROMORPHONE HYDROCHLORIDE 0.2 MG: 0.2 INJECTION, SOLUTION INTRAMUSCULAR; INTRAVENOUS; SUBCUTANEOUS at 17:39

## 2025-06-16 RX ADMIN — HEPARIN SODIUM 5000 UNITS: 5000 INJECTION INTRAVENOUS; SUBCUTANEOUS at 21:59

## 2025-06-16 RX ADMIN — FENTANYL CITRATE 100 MCG: 50 INJECTION INTRAMUSCULAR; INTRAVENOUS at 12:17

## 2025-06-16 RX ADMIN — HYDROMORPHONE HYDROCHLORIDE 0.5 MG: 1 INJECTION, SOLUTION INTRAMUSCULAR; INTRAVENOUS; SUBCUTANEOUS at 21:59

## 2025-06-16 RX ADMIN — TERAZOSIN HYDROCHLORIDE 2 MG: 1 CAPSULE ORAL at 21:59

## 2025-06-16 RX ADMIN — SODIUM CHLORIDE 1000 ML: 0.9 INJECTION, SOLUTION INTRAVENOUS at 11:31

## 2025-06-16 RX ADMIN — FENTANYL CITRATE 100 MCG: 50 INJECTION INTRAMUSCULAR; INTRAVENOUS at 11:31

## 2025-06-16 RX ADMIN — ONDANSETRON 4 MG: 2 INJECTION INTRAMUSCULAR; INTRAVENOUS at 14:30

## 2025-06-16 NOTE — CONSULTS
Consultation - Nephrology   Name: Saige Haji 55 y.o. female I MRN: [id number]  Unit/Bed#: ED 01 I Date of Admission: 6/16/2025   Date of Service: 6/16/2025 I Hospital Day: 0   Inpatient consult to Nephrology  Consult performed by: ADILENE Figueroa  Consult ordered by: ADILENE Agarwal        Physician Requesting Evaluation: Mil Munguia, *   Reason for Evaluation / Principal Problem: ESRD on PD    Assessment & Plan  Essential hypertension  Blood pressure stable, -134.  Continue 2 g sodium restriction. Continue lisinopril 10 mg daily.  Continue management per hospitalist.  Mixed hyperlipidemia    Vitamin D deficiency    ESRD (end stage renal disease) ([facility])  Lab Results   Component Value Date    EGFR 7 06/16/2025    EGFR 7 05/08/2025    EGFR 12 11/08/2024    CREATININE 5.96 (H) 06/16/2025    CREATININE 5.75 (H) 05/08/2025    CREATININE 3.96 (H) 11/08/2024   ESRD on PD [place] under the care of Dr. Talamantes  PD orders entered with [facility].  Check PD culture/Gram stain given abdominal pain in patient receiving PD.  Discussed care with ED physician and house supervisor, RN on second floor is able to provide PD tonight.  Patient will likely be transferred to ICU as no other beds are available in hospital to facilitate peritoneal dialysis tonight.  Acute left lower quadrant pain  CT A/P with extremely trace right pleural effusion.  Subsegmental dependent atelectasis within both lung bases.  Kidneys are mildly atrophic bilaterally.  1.5 cm exophytic hyperdense lesion in upper pole of left kidney, recommend nonemergent renal ultrasound for further evaluation.  No hydronephrosis.  Stomach is decompressed.  Normal course and caliber of the small bowel loops.  Moderate segment diffuse bowel wall thickening and mild pericolonic fat stranding of the descending through proximal to mid sigmoid colon greatest within the distal descending colon and proximal sigmoid colon which could be on the basis  of either diverticulitis or colitis, with several scattered diverticula in the same distribution.  Peritoneal dialysis catheter loops in the lower anterior abdomen/pelvis just above the urinary bladder.  Renal ultrasound pending.  PD cultures/Gram stain to be obtained.  Sepsis (HCC)  Continue management as per hospitalist      I have reviewed the nephrology recommendations including PD schedule and cultures due to abdominal pain with hospitalist and we are in agreement with renal plan including the information outlined above.     History of Present Illness   Saige Haji is a 55 y.o. female with a PMH of ESRD on peritoneal dialysis, HTN, HLD who was admitted to  [facility] 6/16 after presenting with left upper and lower abdominal pain and associated diarrhea and nausea. A renal consultation is requested today for assistance in the management of ESRD on PD.    Review of Systems   Constitutional:  Positive for activity change, appetite change and fatigue. Negative for chills and fever.   HENT:  Negative for ear pain and sore throat.    Eyes:  Negative for pain and visual disturbance.   Respiratory:  Negative for cough and shortness of breath.    Cardiovascular:  Negative for chest pain, palpitations and leg swelling.   Gastrointestinal:  Positive for abdominal pain, nausea and vomiting. Negative for constipation and diarrhea.        Vomiting x 2 days   Genitourinary:  Negative for decreased urine volume, difficulty urinating, dysuria, flank pain, frequency and hematuria.   Musculoskeletal:  Negative for arthralgias and back pain.   Skin:  Negative for color change and rash.   Neurological:  Negative for dizziness, seizures, syncope, light-headedness and headaches.   Hematological: Negative.    Psychiatric/Behavioral: Negative.     All other systems reviewed and are negative.    Historical Information   Past Medical History[1]  Past Surgical History[2]  Social History[3]  E-Cigarette/Vaping    E-Cigarette Use Never User       E-Cigarette/Vaping Substances    Nicotine No     THC No     CBD No     Flavoring No     Other No     Unknown No          Objective :  Temp:  [99.1 °F (37.3 °C)-99.9 °F (37.7 °C)] 99.9 °F (37.7 °C)  HR:  [] 89  BP: (122-134)/(62-69) 134/69  Resp:  [18-20] 20  SpO2:  [98 %-100 %] 99 %  O2 Device: None (Room air)    Current Weight:    First Weight:    I/O       None          Physical Exam  Vitals and nursing note reviewed.   Constitutional:       General: She is not in acute distress.     Appearance: She is well-developed. She is obese. She is ill-appearing.   HENT:      Head: Normocephalic and atraumatic.      Right Ear: External ear normal.      Left Ear: External ear normal.      Nose: Nose normal.      Mouth/Throat:      Mouth: Mucous membranes are moist.      Pharynx: Oropharynx is clear.     Eyes:      Extraocular Movements: Extraocular movements intact.      Conjunctiva/sclera: Conjunctivae normal.      Pupils: Pupils are equal, round, and reactive to light.       Cardiovascular:      Rate and Rhythm: Normal rate and regular rhythm.      Heart sounds: Normal heart sounds. No murmur heard.  Pulmonary:      Effort: Pulmonary effort is normal. No respiratory distress.      Breath sounds: Normal breath sounds.      Comments: Decreased breath sounds  Abdominal:      Palpations: Abdomen is soft.      Tenderness: There is no abdominal tenderness.     Musculoskeletal:         General: No swelling.      Cervical back: Neck supple.      Right lower leg: No edema.      Left lower leg: No edema.     Skin:     General: Skin is warm and dry.      Capillary Refill: Capillary refill takes less than 2 seconds.     Neurological:      General: No focal deficit present.      Mental Status: She is alert and oriented to person, place, and time.     Psychiatric:         Mood and Affect: Mood normal.         Behavior: Behavior normal.       Medications:  Current Medications[4]      Lab Results: I have reviewed the following  "results:  Results from last 7 days   Lab Units 06/16/25  1135   WBC Thousand/uL 24.64*   HEMOGLOBIN g/dL 9.2*   HEMATOCRIT % 27.2*   PLATELETS Thousands/uL 204   POTASSIUM mmol/L 4.4   CHLORIDE mmol/L 107   CO2 mmol/L 18*   BUN mg/dL 69*   CREATININE mg/dL 5.96*   CALCIUM mg/dL 9.4   ALBUMIN g/dL 4.3       Administrative Statements     Portions of the record may have been created with voice recognition software. Occasional wrong word or \"sound a like\" substitutions may have occurred due to the inherent limitations of voice recognition software. Read the chart carefully and recognize, using context, where substitutions have occurred.If you have any questions, please contact the dictating provider.         [1]   Past Medical History:  Diagnosis Date    Acute renal failure (ARF) ([facility]) 6/29/2023    Arthritis     Back pain     Chronic kidney disease     GERD (gastroesophageal reflux disease)     Hyperlipidemia     Hypertension     Neck pain     Neuropathy     Skin abnormality     on abdomen - Dr. House aware    Stage 3b chronic kidney disease ([facility]) 6/30/2023    Wears reading eyeglasses    [2]   Past Surgical History:  Procedure Laterality Date    APPENDECTOMY      CHOLECYSTECTOMY      COLONOSCOPY      FOOT POSTERIOR RELEASE Left     around ankle    IR BIOPSY KIDNEY RANDOM  12/12/2023    SC LAPS INSERTION TUNNELED INTRAPERITONEAL CATHETER N/A 10/25/2024    Procedure: INSERTION PERITONEAL CATHETER DIALYSIS LAPAROSCOPIC;  Surgeon: Cirilo House DO;  Location: [facility];  Service: General    SC LAPS W/REVISION INTRAPERITONEAL CATHETER N/A 11/12/2024    Procedure: REVISION/ UNROOFING PERITONEAL CATHETER DIALYSIS;  Surgeon: Cirilo House DO;  Location: [facility];  Service: General    TUBAL LIGATION     [3]   Social History  Tobacco Use    Smoking status: Former     Current packs/day: 0.00     Average packs/day: 0.5 packs/day for 4.0 years (2.0 ttl pk-yrs)     Types: Cigarettes     Start date: 1988     Quit date: 1992     " Years since quittin.4    Smokeless tobacco: Never   Vaping Use    Vaping status: Never Used   Substance and Sexual Activity    Alcohol use: Never    Drug use: Never    Sexual activity: Yes     Partners: Male   [4]   Current Facility-Administered Medications:     [START ON 2025] gentamicin (GARAMYCIN) 0.1 % cream, , Topical, Daily, ADILENE Agarwal    heparin (porcine) subcutaneous injection 5,000 Units, 5,000 Units, Subcutaneous, Q8H GARY, ADILENE Agarwal    HYDROmorphone (DILAUDID) injection 0.5 mg, 0.5 mg, Intravenous, Q4H PRN, ADILENE Agarwal, 0.5 mg at 25 1431    HYDROmorphone HCl (DILAUDID) injection 0.2 mg, 0.2 mg, Intravenous, Q4H PRN, ADILENE Agarwal    [START ON 2025] lisinopril (ZESTRIL) tablet 10 mg, 10 mg, Oral, Daily, ADILENE Agarwal    ondansetron (ZOFRAN) injection 4 mg, 4 mg, Intravenous, Q8H PRN, ADILENE Agarwal, 4 mg at 25 1430    piperacillin-tazobactam (ZOSYN) IVPB (EXTENDED INFUSION) 4.5 g, 4.5 g, Intravenous, Q12H, Cherrie Cortés, ADILENE    polyethylene glycol (MIRALAX) packet 17 g, 17 g, Oral, Daily PRN, ADILENE Figueroa    terazosin (HYTRIN) capsule 2 mg, 2 mg, Oral, HS, ADILENE Agarwal    Current Outpatient Medications:     b complex vitamins capsule, Take 1 capsule by mouth in the morning., Disp: , Rfl:     ergocalciferol (VITAMIN D2) 50,000 units, Take by mouth in the morning, Disp: , Rfl:     gentamicin (GARAMYCIN) 0.1 % cream, Apply topically daily, Disp: 30 g, Rfl: 2    IRON HEME POLYPEPTIDE PO, Take 1 capsule by mouth in the morning OTC med., Disp: , Rfl:     lisinopril (ZESTRIL) 10 mg tablet, Take 1 tablet (10 mg total) by mouth daily, Disp: 90 tablet, Rfl: 2    multivitamin (THERAGRAN) TABS, Take 1 tablet by mouth in the morning., Disp: , Rfl:     rosuvastatin (CRESTOR) 10 MG tablet, Take 1 tablet (10 mg total) by mouth daily, Disp: 90 tablet, Rfl: 3    saccharomyces boulardii (Florastor) 250 mg capsule,  Take 250 mg by mouth in the morning., Disp: , Rfl:     terazosin (HYTRIN) 1 mg capsule, Take 2 capsules (2 mg total) by mouth daily at bedtime, Disp: 180 capsule, Rfl: 3    calcium acetate (PHOSLO) capsule, Take 1 capsule (667 mg total) by mouth 3 (three) times a day with meals (Patient not taking: Reported on 5/9/2025), Disp: 270 capsule, Rfl: 1    oxyCODONE-acetaminophen (Percocet) 5-325 mg per tablet, Take 1 tablet by mouth every 4 (four) hours as needed for moderate pain Max Daily Amount: 6 tablets (Patient not taking: Reported on 5/9/2025), Disp: 20 tablet, Rfl: 0    sodium bicarbonate 650 mg tablet, Take 1 tablet (650 mg total) by mouth in the morning (Patient not taking: Reported on 5/9/2025), Disp: , Rfl:

## 2025-06-16 NOTE — ASSESSMENT & PLAN NOTE
BP well-controlled upon presentation   Continue home lisinopril 10 mg PO daily and terazosin 2 mg PO HS   Monitor BP and adjust medication as needed

## 2025-06-16 NOTE — ASSESSMENT & PLAN NOTE
Lab Results   Component Value Date    EGFR 7 06/16/2025    EGFR 7 05/08/2025    EGFR 12 11/08/2024    CREATININE 5.96 (H) 06/16/2025    CREATININE 5.75 (H) 05/08/2025    CREATININE 3.96 (H) 11/08/2024     On PD 5 days/week  Baseline Cr 3.9-5.7, GFR 8   Continue with home topical gentamicin for port care  Avoid nephrotoxic agents   Consult nephrology

## 2025-06-16 NOTE — ASSESSMENT & PLAN NOTE
CT A/P with extremely trace right pleural effusion.  Subsegmental dependent atelectasis within both lung bases.  Kidneys are mildly atrophic bilaterally.  1.5 cm exophytic hyperdense lesion in upper pole of left kidney, recommend nonemergent renal ultrasound for further evaluation.  No hydronephrosis.  Stomach is decompressed.  Normal course and caliber of the small bowel loops.  Moderate segment diffuse bowel wall thickening and mild pericolonic fat stranding of the descending through proximal to mid sigmoid colon greatest within the distal descending colon and proximal sigmoid colon which could be on the basis of either diverticulitis or colitis, with several scattered diverticula in the same distribution.  Peritoneal dialysis catheter loops in the lower anterior abdomen/pelvis just above the urinary bladder.  Renal ultrasound pending.  PD cultures/Gram stain to be obtained.

## 2025-06-16 NOTE — ASSESSMENT & PLAN NOTE
Presented for left-sided abdominal pain associated with nausea and diarrhea x 1 day   CT A/P 6/16/2025: Moderate segment diffuse bowel wall thickening and mild pericolonic fat stranding of the descending through proximal to mid sigmoid colon, could be on the basis of either a diverticulitis or a colitis  NPO except sips for now  Received 1 L of normal saline in the ER, hold off on additional fluids due to ESRD status pending nephrology consult  Continue IV Zosyn 4.5 g  Symptom control as needed  Consult GI

## 2025-06-16 NOTE — H&P
H&P - Hospitalist   Name: Saige Haji 55 y.o. female I MRN: [id number]  Unit/Bed#: ED 01 I Date of Admission: 6/16/2025   Date of Service: 6/16/2025 I Hospital Day: 0     Assessment & Plan  Acute left lower quadrant pain  Presented for left-sided abdominal pain associated with nausea and diarrhea x 1 day   CT A/P 6/16/2025: Moderate segment diffuse bowel wall thickening and mild pericolonic fat stranding of the descending through proximal to mid sigmoid colon, could be on the basis of either a diverticulitis or a colitis  NPO except sips for now  Received 1 L of normal saline in the ER, hold off on additional fluids due to ESRD status pending nephrology consult  Continue IV Zosyn 4.5 g  Symptom control as needed  Consult GI  Sepsis ([facility])  Met sepsis criteria with , WBC 24.64, colitis vs diverticulitis on CT  Lactic acid 1.2  Blood cultures are pending  Continue IV fluids and IV Zosyn   Monitor labs and vitals   ESRD (end stage renal disease) ([facility])  Lab Results   Component Value Date    EGFR 7 06/16/2025    EGFR 7 05/08/2025    EGFR 12 11/08/2024    CREATININE 5.96 (H) 06/16/2025    CREATININE 5.75 (H) 05/08/2025    CREATININE 3.96 (H) 11/08/2024     On PD 5 days/week  Baseline Cr 3.9-5.7, GFR 8   Continue with home topical gentamicin for port care  Avoid nephrotoxic agents   Consult nephrology  Essential hypertension  BP well-controlled upon presentation   Continue home lisinopril 10 mg PO daily and terazosin 2 mg PO HS   Monitor BP and adjust medication as needed  Mixed hyperlipidemia  Home regimen: Crestor 10 mg PO daily   Hold statin while NPO, resume when appropriate  Vitamin D deficiency  Patient takes oral Vit D supplement at home  Continue with supplementation upon discharge       VTE Pharmacologic Prophylaxis: VTE Score: 3 Moderate Risk (Score 3-4) - Pharmacological DVT Prophylaxis Ordered: heparin.  Code Status: Prior   Discussion with family: Updated  (daughter) at  bedside.    Anticipated Length of Stay: Patient will be admitted on an inpatient basis with an anticipated length of stay of greater than 2 midnights secondary to left lower abdominal pain.    History of Present Illness   Chief Complaint: left-sided abdominal pain x 1 day.     Saige Haji is a 55 y.o. female with a PMH of ESRD on peritoneal dialysis, HTN, HLD who presents with left-sided abdominal pain x 1 day. Patient reports abdominal pain that began yesterday, with associated diarrhea and nausea. She states that she tried taking bin chews and TUMS for alleviation but found no relief. She was able to tolerate minimal food and fluids throughout past 24 hours. She denies blood in her stool. She further denies fevers, chills, chest pain, SOB. Patient states that she experienced a similar episode 19 years ago, at which time she was treated for colitis. Per patient, a colonoscopy that was completed in 12/2024 revealed possible diverticulitis. Abdominal surgical history is significant for cholecystectomy and appendectomy. CT abdomen revealed colonic thickening consistent with diverticulitis or colitis. Patient was started on IV fluids and IV Zosyn.  She was admitted to medicine.  Her daughter is at bedside.      Review of Systems   Constitutional:  Negative for chills, diaphoresis and fever.   HENT:  Negative for ear pain and sore throat.    Eyes:  Negative for pain and visual disturbance.   Respiratory:  Negative for cough and shortness of breath.    Cardiovascular:  Negative for chest pain, palpitations and leg swelling.   Gastrointestinal:  Positive for abdominal pain, diarrhea and nausea. Negative for blood in stool and vomiting.   Genitourinary:  Negative for difficulty urinating, dysuria and hematuria.   Musculoskeletal:  Negative for arthralgias and back pain.   Skin:  Negative for color change and rash.   Neurological:  Negative for dizziness, seizures, syncope and light-headedness.    Psychiatric/Behavioral:  Negative for agitation and confusion.    All other systems reviewed and are negative.      Historical Information   Past Medical History[1]  Past Surgical History[2]  Social History[3]  E-Cigarette/Vaping    E-Cigarette Use Never User      E-Cigarette/Vaping Substances    Nicotine No     THC No     CBD No     Flavoring No     Other No     Unknown No      Family History  Problem Relation Name Age of Onset    Hypertension Mother      Cancer Mother      Alzheimer's disease Mother      Parkinsonism Mother      Diabetes Father      Hypertension Father      Cancer Father      Breast cancer Sister  50    No Known Problems Daughter      No Known Problems Maternal Grandmother      No Known Problems Paternal Grandmother      No Known Problems Sister debbie     No Known Problems Sister julien     No Known Problems Maternal Aunt      No Known Problems Maternal Aunt      No Known Problems Maternal Aunt      No Known Problems Paternal Aunt      Breast cancer Paternal Aunt      Breast cancer Paternal Aunt olga     No Known Problems Paternal Aunt       Social History:  Marital Status: /Civil Union   Occupation: Not discussed  Patient Pre-hospital Living Situation: Home  Patient Pre-hospital Level of Mobility: walks  Patient Pre-hospital Diet Restrictions: Renal    Meds/Allergies   I have reviewed home medications with patient personally.  Prior to Admission medications    Medication Sig Start Date End Date Taking? Authorizing Provider   b complex vitamins capsule Take 1 capsule by mouth in the morning.   Yes Historical Provider, MD   ergocalciferol (VITAMIN D2) 50,000 units Take by mouth in the morning 4/28/25  Yes Historical Provider, MD   gentamicin (GARAMYCIN) 0.1 % cream Apply topically daily 6/4/25  Yes Varsha Talamantes, DO   IRON HEME POLYPEPTIDE PO Take 1 capsule by mouth in the morning OTC med.   Yes Historical Provider, MD   lisinopril (ZESTRIL) 10 mg tablet Take 1 tablet (10 mg total)  by mouth daily 4/16/25  Yes Varsha Talamantes DO   multivitamin (THERAGRAN) TABS Take 1 tablet by mouth in the morning.   Yes Historical Provider, MD   rosuvastatin (CRESTOR) 10 MG tablet Take 1 tablet (10 mg total) by mouth daily 5/9/25  Yes Cirilo Fowler DO   saccharomyces boulardii (Florastor) 250 mg capsule Take 250 mg by mouth in the morning.   Yes Historical Provider, MD   terazosin (HYTRIN) 1 mg capsule Take 2 capsules (2 mg total) by mouth daily at bedtime 5/9/25  Yes Cirilo Fowler DO   calcium acetate (PHOSLO) capsule Take 1 capsule (667 mg total) by mouth 3 (three) times a day with meals  Patient not taking: Reported on 5/9/2025 4/16/25   Varsha Talamantes DO   oxyCODONE-acetaminophen (Percocet) 5-325 mg per tablet Take 1 tablet by mouth every 4 (four) hours as needed for moderate pain Max Daily Amount: 6 tablets  Patient not taking: Reported on 5/9/2025 10/25/24   Cirilo House DO   sodium bicarbonate 650 mg tablet Take 1 tablet (650 mg total) by mouth in the morning  Patient not taking: Reported on 5/9/2025 9/6/24   Yuri Hennessy DO     Allergies   Allergen Reactions    Methotrexate Lip Swelling, Other (See Comments) and Swelling     LIPS  SWELLING- LIPS OOZING.    Cephalexin GI Intolerance     Tolerated zosyn 6/16/2025       Objective :  Temp:  [99.1 °F (37.3 °C)-99.9 °F (37.7 °C)] 99.9 °F (37.7 °C)  HR:  [] 89  BP: (122-134)/(62-69) 134/69  Resp:  [18-20] 20  SpO2:  [98 %-100 %] 99 %  O2 Device: None (Room air)    Physical Exam  Vitals and nursing note reviewed.   Constitutional:       Appearance: Normal appearance. She is well-developed.      Comments: Appears uncomfortable   HENT:      Head: Normocephalic and atraumatic.      Nose: Nose normal.      Mouth/Throat:      Mouth: Mucous membranes are moist.     Eyes:      Conjunctiva/sclera: Conjunctivae normal.      Pupils: Pupils are equal, round, and reactive to light.       Cardiovascular:      Rate and Rhythm: Normal rate and  regular rhythm.      Heart sounds: No murmur heard.  Pulmonary:      Effort: Pulmonary effort is normal. No respiratory distress.      Breath sounds: Normal breath sounds.   Abdominal:      General: There is no distension.      Palpations: Abdomen is soft.      Tenderness: There is abdominal tenderness. There is guarding.     Musculoskeletal:         General: No swelling.      Cervical back: Neck supple.      Right lower leg: No edema.      Left lower leg: No edema.     Skin:     General: Skin is warm and dry.      Capillary Refill: Capillary refill takes less than 2 seconds.     Neurological:      General: No focal deficit present.      Mental Status: She is alert and oriented to person, place, and time.     Psychiatric:         Mood and Affect: Mood normal.         Behavior: Behavior normal.         Thought Content: Thought content normal.         Lines/Drains:            Lab Results: I have reviewed the following results:  Results from last 7 days   Lab Units 06/16/25  1135   WBC Thousand/uL 24.64*   HEMOGLOBIN g/dL 9.2*   HEMATOCRIT % 27.2*   PLATELETS Thousands/uL 204   SEGS PCT % 87*   LYMPHO PCT % 5*   MONO PCT % 7   EOS PCT % 0     Results from last 7 days   Lab Units 06/16/25  1135   SODIUM mmol/L 136   POTASSIUM mmol/L 4.4   CHLORIDE mmol/L 107   CO2 mmol/L 18*   BUN mg/dL 69*   CREATININE mg/dL 5.96*   ANION GAP mmol/L 11   CALCIUM mg/dL 9.4   ALBUMIN g/dL 4.3   TOTAL BILIRUBIN mg/dL 0.56   ALK PHOS U/L 45   ALT U/L 12   AST U/L 11*   GLUCOSE RANDOM mg/dL 117     Results from last 7 days   Lab Units 06/16/25  1135   INR  1.09         Lab Results   Component Value Date    HGBA1C 4.8 01/29/2024     Results from last 7 days   Lab Units 06/16/25  1135   LACTIC ACID mmol/L 1.2   PROCALCITONIN ng/ml 1.77*       Imaging Results Review: I reviewed radiology reports from this admission including: CT abdomen/pelvis.      Administrative Statements       ** Please Note: This note has been constructed using a voice  recognition system. **         [1]   Past Medical History:  Diagnosis Date    Acute renal failure (ARF) ([facility]) 2023    Arthritis     Back pain     Chronic kidney disease     GERD (gastroesophageal reflux disease)     Hyperlipidemia     Hypertension     Neck pain     Neuropathy     Skin abnormality     on abdomen - Dr. Lacy ricks    Stage 3b chronic kidney disease ([facility]) 2023    Wears reading eyeglasses    [2]   Past Surgical History:  Procedure Laterality Date    APPENDECTOMY      CHOLECYSTECTOMY      COLONOSCOPY      FOOT POSTERIOR RELEASE Left     around ankle    IR BIOPSY KIDNEY RANDOM  2023    OR LAPS INSERTION TUNNELED INTRAPERITONEAL CATHETER N/A 10/25/2024    Procedure: INSERTION PERITONEAL CATHETER DIALYSIS LAPAROSCOPIC;  Surgeon: Cirilo House DO;  Location: [facility];  Service: General    OR LAPS W/REVISION INTRAPERITONEAL CATHETER N/A 2024    Procedure: REVISION/ UNROOFING PERITONEAL CATHETER DIALYSIS;  Surgeon: Cirilo House DO;  Location: [facility];  Service: General    TUBAL LIGATION     [3]   Social History  Tobacco Use    Smoking status: Former     Current packs/day: 0.00     Average packs/day: 0.5 packs/day for 4.0 years (2.0 ttl pk-yrs)     Types: Cigarettes     Start date:      Quit date:      Years since quittin.4    Smokeless tobacco: Never   Vaping Use    Vaping status: Never Used   Substance and Sexual Activity    Alcohol use: Never    Drug use: Never    Sexual activity: Yes     Partners: Male

## 2025-06-16 NOTE — ASSESSMENT & PLAN NOTE
Blood pressure stable, -134.  Continue 2 g sodium restriction. Continue lisinopril 10 mg daily.  Continue management per hospitalist.

## 2025-06-16 NOTE — ASSESSMENT & PLAN NOTE
Lab Results   Component Value Date    EGFR 7 06/16/2025    EGFR 7 05/08/2025    EGFR 12 11/08/2024    CREATININE 5.96 (H) 06/16/2025    CREATININE 5.75 (H) 05/08/2025    CREATININE 3.96 (H) 11/08/2024   ESRD on PD [place] under the care of Dr. Talamantes  PD orders entered with [facility].  Check PD culture/Gram stain given abdominal pain in patient receiving PD.  Discussed care with ED physician and house supervisor, RN on second floor is able to provide PD tonight.  Patient will likely be transferred to ICU as no other beds are available in hospital to facilitate peritoneal dialysis tonight.

## 2025-06-16 NOTE — ED PROVIDER NOTES
Time reflects when diagnosis was documented in both MDM as applicable and the Disposition within this note       Time User Action Codes Description Comment    6/16/2025 12:22 PM Deshaun Dunaway [K57.92] Diverticulitis     6/16/2025 12:22 PM Deshaun Dunaway [A41.9] Sepsis (HCC)     6/16/2025 12:26 PM Deshaun Dunaway [N18.6] ESRD (end stage renal disease) (HCC)     6/16/2025 12:26 PM Deshaun Dunaway [R91.8] Abnormal CT lung screening     6/16/2025 12:26 PM Deshaun Dunaway Remove [R91.8] Abnormal CT lung screening     6/16/2025 12:26 PM Deshaun Dunaway [R93.89] Abnormal CT scan           ED Disposition       ED Disposition   Admit    Condition   Stable    Date/Time   Mon Jun 16, 2025 12:22 PM    Comment   Case was discussed with damir and the patient's admission status was agreed to be Admission Status: inpatient status to the service of Dr. reich .               Assessment & Plan       Medical Decision Making  55-year-old female presents with left-sided abdominal pain and request several days.  Has a history of ESRD peritoneal dialysis.  Denies any fevers.  Does describe generalized weakness malaise.    He is very tender in the left upper and lower quadrants.  Heart rate greater than 90 suggestive of sepsis.  Will get CBC to look for leukocytosis, procalcitonin, lactic acid to assess for tissue hypoperfusion, chemistry assess for electrolyte abnormalities.  Patient is at risk for electrolyte disturbances due to history of ESRD on peritoneal dialysis.  Will start IV fluids as patient clinically dehydrated    Discharge includes diverticulitis, colitis, sepsis, shock, UTI, pyelonephritis    Labs significant for significantly elevated WBC count of 24.  Broad-spectrum IV antibiotics started.  Chart review indicates history of Keflex allergy should be able to tolerate Zosyn.  CT scan ordered viewed and interpreted by me shows evidence of colitis versus diverticulitis.  Formal read was also  appreciated.        Problems Addressed:  Abnormal CT scan: acute illness or injury  Diverticulitis: acute illness or injury  ESRD (end stage renal disease) (HCC): chronic illness or injury  Sepsis (HCC): acute illness or injury    Amount and/or Complexity of Data Reviewed  Labs: ordered. Decision-making details documented in ED Course.  Radiology: ordered and independent interpretation performed. Decision-making details documented in ED Course.    Risk  Prescription drug management.  Parenteral controlled substances.  Decision regarding hospitalization.        ED Course as of 06/16/25 1231   Mon Jun 16, 2025   1223 Critical Care Time Statement: Upon my evaluation, this patient had a high probability of imminent or life-threatening deterioration due to sepsis, which required my direct attention, intervention, and personal management.  I spent a total of 48 minutes directly providing critical care services, including interpretation of complex medical databases, evaluating for the presence of life-threatening injuries or illnesses, management of organ system failure(s) , and complex medical decision making (to support/prevent further life-threatening deterioration).. This time is exclusive of procedures, teaching, treating other patients, family meetings, and any prior time recorded by providers other than myself.           Medications   sodium chloride 0.9 % bolus 1,000 mL (1,000 mL Intravenous New Bag 6/16/25 1131)   fentaNYL injection 100 mcg (100 mcg Intravenous Given 6/16/25 1131)   piperacillin-tazobactam (ZOSYN) IVPB 4.5 g (4.5 g Intravenous New Bag 6/16/25 1200)   fentaNYL injection 100 mcg (100 mcg Intravenous Given 6/16/25 1217)       ED Risk Strat Scores                    No data recorded        SBIRT 22yo+      Flowsheet Row Most Recent Value   Initial Alcohol Screen: US AUDIT-C     1. How often do you have a drink containing alcohol? 0 Filed at: 06/16/2025 1051   2. How many drinks containing alcohol do  you have on a typical day you are drinking?  0 Filed at: 06/16/2025 1051   3b. FEMALE Any Age, or MALE 65+: How often do you have 4 or more drinks on one occassion? 0 Filed at: 06/16/2025 1051   Audit-C Score 0 Filed at: 06/16/2025 1051   FABIO: How many times in the past year have you...    Used an illegal drug or used a prescription medication for non-medical reasons? Never Filed at: 06/16/2025 1051                            History of Present Illness       Chief Complaint   Patient presents with    Abdominal Pain     LLQ pain starting yesterday. Reports diarrhea. On peritoneal dialysis        Past Medical History[1]   Past Surgical History[2]   Family History[3]   Social History[4]   E-Cigarette/Vaping    E-Cigarette Use Never User       E-Cigarette/Vaping Substances    Nicotine No     THC No     CBD No     Flavoring No     Other No     Unknown No       I have reviewed and agree with the history as documented.     55-year-old female with peritoneal dialysis, prior appendectomy, cholecystectomy, presents with LLQ pain. See MDM      Abdominal Pain      Review of Systems   Gastrointestinal:  Positive for abdominal pain.           Objective       ED Triage Vitals   Temperature Pulse Blood Pressure Respirations SpO2 Patient Position - Orthostatic VS   06/16/25 1050 06/16/25 1050 06/16/25 1052 06/16/25 1050 06/16/25 1050 06/16/25 1050   99.1 °F (37.3 °C) 100 130/66 18 98 % Sitting      Temp Source Heart Rate Source BP Location FiO2 (%) Pain Score    06/16/25 1050 06/16/25 1050 06/16/25 1050 -- 06/16/25 1050    Temporal Monitor Left arm  10 - Worst Possible Pain      Vitals      Date and Time Temp Pulse SpO2 Resp BP Pain Score FACES Pain Rating User   06/16/25 1217 -- -- -- -- -- 10 - Worst Possible Pain -- SG   06/16/25 1052 -- -- -- -- 130/66 -- -- SG   06/16/25 1050 99.1 °F (37.3 °C) 100 98 % 18 -- 10 - Worst Possible Pain -- SG            Physical Exam  Vitals and nursing note reviewed.   Constitutional:        Appearance: Normal appearance. She is well-developed. She is ill-appearing.   HENT:      Head: Normocephalic and atraumatic.     Eyes:      Conjunctiva/sclera: Conjunctivae normal.      Pupils: Pupils are equal, round, and reactive to light.     Neck:      Trachea: No tracheal deviation.     Cardiovascular:      Rate and Rhythm: Normal rate and regular rhythm.      Heart sounds: Normal heart sounds. No murmur heard.  Pulmonary:      Effort: Pulmonary effort is normal. No respiratory distress.      Breath sounds: Normal breath sounds. No wheezing or rales.   Abdominal:      General: Bowel sounds are normal. There is no distension.      Palpations: Abdomen is soft.      Tenderness: There is abdominal tenderness in the left upper quadrant and left lower quadrant.     Musculoskeletal:         General: No deformity.      Cervical back: Normal range of motion and neck supple.     Skin:     General: Skin is warm and dry.      Capillary Refill: Capillary refill takes less than 2 seconds.     Neurological:      General: No focal deficit present.      Mental Status: She is alert and oriented to person, place, and time.      Sensory: No sensory deficit.     Psychiatric:         Mood and Affect: Mood normal.         Judgment: Judgment normal.         Results Reviewed       Procedure Component Value Units Date/Time    Procalcitonin [[id number]]  (Abnormal) Collected: 06/16/25 1135    Lab Status: Final result Specimen: Blood from Arm, Left Updated: 06/16/25 1215     Procalcitonin 1.77 ng/ml     Comprehensive metabolic panel [[id number]]  (Abnormal) Collected: 06/16/25 1135    Lab Status: Final result Specimen: Blood from Arm, Left Updated: 06/16/25 1205     Sodium 136 mmol/L      Potassium 4.4 mmol/L      Chloride 107 mmol/L      CO2 18 mmol/L      ANION GAP 11 mmol/L      BUN 69 mg/dL      Creatinine 5.96 mg/dL      Glucose 117 mg/dL      Calcium 9.4 mg/dL      AST 11 U/L      ALT 12 U/L      Alkaline Phosphatase 45 U/L      Total  Protein 6.8 g/dL      Albumin 4.3 g/dL      Total Bilirubin 0.56 mg/dL      eGFR 7 ml/min/1.73sq m     Narrative:      National Kid[employer] Foundation guidelines for Chronic Kidney Disease (CKD):     Stage 1 with normal or high GFR (GFR > 90 mL/min/1.73 square meters)    Stage 2 Mild CKD (GFR = 60-89 mL/min/1.73 square meters)    Stage 3A Moderate CKD (GFR = 45-59 mL/min/1.73 square meters)    Stage 3B Moderate CKD (GFR = 30-44 mL/min/1.73 square meters)    Stage 4 Severe CKD (GFR = 15-29 mL/min/1.73 square meters)    Stage 5 End Stage CKD (GFR <15 mL/min/1.73 square meters)  Note: GFR calculation is accurate only with a steady state creatinine    Lactic acid [[id number]]  (Normal) Collected: 06/16/25 1135    Lab Status: Final result Specimen: Blood from Arm, Left Updated: 06/16/25 1204     LACTIC ACID 1.2 mmol/L     Narrative:      Result may be elevated if tourniquet was used during collection.    Protime-INR [[id number]]  (Normal) Collected: 06/16/25 1135    Lab Status: Final result Specimen: Blood from Arm, Left Updated: 06/16/25 1156     Protime 14.6 seconds      INR 1.09    Narrative:      INR Therapeutic Range    Indication                                             INR Range      Atrial Fibrillation                                               2.0-3.0  Hypercoagulable State                                    2.0.2.3  Left Ventricular Asist Device                            2.0-3.0  Mechanical Heart Valve                                  -    Aortic(with afib, MI, embolism, HF, LA enlargement,    and/or coagulopathy)                                     2.0-3.0 (2.5-3.5)     Mitral                                                             2.5-3.5  Prosthetic/Bioprosthetic Heart Valve               2.0-3.0  Venous thromboembolism (VTE: VT, PE        2.0-3.0    APTT [[id number]]  (Normal) Collected: 06/16/25 1135    Lab Status: Final result Specimen: Blood from Arm, Left Updated: 06/16/25 1156     PTT 30  seconds     CBC and differential [[id number]]  (Abnormal) Collected: 06/16/25 1135    Lab Status: Final result Specimen: Blood from Arm, Left Updated: 06/16/25 1142     WBC 24.64 Thousand/uL      RBC 2.88 Million/uL      Hemoglobin 9.2 g/dL      Hematocrit 27.2 %      MCV 94 fL      MCH 31.9 pg      MCHC 33.8 g/dL      RDW 13.2 %      MPV 10.5 fL      Platelets 204 Thousands/uL      nRBC 0 /100 WBCs      Segmented % 87 %      Immature Grans % 1 %      Lymphocytes % 5 %      Monocytes % 7 %      Eosinophils Relative 0 %      Basophils Relative 0 %      Absolute Neutrophils 21.33 Thousands/µL      Absolute Immature Grans 0.16 Thousand/uL      Absolute Lymphocytes 1.31 Thousands/µL      Absolute Monocytes 1.77 Thousand/µL      Eosinophils Absolute 0.02 Thousand/µL      Basophils Absolute 0.05 Thousands/µL     Blood culture #2 [[id number]] Collected: 06/16/25 1135    Lab Status: In process Specimen: Blood from Arm, Right Updated: 06/16/25 1140    Blood culture #1 [[id number]] Collected: 06/16/25 1135    Lab Status: In process Specimen: Blood from Arm, Left Updated: 06/16/25 1140    UA w Reflex to Microscopic w Reflex to Culture [[id number]]     Lab Status: No result Specimen: Urine             CT abdomen pelvis wo contrast   Final Interpretation by Bernardo Alarcon MD (06/16 1207)      1.  Moderate segment diffuse bowel wall thickening and mild pericolonic fat stranding of the descending through proximal to mid sigmoid colon greatest within the distal descending colon and proximal sigmoid colon could be on the basis of either a    diverticulitis or a colitis.   2.  1.5 cm exophytic hyperdense lesion arising from the lateral interpolar to upper pole of the left kidney is indeterminate but most likely reflects a proteinaceous cyst. Recommend a nonemergent renal ultrasound for further evaluation.   3.  Chronic findings, detailed above.      The study was marked in EPIC for immediate notification.      Workstation performed:  [id number]             Procedures    ED Medication and Procedure Management   Prior to Admission Medications   Prescriptions Last Dose Informant Patient Reported? Taking?   IRON HEME POLYPEPTIDE PO  Self Yes Yes   Sig: Take 1 capsule by mouth in the morning OTC med.   b complex vitamins capsule  Self Yes Yes   Sig: Take 1 capsule by mouth in the morning.   calcium acetate (PHOSLO) capsule   No No   Sig: Take 1 capsule (667 mg total) by mouth 3 (three) times a day with meals   Patient not taking: Reported on 5/9/2025   ergocalciferol (VITAMIN D2) 50,000 units   Yes Yes   Sig: Take by mouth in the morning   gentamicin (GARAMYCIN) 0.1 % cream   No Yes   Sig: Apply topically daily   lisinopril (ZESTRIL) 10 mg tablet   No Yes   Sig: Take 1 tablet (10 mg total) by mouth daily   multivitamin (THERAGRAN) TABS  Self Yes Yes   Sig: Take 1 tablet by mouth in the morning.   oxyCODONE-acetaminophen (Percocet) 5-325 mg per tablet  Self No No   Sig: Take 1 tablet by mouth every 4 (four) hours as needed for moderate pain Max Daily Amount: 6 tablets   Patient not taking: Reported on 5/9/2025   rosuvastatin (CRESTOR) 10 MG tablet   No Yes   Sig: Take 1 tablet (10 mg total) by mouth daily   saccharomyces boulardii (Florastor) 250 mg capsule  Self Yes Yes   Sig: Take 250 mg by mouth in the morning.   sodium bicarbonate 650 mg tablet  Self No No   Sig: Take 1 tablet (650 mg total) by mouth in the morning   Patient not taking: Reported on 5/9/2025   terazosin (HYTRIN) 1 mg capsule   No Yes   Sig: Take 2 capsules (2 mg total) by mouth daily at bedtime      Facility-Administered Medications: None     Patient's Medications   Discharge Prescriptions    No medications on file     No discharge procedures on file.  ED SEPSIS DOCUMENTATION   Time reflects when diagnosis was documented in both MDM as applicable and the Disposition within this note       Time User Action Codes Description Comment    6/16/2025 12:22 PM Deshaun Dunaway Add  [K57.92] Diverticulitis     6/16/2025 12:22 PM Deshaun Dunaway Add [A41.9] Sepsis ([facility])     6/16/2025 12:26 PM Deshaun Dunaway Add [N18.6] ESRD (end stage renal disease) ([facility])     6/16/2025 12:26 PM Deshaun uDnaway Add [R91.8] Abnormal CT lung screening     6/16/2025 12:26 PM Deshaun Dunaway Remove [R91.8] Abnormal CT lung screening     6/16/2025 12:26 PM Deshaun Dunaway Add [R93.89] Abnormal CT scan                      [1]   Past Medical History:  Diagnosis Date    Acute renal failure (ARF) ([facility]) 6/29/2023    Arthritis     Back pain     Chronic kidney disease     GERD (gastroesophageal reflux disease)     Hyperlipidemia     Hypertension     Neck pain     Neuropathy     Skin abnormality     on abdomen - Dr. House aware    Stage 3b chronic kidney disease (HCC) 6/30/2023    Wears reading eyeglasses    [2]   Past Surgical History:  Procedure Laterality Date    APPENDECTOMY      CHOLECYSTECTOMY      COLONOSCOPY      FOOT POSTERIOR RELEASE Left     around ankle    IR BIOPSY KIDNEY RANDOM  12/12/2023    HI LAPS INSERTION TUNNELED INTRAPERITONEAL CATHETER N/A 10/25/2024    Procedure: INSERTION PERITONEAL CATHETER DIALYSIS LAPAROSCOPIC;  Surgeon: Cirilo House DO;  Location: [facility];  Service: General    HI LAPS W/REVISION INTRAPERITONEAL CATHETER N/A 11/12/2024    Procedure: REVISION/ UNROOFING PERITONEAL CATHETER DIALYSIS;  Surgeon: Cirilo House DO;  Location: [facility];  Service: General    TUBAL LIGATION     [3]   Family History  Problem Relation Name Age of Onset    Hypertension Mother      Cancer Mother      Alzheimer's disease Mother      Parkinsonism Mother      Diabetes Father      Hypertension Father      Cancer Father      Breast cancer Sister  50    No Known Problems Daughter      No Known Problems Maternal Grandmother      No Known Problems Paternal Grandmother      No Known Problems Sister debbie     No Known Problems Sister julien     No Known Problems Maternal Aunt      No Known  Problems Maternal Aunt      No Known Problems Maternal Aunt      No Known Problems Paternal Aunt      Breast cancer Paternal Aunt      Breast cancer Paternal Aunt olga     No Known Problems Paternal Aunt     [4]   Social History  Tobacco Use    Smoking status: Former     Current packs/day: 0.00     Average packs/day: 0.5 packs/day for 4.0 years (2.0 ttl pk-yrs)     Types: Cigarettes     Start date:      Quit date:      Years since quittin.4    Smokeless tobacco: Never   Vaping Use    Vaping status: Never Used   Substance Use Topics    Alcohol use: Never    Drug use: Never        Deshaun Dunaway,   25 1239

## 2025-06-16 NOTE — ASSESSMENT & PLAN NOTE
Met sepsis criteria with , WBC 24.64, colitis vs diverticulitis on CT  Lactic acid 1.2  Blood cultures are pending  Continue IV fluids and IV Zosyn   Monitor labs and vitals

## 2025-06-17 PROBLEM — Z99.2 CHRONIC KIDNEY DISEASE-MINERAL BONE DISORDER (CKD-MBD) WITH STAGE 5 CHRONIC KIDNEY DISEASE, ON CHRONIC DIALYSIS (HCC): Status: ACTIVE | Noted: 2025-06-17

## 2025-06-17 PROBLEM — E83.9 CHRONIC KIDNEY DISEASE-MINERAL BONE DISORDER (CKD-MBD) WITH STAGE 5 CHRONIC KIDNEY DISEASE, ON CHRONIC DIALYSIS (HCC): Status: ACTIVE | Noted: 2025-06-17

## 2025-06-17 PROBLEM — N28.1 KIDNEY CYST, ACQUIRED: Status: ACTIVE | Noted: 2025-06-17

## 2025-06-17 PROBLEM — M89.9 CHRONIC KIDNEY DISEASE-MINERAL BONE DISORDER (CKD-MBD) WITH STAGE 5 CHRONIC KIDNEY DISEASE, ON CHRONIC DIALYSIS (HCC): Status: ACTIVE | Noted: 2025-06-17

## 2025-06-17 PROBLEM — N18.5 CHRONIC KIDNEY DISEASE-MINERAL BONE DISORDER (CKD-MBD) WITH STAGE 5 CHRONIC KIDNEY DISEASE, ON CHRONIC DIALYSIS (HCC): Status: ACTIVE | Noted: 2025-06-17

## 2025-06-17 PROBLEM — N25.81 SECONDARY HYPERPARATHYROIDISM OF RENAL ORIGIN (HCC): Status: ACTIVE | Noted: 2025-06-17

## 2025-06-17 LAB
ANION GAP SERPL CALCULATED.3IONS-SCNC: 9 MMOL/L (ref 4–13)
BASOPHILS # BLD AUTO: 0.03 THOUSANDS/ÂΜL (ref 0–0.1)
BASOPHILS NFR BLD AUTO: 0 % (ref 0–1)
BUN SERPL-MCNC: 64 MG/DL (ref 5–25)
CALCIUM SERPL-MCNC: 8.6 MG/DL (ref 8.4–10.2)
CHLORIDE SERPL-SCNC: 109 MMOL/L (ref 96–108)
CO2 SERPL-SCNC: 19 MMOL/L (ref 21–32)
CREAT SERPL-MCNC: 5.77 MG/DL (ref 0.6–1.3)
EOSINOPHIL # BLD AUTO: 0.12 THOUSAND/ÂΜL (ref 0–0.61)
EOSINOPHIL NFR BLD AUTO: 1 % (ref 0–6)
ERYTHROCYTE [DISTWIDTH] IN BLOOD BY AUTOMATED COUNT: 13.5 % (ref 11.6–15.1)
GFR SERPL CREATININE-BSD FRML MDRD: 7 ML/MIN/1.73SQ M
GLUCOSE SERPL-MCNC: 122 MG/DL (ref 65–140)
HCT VFR BLD AUTO: 24.6 % (ref 34.8–46.1)
HGB BLD-MCNC: 8.1 G/DL (ref 11.5–15.4)
IMM GRANULOCYTES # BLD AUTO: 0.11 THOUSAND/UL (ref 0–0.2)
IMM GRANULOCYTES NFR BLD AUTO: 1 % (ref 0–2)
LYMPHOCYTES # BLD AUTO: 1.29 THOUSANDS/ÂΜL (ref 0.6–4.47)
LYMPHOCYTES NFR BLD AUTO: 7 % (ref 14–44)
MCH RBC QN AUTO: 31.4 PG (ref 26.8–34.3)
MCHC RBC AUTO-ENTMCNC: 32.9 G/DL (ref 31.4–37.4)
MCV RBC AUTO: 95 FL (ref 82–98)
MONOCYTES # BLD AUTO: 1.29 THOUSAND/ÂΜL (ref 0.17–1.22)
MONOCYTES NFR BLD AUTO: 7 % (ref 4–12)
NEUTROPHILS # BLD AUTO: 15.01 THOUSANDS/ÂΜL (ref 1.85–7.62)
NEUTS SEG NFR BLD AUTO: 84 % (ref 43–75)
NRBC BLD AUTO-RTO: 0 /100 WBCS
PLATELET # BLD AUTO: 166 THOUSANDS/UL (ref 149–390)
PMV BLD AUTO: 10.2 FL (ref 8.9–12.7)
POTASSIUM SERPL-SCNC: 4.3 MMOL/L (ref 3.5–5.3)
RBC # BLD AUTO: 2.58 MILLION/UL (ref 3.81–5.12)
SODIUM SERPL-SCNC: 137 MMOL/L (ref 135–147)
WBC # BLD AUTO: 17.85 THOUSAND/UL (ref 4.31–10.16)

## 2025-06-17 PROCEDURE — 99222 1ST HOSP IP/OBS MODERATE 55: CPT | Performed by: STUDENT IN AN ORGANIZED HEALTH CARE EDUCATION/TRAINING PROGRAM

## 2025-06-17 PROCEDURE — 87505 NFCT AGENT DETECTION GI: CPT

## 2025-06-17 PROCEDURE — 85025 COMPLETE CBC W/AUTO DIFF WBC: CPT | Performed by: NURSE PRACTITIONER

## 2025-06-17 PROCEDURE — 99232 SBSQ HOSP IP/OBS MODERATE 35: CPT | Performed by: INTERNAL MEDICINE

## 2025-06-17 PROCEDURE — 99232 SBSQ HOSP IP/OBS MODERATE 35: CPT | Performed by: NURSE PRACTITIONER

## 2025-06-17 PROCEDURE — 80048 BASIC METABOLIC PNL TOTAL CA: CPT | Performed by: NURSE PRACTITIONER

## 2025-06-17 RX ORDER — HYDROMORPHONE HCL IN WATER/PF 6 MG/30 ML
0.2 PATIENT CONTROLLED ANALGESIA SYRINGE INTRAVENOUS EVERY 4 HOURS PRN
Status: DISCONTINUED | OUTPATIENT
Start: 2025-06-17 | End: 2025-06-19 | Stop reason: HOSPADM

## 2025-06-17 RX ADMIN — Medication 2.5 MG: at 18:51

## 2025-06-17 RX ADMIN — HYDROMORPHONE HYDROCHLORIDE 0.5 MG: 1 INJECTION, SOLUTION INTRAMUSCULAR; INTRAVENOUS; SUBCUTANEOUS at 22:59

## 2025-06-17 RX ADMIN — LISINOPRIL 10 MG: 10 TABLET ORAL at 09:07

## 2025-06-17 RX ADMIN — ONDANSETRON 4 MG: 2 INJECTION INTRAMUSCULAR; INTRAVENOUS at 07:29

## 2025-06-17 RX ADMIN — TERAZOSIN HYDROCHLORIDE 2 MG: 1 CAPSULE ORAL at 22:58

## 2025-06-17 RX ADMIN — GENTAMICIN SULFATE: 1 CREAM TOPICAL at 09:07

## 2025-06-17 RX ADMIN — HYDROMORPHONE HYDROCHLORIDE 0.2 MG: 0.2 INJECTION, SOLUTION INTRAMUSCULAR; INTRAVENOUS; SUBCUTANEOUS at 09:07

## 2025-06-17 RX ADMIN — PIPERACILLIN AND TAZOBACTAM 4.5 G: 4; .5 INJECTION, POWDER, FOR SOLUTION INTRAVENOUS at 20:38

## 2025-06-17 RX ADMIN — ONDANSETRON 4 MG: 2 INJECTION INTRAMUSCULAR; INTRAVENOUS at 20:43

## 2025-06-17 RX ADMIN — PIPERACILLIN AND TAZOBACTAM 4.5 G: 4; .5 INJECTION, POWDER, FOR SOLUTION INTRAVENOUS at 06:14

## 2025-06-17 RX ADMIN — HEPARIN SODIUM: 1000 INJECTION, SOLUTION INTRAVENOUS; SUBCUTANEOUS at 23:00

## 2025-06-17 RX ADMIN — HEPARIN SODIUM 5000 UNITS: 5000 INJECTION INTRAVENOUS; SUBCUTANEOUS at 22:59

## 2025-06-17 RX ADMIN — HEPARIN SODIUM 5000 UNITS: 5000 INJECTION INTRAVENOUS; SUBCUTANEOUS at 05:00

## 2025-06-17 RX ADMIN — HYDROMORPHONE HYDROCHLORIDE 0.5 MG: 1 INJECTION, SOLUTION INTRAMUSCULAR; INTRAVENOUS; SUBCUTANEOUS at 11:35

## 2025-06-17 NOTE — ASSESSMENT & PLAN NOTE
Met sepsis criteria with , WBC 24.64, colitis vs diverticulitis on CT  Lactic acid 1.2  Blood cultures are in progress  Continue IV fluids and IV Zosyn   Monitor labs and vitals

## 2025-06-17 NOTE — ASSESSMENT & PLAN NOTE
In summary, this is a 55-year-old female with a pertinent medical history of ESRD on PD who presented with a 2-day history of progressive LLQ abdominal pain with diarrhea.  Tmax 100.5 otherwise hemodynamically stable.  Her initial labs showed leukocytosis WBC 24K, anemia with a hemoglobin of 9.2 (baseline 10).  CT abdomen and pelvis evidence of diffuse bowel wall thickening involving the descending to proximal/mid sigmoid colon with scattered diverticula in the same distribution.    Differential diagnoses include infectious colitis versus diverticulitis    Plan  - Recommend infectious stool workup including C. difficile and enteric panel  - Given her medical comorbidities, leukocytosis and low-grade fever of 100.5, would favor treating her uncomplicated diverticulitis with antibiotics.  While admitted continue with IV antibiotics once patient is able to tolerate oral intake, can transition to p.o. antibiotics for treatment duration of 5-7 days  - Continue with clear liquid diet for today as patient reporting persistent abdominal pain with nausea.  If she is improving, she can be advanced to a low residue regular diet tomorrow.  - Once able to be discharged, recommend follow-up with her gastroenterologist at [facility] to discuss repeat colonoscopy in 6 to 8 weeks once acute inflammatory process has resolved.  Alternatively, we can arrange for outpatient follow-up with [facility] gastro[facility]ology group as well.  - Monitor CBC daily.  - Management of peritoneal dialysis per nephrology service.

## 2025-06-17 NOTE — ASSESSMENT & PLAN NOTE
Presented for left-sided abdominal pain associated with nausea and diarrhea x 1 day   CT A/P 6/16/2025: Moderate segment diffuse bowel wall thickening and mild pericolonic fat stranding of the descending through proximal to mid sigmoid colon, could be on the basis of either a diverticulitis or a colitis  NPO except sips for now  Received 1 L of normal saline in the ER, hold off on additional fluids due to ESRD status pending nephrology consult  Continue IV Zosyn 4.5 g, Q12H renally dosed  Symptom control with IV hydromorphone and ondansetron as needed  Appreciate GI consultation

## 2025-06-17 NOTE — PROGRESS NOTES
Progress Note - Nephrology   Name: Saige Haji 55 y.o. female I MRN: [id number]  Unit/Bed#: ICU 03-01 I Date of Admission: 6/16/2025   Date of Service: 6/17/2025 I Hospital Day: 1     Assessment & Plan  ESRD (end stage renal disease) ([facility])  Lab Results   Component Value Date    EGFR 7 06/17/2025    EGFR 7 06/16/2025    EGFR 7 05/08/2025    CREATININE 5.77 (H) 06/17/2025    CREATININE 5.96 (H) 06/16/2025    CREATININE 5.75 (H) 05/08/2025   Continue with continuous cycler peritoneal dialysis, orders are in the chart.  Patient appears to be doing well from a volume standpoint at this time.  Will add heparin due to increased fibrous strands noted in the setting of an acute abdominal infection.  No other alteration to orders made today.  Continue with gentamicin cream daily.  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  Secondary hyperparathyroidism of renal origin ([facility])  Follow-up PTH in the outpatient setting, no activated vitamin D agent indicated at this time.  Kidney cyst, acquired  Potential follow-up in the outpatient setting, cyst appears to be proteinaceous at this time.  Essential hypertension  Blood pressures are acceptable at this time, continue with lisinopril 10 mg daily.  Acute left lower quadrant pain  Potentially diverticulitis or colitis, currently on appropriately dosed piperacillin/tazobactam at 4.5 g over 4 hours every 12 hours.  Follow-up cultures.  Sepsis ([facility])  Continue management as per hospitalist    Case discussed with hospitalist.  Continue with current care on cycler peritoneal dialysis      Acute left lower quadrant  "pain    Problem List[1]      Subjective:   Patient is feeling better, is performing peritoneal dialysis on cycler with no specific issues at this time.    Objective:     Vitals: Blood pressure 139/63, pulse 98, temperature 97.8 °F (36.6 °C), temperature source Tympanic, resp. rate 18, height 5' 6\" (1.676 m), weight 106 kg (232 lb 12.9 oz), SpO2 100%.,Body mass index is 37.58 kg/m².    Weight (last 2 days)       Date/Time Weight    06/17/25 0800 106 (232.81)    06/16/25 2016 106 (234.57)              Intake/Output Summary (Last 24 hours) at 6/17/2025 1002  Last data filed at 6/16/2025 2100  Gross per 24 hour   Intake 1150 ml   Output 100 ml   Net 1050 ml     I/O last 3 completed shifts:  In: 1150 [P.O.:50; IV Piggyback:1100]  Out: 100 [Urine:100]         Physical Exam: /63   Pulse 98   Temp 97.8 °F (36.6 °C) (Tympanic)   Resp 18   Ht 5' 6\" (1.676 m)   Wt 106 kg (232 lb 12.9 oz)   LMP  (LMP Unknown) Comment: menopause - one year ago  SpO2 100%   BMI 37.58 kg/m²     General Appearance:    Alert, cooperative, no distress, appears stated age   Head:    Normocephalic, without obvious abnormality, atraumatic   Eyes:    Conjunctiva/corneas clear   Ears:    Normal external ears   Nose:   Nares normal, septum midline, mucosa normal, no drainage    or sinus tenderness   Throat:   Lips, mucosa, and tongue normal; teeth and gums normal   Neck:   Supple, symmetrical, trachea midline, no adenopathy;        thyroid:  No enlargement/tenderness/nodules; no carotid    bruit or JVD   Back:     Symmetric, no curvature, ROM normal, no CVA tenderness   Lungs:     Clear to auscultation bilaterally, respirations unlabored   Chest wall:    No tenderness or deformity   Heart:    Regular rate and rhythm, S1 and S2 normal, no murmur, rub   or gallop   Abdomen:     Soft, non-tender, bowel sounds active   Extremities:   Extremities normal, atraumatic, no cyanosis or edema   Skin:   Skin color, texture, turgor normal, no rashes or " "lesions   Lymph nodes:   Cervical normal   Neurologic:   CNII-XII intact            Lab, Imaging and other studies: I have personally reviewed pertinent labs.  CBC:   Lab Results   Component Value Date    WBC 17.85 (H) 06/17/2025    HGB 8.1 (L) 06/17/2025    HCT 24.6 (L) 06/17/2025    MCV 95 06/17/2025     06/17/2025    RBC 2.58 (L) 06/17/2025    MCH 31.4 06/17/2025    MCHC 32.9 06/17/2025    RDW 13.5 06/17/2025    MPV 10.2 06/17/2025    NRBC 0 06/17/2025     CMP:   Lab Results   Component Value Date    K 4.3 06/17/2025     (H) 06/17/2025    CO2 19 (L) 06/17/2025    BUN 64 (H) 06/17/2025    CREATININE 5.77 (H) 06/17/2025    CALCIUM 8.6 06/17/2025    AST 11 (L) 06/16/2025    ALT 12 06/16/2025    ALKPHOS 45 06/16/2025    EGFR 7 06/17/2025       .  Results from last 7 days   Lab Units 06/17/25  0451 06/16/25  1135   POTASSIUM mmol/L 4.3 4.4   CHLORIDE mmol/L 109* 107   CO2 mmol/L 19* 18*   BUN mg/dL 64* 69*   CREATININE mg/dL 5.77* 5.96*   CALCIUM mg/dL 8.6 9.4   ALK PHOS U/L  --  45   ALT U/L  --  12   AST U/L  --  11*         Phosphorus: No results found for: \"PHOS\"  Magnesium: No results found for: \"MG\"  Urinalysis:   Lab Results   Component Value Date    COLORU Yellow 06/16/2025    CLARITYU Clear 06/16/2025    SPECGRAV 1.010 06/16/2025    PHUR 6.0 06/16/2025    LEUKOCYTESUR Trace (A) 06/16/2025    NITRITE Negative 06/16/2025    GLUCOSEU Negative 06/16/2025    KETONESU Negative 06/16/2025    BILIRUBINUR Negative 06/16/2025    BLOODU 1+ (A) 06/16/2025     Ionized Calcium: No results found for: \"CAION\"  Coagulation:   Lab Results   Component Value Date    INR 1.09 06/16/2025     Troponin: No results found for: \"TROPONINI\"  ABG: No results found for: \"PHART\", \"BMG0JPQ\", \"PO2ART\", \"SEZ7MVU\", \"A2RSGBPX\", \"BEART\", \"SOURCE\"  Radiology review:     IMAGING  Procedure: US kidney and bladder  Result Date: 6/17/2025  Narrative: RENAL ULTRASOUND INDICATION: Left kidney hyperdense lesion. COMPARISON: Renal ultrasound " 7/3/2023; CT guided renal biopsy 12/12/2023 TECHNIQUE: Ultrasound of the retroperitoneum was performed with a curvilinear transducer utilizing volumetric sweeps and still imaging techniques. FINDINGS: Technically limited study due to patient body habitus and positioning. KIDNEYS: Symmetric and normal size. Right kidney: 9.1 x 4.3 x 4.6 cm. Volume 95.1 mL Left kidney: 9.0 x 6.2 x 5.4 cm. Volume 157.0 mL Right kidney Normal echogenicity and contour. No mass is identified. No hydronephrosis. No shadowing calculi. No perinephric fluid collections. Left kidney Normal echogenicity and contour. 1.5 x 1.5 x 1.6 cm lateral interpolar cyst, questionably with fine low-level internal echoes. No internal flow noted. This cyst is noted on prior ultrasound 7/3/2023 and prior CT 12/12/2023, appearing roughly similar in size. High attenuation appearance on recent CT 6/16/2025 suggests that this represents a complex hemorrhagic and/or proteinaceous cyst. No hydronephrosis. No shadowing calculi. No perinephric fluid collections. URETERS: Nonvisualized. BLADDER: Normally distended. No focal thickening or mass lesions. Bilateral ureteral jets detected.     Impression: 1. 1.6 cm lateral interpolar left renal cyst again identified, not appreciably changed from prior studies dating back to 7/3/2023. No internal flow. Somewhat high attenuation appearance on recent CT suggests complex hemorrhagic/proteinaceous cyst. Workstation performed: [id number]     Procedure: CT abdomen pelvis wo contrast  Result Date: 6/16/2025  Narrative: CT ABDOMEN AND PELVIS WITHOUT IV CONTRAST INDICATION: llq pain. COMPARISON: 6/2/2020 CT of the abdomen and pelvis TECHNIQUE: CT examination of the abdomen and pelvis was performed without intravenous contrast. Multiplanar 2D reformatted images were created from the source data. This examination, like all CT scans performed in the [facility] include [facility] beds at the [facility], was performed utilizing techniques to minimize radiation  dose exposure, including the use of iterative reconstruction and automated exposure control. Radiation dose length product (DLP) for this visit: 1280.11 mGy-cm. Enteric Contrast: Not administered. FINDINGS: ABDOMEN LOWER CHEST: Extremely trace right pleural effusion. Subsegmental dependent atelectasis within both lung bases. LIVER/BILIARY TREE: Unremarkable. GALLBLADDER: Post cholecystectomy. SPLEEN: Unremarkable. PANCREAS: Unremarkable. ADRENAL GLANDS: Low density lobulated thickening of bilateral adrenal glands, statistically likely due to small adenomas or adenomatous hyperplasia. KIDNEYS/URETERS: Kidneys are mildly atrophic bilaterally, left more so than right. 1.5 cm axial exophytic hyperdense lesion arising from the lateral interpolar to upper pole of the left kidney measuring approximately 56 Hounsfield units is indeterminate but most likely reflects a proteinaceous cyst. No hydronephrosis. STOMACH AND BOWEL: Stomach is decompressed. Normal course and caliber of the small bowel loops. There is moderate segment diffuse bowel wall thickening and mild pericolonic fat stranding of the descending through proximal to mid sigmoid colon greatest within the distal descending colon and proximal sigmoid colon which could be on the basis of either a diverticulitis or a colitis, with several scattered diverticula in this same distribution. APPENDIX: No findings to suggest appendicitis. ABDOMINOPELVIC CAVITY: A peritoneal dialysis catheter loops in the lower anterior abdomen/pelvis just above the urinary bladder. Small volume free fluid within the lower pelvis. No pneumoperitoneum. No lymphadenopathy. VESSELS: Normal caliber of the abdominal aorta. Mild atherosclerosis. PELVIS REPRODUCTIVE ORGANS: Unremarkable for patient's age. URINARY BLADDER: Unremarkable. ABDOMINAL WALL/INGUINAL REGIONS: Unremarkable. BONES: No acute fracture or suspicious osseous lesion. Mild spinal degenerative changes.     Impression: 1.  Moderate  segment diffuse bowel wall thickening and mild pericolonic fat stranding of the descending through proximal to mid sigmoid colon greatest within the distal descending colon and proximal sigmoid colon could be on the basis of either a diverticulitis or a colitis. 2.  1.5 cm exophytic hyperdense lesion arising from the lateral interpolar to upper pole of the left kidney is indeterminate but most likely reflects a proteinaceous cyst. Recommend a nonemergent renal ultrasound for further evaluation. 3.  Chronic findings, detailed above. The study was marked in EPIC for immediate notification. Workstation performed: [id number]         Current Facility-Administered Medications:     gentamicin (GARAMYCIN) 0.1 % cream, Daily    heparin (porcine) subcutaneous injection 5,000 Units, Q8H GARY    HYDROmorphone (DILAUDID) injection 0.5 mg, Q4H PRN    HYDROmorphone HCl (DILAUDID) injection 0.2 mg, Q4H PRN    lisinopril (ZESTRIL) tablet 10 mg, Daily    ondansetron (ZOFRAN) injection 4 mg, Q8H PRN    piperacillin-tazobactam (ZOSYN) IVPB (EXTENDED INFUSION) 4.5 g, Q12H, Last Rate: 0 g (06/16/25 1830)    polyethylene glycol (MIRALAX) packet 17 g, Daily PRN    terazosin (HYTRIN) capsule 2 mg, HS  Medications Discontinued During This Encounter   Medication Reason    piperacillin-tazobactam (ZOSYN) IVPB 4.5 g     piperacillin-tazobactam (ZOSYN) IVPB (EXTENDED INFUSION) 4.5 g        Yrui Hennessy,       This progress note was produced in part using a dictation device which may document imprecise wording from author's original intent.           [1]   Patient Active Problem List  Diagnosis    Benign paroxysmal positional vertigo    Essential hypertension    Peripheral polyneuropathy    GERD without esophagitis    Mixed hyperlipidemia    Irritable bowel syndrome with diarrhea    Need for assessment for sleep apnea    Chest pain    Leukocytosis    Thoracic region somatic dysfunction    Rheumatoid arthritis involving multiple sites with  positive rheumatoid factor ([facility])    Iron deficiency anemia secondary to inadequate dietary iron intake    Acute left-sided low back pain with left-sided sciatica    COVID-19    Shortness of breath    Annual physical exam    Other microscopic hematuria    Proteinuria    Anemia    Hyperfiltration focal segmental glomerulosclerosis    Vitamin D deficiency    Bilateral leg edema    CKD (chronic kidney disease) stage 5, GFR less than 15 ml/min ([facility])    BMI 35.0-35.9,adult    ESRD (end stage renal disease) ([facility])    Anemia of chronic kidney failure, stage 5  ([facility])    Acidosis    Body mass index (BMI) 40.0-44.9, adult ([facility])    Orthostatic hypotension    Acute left lower quadrant pain    Sepsis ([facility])

## 2025-06-17 NOTE — ASSESSMENT & PLAN NOTE
Lab Results   Component Value Date    EGFR 7 06/17/2025    EGFR 7 06/16/2025    EGFR 7 05/08/2025    CREATININE 5.77 (H) 06/17/2025    CREATININE 5.96 (H) 06/16/2025    CREATININE 5.75 (H) 05/08/2025

## 2025-06-17 NOTE — CONSULTS
Consultation - Gastroenterology   Name: Saige Haji 55 y.o. female I MRN: [id number]  Unit/Bed#: ICU 03-01 I Date of Admission: 6/16/2025   Date of Service: 6/17/2025 I Hospital Day: 1       Inpatient consult to gastroenterology     Date/Time  6/17/2025 7:48 AM     Performed by  Carolin Azevedo MD   Authorized by  ADILENE Agarwal           Physician Requesting Evaluation: Mil Munguia, *   Reason for Evaluation / Principal Problem: Diverticulitis    Assessment & Plan  Acute left lower quadrant pain  In summary, this is a 55-year-old female with a pertinent medical history of ESRD on PD who presented with a 2-day history of progressive LLQ abdominal pain with diarrhea.  Tmax 100.5 otherwise hemodynamically stable.  Her initial labs showed leukocytosis WBC 24K, anemia with a hemoglobin of 9.2 (baseline 10).  CT abdomen and pelvis evidence of diffuse bowel wall thickening involving the descending to proximal/mid sigmoid colon with scattered diverticula in the same distribution.    Differential diagnoses include infectious colitis versus diverticulitis    Plan  - Recommend infectious stool workup including C. difficile and enteric panel  - Given her medical comorbidities, leukocytosis and low-grade fever of 100.5, would favor treating her uncomplicated diverticulitis with antibiotics.  While admitted continue with IV antibiotics once patient is able to tolerate oral intake, can transition to p.o. antibiotics for treatment duration of 5-7 days  - Continue with clear liquid diet for today as patient reporting persistent abdominal pain with nausea.  If she is improving, she can be advanced to a low residue regular diet tomorrow.  - Once able to be discharged, recommend follow-up with her gastroenterologist at [facility] to discuss repeat colonoscopy in 6 to 8 weeks once acute inflammatory process has resolved.  Alternatively, we can arrange for outpatient follow-up with [facility] gastroenterology  group as well.  - Monitor CBC daily.  - Management of peritoneal dialysis per nephrology service.      History of Present Illness   HPI:  Saige Haji is a 55 y.o. female with past medical history of ESRD on peritoneal dialysis who presented with left lower quadrant abdominal pain, nausea and diarrhea for the past 2 days.  Reports some subjective fevers and chills but otherwise denies hematemesis or significant vomiting.  Reports 2-3 episodes of loose stools daily.  Denies any recent medication changes, heavy NSAID use, recent travel, sick contact or dietary changes.  Denies any prior history of colitis or acute diverticulitis.  Underwent a colonoscopy with [facility] in December 2024 with findings of internal hemorrhoids and moderate sigmoid and descending colon diverticulosis     She does report a family history of colon cancer in her father who was diagnosed in his 70s.    Review of Systems  See HPI    Objective :  Temp:  [97.8 °F (36.6 °C)-100.5 °F (38.1 °C)] 97.8 °F (36.6 °C)  HR:  [] 90  BP: ()/(54-70) 112/65  Resp:  [18-20] 18  SpO2:  [93 %-100 %] 96 %  O2 Device: None (Room air)    Physical Exam  Vitals and nursing note reviewed.   Constitutional:       General: She is not in acute distress.     Appearance: She is well-developed.   HENT:      Head: Normocephalic and atraumatic.     Eyes:      Conjunctiva/sclera: Conjunctivae normal.       Cardiovascular:      Rate and Rhythm: Normal rate and regular rhythm.      Heart sounds: No murmur heard.  Pulmonary:      Effort: Pulmonary effort is normal. No respiratory distress.      Breath sounds: Normal breath sounds.   Abdominal:      Palpations: Abdomen is soft.      Tenderness: There is abdominal tenderness.      Comments: Left lower quadrant tenderness to deep palpation     Musculoskeletal:         General: No swelling.      Cervical back: Neck supple.     Skin:     General: Skin is warm and dry.      Capillary Refill: Capillary refill takes  less than 2 seconds.     Neurological:      Mental Status: She is alert.     Psychiatric:         Mood and Affect: Mood normal.           Lab Results: I have reviewed the following results:CBC/BMP:   .     06/17/25  0451   WBC 17.85*   HGB 8.1*   HCT 24.6*      SODIUM 137   K 4.3   *   CO2 19*   BUN 64*   CREATININE 5.77*   GLUC 122        Imaging Results Review: I reviewed radiology reports from this admission including: CT abdomen/pelvis.  Other Study Results Review: No additional pertinent studies reviewed.    Carolin Azevedo MD  Gastroenterology Fellow, PGY- 4  Available on EPIC Secure Chat  6/17/2025 2:21 PM

## 2025-06-17 NOTE — ASSESSMENT & PLAN NOTE
Lab Results   Component Value Date    EGFR 7 06/17/2025    EGFR 7 06/16/2025    EGFR 7 05/08/2025    CREATININE 5.77 (H) 06/17/2025    CREATININE 5.96 (H) 06/16/2025    CREATININE 5.75 (H) 05/08/2025     On PD 5 days/week, had an exchange last evening  Baseline Cr 3.9-5.7, GFR 8   Continue with home topical gentamicin for port care  Avoid nephrotoxic agents   Consult nephrology

## 2025-06-17 NOTE — PLAN OF CARE
Problem: PAIN - ADULT  Goal: Verbalizes/displays adequate comfort level or baseline comfort level  Description: Interventions:  - Encourage patient to monitor pain and request assistance  - Assess pain using appropriate pain scale  - Administer analgesics as ordered based on type and severity of pain and evaluate response  - Implement non-pharmacological measures as appropriate and evaluate response  - Consider cultural and social influences on pain and pain management  - Notify physician/advanced practitioner if interventions unsuccessful or patient reports new pain  - Educate patient/family on pain management process including their role and importance of  reporting pain   - Provide non-pharmacologic/complimentary pain relief interventions  Outcome: Progressing     Problem: INFECTION - ADULT  Goal: Absence or prevention of progression during hospitalization  Description: INTERVENTIONS:  - Assess and monitor for signs and symptoms of infection  - Monitor lab/diagnostic results  - Monitor all insertion sites, i.e. indwelling lines, tubes, and drains  - Monitor endotracheal if appropriate and nasal secretions for changes in amount and color  - [place] appropriate cooling/warming therapies per order  - Administer medications as ordered  - Instruct and encourage patient and family to use good hand hygiene technique  - Identify and instruct in appropriate isolation precautions for identified infection/condition  Outcome: Progressing  Goal: Absence of fever/infection during neutropenic period  Description: INTERVENTIONS:  - Monitor WBC  - Perform strict hand hygiene  - Limit to healthy visitors only  - No plants, dried, fresh or silk flowers with hairston in patient room  Outcome: Progressing     Problem: SAFETY ADULT  Goal: Patient will remain free of falls  Description: INTERVENTIONS:  - Educate patient/family on patient safety including physical limitations  - Instruct patient to call for assistance with activity   -  Consider consulting OT/PT to assist with strengthening/mobility based on AM PAC & JH-HLM score  - Consult OT/PT to assist with strengthening/mobility   - Keep Call bell within reach  - Keep bed low and locked with side rails adjusted as appropriate  - Keep care items and personal belongings within reach  - Initiate and maintain comfort rounds  - Make Fall Risk Sign visible to staff  - Offer Toileting every Hours, in advance of need  - Initiate/Maintain alarm  - Obtain necessary fall risk management equipment:   - Apply yellow socks and bracelet for high fall risk patients  - Consider moving patient to room near nurses station  Outcome: Progressing  Goal: Maintain or return to baseline ADL function  Description: INTERVENTIONS:  -  Assess patient's ability to carry out ADLs; assess patient's baseline for ADL function and identify physical deficits which impact ability to perform ADLs (bathing, care of mouth/teeth, toileting, grooming, dressing, etc.)  - Assess/evaluate cause of self-care deficits   - Assess range of motion  - Assess patient's mobility; develop plan if impaired  - Assess patient's need for assistive devices and provide as appropriate  - Encourage maximum independence but intervene and supervise when necessary  - Involve family in performance of ADLs  - Assess for home care needs following discharge   - Consider OT consult to assist with ADL evaluation and planning for discharge  - Provide patient education as appropriate  - Monitor functional capacity and physical performance, use of AM PAC & JH-HLM   - Monitor gait, balance and fatigue with ambulation    Outcome: Progressing  Goal: Maintains/Returns to pre admission functional level  Description: INTERVENTIONS:  - Perform AM-PAC 6 Click Basic Mobility/ Daily Activity assessment daily.  - Set and communicate daily mobility goal to care team and patient/family/caregiver.   - Collaborate with rehabilitation services on mobility goals if consulted  -  Perform Range of Motion  times a day.  - Reposition patient every  hours.  - Dangle patient  times a day  - Stand patient  times a day  - Ambulate patient  times a day  - Out of bed to chair  times a day   - Out of bed for meals  times a day  - Out of bed for toileting  - Record patient progress and toleration of activity level   Outcome: Progressing     Problem: DISCHARGE PLANNING  Goal: Discharge to home or other facility with appropriate resources  Description: INTERVENTIONS:  - Identify barriers to discharge w/patient and caregiver  - Arrange for needed discharge resources and transportation as appropriate  - Identify discharge learning needs (meds, wound care, etc.)  - Arrange for interpretive services to assist at discharge as needed  - Refer to Case Management Department for coordinating discharge planning if the patient needs post-hospital services based on physician/advanced practitioner order or complex needs related to functional status, cognitive ability, or social support system  Outcome: Progressing     Problem: Knowledge Deficit  Goal: Patient/family/caregiver demonstrates understanding of disease process, treatment plan, medications, and discharge instructions  Description: Complete learning assessment and assess knowledge base.  Interventions:  - Provide teaching at level of understanding  - Provide teaching via preferred learning methods  Outcome: Progressing     Problem: Nutrition/Hydration-ADULT  Goal: Nutrient/Hydration intake appropriate for improving, restoring or maintaining nutritional needs  Description: Monitor and assess patient's nutrition/hydration status for malnutrition. Collaborate with interdisciplinary team and initiate plan and interventions as ordered.  Monitor patient's weight and dietary intake as ordered or per policy. Utilize nutrition screening tool and intervene as necessary. Determine patient's food preferences and provide high-protein, high-caloric foods as appropriate.      INTERVENTIONS:  - Monitor oral intake, urinary output, labs, and treatment plans  - Assess nutrition and hydration status and recommend course of action  - Evaluate amount of meals eaten  - Assist patient with eating if necessary   - Allow adequate time for meals  - Recommend/ encourage appropriate diets, oral nutritional supplements, and vitamin/mineral supplements  - Order, calculate, and assess calorie counts as needed  - Recommend, monitor, and adjust tube feedings and TPN/PPN based on assessed needs  - Assess need for intravenous fluids  - Provide specific nutrition/hydration education as appropriate  - Include patient/family/caregiver in decisions related to nutrition  Outcome: Progressing     Problem: CARDIOVASCULAR - ADULT  Goal: Maintains optimal cardiac output and hemodynamic stability  Description: INTERVENTIONS:  - Monitor I/O, vital signs and rhythm  - Monitor for S/S and trends of decreased cardiac output  - Administer and titrate ordered vasoactive medications to optimize hemodynamic stability  - Assess quality of pulses, skin color and temperature  - Assess for signs of decreased coronary artery perfusion  - Instruct patient to report change in severity of symptoms  Outcome: Progressing  Goal: Absence of cardiac dysrhythmias or at baseline rhythm  Description: INTERVENTIONS:  - Continuous cardiac monitoring, vital signs, obtain 12 lead EKG if ordered  - Administer antiarrhythmic and heart rate control medications as ordered  - Monitor electrolytes and administer replacement therapy as ordered  Outcome: Progressing     Problem: GASTROINTESTINAL - ADULT  Goal: Minimal or absence of nausea and/or vomiting  Description: INTERVENTIONS:  - Administer IV fluids if ordered to ensure adequate hydration  - Maintain NPO status until nausea and vomiting are resolved  - Nasogastric tube if ordered  - Administer ordered antiemetic medications as needed  - Provide nonpharmacologic comfort measures as  appropriate  - Advance diet as tolerated, if ordered  - Consider nutrition services referral to assist patient with adequate nutrition and appropriate food choices  Outcome: Progressing  Goal: Maintains or returns to baseline bowel function  Description: INTERVENTIONS:  - Assess bowel function  - Encourage oral fluids to ensure adequate hydration  - Administer IV fluids if ordered to ensure adequate hydration  - Administer ordered medications as needed  - Encourage mobilization and activity  - Consider nutritional services referral to assist patient with adequate nutrition and appropriate food choices  Outcome: Progressing  Goal: Maintains adequate nutritional intake  Description: INTERVENTIONS:  - Monitor percentage of each meal consumed  - Identify factors contributing to decreased intake, treat as appropriate  - Assist with meals as needed  - Monitor I&O, weight, and lab values if indicated  - Obtain nutrition services referral as needed  Outcome: Progressing  Goal: Establish and maintain optimal ostomy function  Description: INTERVENTIONS:  - Assess bowel function  - Encourage oral fluids to ensure adequate hydration  - Administer IV fluids if ordered to ensure adequate hydration   - Administer ordered medications as needed  - Encourage mobilization and activity  - Nutrition services referral to assist patient with appropriate food choices  - Assess stoma site  - Consider wound care consult   Outcome: Progressing  Goal: Oral mucous membranes remain intact  Description: INTERVENTIONS  - Assess oral mucosa and hygiene practices  - Implement preventative oral hygiene regimen  - Implement oral medicated treatments as ordered  - Initiate Nutrition services referral as needed  Outcome: Progressing     Problem: GENITOURINARY - ADULT  Goal: Maintains or returns to baseline urinary function  Description: INTERVENTIONS:  - Assess urinary function  - Encourage oral fluids to ensure adequate hydration if ordered  -  Administer IV fluids as ordered to ensure adequate hydration  - Administer ordered medications as needed  - Offer frequent toileting  - Follow urinary retention protocol if ordered  Outcome: Progressing  Goal: Absence of urinary retention  Description: INTERVENTIONS:  - Assess patient’s ability to void and empty bladder  - Monitor I/O  - Bladder scan as needed  - Discuss with physician/AP medications to alleviate retention as needed  - Discuss catheterization for long term situations as appropriate  Outcome: Progressing  Goal: Urinary catheter remains patent  Description: INTERVENTIONS:  - Assess patency of urinary catheter  - If patient has a chronic spicer, consider changing catheter if non-functioning  - Follow guidelines for intermittent irrigation of non-functioning urinary catheter  Outcome: Progressing     Problem: METABOLIC, FLUID AND ELECTROLYTES - ADULT  Goal: Electrolytes maintained within normal limits  Description: INTERVENTIONS:  - Monitor labs and assess patient for signs and symptoms of electrolyte imbalances  - Administer electrolyte replacement as ordered  - Monitor response to electrolyte replacements, including repeat lab results as appropriate  - Instruct patient on fluid and nutrition as appropriate  Outcome: Progressing  Goal: Fluid balance maintained  Description: INTERVENTIONS:  - Monitor labs   - Monitor I/O and WT  - Instruct patient on fluid and nutrition as appropriate  - Assess for signs & symptoms of volume excess or deficit  Outcome: Progressing  Goal: Glucose maintained within target range  Description: INTERVENTIONS:  - Monitor Blood Glucose as ordered  - Assess for signs and symptoms of hyperglycemia and hypoglycemia  - Administer ordered medications to maintain glucose within target range  - Assess nutritional intake and initiate nutrition service referral as needed  Outcome: Progressing

## 2025-06-17 NOTE — PROGRESS NOTES
Progress Note - Hospitalist   Name: Saige Haji 55 y.o. female I MRN: [id number]  Unit/Bed#: ICU 03-01 I Date of Admission: 6/16/2025   Date of Service: 6/17/2025 I Hospital Day: 1    Assessment & Plan  Acute left lower quadrant pain  Presented for left-sided abdominal pain associated with nausea and diarrhea x 1 day   CT A/P 6/16/2025: Moderate segment diffuse bowel wall thickening and mild pericolonic fat stranding of the descending through proximal to mid sigmoid colon, could be on the basis of either a diverticulitis or a colitis  NPO except sips for now  Received 1 L of normal saline in the ER, hold off on additional fluids due to ESRD status pending nephrology consult  Continue IV Zosyn 4.5 g, Q12H renally dosed  Symptom control with IV hydromorphone and ondansetron as needed  Appreciate GI consultation  Sepsis ([facility])  Met sepsis criteria with , WBC 24.64, colitis vs diverticulitis on CT  Lactic acid 1.2  Blood cultures are in progress  Continue IV fluids and IV Zosyn   Monitor labs and vitals   ESRD (end stage renal disease) (Formerly [facility])  Lab Results   Component Value Date    EGFR 7 06/17/2025    EGFR 7 06/16/2025    EGFR 7 05/08/2025    CREATININE 5.77 (H) 06/17/2025    CREATININE 5.96 (H) 06/16/2025    CREATININE 5.75 (H) 05/08/2025     On PD 5 days/week, had an exchange last evening  Baseline Cr 3.9-5.7, GFR 8   Continue with home topical gentamicin for port care  Avoid nephrotoxic agents   Consult nephrology  Essential hypertension  BP well-controlled upon presentation   Continue home lisinopril 10 mg PO daily and terazosin 2 mg PO HS   Monitor BP and adjust medication as needed  Mixed hyperlipidemia  Home regimen: Crestor 10 mg PO daily   Hold statin while NPO, resume when appropriate  Vitamin D deficiency  Patient takes oral Vit D supplement at home  Continue with supplementation upon discharge     VTE Pharmacologic Prophylaxis: VTE Score: 3 Moderate Risk (Score 3-4) - Pharmacological DVT Prophylaxis  Ordered: heparin.    Mobility:   Basic Mobility Inpatient Raw Score: 18  JH-HLM Goal: 6: Walk 10 steps or more  JH-HLM Achieved: 6: Walk 10 steps or more  JH-HLM Goal achieved. Continue to encourage appropriate mobility.    Patient Centered Rounds: I performed bedside rounds with nursing staff today.   Discussions with Specialists or Other Care Team Provider: Gastroenterology, nephrology    Education and Discussions with Family / Patient: Updated  (daughter) at bedside.    Current Length of Stay: 1 day(s)  Current Patient Status: Inpatient   Certification Statement: The patient will continue to require additional inpatient hospital stay due to abdominal pain,sepsis, care coordination.  Discharge Plan: Anticipate discharge in 24-48 hrs to home.    Code Status: Level 1 - Full Code    Subjective   Evaluated at bedside.  She said she is feeling little better.  The IV Dilaudid is helping, but does not last long.  She is taking small amounts of liquid diet.  She says she has to go slow or she becomes nauseated.     Objective :  Temp:  [97 °F (36.1 °C)-100.5 °F (38.1 °C)] 97 °F (36.1 °C)  HR:  [] 85  BP: ()/(54-70) 127/66  Resp:  [16-20] 16  SpO2:  [93 %-100 %] 100 %  O2 Device: None (Room air)    Body mass index is 37.58 kg/m².     Input and Output Summary (last 24 hours):     Intake/Output Summary (Last 24 hours) at 6/17/2025 1254  Last data filed at 6/16/2025 2100  Gross per 24 hour   Intake 1150 ml   Output 100 ml   Net 1050 ml       Physical Exam  Vitals and nursing note reviewed.   HENT:      Head: Normocephalic and atraumatic.      Nose: Nose normal.      Mouth/Throat:      Mouth: Mucous membranes are moist.      Pharynx: Oropharynx is clear.     Eyes:      Pupils: Pupils are equal, round, and reactive to light.       Cardiovascular:      Rate and Rhythm: Normal rate and regular rhythm.      Pulses: Normal pulses.      Heart sounds: Normal heart sounds.   Pulmonary:      Effort: Pulmonary  effort is normal. No respiratory distress.      Breath sounds: Normal breath sounds.   Abdominal:      General: Bowel sounds are normal.      Palpations: Abdomen is soft.      Tenderness: There is abdominal tenderness.     Musculoskeletal:      Cervical back: Neck supple.      Right lower leg: No edema.      Left lower leg: No edema.     Skin:     General: Skin is warm and dry.      Capillary Refill: Capillary refill takes less than 2 seconds.     Neurological:      General: No focal deficit present.      Mental Status: She is alert and oriented to person, place, and time.           Lines/Drains:              Lab Results: I have reviewed the following results:   Results from last 7 days   Lab Units 06/17/25  0451   WBC Thousand/uL 17.85*   HEMOGLOBIN g/dL 8.1*   HEMATOCRIT % 24.6*   PLATELETS Thousands/uL 166   SEGS PCT % 84*   LYMPHO PCT % 7*   MONO PCT % 7   EOS PCT % 1     Results from last 7 days   Lab Units 06/17/25  0451 06/16/25  1135   SODIUM mmol/L 137 136   POTASSIUM mmol/L 4.3 4.4   CHLORIDE mmol/L 109* 107   CO2 mmol/L 19* 18*   BUN mg/dL 64* 69*   CREATININE mg/dL 5.77* 5.96*   ANION GAP mmol/L 9 11   CALCIUM mg/dL 8.6 9.4   ALBUMIN g/dL  --  4.3   TOTAL BILIRUBIN mg/dL  --  0.56   ALK PHOS U/L  --  45   ALT U/L  --  12   AST U/L  --  11*   GLUCOSE RANDOM mg/dL 122 117     Results from last 7 days   Lab Units 06/16/25  1135   INR  1.09             Results from last 7 days   Lab Units 06/16/25  1135   LACTIC ACID mmol/L 1.2   PROCALCITONIN ng/ml 1.77*       Recent Cultures (last 7 days):   Results from last 7 days   Lab Units 06/16/25  1135   BLOOD CULTURE  Received in Microbiology Lab. Culture in Progress.  Received in Microbiology Lab. Culture in Progress.             Last 24 Hours Medication List:     Current Facility-Administered Medications:     gentamicin (GARAMYCIN) 0.1 % cream, Daily    heparin (porcine) subcutaneous injection 5,000 Units, Q8H GARY    HYDROmorphone (DILAUDID) injection 0.5 mg,  Q4H PRN    HYDROmorphone HCl (DILAUDID) injection 0.2 mg, Q4H PRN    lisinopril (ZESTRIL) tablet 10 mg, Daily    ondansetron (ZOFRAN) injection 4 mg, Q8H PRN    piperacillin-tazobactam (ZOSYN) IVPB (EXTENDED INFUSION) 4.5 g, Q12H, Last Rate: 0 g (06/16/25 1830)    polyethylene glycol (MIRALAX) packet 17 g, Daily PRN    terazosin (HYTRIN) capsule 2 mg, HS    Administrative Statements   Today, Patient Was Seen By: ADILENE Agarwal      **Please Note: This note may have been constructed using a voice recognition system.**

## 2025-06-17 NOTE — CASE MANAGEMENT
Case Management Assessment & Discharge Planning Note    Patient name Saige Haji  Location ICU 03/ICU 03- MRN [id number]  : 1970 Date 2025       Current Admission Date: 2025  Current Admission Diagnosis:Acute left lower quadrant pain   Patient Active Problem List    Diagnosis Date Noted    Acute left lower quadrant pain 2025    Sepsis ([facility]) 2025    Body mass index (BMI) 40.0-44.9, adult ([facility]) 2025    Orthostatic hypotension 2025    Anemia of chronic kidney failure, stage 5  ([facility]) 2024    ESRD (end stage renal disease) ([facility]) 2024    Acidosis 03/15/2024    CKD (chronic kidney disease) stage 5, GFR less than 15 ml/min ([facility]) 2023    BMI 35.0-35.9,adult 2023    Bilateral leg edema 2023    Hyperfiltration focal segmental glomerulosclerosis 2023    Vitamin D deficiency 2023    Other microscopic hematuria 2023    Proteinuria 2023    Anemia 2023    Annual physical exam 06/10/2022    COVID-19 2022    Shortness of breath 2022    Acute left-sided low back pain with left-sided sciatica 2021    Rheumatoid arthritis involving multiple sites with positive rheumatoid factor ([facility]) 2020    Iron deficiency anemia secondary to inadequate dietary iron intake 2020    Thoracic region somatic dysfunction 01/10/2020    Chest pain 2020    Leukocytosis 2020    Irritable bowel syndrome with diarrhea 2019    Need for assessment for sleep apnea 2019    Benign paroxysmal positional vertigo 2019    Essential hypertension 2019    Peripheral polyneuropathy 2019    GERD without esophagitis 2019    Mixed hyperlipidemia 2019      LOS (days): 1  Geometric Mean LOS (GMLOS) (days): 4.9  Days to GMLOS:3.8     OBJECTIVE:    Risk of Unplanned Readmission Score: 14.7     Current admission status: Inpatient    Preferred Pharmacy:   [id number] - [place], [place]  - [address]  [facility] [place] [address]  Phone: [phone] Fax: [phone]    [facility] - [place], [place] - [address]  [address] [address]  Phone: [phone] Fax: [phone]    [address] [place] - [address]  [address] [address]  Phone: [phone] Fax: [phone]    Primary Care Provider: Cirilo Fowler DO    Primary Insurance: MEDICARE  Secondary Insurance: [employer]    ASSESSMENT:  Active Health Care Proxies    There are no active Health Care Proxies on file.       Advance Directives  Does patient have a Health Care POA?: No  Was patient offered paperwork?: Yes (Declined)  Does patient currently have a Health Care decision maker?: Yes, please see Health Care Proxy section  Does patient have Advance Directives?: No  Was patient offered paperwork?: Yes (declined)  Primary Contact: Chandra Haji spouse    Readmission Root Cause  30 Day Readmission: No    Patient Information  Admitted from:: Home  Mental Status: Alert  During Assessment patient was accompanied by: Not accompanied during assessment  Assessment information provided by:: Patient  Primary Caregiver: Self  Caregiver's Relationship to Patient:: Family Member  Caregiver's Telephone Number:: [phone]  Support Systems: Spouse/significant other  County of Residence: [place]  What [place] you live in[place] [facility]  Home entry access options. Select all that apply.: Ramp  Type of Current Residence: [facility]  Living Arrangements: Lives w/ Spouse/significant other  Is patient a ?: No    Activities of Daily Living Prior to Admission  Functional Status: Independent  Completes ADLs independently?: Yes  Ambulates independently?: Yes  Does patient use assisted devices?: No  Does patient currently own DME?: Yes  What DME does the patient currently own?: Other (Comment) (Peritoneal dialysis  supplies)  Does patient have a history of Outpatient Therapy (PT/OT)?: No  Does the patient have a history of Short-Term Rehab?: No  Does patient have a history of HHC?: No  Does patient currently have HHC?: No         Patient Information Continued  Income Source: Employed  Does patient have prescription coverage?: Yes  Can the patient afford their medications and any related supplies (such as glucometers or test strips)?: Yes  Does patient receive dialysis treatments?: Yes (Home peritoneal dialysis, [facility])  Does patient have a history of substance abuse?: No  Does patient have a history of Mental Health Diagnosis?: No    Means of Transportation  Means of Transport to Appts:: Drives Self    DISCHARGE DETAILS:    Discharge planning discussed with:: Pt     Contacts  Patient Contacts: Chandra Haji spouse  Relationship to Patient:: Family  Contact Method: Phone  Phone Number: [phone]  Reason/Outcome: Emergency Contact    Treatment Team Recommendation: Home  Expected Discharge Disposition: Home or Self Care  Additional Discharge Dispositions: Home or Self Care  Transport at Discharge : Family  Pt was IPA, drives and does own home peritoneal dialysis.  Continues to work.  Will follow for any care needs.  Daughter will transport home as spouse just had surgery.

## 2025-06-17 NOTE — ASSESSMENT & PLAN NOTE
Lab Results   Component Value Date    EGFR 7 06/17/2025    EGFR 7 06/16/2025    EGFR 7 05/08/2025    CREATININE 5.77 (H) 06/17/2025    CREATININE 5.96 (H) 06/16/2025    CREATININE 5.75 (H) 05/08/2025   Continue with continuous cycler peritoneal dialysis, orders are in the chart.  Patient appears to be doing well from a volume standpoint at this time.  Will add heparin due to increased fibrous strands noted in the setting of an acute abdominal infection.  No other alteration to orders made today.  Continue with gentamicin cream daily.

## 2025-06-17 NOTE — NURSING NOTE
2255:   PD cycler treatment initiated.    [id number]:  Primary RN at bedside. PD cycler alarming. UF still negative. Educator in room assisting with cycler. RN switching to manual drain to get positive UF. Will continue to adjust cycler.

## 2025-06-17 NOTE — PLAN OF CARE
Problem: INFECTION - ADULT  Goal: Absence or prevention of progression during hospitalization  Description: INTERVENTIONS:  - Assess and monitor for signs and symptoms of infection  - Monitor lab/diagnostic results  - Monitor all insertion sites, i.e. indwelling lines, tubes, and drains  - Monitor endotracheal if appropriate and nasal secretions for changes in amount and color  - [place] appropriate cooling/warming therapies per order  - Administer medications as ordered  - Instruct and encourage patient and family to use good hand hygiene technique  - Identify and instruct in appropriate isolation precautions for identified infection/condition  Outcome: Progressing  Goal: Absence of fever/infection during neutropenic period  Description: INTERVENTIONS:  - Monitor WBC  - Perform strict hand hygiene  - Limit to healthy visitors only  - No plants, dried, fresh or silk flowers with hairston in patient room  Outcome: Progressing     Problem: SAFETY ADULT  Goal: Patient will remain free of falls  Description: INTERVENTIONS:  - Educate patient/family on patient safety including physical limitations  - Instruct patient to call for assistance with activity   - Consider consulting OT/PT to assist with strengthening/mobility based on AM PAC & JH-HLM score  - Consult OT/PT to assist with strengthening/mobility   - Keep Call bell within reach  - Keep bed low and locked with side rails adjusted as appropriate  - Keep care items and personal belongings within reach  - Initiate and maintain comfort rounds  - Make Fall Risk Sign visible to staff  - Offer Toileting every Hours, in advance of need  - Initiate/Maintain alarm  - Obtain necessary fall risk management equipment:   - Apply yellow socks and bracelet for high fall risk patients  - Consider moving patient to room near nurses station  Outcome: Progressing  Goal: Maintain or return to baseline ADL function  Description: INTERVENTIONS:  -  Assess patient's ability to carry out  ADLs; assess patient's baseline for ADL function and identify physical deficits which impact ability to perform ADLs (bathing, care of mouth/teeth, toileting, grooming, dressing, etc.)  - Assess/evaluate cause of self-care deficits   - Assess range of motion  - Assess patient's mobility; develop plan if impaired  - Assess patient's need for assistive devices and provide as appropriate  - Encourage maximum independence but intervene and supervise when necessary  - Involve family in performance of ADLs  - Assess for home care needs following discharge   - Consider OT consult to assist with ADL evaluation and planning for discharge  - Provide patient education as appropriate  - Monitor functional capacity and physical performance, use of AM PAC & JH-HLM   - Monitor gait, balance and fatigue with ambulation    Outcome: Progressing  Goal: Maintains/Returns to pre admission functional level  Description: INTERVENTIONS:  - Perform AM-PAC 6 Click Basic Mobility/ Daily Activity assessment daily.  - Set and communicate daily mobility goal to care team and patient/family/caregiver.   - Collaborate with rehabilitation services on mobility goals if consulted  - Perform Range of Motion  times a day.  - Reposition patient every  hours.  - Dangle patient  times a day  - Stand patient  times a day  - Ambulate patient  times a day  - Out of bed to chair  times a day   - Out of bed for meal times a day  - Out of bed for toileting  - Record patient progress and toleration of activity level   Outcome: Progressing     Problem: Knowledge Deficit  Goal: Patient/family/caregiver demonstrates understanding of disease process, treatment plan, medications, and discharge instructions  Description: Complete learning assessment and assess knowledge base.  Interventions:  - Provide teaching at level of understanding  - Provide teaching via preferred learning methods  Outcome: Progressing     Problem: Nutrition/Hydration-ADULT  Goal:  Nutrient/Hydration intake appropriate for improving, restoring or maintaining nutritional needs  Description: Monitor and assess patient's nutrition/hydration status for malnutrition. Collaborate with interdisciplinary team and initiate plan and interventions as ordered.  Monitor patient's weight and dietary intake as ordered or per policy. Utilize nutrition screening tool and intervene as necessary. Determine patient's food preferences and provide high-protein, high-caloric foods as appropriate.     INTERVENTIONS:  - Monitor oral intake, urinary output, labs, and treatment plans  - Assess nutrition and hydration status and recommend course of action  - Evaluate amount of meals eaten  - Assist patient with eating if necessary   - Allow adequate time for meals  - Recommend/ encourage appropriate diets, oral nutritional supplements, and vitamin/mineral supplements  - Order, calculate, and assess calorie counts as needed  - Recommend, monitor, and adjust tube feedings and TPN/PPN based on assessed needs  - Assess need for intravenous fluids  - Provide specific nutrition/hydration education as appropriate  - Include patient/family/caregiver in decisions related to nutrition  Outcome: Progressing     Problem: CARDIOVASCULAR - ADULT  Goal: Maintains optimal cardiac output and hemodynamic stability  Description: INTERVENTIONS:  - Monitor I/O, vital signs and rhythm  - Monitor for S/S and trends of decreased cardiac output  - Administer and titrate ordered vasoactive medications to optimize hemodynamic stability  - Assess quality of pulses, skin color and temperature  - Assess for signs of decreased coronary artery perfusion  - Instruct patient to report change in severity of symptoms  Outcome: Progressing  Goal: Absence of cardiac dysrhythmias or at baseline rhythm  Description: INTERVENTIONS:  - Continuous cardiac monitoring, vital signs, obtain 12 lead EKG if ordered  - Administer antiarrhythmic and heart rate control  medications as ordered  - Monitor electrolytes and administer replacement therapy as ordered  Outcome: Progressing     Problem: GASTROINTESTINAL - ADULT  Goal: Minimal or absence of nausea and/or vomiting  Description: INTERVENTIONS:  - Administer IV fluids if ordered to ensure adequate hydration  - Maintain NPO status until nausea and vomiting are resolved  - Nasogastric tube if ordered  - Administer ordered antiemetic medications as needed  - Provide nonpharmacologic comfort measures as appropriate  - Advance diet as tolerated, if ordered  - Consider nutrition services referral to assist patient with adequate nutrition and appropriate food choices  Outcome: Progressing  Goal: Maintains or returns to baseline bowel function  Description: INTERVENTIONS:  - Assess bowel function  - Encourage oral fluids to ensure adequate hydration  - Administer IV fluids if ordered to ensure adequate hydration  - Administer ordered medications as needed  - Encourage mobilization and activity  - Consider nutritional services referral to assist patient with adequate nutrition and appropriate food choices  Outcome: Progressing  Goal: Maintains adequate nutritional intake  Description: INTERVENTIONS:  - Monitor percentage of each meal consumed  - Identify factors contributing to decreased intake, treat as appropriate  - Assist with meals as needed  - Monitor I&O, weight, and lab values if indicated  - Obtain nutrition services referral as needed  Outcome: Progressing  Goal: Establish and maintain optimal ostomy function  Description: INTERVENTIONS:  - Assess bowel function  - Encourage oral fluids to ensure adequate hydration  - Administer IV fluids if ordered to ensure adequate hydration   - Administer ordered medications as needed  - Encourage mobilization and activity  - Nutrition services referral to assist patient with appropriate food choices  - Assess stoma site  - Consider wound care consult   Outcome: Progressing  Goal: Oral  mucous membranes remain intact  Description: INTERVENTIONS  - Assess oral mucosa and hygiene practices  - Implement preventative oral hygiene regimen  - Implement oral medicated treatments as ordered  - Initiate Nutrition services referral as needed  Outcome: Progressing     Problem: GENITOURINARY - ADULT  Goal: Maintains or returns to baseline urinary function  Description: INTERVENTIONS:  - Assess urinary function  - Encourage oral fluids to ensure adequate hydration if ordered  - Administer IV fluids as ordered to ensure adequate hydration  - Administer ordered medications as needed  - Offer frequent toileting  - Follow urinary retention protocol if ordered  Outcome: Progressing  Goal: Absence of urinary retention  Description: INTERVENTIONS:  - Assess patient’s ability to void and empty bladder  - Monitor I/O  - Bladder scan as needed  - Discuss with physician/AP medications to alleviate retention as needed  - Discuss catheterization for long term situations as appropriate  Outcome: Progressing  Goal: Urinary catheter remains patent  Description: INTERVENTIONS:  - Assess patency of urinary catheter  - If patient has a chronic spicer, consider changing catheter if non-functioning  - Follow guidelines for intermittent irrigation of non-functioning urinary catheter  Outcome: Progressing     Problem: METABOLIC, FLUID AND ELECTROLYTES - ADULT  Goal: Electrolytes maintained within normal limits  Description: INTERVENTIONS:  - Monitor labs and assess patient for signs and symptoms of electrolyte imbalances  - Administer electrolyte replacement as ordered  - Monitor response to electrolyte replacements, including repeat lab results as appropriate  - Instruct patient on fluid and nutrition as appropriate  Outcome: Progressing  Goal: Fluid balance maintained  Description: INTERVENTIONS:  - Monitor labs   - Monitor I/O and WT  - Instruct patient on fluid and nutrition as appropriate  - Assess for signs & symptoms of volume  excess or deficit  Outcome: Progressing  Goal: Glucose maintained within target range  Description: INTERVENTIONS:  - Monitor Blood Glucose as ordered  - Assess for signs and symptoms of hyperglycemia and hypoglycemia  - Administer ordered medications to maintain glucose within target range  - Assess nutritional intake and initiate nutrition service referral as needed  Outcome: Progressing

## 2025-06-17 NOTE — ASSESSMENT & PLAN NOTE
Potentially diverticulitis or colitis, currently on appropriately dosed piperacillin/tazobactam at 4.5 g over 4 hours every 12 hours.  Follow-up cultures.

## 2025-06-17 NOTE — ASSESSMENT & PLAN NOTE
Lab Results   Component Value Date    EGFR 7 06/17/2025    EGFR 7 06/16/2025    EGFR 7 05/08/2025    CREATININE 5.77 (H) 06/17/2025    CREATININE 5.96 (H) 06/16/2025    CREATININE 5.75 (H) 05/08/2025   Phosphorus levels appropriate, continue to monitor from time to time, add binders as indicated.

## 2025-06-18 LAB
ANION GAP SERPL CALCULATED.3IONS-SCNC: 11 MMOL/L (ref 4–13)
BASOPHILS # BLD AUTO: 0.03 THOUSANDS/ÂΜL (ref 0–0.1)
BASOPHILS NFR BLD AUTO: 0 % (ref 0–1)
BUN SERPL-MCNC: 63 MG/DL (ref 5–25)
C COLI+JEJUNI TUF STL QL NAA+PROBE: NEGATIVE
C DIFF TOX GENS STL QL NAA+PROBE: NEGATIVE
CALCIUM SERPL-MCNC: 8.4 MG/DL (ref 8.4–10.2)
CHLORIDE SERPL-SCNC: 108 MMOL/L (ref 96–108)
CO2 SERPL-SCNC: 18 MMOL/L (ref 21–32)
CREAT SERPL-MCNC: 6.18 MG/DL (ref 0.6–1.3)
EC STX1+STX2 GENES STL QL NAA+PROBE: NEGATIVE
EOSINOPHIL # BLD AUTO: 0.19 THOUSAND/ÂΜL (ref 0–0.61)
EOSINOPHIL NFR BLD AUTO: 1 % (ref 0–6)
ERYTHROCYTE [DISTWIDTH] IN BLOOD BY AUTOMATED COUNT: 13.3 % (ref 11.6–15.1)
GFR SERPL CREATININE-BSD FRML MDRD: 7 ML/MIN/1.73SQ M
GLUCOSE SERPL-MCNC: 115 MG/DL (ref 65–140)
HCT VFR BLD AUTO: 22.8 % (ref 34.8–46.1)
HGB BLD-MCNC: 7.4 G/DL (ref 11.5–15.4)
IMM GRANULOCYTES # BLD AUTO: 0.08 THOUSAND/UL (ref 0–0.2)
IMM GRANULOCYTES NFR BLD AUTO: 1 % (ref 0–2)
LYMPHOCYTES # BLD AUTO: 1.39 THOUSANDS/ÂΜL (ref 0.6–4.47)
LYMPHOCYTES NFR BLD AUTO: 10 % (ref 14–44)
MCH RBC QN AUTO: 31.4 PG (ref 26.8–34.3)
MCHC RBC AUTO-ENTMCNC: 32.5 G/DL (ref 31.4–37.4)
MCV RBC AUTO: 97 FL (ref 82–98)
MONOCYTES # BLD AUTO: 0.98 THOUSAND/ÂΜL (ref 0.17–1.22)
MONOCYTES NFR BLD AUTO: 7 % (ref 4–12)
MRSA NOSE QL CULT: NORMAL
NEUTROPHILS # BLD AUTO: 11.81 THOUSANDS/ÂΜL (ref 1.85–7.62)
NEUTS SEG NFR BLD AUTO: 81 % (ref 43–75)
NRBC BLD AUTO-RTO: 0 /100 WBCS
PLATELET # BLD AUTO: 187 THOUSANDS/UL (ref 149–390)
PMV BLD AUTO: 10.6 FL (ref 8.9–12.7)
POTASSIUM SERPL-SCNC: 4.2 MMOL/L (ref 3.5–5.3)
RBC # BLD AUTO: 2.36 MILLION/UL (ref 3.81–5.12)
SALMONELLA SP SPAO STL QL NAA+PROBE: NEGATIVE
SHIGELLA SP+EIEC IPAH STL QL NAA+PROBE: NEGATIVE
SODIUM SERPL-SCNC: 137 MMOL/L (ref 135–147)
WBC # BLD AUTO: 14.48 THOUSAND/UL (ref 4.31–10.16)

## 2025-06-18 PROCEDURE — 99232 SBSQ HOSP IP/OBS MODERATE 35: CPT

## 2025-06-18 PROCEDURE — 85025 COMPLETE CBC W/AUTO DIFF WBC: CPT | Performed by: NURSE PRACTITIONER

## 2025-06-18 PROCEDURE — 99232 SBSQ HOSP IP/OBS MODERATE 35: CPT | Performed by: STUDENT IN AN ORGANIZED HEALTH CARE EDUCATION/TRAINING PROGRAM

## 2025-06-18 PROCEDURE — 80048 BASIC METABOLIC PNL TOTAL CA: CPT | Performed by: NURSE PRACTITIONER

## 2025-06-18 PROCEDURE — 99232 SBSQ HOSP IP/OBS MODERATE 35: CPT | Performed by: NURSE PRACTITIONER

## 2025-06-18 RX ORDER — PRAVASTATIN SODIUM 40 MG
80 TABLET ORAL
Status: DISCONTINUED | OUTPATIENT
Start: 2025-06-18 | End: 2025-06-19 | Stop reason: HOSPADM

## 2025-06-18 RX ORDER — MAGNESIUM HYDROXIDE/ALUMINUM HYDROXICE/SIMETHICONE 120; 1200; 1200 MG/30ML; MG/30ML; MG/30ML
30 SUSPENSION ORAL EVERY 4 HOURS PRN
Status: DISCONTINUED | OUTPATIENT
Start: 2025-06-18 | End: 2025-06-19 | Stop reason: HOSPADM

## 2025-06-18 RX ORDER — ACETAMINOPHEN 10 MG/ML
1000 INJECTION, SOLUTION INTRAVENOUS ONCE
Status: COMPLETED | OUTPATIENT
Start: 2025-06-18 | End: 2025-06-18

## 2025-06-18 RX ADMIN — ALUMINUM HYDROXIDE, MAGNESIUM HYDROXIDE, AND DIMETHICONE 30 ML: 200; 20; 200 SUSPENSION ORAL at 21:21

## 2025-06-18 RX ADMIN — HEPARIN SODIUM 5000 UNITS: 5000 INJECTION INTRAVENOUS; SUBCUTANEOUS at 14:56

## 2025-06-18 RX ADMIN — GENTAMICIN SULFATE 1 APPLICATION: 1 CREAM TOPICAL at 09:18

## 2025-06-18 RX ADMIN — ACETAMINOPHEN 1000 MG: 10 INJECTION INTRAVENOUS at 22:31

## 2025-06-18 RX ADMIN — HEPARIN SODIUM 5000 UNITS: 5000 INJECTION INTRAVENOUS; SUBCUTANEOUS at 21:21

## 2025-06-18 RX ADMIN — Medication 2.5 MG: at 05:54

## 2025-06-18 RX ADMIN — HEPARIN SODIUM 5000 UNITS: 5000 INJECTION INTRAVENOUS; SUBCUTANEOUS at 05:20

## 2025-06-18 RX ADMIN — PIPERACILLIN AND TAZOBACTAM 4.5 G: 4; .5 INJECTION, POWDER, FOR SOLUTION INTRAVENOUS at 09:18

## 2025-06-18 RX ADMIN — PIPERACILLIN AND TAZOBACTAM 4.5 G: 4; .5 INJECTION, POWDER, FOR SOLUTION INTRAVENOUS at 22:31

## 2025-06-18 RX ADMIN — PRAVASTATIN SODIUM 80 MG: 40 TABLET ORAL at 16:35

## 2025-06-18 RX ADMIN — ONDANSETRON 4 MG: 2 INJECTION INTRAMUSCULAR; INTRAVENOUS at 05:50

## 2025-06-18 RX ADMIN — HYDROMORPHONE HYDROCHLORIDE 0.2 MG: 0.2 INJECTION, SOLUTION INTRAMUSCULAR; INTRAVENOUS; SUBCUTANEOUS at 08:18

## 2025-06-18 NOTE — PROGRESS NOTES
Progress Note - Hospitalist   Name: Saige Haji 55 y.o. female I MRN: [id number]  Unit/Bed#: ICU 03-01 I Date of Admission: 6/16/2025   Date of Service: 6/18/2025 I Hospital Day: 2    Assessment & Plan  Acute left lower quadrant pain  Presented for left-sided abdominal pain associated with nausea and diarrhea x 1 day   CT A/P 6/16: Moderate segment diffuse bowel wall thickening and mild pericolonic fat stranding of the descending through proximal to mid sigmoid colon, could be on the basis of either a diverticulitis or a colitis  Received 1 L of normal saline in the ER  C.diff- negative, stool culture- pending   GI input appreciated  Given possibility of segmental colitis versus diverticulitis, GI recommended to obtain stool studies including C. difficile and enteric panel.  Can advance diet as tolerated  Continue IV Zosyn 4.5 g, Q12H renally dosed  Symptom control with IV hydromorphone and ondansetron as needed  Goal to transition from IV to oral antibiotics as tolerated  Sepsis ([facility])  Met sepsis criteria upon presentation with , WBC 24.64, colitis vs diverticulitis on CT  Lactic acid 1.2  Blood cultures - no growth at 24 hours  Continue IV Zosyn   Clinically is improving   Monitor vitals, repeat CBC, BMP in a.m.   ESRD (end stage renal disease) ([facility])  Lab Results   Component Value Date    EGFR 7 06/18/2025    EGFR 7 06/17/2025    EGFR 7 06/16/2025    CREATININE 6.18 (H) 06/18/2025    CREATININE 5.77 (H) 06/17/2025    CREATININE 5.96 (H) 06/16/2025     On PD 5 days/week- off on Wednesday and Sunday   Baseline Cr 3.9-5.7 mg/dL    Nephrology input appreciated  Continue with peritoneal dialysis as scheduled  Continue with home topical gentamicin for port care  Avoid nephrotoxic agents     Essential hypertension  Continue home regimen: lisinopril 10 mg PO daily and terazosin 2 mg PO HS   BP levels trending on the lower end of normal   Monitor BP and hold home regimen at this time   Mixed hyperlipidemia  Home  regimen: Crestor 10 mg PO daily   Substitute for statin while admitted     Vitamin D deficiency  Patient takes oral Vit D supplement at home  Continue with supplementation upon discharge      VTE Pharmacologic Prophylaxis: VTE Score: 3 Moderate Risk (Score 3-4) - Pharmacological DVT Prophylaxis Ordered: heparin.    Mobility:   Basic Mobility Inpatient Raw Score: 18  JH-HLM Goal: 6: Walk 10 steps or more  JH-HLM Achieved: 4: Move to chair/commode  JH-HLM Goal achieved. Continue to encourage appropriate mobility.    Patient Centered Rounds: I performed bedside rounds with nursing staff today.   Discussions with Specialists or Other Care Team Provider: Nephrology, GI, CM     Education and Discussions with Family / Patient: patient was updated.     Current Length of Stay: 2 day(s)  Current Patient Status: Inpatient   Certification Statement: The patient will continue to require additional inpatient hospital stay due to acute left lower quadrant abdominal pain with sepsis.  The patient continues to have left lower quadrant abdominal pain and will require close monitoring.  Additionally she has been unable to tolerate diet.   Discharge Plan: Anticipate discharge in 24-48 hrs to home.    Code Status: Level 1 - Full Code    Subjective   55 y.o. patient seen and examined today. No acute overnight changes reported. Patient states that she slept well after receiving pain medication last night. She reports improved pain at a 5/10 while laying in bed, but notes pain exacerbation and nausea upon ambulation to the bathroom. She has been able to tolerate clear liquids. She denies urinary changes. She states that she had an episode of diarrhea this morning but denies any blood.     Objective :  Temp:  [98.4 °F (36.9 °C)-99.3 °F (37.4 °C)] 98.4 °F (36.9 °C)  HR:  [81-88] 85  BP: ()/(53-55) 92/53  Resp:  [16-18] 16  SpO2:  [95 %-98 %] 95 %  O2 Device: None (Room air)    Body mass index is 37.58 kg/m².     Input and Output  Summary (last 24 hours):     Intake/Output Summary (Last 24 hours) at 6/18/2025 1107  Last data filed at 6/18/2025 1058  Gross per 24 hour   Intake 360 ml   Output 25 ml   Net 335 ml       Physical Exam  Vitals and nursing note reviewed.   Constitutional:       General: She is not in acute distress.     Appearance: Normal appearance. She is well-developed. She is not ill-appearing.   HENT:      Head: Normocephalic and atraumatic.      Right Ear: External ear normal.      Left Ear: External ear normal.      Nose: Nose normal. No rhinorrhea.      Mouth/Throat:      Mouth: Mucous membranes are moist.      Pharynx: Oropharynx is clear.     Eyes:      General:         Right eye: No discharge.         Left eye: No discharge.      Conjunctiva/sclera: Conjunctivae normal.      Pupils: Pupils are equal, round, and reactive to light.       Cardiovascular:      Rate and Rhythm: Normal rate and regular rhythm.      Pulses: Normal pulses.      Heart sounds: Normal heart sounds. No murmur heard.  Pulmonary:      Effort: Pulmonary effort is normal. No respiratory distress.      Breath sounds: Normal breath sounds. No wheezing, rhonchi or rales.   Abdominal:      General: Bowel sounds are normal. There is no distension.      Palpations: Abdomen is soft. There is no mass.      Tenderness: There is abdominal tenderness. There is guarding.     Musculoskeletal:         General: No swelling or tenderness. Normal range of motion.      Cervical back: Normal range of motion and neck supple. No muscular tenderness.      Right lower leg: No edema.      Left lower leg: No edema.     Skin:     General: Skin is warm and dry.      Capillary Refill: Capillary refill takes less than 2 seconds.      Findings: No erythema or rash.     Neurological:      General: No focal deficit present.      Mental Status: She is alert and oriented to person, place, and time. Mental status is at baseline.     Psychiatric:         Mood and Affect: Mood normal.          Behavior: Behavior normal.         Thought Content: Thought content normal.         Judgment: Judgment normal.                 Lab Results: I have reviewed the following results:   Results from last 7 days   Lab Units 06/18/25  0534   WBC Thousand/uL 14.48*   HEMOGLOBIN g/dL 7.4*   HEMATOCRIT % 22.8*   PLATELETS Thousands/uL 187   SEGS PCT % 81*   LYMPHO PCT % 10*   MONO PCT % 7   EOS PCT % 1     Results from last 7 days   Lab Units 06/18/25  0534 06/17/25  0451 06/16/25  1135   SODIUM mmol/L 137   < > 136   POTASSIUM mmol/L 4.2   < > 4.4   CHLORIDE mmol/L 108   < > 107   CO2 mmol/L 18*   < > 18*   BUN mg/dL 63*   < > 69*   CREATININE mg/dL 6.18*   < > 5.96*   ANION GAP mmol/L 11   < > 11   CALCIUM mg/dL 8.4   < > 9.4   ALBUMIN g/dL  --   --  4.3   TOTAL BILIRUBIN mg/dL  --   --  0.56   ALK PHOS U/L  --   --  45   ALT U/L  --   --  12   AST U/L  --   --  11*   GLUCOSE RANDOM mg/dL 115   < > 117    < > = values in this interval not displayed.     Results from last 7 days   Lab Units 06/16/25  1135   INR  1.09             Results from last 7 days   Lab Units 06/16/25  1135   LACTIC ACID mmol/L 1.2   PROCALCITONIN ng/ml 1.77*       Recent Cultures (last 7 days):   Results from last 7 days   Lab Units 06/17/25  1807 06/16/25  1135   BLOOD CULTURE   --  No Growth at 24 hrs.  No Growth at 24 hrs.   C DIFF TOXIN B BY PCR  Negative  --        Imaging Results Review: I reviewed radiology reports from this admission including: CT abdomen/pelvis.  Other Study Results Review: No additional pertinent studies reviewed.    Last 24 Hours Medication List:     Current Facility-Administered Medications:     gentamicin (GARAMYCIN) 0.1 % cream, Daily    heparin (porcine) subcutaneous injection 5,000 Units, Q8H GARY    HYDROmorphone (DILAUDID) injection 0.5 mg, Q4H PRN    HYDROmorphone HCl (DILAUDID) injection 0.2 mg, Q4H PRN    [Held by provider] lisinopril (ZESTRIL) tablet 10 mg, Daily    ondansetron (ZOFRAN) injection 4 mg, Q8H  PRN    oxyCODONE (ROXICODONE) split tablet 2.5 mg, Q4H PRN    piperacillin-tazobactam (ZOSYN) IVPB (EXTENDED INFUSION) 4.5 g, Q12H, Last Rate: 4.5 g (06/18/25 0918)    polyethylene glycol (MIRALAX) packet 17 g, Daily PRN    terazosin (HYTRIN) capsule 2 mg, HS    Administrative Statements   Today, Patient Was Seen By: Jennifer Christianson  I have spent a total time of 38 minutes in caring for this patient on the day of the visit/encounter including Diagnostic results, Prognosis, Risks and benefits of tx options, Instructions for management, Patient and family education, Importance of tx compliance, Risk factor reductions, Impressions, Counseling / Coordination of care, Documenting in the medical record, Reviewing/placing orders in the medical record (including tests, medications, and/or procedures), Obtaining or reviewing history  , and Communicating with other healthcare professionals .    **Please Note: This note may have been constructed using a voice recognition system.**

## 2025-06-18 NOTE — PROGRESS NOTES
Progress Note - Gastroenterology   Name: Saige Haji 55 y.o. female I MRN: [id number]  Unit/Bed#: ICU 03-01 I Date of Admission: 6/16/2025   Date of Service: 6/18/2025 I Hospital Day: 2    Assessment & Plan  Acute left lower quadrant pain  In summary, this is a 55-year-old female with a pertinent medical history of ESRD on PD who presented with a 2-day history of progressive LLQ abdominal pain with diarrhea.  Tmax 100.5 otherwise hemodynamically stable.  Her initial labs showed leukocytosis WBC 24K, anemia with a hemoglobin of 9.2 (baseline 10).  CT abdomen and pelvis evidence of diffuse bowel wall thickening involving the descending to proximal/mid sigmoid colon with scattered diverticula in the same distribution.    Differential diagnoses include infectious colitis versus diverticulitis    Plan  -  C. difficile and enteric panel negative. Given her medical comorbidities, leukocytosis and initial low-grade fever of 100.5, would favor treating her uncomplicated diverticulitis with antibiotics.  While admitted continue with IV antibiotics once patient is able to tolerate oral intake, can transition to p.o. antibiotics for total treatment duration of 5-7 days.  Fluoroquinolone + metronidazole or Augmentin  - Tolerating clear liquid diet, she can be advanced to a low residue regular diet  - Once able to be discharged, recommend follow-up with her gastroenterologist at [facility] to discuss repeat colonoscopy in 6 to 8 weeks once acute inflammatory process has resolved.  Alternatively, we can arrange for outpatient follow-up with [facility] gastroenterology group as well.  - Monitor CBC daily.  - Management of peritoneal dialysis per nephrology service.  - GI will sign off at this time. 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  She is feeling much better this morning and was able to tolerate toast.    Objective :  Temp:  [98.4 °F (36.9 °C)-99.3 °F (37.4 °C)] 98.4 °F (36.9 °C)  HR:  [81-88] 85  BP: ()/(53-55) 92/53  Resp:  [16-18] 16  SpO2:  [95 %-98 %] 95 %  O2 Device: None (Room air)    Physical Exam  Vitals and nursing note reviewed.   Constitutional:       General: She is not in acute distress.     Appearance: She is well-developed.   HENT:      Head: Normocephalic and atraumatic.     Eyes:      Conjunctiva/sclera: Conjunctivae normal.       Cardiovascular:      Rate and Rhythm: Normal rate and regular rhythm.      Heart sounds: No murmur heard.  Pulmonary:      Effort: Pulmonary effort is normal. No respiratory distress.      Breath sounds: Normal breath sounds.   Abdominal:      Palpations: Abdomen is soft.      Tenderness: There is no abdominal tenderness.     Musculoskeletal:         General: No swelling.      Cervical back: Neck supple.     Skin:     General: Skin is warm and dry.      Capillary Refill: Capillary refill takes less than 2 seconds.     Neurological:      Mental Status: She is alert.     Psychiatric:         Mood and Affect: Mood normal.           Lab Results: I have reviewed the following results:CBC/BMP:   .     06/18/25  0534   WBC 14.48*   HGB 7.4*   HCT 22.8*      SODIUM 137   K 4.2      CO2 18*   BUN 63*   CREATININE 6.18*   GLUC 115        Imaging Results Review: No pertinent imaging studies reviewed.  Other Study Results Review: No additional pertinent studies reviewed.    Carolin Azevedo MD  Gastroenterology Fellow, PGY- 4  Available on EPIC Secure Chat  6/18/2025 1:12 PM

## 2025-06-18 NOTE — PLAN OF CARE
Problem: PAIN - ADULT  Goal: Verbalizes/displays adequate comfort level or baseline comfort level  Description: Interventions:  - Encourage patient to monitor pain and request assistance  - Assess pain using appropriate pain scale  - Administer analgesics as ordered based on type and severity of pain and evaluate response  - Implement non-pharmacological measures as appropriate and evaluate response  - Consider cultural and social influences on pain and pain management  - Notify physician/advanced practitioner if interventions unsuccessful or patient reports new pain  - Educate patient/family on pain management process including their role and importance of  reporting pain   - Provide non-pharmacologic/complimentary pain relief interventions  Outcome: Progressing     Problem: INFECTION - ADULT  Goal: Absence or prevention of progression during hospitalization  Description: INTERVENTIONS:  - Assess and monitor for signs and symptoms of infection  - Monitor lab/diagnostic results  - Monitor all insertion sites, i.e. indwelling lines, tubes, and drains  - Monitor endotracheal if appropriate and nasal secretions for changes in amount and color  - [place] appropriate cooling/warming therapies per order  - Administer medications as ordered  - Instruct and encourage patient and family to use good hand hygiene technique  - Identify and instruct in appropriate isolation precautions for identified infection/condition  Outcome: Progressing  Goal: Absence of fever/infection during neutropenic period  Description: INTERVENTIONS:  - Monitor WBC  - Perform strict hand hygiene  - Limit to healthy visitors only  - No plants, dried, fresh or silk flowers with hairston in patient room  Outcome: Progressing     Problem: SAFETY ADULT  Goal: Patient will remain free of falls  Description: INTERVENTIONS:  - Educate patient/family on patient safety including physical limitations  - Instruct patient to call for assistance with activity   -  Consider consulting OT/PT to assist with strengthening/mobility based on AM PAC & JH-HLM score  - Consult OT/PT to assist with strengthening/mobility   - Keep Call bell within reach  - Keep bed low and locked with side rails adjusted as appropriate  - Keep care items and personal belongings within reach  - Initiate and maintain comfort rounds  - Make Fall Risk Sign visible to staff  - Offer Toileting every Hours, in advance of need  - Initiate/Maintain alarm  - Obtain necessary fall risk management equipment:   - Apply yellow socks and bracelet for high fall risk patients  - Consider moving patient to room near nurses station  Outcome: Progressing  Goal: Maintain or return to baseline ADL function  Description: INTERVENTIONS:  -  Assess patient's ability to carry out ADLs; assess patient's baseline for ADL function and identify physical deficits which impact ability to perform ADLs (bathing, care of mouth/teeth, toileting, grooming, dressing, etc.)  - Assess/evaluate cause of self-care deficits   - Assess range of motion  - Assess patient's mobility; develop plan if impaired  - Assess patient's need for assistive devices and provide as appropriate  - Encourage maximum independence but intervene and supervise when necessary  - Involve family in performance of ADLs  - Assess for home care needs following discharge   - Consider OT consult to assist with ADL evaluation and planning for discharge  - Provide patient education as appropriate  - Monitor functional capacity and physical performance, use of AM PAC & JH-HLM   - Monitor gait, balance and fatigue with ambulation    Outcome: Progressing  Goal: Maintains/Returns to pre admission functional level  Description: INTERVENTIONS:  - Perform AM-PAC 6 Click Basic Mobility/ Daily Activity assessment daily.  - Set and communicate daily mobility goal to care team and patient/family/caregiver.   - Collaborate with rehabilitation services on mobility goals if consulted  -  Perform Range of Motion  times a day.  - Reposition patient every  hours.  - Dangle patient  times a day  - Stand patient  times a day  - Ambulate patient  times a day  - Out of bed to chair  times a day   - Out of bed for meals  times a day  - Out of bed for toileting  - Record patient progress and toleration of activity level   Outcome: Progressing     Problem: DISCHARGE PLANNING  Goal: Discharge to home or other facility with appropriate resources  Description: INTERVENTIONS:  - Identify barriers to discharge w/patient and caregiver  - Arrange for needed discharge resources and transportation as appropriate  - Identify discharge learning needs (meds, wound care, etc.)  - Arrange for interpretive services to assist at discharge as needed  - Refer to Case Management Department for coordinating discharge planning if the patient needs post-hospital services based on physician/advanced practitioner order or complex needs related to functional status, cognitive ability, or social support system  Outcome: Progressing     Problem: Knowledge Deficit  Goal: Patient/family/caregiver demonstrates understanding of disease process, treatment plan, medications, and discharge instructions  Description: Complete learning assessment and assess knowledge base.  Interventions:  - Provide teaching at level of understanding  - Provide teaching via preferred learning methods  Outcome: Progressing     Problem: Nutrition/Hydration-ADULT  Goal: Nutrient/Hydration intake appropriate for improving, restoring or maintaining nutritional needs  Description: Monitor and assess patient's nutrition/hydration status for malnutrition. Collaborate with interdisciplinary team and initiate plan and interventions as ordered.  Monitor patient's weight and dietary intake as ordered or per policy. Utilize nutrition screening tool and intervene as necessary. Determine patient's food preferences and provide high-protein, high-caloric foods as appropriate.      INTERVENTIONS:  - Monitor oral intake, urinary output, labs, and treatment plans  - Assess nutrition and hydration status and recommend course of action  - Evaluate amount of meals eaten  - Assist patient with eating if necessary   - Allow adequate time for meals  - Recommend/ encourage appropriate diets, oral nutritional supplements, and vitamin/mineral supplements  - Order, calculate, and assess calorie counts as needed  - Recommend, monitor, and adjust tube feedings and TPN/PPN based on assessed needs  - Assess need for intravenous fluids  - Provide specific nutrition/hydration education as appropriate  - Include patient/family/caregiver in decisions related to nutrition  Outcome: Progressing     Problem: CARDIOVASCULAR - ADULT  Goal: Maintains optimal cardiac output and hemodynamic stability  Description: INTERVENTIONS:  - Monitor I/O, vital signs and rhythm  - Monitor for S/S and trends of decreased cardiac output  - Administer and titrate ordered vasoactive medications to optimize hemodynamic stability  - Assess quality of pulses, skin color and temperature  - Assess for signs of decreased coronary artery perfusion  - Instruct patient to report change in severity of symptoms  Outcome: Progressing  Goal: Absence of cardiac dysrhythmias or at baseline rhythm  Description: INTERVENTIONS:  - Continuous cardiac monitoring, vital signs, obtain 12 lead EKG if ordered  - Administer antiarrhythmic and heart rate control medications as ordered  - Monitor electrolytes and administer replacement therapy as ordered  Outcome: Progressing     Problem: GASTROINTESTINAL - ADULT  Goal: Minimal or absence of nausea and/or vomiting  Description: INTERVENTIONS:  - Administer IV fluids if ordered to ensure adequate hydration  - Maintain NPO status until nausea and vomiting are resolved  - Nasogastric tube if ordered  - Administer ordered antiemetic medications as needed  - Provide nonpharmacologic comfort measures as  appropriate  - Advance diet as tolerated, if ordered  - Consider nutrition services referral to assist patient with adequate nutrition and appropriate food choices  Outcome: Progressing  Goal: Maintains or returns to baseline bowel function  Description: INTERVENTIONS:  - Assess bowel function  - Encourage oral fluids to ensure adequate hydration  - Administer IV fluids if ordered to ensure adequate hydration  - Administer ordered medications as needed  - Encourage mobilization and activity  - Consider nutritional services referral to assist patient with adequate nutrition and appropriate food choices  Outcome: Progressing  Goal: Maintains adequate nutritional intake  Description: INTERVENTIONS:  - Monitor percentage of each meal consumed  - Identify factors contributing to decreased intake, treat as appropriate  - Assist with meals as needed  - Monitor I&O, weight, and lab values if indicated  - Obtain nutrition services referral as needed  Outcome: Progressing  Goal: Establish and maintain optimal ostomy function  Description: INTERVENTIONS:  - Assess bowel function  - Encourage oral fluids to ensure adequate hydration  - Administer IV fluids if ordered to ensure adequate hydration   - Administer ordered medications as needed  - Encourage mobilization and activity  - Nutrition services referral to assist patient with appropriate food choices  - Assess stoma site  - Consider wound care consult   Outcome: Progressing  Goal: Oral mucous membranes remain intact  Description: INTERVENTIONS  - Assess oral mucosa and hygiene practices  - Implement preventative oral hygiene regimen  - Implement oral medicated treatments as ordered  - Initiate Nutrition services referral as needed  Outcome: Progressing     Problem: GENITOURINARY - ADULT  Goal: Maintains or returns to baseline urinary function  Description: INTERVENTIONS:  - Assess urinary function  - Encourage oral fluids to ensure adequate hydration if ordered  -  Administer IV fluids as ordered to ensure adequate hydration  - Administer ordered medications as needed  - Offer frequent toileting  - Follow urinary retention protocol if ordered  Outcome: Progressing  Goal: Absence of urinary retention  Description: INTERVENTIONS:  - Assess patient’s ability to void and empty bladder  - Monitor I/O  - Bladder scan as needed  - Discuss with physician/AP medications to alleviate retention as needed  - Discuss catheterization for long term situations as appropriate  Outcome: Progressing  Goal: Urinary catheter remains patent  Description: INTERVENTIONS:  - Assess patency of urinary catheter  - If patient has a chronic spicer, consider changing catheter if non-functioning  - Follow guidelines for intermittent irrigation of non-functioning urinary catheter  Outcome: Progressing     Problem: METABOLIC, FLUID AND ELECTROLYTES - ADULT  Goal: Electrolytes maintained within normal limits  Description: INTERVENTIONS:  - Monitor labs and assess patient for signs and symptoms of electrolyte imbalances  - Administer electrolyte replacement as ordered  - Monitor response to electrolyte replacements, including repeat lab results as appropriate  - Instruct patient on fluid and nutrition as appropriate  Outcome: Progressing  Goal: Fluid balance maintained  Description: INTERVENTIONS:  - Monitor labs   - Monitor I/O and WT  - Instruct patient on fluid and nutrition as appropriate  - Assess for signs & symptoms of volume excess or deficit  Outcome: Progressing  Goal: Glucose maintained within target range  Description: INTERVENTIONS:  - Monitor Blood Glucose as ordered  - Assess for signs and symptoms of hyperglycemia and hypoglycemia  - Administer ordered medications to maintain glucose within target range  - Assess nutritional intake and initiate nutrition service referral as needed  Outcome: Progressing

## 2025-06-18 NOTE — ASSESSMENT & PLAN NOTE
Continue home regimen: lisinopril 10 mg PO daily and terazosin 2 mg PO HS   BP levels trending on the lower end of normal   Monitor BP and hold home regimen at this time

## 2025-06-18 NOTE — PLAN OF CARE
Problem: PAIN - ADULT  Goal: Verbalizes/displays adequate comfort level or baseline comfort level  Description: Interventions:  - Encourage patient to monitor pain and request assistance  - Assess pain using appropriate pain scale  - Administer analgesics as ordered based on type and severity of pain and evaluate response  - Implement non-pharmacological measures as appropriate and evaluate response  - Consider cultural and social influences on pain and pain management  - Notify physician/advanced practitioner if interventions unsuccessful or patient reports new pain  - Educate patient/family on pain management process including their role and importance of  reporting pain   - Provide non-pharmacologic/complimentary pain relief interventions  Outcome: Progressing     Problem: INFECTION - ADULT  Goal: Absence or prevention of progression during hospitalization  Description: INTERVENTIONS:  - Assess and monitor for signs and symptoms of infection  - Monitor lab/diagnostic results  - Monitor all insertion sites, i.e. indwelling lines, tubes, and drains  - Monitor endotracheal if appropriate and nasal secretions for changes in amount and color  - [place] appropriate cooling/warming therapies per order  - Administer medications as ordered  - Instruct and encourage patient and family to use good hand hygiene technique  - Identify and instruct in appropriate isolation precautions for identified infection/condition  Outcome: Progressing  Goal: Absence of fever/infection during neutropenic period  Description: INTERVENTIONS:  - Monitor WBC  - Perform strict hand hygiene  - Limit to healthy visitors only  - No plants, dried, fresh or silk flowers with hairston in patient room  Outcome: Progressing     Problem: SAFETY ADULT  Goal: Patient will remain free of falls  Description: INTERVENTIONS:  - Educate patient/family on patient safety including physical limitations  - Instruct patient to call for assistance with activity   -  Consider consulting OT/PT to assist with strengthening/mobility based on AM PAC & JH-HLM score  - Consult OT/PT to assist with strengthening/mobility   - Keep Call bell within reach  - Keep bed low and locked with side rails adjusted as appropriate  - Keep care items and personal belongings within reach  - Initiate and maintain comfort rounds  - Make Fall Risk Sign visible to staff  - Offer Toileting every 2 Hours, in advance of need  - Initiate/Maintain bed alarm  - Obtain necessary fall risk management equipment  - Apply yellow socks and bracelet for high fall risk patients  - Consider moving patient to room near nurses station  Outcome: Progressing  Goal: Maintain or return to baseline ADL function  Description: INTERVENTIONS:  -  Assess patient's ability to carry out ADLs; assess patient's baseline for ADL function and identify physical deficits which impact ability to perform ADLs (bathing, care of mouth/teeth, toileting, grooming, dressing, etc.)  - Assess/evaluate cause of self-care deficits   - Assess range of motion  - Assess patient's mobility; develop plan if impaired  - Assess patient's need for assistive devices and provide as appropriate  - Encourage maximum independence but intervene and supervise when necessary  - Involve family in performance of ADLs  - Assess for home care needs following discharge   - Consider OT consult to assist with ADL evaluation and planning for discharge  - Provide patient education as appropriate  - Monitor functional capacity and physical performance, use of AM PAC & JH-HLM   - Monitor gait, balance and fatigue with ambulation    Outcome: Progressing    Problem: SAFETY ADULT  Goal: Patient will remain free of falls  Description: INTERVENTIONS:  - Educate patient/family on patient safety including physical limitations  - Instruct patient to call for assistance with activity   - Consider consulting OT/PT to assist with strengthening/mobility based on AM PAC & JH-HLM score  -  Consult OT/PT to assist with strengthening/mobility   - Keep Call bell within reach  - Keep bed low and locked with side rails adjusted as appropriate  - Keep care items and personal belongings within reach  - Initiate and maintain comfort rounds  - Make Fall Risk Sign visible to staff  - Offer Toileting every 2 Hours, in advance of need  - Initiate/Maintain bed alarm  - Obtain necessary fall risk management equipment  - Apply yellow socks and bracelet for high fall risk patients  - Consider moving patient to room near nurses station  Outcome: Progressing  Goal: Maintain or return to baseline ADL function  Description: INTERVENTIONS:  -  Assess patient's ability to carry out ADLs; assess patient's baseline for ADL function and identify physical deficits which impact ability to perform ADLs (bathing, care of mouth/teeth, toileting, grooming, dressing, etc.)  - Assess/evaluate cause of self-care deficits   - Assess range of motion  - Assess patient's mobility; develop plan if impaired  - Assess patient's need for assistive devices and provide as appropriate  - Encourage maximum independence but intervene and supervise when necessary  - Involve family in performance of ADLs  - Assess for home care needs following discharge   - Consider OT consult to assist with ADL evaluation and planning for discharge  - Provide patient education as appropriate  - Monitor functional capacity and physical performance, use of AM PAC & JH-HLM   - Monitor gait, balance and fatigue with ambulation    Outcome: Progressing       Problem: Knowledge Deficit  Goal: Patient/family/caregiver demonstrates understanding of disease process, treatment plan, medications, and discharge instructions  Description: Complete learning assessment and assess knowledge base.  Interventions:  - Provide teaching at level of understanding  - Provide teaching via preferred learning methods  Outcome: Progressing     Problem: Nutrition/Hydration-ADULT  Goal:  Nutrient/Hydration intake appropriate for improving, restoring or maintaining nutritional needs  Description: Monitor and assess patient's nutrition/hydration status for malnutrition. Collaborate with interdisciplinary team and initiate plan and interventions as ordered.  Monitor patient's weight and dietary intake as ordered or per policy. Utilize nutrition screening tool and intervene as necessary. Determine patient's food preferences and provide high-protein, high-caloric foods as appropriate.     INTERVENTIONS:  - Monitor oral intake, urinary output, labs, and treatment plans  - Assess nutrition and hydration status and recommend course of action  - Evaluate amount of meals eaten  - Assist patient with eating if necessary   - Allow adequate time for meals  - Recommend/ encourage appropriate diets, oral nutritional supplements, and vitamin/mineral supplements  - Order, calculate, and assess calorie counts as needed  - Recommend, monitor, and adjust tube feedings and TPN/PPN based on assessed needs  - Assess need for intravenous fluids  - Provide specific nutrition/hydration education as appropriate  - Include patient/family/caregiver in decisions related to nutrition  Outcome: Progressing     Problem: CARDIOVASCULAR - ADULT  Goal: Maintains optimal cardiac output and hemodynamic stability  Description: INTERVENTIONS:  - Monitor I/O, vital signs and rhythm  - Monitor for S/S and trends of decreased cardiac output  - Administer and titrate ordered vasoactive medications to optimize hemodynamic stability  - Assess quality of pulses, skin color and temperature  - Assess for signs of decreased coronary artery perfusion  - Instruct patient to report change in severity of symptoms  Outcome: Progressing  Goal: Absence of cardiac dysrhythmias or at baseline rhythm  Description: INTERVENTIONS:  - Continuous cardiac monitoring, vital signs, obtain 12 lead EKG if ordered  - Administer antiarrhythmic and heart rate control  medications as ordered  - Monitor electrolytes and administer replacement therapy as ordered  Outcome: Progressing     Problem: GASTROINTESTINAL - ADULT  Goal: Minimal or absence of nausea and/or vomiting  Description: INTERVENTIONS:  - Administer IV fluids if ordered to ensure adequate hydration  - Maintain NPO status until nausea and vomiting are resolved  - Nasogastric tube if ordered  - Administer ordered antiemetic medications as needed  - Provide nonpharmacologic comfort measures as appropriate  - Advance diet as tolerated, if ordered  - Consider nutrition services referral to assist patient with adequate nutrition and appropriate food choices  Outcome: Progressing  Goal: Maintains or returns to baseline bowel function  Description: INTERVENTIONS:  - Assess bowel function  - Encourage oral fluids to ensure adequate hydration  - Administer IV fluids if ordered to ensure adequate hydration  - Administer ordered medications as needed  - Encourage mobilization and activity  - Consider nutritional services referral to assist patient with adequate nutrition and appropriate food choices  Outcome: Progressing  Goal: Maintains adequate nutritional intake  Description: INTERVENTIONS:  - Monitor percentage of each meal consumed  - Identify factors contributing to decreased intake, treat as appropriate  - Assist with meals as needed  - Monitor I&O, weight, and lab values if indicated  - Obtain nutrition services referral as needed  Outcome: Progressing  Goal: Establish and maintain optimal ostomy function  Description: INTERVENTIONS:  - Assess bowel function  - Encourage oral fluids to ensure adequate hydration  - Administer IV fluids if ordered to ensure adequate hydration   - Administer ordered medications as needed  - Encourage mobilization and activity  - Nutrition services referral to assist patient with appropriate food choices  - Assess stoma site  - Consider wound care consult   Outcome: Progressing  Goal: Oral  mucous membranes remain intact  Description: INTERVENTIONS  - Assess oral mucosa and hygiene practices  - Implement preventative oral hygiene regimen  - Implement oral medicated treatments as ordered  - Initiate Nutrition services referral as needed  Outcome: Progressing

## 2025-06-18 NOTE — ASSESSMENT & PLAN NOTE
In summary, this is a 55-year-old female with a pertinent medical history of ESRD on PD who presented with a 2-day history of progressive LLQ abdominal pain with diarrhea.  Tmax 100.5 otherwise hemodynamically stable.  Her initial labs showed leukocytosis WBC 24K, anemia with a hemoglobin of 9.2 (baseline 10).  CT abdomen and pelvis evidence of diffuse bowel wall thickening involving the descending to proximal/mid sigmoid colon with scattered diverticula in the same distribution.    Differential diagnoses include infectious colitis versus diverticulitis    Plan  -  C. difficile and enteric panel negative. Given her medical comorbidities, leukocytosis and initial low-grade fever of 100.5, would favor treating her uncomplicated diverticulitis with antibiotics.  While admitted continue with IV antibiotics once patient is able to tolerate oral intake, can transition to p.o. antibiotics for total treatment duration of 5-7 days.  Fluoroquinolone + metronidazole or Augmentin  - Tolerating clear liquid diet, she can be advanced to a low residue regular diet  - Once able to be discharged, recommend follow-up with her gastroenterologist at [facility] to discuss repeat colonoscopy in 6 to 8 weeks once acute inflammatory process has resolved.  Alternatively, we can arrange for outpatient follow-up with [facility] gastroenterology group as well.  - Monitor CBC daily.  - Management of peritoneal dialysis per nephrology service.  - GI will sign off at this time. Please reach out if issues arise

## 2025-06-18 NOTE — ASSESSMENT & PLAN NOTE
Presented for left-sided abdominal pain associated with nausea and diarrhea x 1 day   CT A/P 6/16: Moderate segment diffuse bowel wall thickening and mild pericolonic fat stranding of the descending through proximal to mid sigmoid colon, could be on the basis of either a diverticulitis or a colitis  Received 1 L of normal saline in the ER  C.diff- negative, stool culture- pending   GI input appreciated  Given possibility of segmental colitis versus diverticulitis, GI recommended to obtain stool studies including C. difficile and enteric panel.  Can advance diet as tolerated  Continue IV Zosyn 4.5 g, Q12H renally dosed  Symptom control with IV hydromorphone and ondansetron as needed  Goal to transition from IV to oral antibiotics as tolerated

## 2025-06-18 NOTE — ASSESSMENT & PLAN NOTE
Lab Results   Component Value Date    EGFR 7 06/18/2025    EGFR 7 06/17/2025    EGFR 7 06/16/2025    CREATININE 6.18 (H) 06/18/2025    CREATININE 5.77 (H) 06/17/2025    CREATININE 5.96 (H) 06/16/2025   Continue with continuous cycler peritoneal dialysis, orders are in the chart.  Patient appears to be doing well from a volume standpoint at this time.  Heparin previously added due to increased fibrous strands noted in the setting of an acute abdominal infection.  Continue with gentamicin cream daily.

## 2025-06-18 NOTE — PROGRESS NOTES
Progress Note - Nephrology   Name: Saige Haji 55 y.o. female I MRN: [id number]  Unit/Bed#: ICU 03-01 I Date of Admission: 6/16/2025   Date of Service: 6/18/2025 I Hospital Day: 2    Assessment & Plan  ESRD (end stage renal disease) (Formerly [facility])  Lab Results   Component Value Date    EGFR 7 06/18/2025    EGFR 7 06/17/2025    EGFR 7 06/16/2025    CREATININE 6.18 (H) 06/18/2025    CREATININE 5.77 (H) 06/17/2025    CREATININE 5.96 (H) 06/16/2025   Continue with continuous cycler peritoneal dialysis, orders are in the chart.  Patient appears to be doing well from a volume standpoint at this time.  Heparin previously added due to increased fibrous strands noted in the setting of an acute abdominal infection.  Continue with gentamicin cream daily.  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  Secondary hyperparathyroidism of renal origin (Formerly [facility])  Follow-up PTH in the outpatient setting, no activated vitamin D agent indicated at this time.  Kidney cyst, acquired  Potential follow-up in the outpatient setting, cyst appears to be proteinaceous at this time.  Essential hypertension  Blood pressures are acceptable at this time, continue with lisinopril 10 mg daily.  Acute left lower quadrant pain  Potentially diverticulitis or colitis, currently on appropriately dosed piperacillin/tazobactam at 4.5 g over 4 hours every 12 hours.  Follow-up cultures.  Sepsis (Formerly [facility])  Continue management as per hospitalist    I have reviewed the nephrology recommendations including ESRD on PD, with Critical Care, and we are in agreement with renal plan including the information outlined  above.     Subjective   Brief History of Admission - Saige Haji is a 55 y.o. female with a PMH of ESRD on peritoneal dialysis, HTN, HLD who was admitted to  [facility] 6/16 after presenting with left upper and lower abdominal pain and associated diarrhea and nausea.  Nephrology is following for assistance in the management of ESRD on PD.    Patient is examines resting in bed with son at bedside. States her pain has decreased to 5/10 today after initial 10/10 on presentation. Also states she was able to eat a little today.     Objective :  Temp:  [98.4 °F (36.9 °C)-99.3 °F (37.4 °C)] 98.4 °F (36.9 °C)  HR:  [81-88] 85  BP: ()/(53-55) 92/53  Resp:  [16-18] 16  SpO2:  [95 %-98 %] 95 %  O2 Device: None (Room air)    Current Weight: Weight - Scale: 106 kg (232 lb 12.9 oz)  First Weight: Weight - Scale: 106 kg (234 lb 9.1 oz)  I/O         06/16 0701  06/17 0700 06/17 0701  06/18 0700 06/18 0701  06/19 0700    P.O. 50 360     IV Piggyback 1100      Total Intake(mL/kg) 1150 (10.8) 360 (3.4)     Urine (mL/kg/hr) 100  25 (0)    Stool 0      Total Output 100  25    Net +1050 +360 -25           Unmeasured Urine Occurrence  2 x     Unmeasured Stool Occurrence 0 x 1 x           Physical Exam  Vitals and nursing note reviewed.   Constitutional:       General: She is not in acute distress.     Appearance: She is well-developed. She is obese. She is ill-appearing.   HENT:      Head: Normocephalic and atraumatic.      Right Ear: External ear normal.      Left Ear: External ear normal.      Nose: Nose normal.      Mouth/Throat:      Mouth: Mucous membranes are moist.      Pharynx: Oropharynx is clear.     Eyes:      Extraocular Movements: Extraocular movements intact.      Conjunctiva/sclera: Conjunctivae normal.      Pupils: Pupils are equal, round, and reactive to light.       Cardiovascular:      Rate and Rhythm: Normal rate and regular rhythm.      Heart sounds: Normal heart sounds. No murmur heard.  Pulmonary:      Effort:  "Pulmonary effort is normal. No respiratory distress.      Breath sounds: Normal breath sounds.      Comments: Decreased breath sounds  Abdominal:      Palpations: Abdomen is soft.      Tenderness: There is no abdominal tenderness.     Musculoskeletal:         General: No swelling.      Cervical back: Neck supple.      Right lower leg: No edema.      Left lower leg: No edema.     Skin:     General: Skin is warm and dry.      Capillary Refill: Capillary refill takes less than 2 seconds.     Neurological:      General: No focal deficit present.      Mental Status: She is alert and oriented to person, place, and time.     Psychiatric:         Mood and Affect: Mood normal.         Behavior: Behavior normal.       Medications:  Current Medications[1]      Lab Results: I have reviewed the following results:  Results from last 7 days   Lab Units 06/18/25  0534 06/17/25  0451 06/16/25  1135   WBC Thousand/uL 14.48* 17.85* 24.64*   HEMOGLOBIN g/dL 7.4* 8.1* 9.2*   HEMATOCRIT % 22.8* 24.6* 27.2*   PLATELETS Thousands/uL 187 166 204   POTASSIUM mmol/L 4.2 4.3 4.4   CHLORIDE mmol/L 108 109* 107   CO2 mmol/L 18* 19* 18*   BUN mg/dL 63* 64* 69*   CREATININE mg/dL 6.18* 5.77* 5.96*   CALCIUM mg/dL 8.4 8.6 9.4   ALBUMIN g/dL  --   --  4.3       Administrative Statements     Portions of the record may have been created with voice recognition software. Occasional wrong word or \"sound a like\" substitutions may have occurred due to the inherent limitations of voice recognition software. Read the chart carefully and recognize, using context, where substitutions have occurred.If you have any questions, please contact the dictating provider.         [1]   Current Facility-Administered Medications:     gentamicin (GARAMYCIN) 0.1 % cream, , Topical, Daily, ADILENE Agarwal, 1 Application at 06/18/25 0918    heparin (porcine) subcutaneous injection 5,000 Units, 5,000 Units, Subcutaneous, Q8H [facility], ADILENE Agarwal, 5,000 Units at " 06/18/25 0520    HYDROmorphone (DILAUDID) injection 0.5 mg, 0.5 mg, Intravenous, Q4H PRN, ADILENE Agarwal, 0.5 mg at 06/17/25 2259    HYDROmorphone HCl (DILAUDID) injection 0.2 mg, 0.2 mg, Intravenous, Q4H PRN, ADILENE Agarwal, 0.2 mg at 06/18/25 0818    [Held by provider] lisinopril (ZESTRIL) tablet 10 mg, 10 mg, Oral, Daily, ADILENE Agarwal, 10 mg at 06/17/25 0907    ondansetron (ZOFRAN) injection 4 mg, 4 mg, Intravenous, Q8H PRN, ADILENE Agarwal, 4 mg at 06/18/25 0550    oxyCODONE (ROXICODONE) split tablet 2.5 mg, 2.5 mg, Oral, Q4H PRN, ADILENE Agarwal, 2.5 mg at 06/18/25 0554    piperacillin-tazobactam (ZOSYN) IVPB (EXTENDED INFUSION) 4.5 g, 4.5 g, Intravenous, Q12H, ADILENE Agarwal, Last Rate: 25 mL/hr at 06/18/25 0918, 4.5 g at 06/18/25 0918    polyethylene glycol (MIRALAX) packet 17 g, 17 g, Oral, Daily PRN, ADILENE Figueroa    pravastatin (PRAVACHOL) tablet 80 mg, 80 mg, Oral, Daily With Dinner, ADILENE Thornton    [Held by provider] terazosin (HYTRIN) capsule 2 mg, 2 mg, Oral, HS, ADILENE Agarwal, 2 mg at 06/17/25 2256

## 2025-06-18 NOTE — ASSESSMENT & PLAN NOTE
Lab Results   Component Value Date    EGFR 7 06/18/2025    EGFR 7 06/17/2025    EGFR 7 06/16/2025    CREATININE 6.18 (H) 06/18/2025    CREATININE 5.77 (H) 06/17/2025    CREATININE 5.96 (H) 06/16/2025   Phosphorus levels appropriate, continue to monitor from time to time, add binders as indicated.

## 2025-06-18 NOTE — ASSESSMENT & PLAN NOTE
Lab Results   Component Value Date    EGFR 7 06/18/2025    EGFR 7 06/17/2025    EGFR 7 06/16/2025    CREATININE 6.18 (H) 06/18/2025    CREATININE 5.77 (H) 06/17/2025    CREATININE 5.96 (H) 06/16/2025

## 2025-06-18 NOTE — ASSESSMENT & PLAN NOTE
Lab Results   Component Value Date    EGFR 7 06/18/2025    EGFR 7 06/17/2025    EGFR 7 06/16/2025    CREATININE 6.18 (H) 06/18/2025    CREATININE 5.77 (H) 06/17/2025    CREATININE 5.96 (H) 06/16/2025     On PD 5 days/week- off on Wednesday and Sunday   Baseline Cr 3.9-5.7 mg/dL    Nephrology input appreciated  Continue with peritoneal dialysis as scheduled  Continue with home topical gentamicin for port care  Avoid nephrotoxic agents

## 2025-06-18 NOTE — ASSESSMENT & PLAN NOTE
Met sepsis criteria upon presentation with , WBC 24.64, colitis vs diverticulitis on CT  Lactic acid 1.2  Blood cultures - no growth at 24 hours  Continue IV Zosyn   Clinically is improving   Monitor vitals, repeat CBC, BMP in a.m.

## 2025-06-19 ENCOUNTER — TRANSITIONAL CARE MANAGEMENT (OUTPATIENT)
Dept: FAMILY MEDICINE CLINIC | Facility: CLINIC | Age: 55
End: 2025-06-19

## 2025-06-19 VITALS
WEIGHT: 231.92 LBS | TEMPERATURE: 97.8 F | HEIGHT: 66 IN | RESPIRATION RATE: 18 BRPM | DIASTOLIC BLOOD PRESSURE: 54 MMHG | OXYGEN SATURATION: 99 % | HEART RATE: 76 BPM | SYSTOLIC BLOOD PRESSURE: 92 MMHG | BODY MASS INDEX: 37.27 KG/M2

## 2025-06-19 LAB
ANION GAP SERPL CALCULATED.3IONS-SCNC: 11 MMOL/L (ref 4–13)
BUN SERPL-MCNC: 62 MG/DL (ref 5–25)
CALCIUM SERPL-MCNC: 8.2 MG/DL (ref 8.4–10.2)
CHLORIDE SERPL-SCNC: 108 MMOL/L (ref 96–108)
CO2 SERPL-SCNC: 19 MMOL/L (ref 21–32)
CREAT SERPL-MCNC: 6.47 MG/DL (ref 0.6–1.3)
ERYTHROCYTE [DISTWIDTH] IN BLOOD BY AUTOMATED COUNT: 13.1 % (ref 11.6–15.1)
GFR SERPL CREATININE-BSD FRML MDRD: 6 ML/MIN/1.73SQ M
GLUCOSE SERPL-MCNC: 85 MG/DL (ref 65–140)
HCT VFR BLD AUTO: 22.1 % (ref 34.8–46.1)
HGB BLD-MCNC: 7.3 G/DL (ref 11.5–15.4)
MCH RBC QN AUTO: 31.5 PG (ref 26.8–34.3)
MCHC RBC AUTO-ENTMCNC: 33 G/DL (ref 31.4–37.4)
MCV RBC AUTO: 95 FL (ref 82–98)
PLATELET # BLD AUTO: 184 THOUSANDS/UL (ref 149–390)
PMV BLD AUTO: 11.1 FL (ref 8.9–12.7)
POTASSIUM SERPL-SCNC: 4.2 MMOL/L (ref 3.5–5.3)
RBC # BLD AUTO: 2.32 MILLION/UL (ref 3.81–5.12)
SODIUM SERPL-SCNC: 138 MMOL/L (ref 135–147)
WBC # BLD AUTO: 12.67 THOUSAND/UL (ref 4.31–10.16)

## 2025-06-19 PROCEDURE — 99232 SBSQ HOSP IP/OBS MODERATE 35: CPT | Performed by: INTERNAL MEDICINE

## 2025-06-19 PROCEDURE — 99239 HOSP IP/OBS DSCHRG MGMT >30: CPT | Performed by: NURSE PRACTITIONER

## 2025-06-19 PROCEDURE — 80048 BASIC METABOLIC PNL TOTAL CA: CPT | Performed by: NURSE PRACTITIONER

## 2025-06-19 PROCEDURE — 85027 COMPLETE CBC AUTOMATED: CPT | Performed by: NURSE PRACTITIONER

## 2025-06-19 RX ORDER — METRONIDAZOLE 500 MG/1
500 TABLET ORAL EVERY 12 HOURS SCHEDULED
Qty: 14 TABLET | Refills: 0 | Status: SHIPPED | OUTPATIENT
Start: 2025-06-19 | End: 2025-06-26

## 2025-06-19 RX ORDER — CIPROFLOXACIN 500 MG/1
500 TABLET, FILM COATED ORAL EVERY 24 HOURS
Status: DISCONTINUED | OUTPATIENT
Start: 2025-06-19 | End: 2025-06-19 | Stop reason: HOSPADM

## 2025-06-19 RX ORDER — METRONIDAZOLE 500 MG/1
500 TABLET ORAL EVERY 12 HOURS SCHEDULED
Status: DISCONTINUED | OUTPATIENT
Start: 2025-06-19 | End: 2025-06-19 | Stop reason: HOSPADM

## 2025-06-19 RX ORDER — CIPROFLOXACIN 500 MG/1
500 TABLET, FILM COATED ORAL EVERY 24 HOURS
Qty: 7 TABLET | Refills: 0 | Status: SHIPPED | OUTPATIENT
Start: 2025-06-19 | End: 2025-06-26

## 2025-06-19 RX ADMIN — HEPARIN SODIUM 5000 UNITS: 5000 INJECTION INTRAVENOUS; SUBCUTANEOUS at 06:08

## 2025-06-19 RX ADMIN — PIPERACILLIN AND TAZOBACTAM 4.5 G: 4; .5 INJECTION, POWDER, FOR SOLUTION INTRAVENOUS at 08:06

## 2025-06-19 RX ADMIN — CIPROFLOXACIN 500 MG: 500 TABLET ORAL at 12:23

## 2025-06-19 RX ADMIN — GENTAMICIN SULFATE: 1 CREAM TOPICAL at 08:16

## 2025-06-19 RX ADMIN — METRONIDAZOLE 500 MG: 500 TABLET ORAL at 12:23

## 2025-06-19 NOTE — NURSING NOTE
IV removed, AVS discussed with patient. Patient did request work note but left before I could give it to her. Discharged home, daughter picked up patient.

## 2025-06-19 NOTE — PROGRESS NOTES
Reviewed AVS remotely with permission of the Pt after verifying name/.  Reviewed the importance of calling to schedule PCP appointment within the next 7 days, also the importance of explaining that the Pt had recently been discharged from the hospital, to prioritize scheduling.  Reviewed new and old medications, including SE's.  Voiced understanding. Bedside nurse notified of AVS review completion and that the Pt requested additional guidance regarding her phoslo medication, which needs clarified with the MD.

## 2025-06-19 NOTE — ASSESSMENT & PLAN NOTE
Met sepsis criteria upon presentation with , WBC 24.64, colitis vs diverticulitis on CT  Lactic acid 1.2  Blood cultures - no growth at 48 hours  Clinically improved - afebrile, improved leukocytosis   Please refer to treatment outlined under acute LLQ pain

## 2025-06-19 NOTE — INCIDENTAL FINDINGS
The following findings require follow up:  Radiographic finding   Findin. 1.6 cm lateral interpolar left renal cyst again identified, not appreciably changed from prior studies dating back to 7/3/2023. No internal flow. Somewhat high attenuation appearance on recent CT suggests complex hemorrhagic/proteinaceous cyst.    Follow up required: interval US for surveillance    Follow up should be done within 1 year    Please notify the following clinician to assist with the follow up:   Dr. Cirilo Fowler/PCP    Incidental finding results were discussed with the Patient by ADILENE Thornton on 25.   They expressed understanding and all questions answered.

## 2025-06-19 NOTE — ASSESSMENT & PLAN NOTE
Patient significantly improved, was eating breakfast this morning when I evaluated her.  Continue with current treatment according to hospitalist recommendations.

## 2025-06-19 NOTE — ASSESSMENT & PLAN NOTE
Presented for left-sided abdominal pain associated with nausea and diarrhea x 1 day   CT A/P 6/16: Moderate segment diffuse bowel wall thickening and mild pericolonic fat stranding of the descending through proximal to mid sigmoid colon, could be on the basis of either a diverticulitis or a colitis  Received 1 L of normal saline in the ER  C.diff- negative, stool culture- negative   GI input appreciated   Received zosyn while admitted. Will transition to ciprofloxacin and flagyl for 7 more day upon discharge   Follow-up with GI in 6-8 weeks for repeat colonoscopy

## 2025-06-19 NOTE — ASSESSMENT & PLAN NOTE
Home regimen: lisinopril 10 mg PO daily and terazosin 2 mg PO HS   BP levels trended low while inpatient  Recommended to hold home medications until PCP follow-up for further evaluation and management.

## 2025-06-19 NOTE — PLAN OF CARE
Problem: PAIN - ADULT  Goal: Verbalizes/displays adequate comfort level or baseline comfort level  Description: Interventions:  - Encourage patient to monitor pain and request assistance  - Assess pain using appropriate pain scale  - Administer analgesics as ordered based on type and severity of pain and evaluate response  - Implement non-pharmacological measures as appropriate and evaluate response  - Consider cultural and social influences on pain and pain management  - Notify physician/advanced practitioner if interventions unsuccessful or patient reports new pain  - Educate patient/family on pain management process including their role and importance of  reporting pain   - Provide non-pharmacologic/complimentary pain relief interventions  Outcome: Progressing     Problem: INFECTION - ADULT  Goal: Absence or prevention of progression during hospitalization  Description: INTERVENTIONS:  - Assess and monitor for signs and symptoms of infection  - Monitor lab/diagnostic results  - Monitor all insertion sites, i.e. indwelling lines, tubes, and drains  - Monitor endotracheal if appropriate and nasal secretions for changes in amount and color  - [place] appropriate cooling/warming therapies per order  - Administer medications as ordered  - Instruct and encourage patient and family to use good hand hygiene technique  - Identify and instruct in appropriate isolation precautions for identified infection/condition  Outcome: Progressing  Goal: Absence of fever/infection during neutropenic period  Description: INTERVENTIONS:  - Monitor WBC  - Perform strict hand hygiene  - Limit to healthy visitors only  - No plants, dried, fresh or silk flowers with hairston in patient room  Outcome: Progressing     Problem: SAFETY ADULT  Goal: Patient will remain free of falls  Description: INTERVENTIONS:  - Educate patient/family on patient safety including physical limitations  - Instruct patient to call for assistance with activity   -  Consider consulting OT/PT to assist with strengthening/mobility based on AM PAC & JH-HLM score  - Consult OT/PT to assist with strengthening/mobility   - Keep Call bell within reach  - Keep bed low and locked with side rails adjusted as appropriate  - Keep care items and personal belongings within reach  - Initiate and maintain comfort rounds  - Make Fall Risk Sign visible to staff  - Offer Toileting every 2 Hours, in advance of need  - Initiate/Maintain bed alarm  - Obtain necessary fall risk management equipment: yellow socks  - Apply yellow socks and bracelet for high fall risk patients  - Consider moving patient to room near nurses station  Outcome: Progressing  Goal: Maintain or return to baseline ADL function  Description: INTERVENTIONS:  -  Assess patient's ability to carry out ADLs; assess patient's baseline for ADL function and identify physical deficits which impact ability to perform ADLs (bathing, care of mouth/teeth, toileting, grooming, dressing, etc.)  - Assess/evaluate cause of self-care deficits   - Assess range of motion  - Assess patient's mobility; develop plan if impaired  - Assess patient's need for assistive devices and provide as appropriate  - Encourage maximum independence but intervene and supervise when necessary  - Involve family in performance of ADLs  - Assess for home care needs following discharge   - Consider OT consult to assist with ADL evaluation and planning for discharge  - Provide patient education as appropriate  - Monitor functional capacity and physical performance, use of AM PAC & JH-HLM   - Monitor gait, balance and fatigue with ambulation    Outcome: Progressing  Goal: Maintains/Returns to pre admission functional level  Description: INTERVENTIONS:  - Perform AM-PAC 6 Click Basic Mobility/ Daily Activity assessment daily.  - Set and communicate daily mobility goal to care team and patient/family/caregiver.   - Collaborate with rehabilitation services on mobility goals if  consulted  - Perform Range of Motion 3 times a day.  - Reposition patient every 2 hours.  - Dangle patient 3 times a day  - Stand patient 3 times a day  - Ambulate patient 3 times a day  - Out of bed to chair 3 times a day   - Out of bed for meals 3 times a day  - Out of bed for toileting  - Record patient progress and toleration of activity level   Outcome: Progressing     Problem: DISCHARGE PLANNING  Goal: Discharge to home or other facility with appropriate resources  Description: INTERVENTIONS:  - Identify barriers to discharge w/patient and caregiver  - Arrange for needed discharge resources and transportation as appropriate  - Identify discharge learning needs (meds, wound care, etc.)  - Arrange for interpretive services to assist at discharge as needed  - Refer to Case Management Department for coordinating discharge planning if the patient needs post-hospital services based on physician/advanced practitioner order or complex needs related to functional status, cognitive ability, or social support system  Outcome: Progressing     Problem: Knowledge Deficit  Goal: Patient/family/caregiver demonstrates understanding of disease process, treatment plan, medications, and discharge instructions  Description: Complete learning assessment and assess knowledge base.  Interventions:  - Provide teaching at level of understanding  - Provide teaching via preferred learning methods  Outcome: Progressing     Problem: Nutrition/Hydration-ADULT  Goal: Nutrient/Hydration intake appropriate for improving, restoring or maintaining nutritional needs  Description: Monitor and assess patient's nutrition/hydration status for malnutrition. Collaborate with interdisciplinary team and initiate plan and interventions as ordered.  Monitor patient's weight and dietary intake as ordered or per policy. Utilize nutrition screening tool and intervene as necessary. Determine patient's food preferences and provide high-protein, high-caloric  foods as appropriate.     INTERVENTIONS:  - Monitor oral intake, urinary output, labs, and treatment plans  - Assess nutrition and hydration status and recommend course of action  - Evaluate amount of meals eaten  - Assist patient with eating if necessary   - Allow adequate time for meals  - Recommend/ encourage appropriate diets, oral nutritional supplements, and vitamin/mineral supplements  - Order, calculate, and assess calorie counts as needed  - Recommend, monitor, and adjust tube feedings and TPN/PPN based on assessed needs  - Assess need for intravenous fluids  - Provide specific nutrition/hydration education as appropriate  - Include patient/family/caregiver in decisions related to nutrition  Outcome: Progressing     Problem: CARDIOVASCULAR - ADULT  Goal: Maintains optimal cardiac output and hemodynamic stability  Description: INTERVENTIONS:  - Monitor I/O, vital signs and rhythm  - Monitor for S/S and trends of decreased cardiac output  - Administer and titrate ordered vasoactive medications to optimize hemodynamic stability  - Assess quality of pulses, skin color and temperature  - Assess for signs of decreased coronary artery perfusion  - Instruct patient to report change in severity of symptoms  Outcome: Progressing  Goal: Absence of cardiac dysrhythmias or at baseline rhythm  Description: INTERVENTIONS:  - Continuous cardiac monitoring, vital signs, obtain 12 lead EKG if ordered  - Administer antiarrhythmic and heart rate control medications as ordered  - Monitor electrolytes and administer replacement therapy as ordered  Outcome: Progressing     Problem: GASTROINTESTINAL - ADULT  Goal: Minimal or absence of nausea and/or vomiting  Description: INTERVENTIONS:  - Administer IV fluids if ordered to ensure adequate hydration  - Maintain NPO status until nausea and vomiting are resolved  - Nasogastric tube if ordered  - Administer ordered antiemetic medications as needed  - Provide nonpharmacologic comfort  measures as appropriate  - Advance diet as tolerated, if ordered  - Consider nutrition services referral to assist patient with adequate nutrition and appropriate food choices  Outcome: Progressing  Goal: Maintains or returns to baseline bowel function  Description: INTERVENTIONS:  - Assess bowel function  - Encourage oral fluids to ensure adequate hydration  - Administer IV fluids if ordered to ensure adequate hydration  - Administer ordered medications as needed  - Encourage mobilization and activity  - Consider nutritional services referral to assist patient with adequate nutrition and appropriate food choices  Outcome: Progressing  Goal: Maintains adequate nutritional intake  Description: INTERVENTIONS:  - Monitor percentage of each meal consumed  - Identify factors contributing to decreased intake, treat as appropriate  - Assist with meals as needed  - Monitor I&O, weight, and lab values if indicated  - Obtain nutrition services referral as needed  Outcome: Progressing  Goal: Establish and maintain optimal ostomy function  Description: INTERVENTIONS:  - Assess bowel function  - Encourage oral fluids to ensure adequate hydration  - Administer IV fluids if ordered to ensure adequate hydration   - Administer ordered medications as needed  - Encourage mobilization and activity  - Nutrition services referral to assist patient with appropriate food choices  - Assess stoma site  - Consider wound care consult   Outcome: Progressing  Goal: Oral mucous membranes remain intact  Description: INTERVENTIONS  - Assess oral mucosa and hygiene practices  - Implement preventative oral hygiene regimen  - Implement oral medicated treatments as ordered  - Initiate Nutrition services referral as needed  Outcome: Progressing     Problem: GENITOURINARY - ADULT  Goal: Maintains or returns to baseline urinary function  Description: INTERVENTIONS:  - Assess urinary function  - Encourage oral fluids to ensure adequate hydration if  ordered  - Administer IV fluids as ordered to ensure adequate hydration  - Administer ordered medications as needed  - Offer frequent toileting  - Follow urinary retention protocol if ordered  Outcome: Progressing  Goal: Absence of urinary retention  Description: INTERVENTIONS:  - Assess patient’s ability to void and empty bladder  - Monitor I/O  - Bladder scan as needed  - Discuss with physician/AP medications to alleviate retention as needed  - Discuss catheterization for long term situations as appropriate  Outcome: Progressing  Goal: Urinary catheter remains patent  Description: INTERVENTIONS:  - Assess patency of urinary catheter  - If patient has a chronic spicer, consider changing catheter if non-functioning  - Follow guidelines for intermittent irrigation of non-functioning urinary catheter  Outcome: Progressing     Problem: METABOLIC, FLUID AND ELECTROLYTES - ADULT  Goal: Electrolytes maintained within normal limits  Description: INTERVENTIONS:  - Monitor labs and assess patient for signs and symptoms of electrolyte imbalances  - Administer electrolyte replacement as ordered  - Monitor response to electrolyte replacements, including repeat lab results as appropriate  - Instruct patient on fluid and nutrition as appropriate  Outcome: Progressing  Goal: Fluid balance maintained  Description: INTERVENTIONS:  - Monitor labs   - Monitor I/O and WT  - Instruct patient on fluid and nutrition as appropriate  - Assess for signs & symptoms of volume excess or deficit  Outcome: Progressing  Goal: Glucose maintained within target range  Description: INTERVENTIONS:  - Monitor Blood Glucose as ordered  - Assess for signs and symptoms of hyperglycemia and hypoglycemia  - Administer ordered medications to maintain glucose within target range  - Assess nutritional intake and initiate nutrition service referral as needed  Outcome: Progressing

## 2025-06-19 NOTE — DISCHARGE SUMMARY
Discharge Summary - Hospitalist   Name: Saige Haji 55 y.o. female I MRN: [id number]  Unit/Bed#: -01 I Date of Admission: 6/16/2025   Date of Service: 6/19/2025 I Hospital Day: 3     Assessment & Plan  Acute left lower quadrant pain  Presented for left-sided abdominal pain associated with nausea and diarrhea x 1 day   CT A/P 6/16: Moderate segment diffuse bowel wall thickening and mild pericolonic fat stranding of the descending through proximal to mid sigmoid colon, could be on the basis of either a diverticulitis or a colitis  Received 1 L of normal saline in the ER  C.diff- negative, stool culture- negative   GI input appreciated   Received zosyn while admitted. Will transition to ciprofloxacin and flagyl for 7 more day upon discharge   Follow-up with GI in 6-8 weeks for repeat colonoscopy  Sepsis (HCC)  Met sepsis criteria upon presentation with , WBC 24.64, colitis vs diverticulitis on CT  Lactic acid 1.2  Blood cultures - no growth at 48 hours  Clinically improved - afebrile, improved leukocytosis   Please refer to treatment outlined under acute LLQ pain     ESRD (end stage renal disease) (HCC)  Lab Results   Component Value Date    EGFR 6 06/19/2025    EGFR 7 06/18/2025    EGFR 7 06/17/2025    CREATININE 6.47 (H) 06/19/2025    CREATININE 6.18 (H) 06/18/2025    CREATININE 5.77 (H) 06/17/2025     On PD 5 days/week- off on Wednesday and Sunday   Baseline Cr 3.9-5.7 mg/dL    Continue outpatient peritoneal dialysis as scheduled  Continue with topical gentamicin for port care  Continue follow-up with outpatient nephrology    Essential hypertension  Home regimen: lisinopril 10 mg PO daily and terazosin 2 mg PO HS   BP levels trended low while inpatient  Recommended to hold home medications until PCP follow-up for further evaluation and management.   Mixed hyperlipidemia  Home regimen: Crestor 10 mg PO daily   Continue Crestor upon discharge.     Vitamin D deficiency  Patient takes oral Vit D supplement  at home  Continue supplementation upon discharge       Medical Problems       Resolved Problems  Date Reviewed: 5/9/2025   None       Discharging Physician / Practitioner: Jennifer Christianson  PCP: Cirilo Fowler DO  Admission Date:   Admission Orders (From admission, onward)       Ordered        06/16/25 1222  INPATIENT ADMISSION  Once                          Discharge Date: 06/19/25    Next Steps for Physician/AP Assuming Care:  She will need to repeat a CBC in a week to evaluate WBC noted while in the hospital   Continue ciprofloxacin and flagyl for 7 more days upon discharge     Test Results Pending at Discharge (will require follow up):  None.     Medication Changes for Discharge & Rationale:   Ciprofloxacin and metronidazole  See after visit summary for reconciled discharge medications provided to patient and/or family.     Consultations During Hospital Stay:  Nephrology, GI    Procedures Performed:   None     Significant Findings / Test Results:   CT abdomen/pelvis (6/16): diffuse bowel wall thickening and mild pericolonic fat stranding of the descending through proximal to mid sigmoid colon, on the basis of diverticulitis or colitis    Incidental Findings:   Renal ultrasound (6/16): 1.6 cm lateral interpolar left renal cyst again identified, not appreciably changed from prior studies dating back to 7/3/2023.   I reviewed the above mentioned incidental findings with the patient and/or family and they expressed understanding.  Patient follow-up with outpatient nephrology for further monitoring.    Hospital Course:   Saige Haji is a 55 y.o. female patient who originally presented to the hospital on 6/16/2025 due to left-sided abdominal pain x 1 day. She was experiencing associated diarrhea and nausea, and was able to tolerate minimal food and fluids. A CT of abdomen/pelvis upon arrival revealed bowel wall thickening and fat stranding suggestive of colitis vs. diverticulitis. At presentation, patient met sepsis  "criteria with leukocytosis, tachypnea, and identified source of infection. She was started on IV Zosyn, placed on clear liquid diet, and GI was consulted. Throughout her admission, diet was advanced, leukocytosis trended down, and symptomatic improvement was noted. Stool cultures and C. Diff were negative. Nephrology followed throughout for management of her ESRD and continuation of her peritoneal dialysis regimen, which was completed nightly as scheduled. She has ultimately been discharged home in stable condition and advised to continue PO antibiotics for course completion. Follow-up advised with GI in 6-8 weeks for repeat colonoscopy. She will need to continue with nephrology follow-up and outpatient dialysis as scheduled. Prior to discharge, all questions were answered and patient expressed understanding of the plan. This serves as a brief discharge summary. See prior documentation for full details.     Please see above list of diagnoses and related plan for additional information.     Discharge Day Visit / Exam:   Subjective: The patient was seen and examined.  She states that she is feeling much better and is looking forward to going home.  She is able to tolerate diet.  She continues to have some mild abdominal discomfort but that has significantly improved.  Additionally her diarrhea has improved as well.  Vitals: Blood Pressure: 92/54 (06/19/25 0751)  Pulse: 76 (06/19/25 0751)  Temperature: 97.8 °F (36.6 °C) (06/19/25 0751)  Temp Source: Oral (06/19/25 0751)  Respirations: 18 (06/19/25 0751)  Height: 5' 6\" (167.6 cm) (06/16/25 2016)  Weight - Scale: 105 kg (231 lb 14.8 oz) (06/19/25 0541)  SpO2: 99 % (06/19/25 0751)  Physical Exam  Vitals and nursing note reviewed.   Constitutional:       General: She is not in acute distress.     Appearance: Normal appearance. She is well-developed. She is not ill-appearing.   HENT:      Head: Normocephalic and atraumatic.      Right Ear: External ear normal.      Left " Ear: External ear normal.      Nose: Nose normal. No rhinorrhea.      Mouth/Throat:      Mouth: Mucous membranes are moist.      Pharynx: Oropharynx is clear.     Eyes:      General:         Right eye: No discharge.         Left eye: No discharge.      Conjunctiva/sclera: Conjunctivae normal.      Pupils: Pupils are equal, round, and reactive to light.       Cardiovascular:      Rate and Rhythm: Normal rate and regular rhythm.      Pulses: Normal pulses.      Heart sounds: Normal heart sounds. No murmur heard.  Pulmonary:      Effort: Pulmonary effort is normal. No respiratory distress.      Breath sounds: Normal breath sounds. No wheezing, rhonchi or rales.   Abdominal:      General: Bowel sounds are normal. There is no distension.      Palpations: Abdomen is soft. There is no mass.      Tenderness: There is no abdominal tenderness. There is no guarding.     Musculoskeletal:         General: No swelling or tenderness. Normal range of motion.      Cervical back: Normal range of motion and neck supple. No muscular tenderness.      Right lower leg: No edema.      Left lower leg: No edema.     Skin:     General: Skin is warm and dry.      Capillary Refill: Capillary refill takes less than 2 seconds.      Findings: No erythema or rash.     Neurological:      General: No focal deficit present.      Mental Status: She is alert and oriented to person, place, and time. Mental status is at baseline.     Psychiatric:         Mood and Affect: Mood normal.         Behavior: Behavior normal.         Thought Content: Thought content normal.         Judgment: Judgment normal.          Discharge instructions/Information to patient and family:   See after visit summary for information provided to patient and family.      Provisions for Follow-Up Care:  See after visit summary for information related to follow-up care and any pertinent home health orders.      Mobility at time of Discharge:   Basic Mobility Inpatient Raw Score:  24  JH-HLM Goal: 8: Walk 250 feet or more  JH-HLM Achieved: 7: Walk 25 feet or more  HLM Goal achieved. Continue to encourage appropriate mobility.     Disposition:   Home    Planned Readmission: no     Administrative Statements   Discharge Statement:  I have spent a total time of 42 minutes in caring for this patient on the day of the visit/encounter. >30 minutes of time was spent on: Diagnostic results, Prognosis, Risks and benefits of tx options, Instructions for management, Patient and family education, Importance of tx compliance, Risk factor reductions, Impressions, Counseling / Coordination of care, Documenting in the medical record, Reviewing / ordering tests, medicine, procedures  , and Communicating with other healthcare professionals .    **Please Note: This note may have been constructed using a voice recognition system**

## 2025-06-19 NOTE — PLAN OF CARE
Problem: INFECTION - ADULT  Goal: Absence or prevention of progression during hospitalization  Description: INTERVENTIONS:  - Assess and monitor for signs and symptoms of infection  - Monitor lab/diagnostic results  - Monitor all insertion sites, i.e. indwelling lines, tubes, and drains  - Monitor endotracheal if appropriate and nasal secretions for changes in amount and color  - [place] appropriate cooling/warming therapies per order  - Administer medications as ordered  - Instruct and encourage patient and family to use good hand hygiene technique  - Identify and instruct in appropriate isolation precautions for identified infection/condition  Outcome: Progressing     Problem: GASTROINTESTINAL - ADULT  Goal: Minimal or absence of nausea and/or vomiting  Description: INTERVENTIONS:  - Administer IV fluids if ordered to ensure adequate hydration  - Maintain NPO status until nausea and vomiting are resolved  - Nasogastric tube if ordered  - Administer ordered antiemetic medications as needed  - Provide nonpharmacologic comfort measures as appropriate  - Advance diet as tolerated, if ordered  - Consider nutrition services referral to assist patient with adequate nutrition and appropriate food choices  Outcome: Progressing     Problem: GASTROINTESTINAL - ADULT  Goal: Maintains adequate nutritional intake  Description: INTERVENTIONS:  - Monitor percentage of each meal consumed  - Identify factors contributing to decreased intake, treat as appropriate  - Assist with meals as needed  - Monitor I&O, weight, and lab values if indicated  - Obtain nutrition services referral as needed  Outcome: Progressing

## 2025-06-19 NOTE — QUICK NOTE
Discussed care with pharmacy and RN, patient undergoes PD 5x weekly and does not perform  PD on Wednesday.She allowed to perform incremental PD as outpatient due to significant reserve and work schedule.  Per patient RN, there was no plan to perform PD Rx this evening. Rx may need adjustment as inpatient to help with clearances in setting of infection.  As patient is under impression PD will not be done this evening, will hold therapy without urgent metabolic indication. TCO2 18 noted.  I ordered an additional exchange while inpatient for 4 exchanges for tomorrow.

## 2025-06-19 NOTE — PROGRESS NOTES
Progress Note - Nephrology   Name: Saige Haji 55 y.o. female I MRN: [id number]  Unit/Bed#: -01 I Date of Admission: 6/16/2025   Date of Service: 6/19/2025 I Hospital Day: 3     Assessment & Plan  ESRD (end stage renal disease) ([facility])  Lab Results   Component Value Date    EGFR 6 06/19/2025    EGFR 7 06/18/2025    EGFR 7 06/17/2025    CREATININE 6.47 (H) 06/19/2025    CREATININE 6.18 (H) 06/18/2025    CREATININE 5.77 (H) 06/17/2025   Continue with CCPD tonight, patient does not do PD on Wednesdays, which was last night.  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)  Lab Results   Component Value Date    EGFR 6 06/19/2025    EGFR 7 06/18/2025    EGFR 7 06/17/2025    CREATININE 6.47 (H) 06/19/2025    CREATININE 6.18 (H) 06/18/2025    CREATININE 5.77 (H) 06/17/2025   Patient remains off of phosphorus binders, follow phosphorus levels from time to time.  Secondary hyperparathyroidism of renal origin ([facility])  No active vitamin D agent at this time, follow PTH in the outpatient setting.  Kidney cyst, acquired  Will need to have follow-up imaging, proteinaceous/hemorrhagic cyst noted.  Essential hypertension  Continue with current blood pressure medications,  Acute left lower quadrant pain  Patient significantly improved, was eating breakfast this morning when I evaluated her.  Continue with current treatment according to hospitalist recommendations.  Sepsis ([facility])  Patient longer septic, continue with antibiotic care and management according to hospitalist.    Case discussed with hospitalist.  Patient doing well from the renal standpoint, continue CCPD.  No signs or symptoms consistent with peritonitis at this time.    Follow up reason for today's visit:     Acute left lower quadrant pain    Problem List[1]      Subjective:   Patient feeling significantly improved, eating breakfast with no abdominal pain, although her appetite is not back up to  "usual level, she is feeling improved.    Objective:     Vitals: Blood pressure 92/54, pulse 76, temperature 97.8 °F (36.6 °C), temperature source Oral, resp. rate 18, height 5' 6\" (1.676 m), weight 105 kg (231 lb 14.8 oz), SpO2 99%.,Body mass index is 37.43 kg/m².    Weight (last 2 days)       Date/Time Weight    06/19/25 0541 105 (231.92)    06/17/25 0800 106 (232.81)              Intake/Output Summary (Last 24 hours) at 6/19/2025 1310  Last data filed at 6/19/2025 0901  Gross per 24 hour   Intake 490 ml   Output 150 ml   Net 340 ml     I/O last 3 completed shifts:  In: 820 [P.O.:820]  Out: 25 [Urine:25]         Physical Exam: BP 92/54 (BP Location: Right arm)   Pulse 76   Temp 97.8 °F (36.6 °C) (Oral)   Resp 18   Ht 5' 6\" (1.676 m)   Wt 105 kg (231 lb 14.8 oz)   LMP  (LMP Unknown) Comment: menopause - one year ago  SpO2 99%   BMI 37.43 kg/m²     General Appearance:    Alert, cooperative, no distress, appears stated age   Head:    Normocephalic, without obvious abnormality, atraumatic   Eyes:    Conjunctiva/corneas clear   Ears:    Normal external ears   Nose:   Nares normal, septum midline, mucosa normal, no drainage    or sinus tenderness   Throat:   Lips, mucosa, and tongue normal; teeth and gums normal   Neck:   Supple, symmetrical, trachea midline, no adenopathy;        thyroid:  No enlargement/tenderness/nodules; no carotid    bruit or JVD   Back:     Symmetric, no curvature, ROM normal, no CVA tenderness   Lungs:     Clear to auscultation bilaterally, respirations unlabored   Chest wall:    No tenderness or deformity   Heart:    Regular rate and rhythm, S1 and S2 normal, no murmur, rub   or gallop   Abdomen:     Soft, non-tender, bowel sounds active   Extremities:   Extremities normal, atraumatic, no cyanosis or edema   Skin:   Skin color, texture, turgor normal, no rashes or lesions   Lymph nodes:   Cervical normal   Neurologic:   CNII-XII intact            Lab, Imaging and other studies: I have " "personally reviewed pertinent labs.  CBC:   Lab Results   Component Value Date    WBC 12.67 (H) 06/19/2025    HGB 7.3 (L) 06/19/2025    HCT 22.1 (L) 06/19/2025    MCV 95 06/19/2025     06/19/2025    RBC 2.32 (L) 06/19/2025    MCH 31.5 06/19/2025    MCHC 33.0 06/19/2025    RDW 13.1 06/19/2025    MPV 11.1 06/19/2025     CMP:   Lab Results   Component Value Date    K 4.2 06/19/2025     06/19/2025    CO2 19 (L) 06/19/2025    BUN 62 (H) 06/19/2025    CREATININE 6.47 (H) 06/19/2025    CALCIUM 8.2 (L) 06/19/2025    EGFR 6 06/19/2025       .  Results from last 7 days   Lab Units 06/19/25  0539 06/18/25  0534 06/17/25  0451 06/16/25  1135   POTASSIUM mmol/L 4.2 4.2 4.3 4.4   CHLORIDE mmol/L 108 108 109* 107   CO2 mmol/L 19* 18* 19* 18*   BUN mg/dL 62* 63* 64* 69*   CREATININE mg/dL 6.47* 6.18* 5.77* 5.96*   CALCIUM mg/dL 8.2* 8.4 8.6 9.4   ALK PHOS U/L  --   --   --  45   ALT U/L  --   --   --  12   AST U/L  --   --   --  11*         Phosphorus: No results found for: \"PHOS\"  Magnesium: No results found for: \"MG\"  Urinalysis: No results found for: \"COLORU\", \"CLARITYU\", \"SPECGRAV\", \"PHUR\", \"LEUKOCYTESUR\", \"NITRITE\", \"PROTEINUA\", \"GLUCOSEU\", \"KETONESU\", \"BILIRUBINUR\", \"BLOODU\"  Ionized Calcium: No results found for: \"CAION\"  Coagulation: No results found for: \"PT\", \"INR\", \"APTT\"  Troponin: No results found for: \"TROPONINI\"  ABG: No results found for: \"PHART\", \"ZXE1NUT\", \"PO2ART\", \"BOC9WIC\", \"N6BPGQSL\", \"BEART\", \"SOURCE\"  Radiology review:     IMAGING  Procedure: US kidney and bladder  Result Date: 6/17/2025  Narrative: RENAL ULTRASOUND INDICATION: Left kidney hyperdense lesion. COMPARISON: Renal ultrasound 7/3/2023; CT guided renal biopsy 12/12/2023 TECHNIQUE: Ultrasound of the retroperitoneum was performed with a curvilinear transducer utilizing volumetric sweeps and still imaging techniques. FINDINGS: Technically limited study due to patient body habitus and positioning. KIDNEYS: Symmetric and normal size. Right " kidney: 9.1 x 4.3 x 4.6 cm. Volume 95.1 mL Left kidney: 9.0 x 6.2 x 5.4 cm. Volume 157.0 mL Right kidney Normal echogenicity and contour. No mass is identified. No hydronephrosis. No shadowing calculi. No perinephric fluid collections. Left kidney Normal echogenicity and contour. 1.5 x 1.5 x 1.6 cm lateral interpolar cyst, questionably with fine low-level internal echoes. No internal flow noted. This cyst is noted on prior ultrasound 7/3/2023 and prior CT 12/12/2023, appearing roughly similar in size. High attenuation appearance on recent CT 6/16/2025 suggests that this represents a complex hemorrhagic and/or proteinaceous cyst. No hydronephrosis. No shadowing calculi. No perinephric fluid collections. URETERS: Nonvisualized. BLADDER: Normally distended. No focal thickening or mass lesions. Bilateral ureteral jets detected.     Impression: 1. 1.6 cm lateral interpolar left renal cyst again identified, not appreciably changed from prior studies dating back to 7/3/2023. No internal flow. Somewhat high attenuation appearance on recent CT suggests complex hemorrhagic/proteinaceous cyst. Workstation performed: [id number]         Current Facility-Administered Medications:     aluminum-magnesium hydroxide-simethicone (MAALOX) oral suspension 30 mL, Q4H PRN    ciprofloxacin (CIPRO) tablet 500 mg, Q24H    gentamicin (GARAMYCIN) 0.1 % cream, Daily    heparin (porcine) subcutaneous injection 5,000 Units, Q8H GARY    HYDROmorphone (DILAUDID) injection 0.5 mg, Q4H PRN    HYDROmorphone HCl (DILAUDID) injection 0.2 mg, Q4H PRN    [Held by provider] lisinopril (ZESTRIL) tablet 10 mg, Daily    metroNIDAZOLE (FLAGYL) tablet 500 mg, Q12H GARY    ondansetron (ZOFRAN) injection 4 mg, Q8H PRN    oxyCODONE (ROXICODONE) split tablet 2.5 mg, Q4H PRN    polyethylene glycol (MIRALAX) packet 17 g, Daily PRN    pravastatin (PRAVACHOL) tablet 80 mg, Daily With Dinner    [Held by provider] terazosin (HYTRIN) capsule 2 mg, HS  Medications  Discontinued During This Encounter   Medication Reason    piperacillin-tazobactam (ZOSYN) IVPB 4.5 g     piperacillin-tazobactam (ZOSYN) IVPB (EXTENDED INFUSION) 4.5 g     HYDROmorphone HCl (DILAUDID) injection 0.2 mg     piperacillin-tazobactam (ZOSYN) IVPB (EXTENDED INFUSION) 4.5 g        Yuri Hennessy,       This progress note was produced in part using a dictation device which may document imprecise wording from author's original intent.           [1]   Patient Active Problem List  Diagnosis    Benign paroxysmal positional vertigo    Essential hypertension    Peripheral polyneuropathy    GERD without esophagitis    Mixed hyperlipidemia    Irritable bowel syndrome with diarrhea    Need for assessment for sleep apnea    Chest pain    Leukocytosis    Thoracic region somatic dysfunction    Rheumatoid arthritis involving multiple sites with positive rheumatoid factor ([facility])    Iron deficiency anemia secondary to inadequate dietary iron intake    Acute left-sided low back pain with left-sided sciatica    COVID-19    Shortness of breath    Annual physical exam    Other microscopic hematuria    Proteinuria    Anemia    Hyperfiltration focal segmental glomerulosclerosis    Vitamin D deficiency    Bilateral leg edema    CKD (chronic kidney disease) stage 5, GFR less than 15 ml/min ([facility])    BMI 35.0-35.9,adult    ESRD (end stage renal disease) ([facility])    Anemia of chronic kidney failure, stage 5  ([facility])    Acidosis    Body mass index (BMI) 40.0-44.9, adult ([facility])    Orthostatic hypotension    Acute left lower quadrant pain    Sepsis ([facility])    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

## 2025-06-19 NOTE — ASSESSMENT & PLAN NOTE
Lab Results   Component Value Date    EGFR 6 06/19/2025    EGFR 7 06/18/2025    EGFR 7 06/17/2025    CREATININE 6.47 (H) 06/19/2025    CREATININE 6.18 (H) 06/18/2025    CREATININE 5.77 (H) 06/17/2025   Patient remains off of phosphorus binders, follow phosphorus levels from time to time.

## 2025-06-19 NOTE — DISCHARGE INSTR - AVS FIRST PAGE
Dear Saige Haji,     It was our pleasure to care for you here at [facility].  It is our hope that we were always able to exceed the expected standards for your care during your stay.  You were hospitalized due to abdominal pain and diarrhea suspect to be due to possible colitis versus diverticulitis.  You were cared for on the 3rd floor by ADILENE Thornton with the [facility] who covers for your primary care physician (PCP), Cirilo Fowler DO, while you were hospitalized.  If you have any questions or concerns related to this hospitalization, you may contact us at .  For follow up as well as any medication refills, we recommend that you follow up with your primary care physician.  A registered nurse will reach out to you by phone within a few days after your discharge to answer any additional questions that you may have after going home.  However, at this time we provide for you here, the most important instructions / recommendations at discharge:     Notable Medication Adjustments -   Ciprofloxacin 500 mg daily-antibiotic  Metronidazole 500 mg twice daily-antibiotic  Please hold lisinopril and terazosin until seen by your PCP.  Testing Required after Discharge -   Please discuss with your PCP regarding repeat BMP in 1 week as you WBC/white blood cell count was slightly elevated during the hospital stay  Important follow up information -   Please monitor your blood pressure daily and start a blood pressure diary.  You were noted to have low blood pressure while you are admitted.  Your medication including lisinopril and terazosin are being held upon discharge.  Please do not resume the medication until you discuss with your family physician.  Other Instructions -   Please refer to discharge instructions  Please review this entire after visit summary as additional general instructions including medication list, appointments, activity,  diet, any pertinent wound care, and other additional recommendations from your care team that may be provided for you.      Sincerely,     ADILENE Thornton

## 2025-06-19 NOTE — ASSESSMENT & PLAN NOTE
Lab Results   Component Value Date    EGFR 6 06/19/2025    EGFR 7 06/18/2025    EGFR 7 06/17/2025    CREATININE 6.47 (H) 06/19/2025    CREATININE 6.18 (H) 06/18/2025    CREATININE 5.77 (H) 06/17/2025     On PD 5 days/week- off on Wednesday and Sunday   Baseline Cr 3.9-5.7 mg/dL    Continue outpatient peritoneal dialysis as scheduled  Continue with topical gentamicin for port care  Continue follow-up with outpatient nephrology

## 2025-06-19 NOTE — ASSESSMENT & PLAN NOTE
Lab Results   Component Value Date    EGFR 6 06/19/2025    EGFR 7 06/18/2025    EGFR 7 06/17/2025    CREATININE 6.47 (H) 06/19/2025    CREATININE 6.18 (H) 06/18/2025    CREATININE 5.77 (H) 06/17/2025   Continue with CCPD tonight, patient does not do PD on Wednesdays, which was last night.

## 2025-06-21 LAB
BACTERIA BLD CULT: NORMAL
BACTERIA BLD CULT: NORMAL

## 2025-06-24 ENCOUNTER — TELEMEDICINE (OUTPATIENT)
Dept: FAMILY MEDICINE CLINIC | Facility: CLINIC | Age: 55
End: 2025-06-24
Payer: MEDICARE

## 2025-06-24 DIAGNOSIS — K58.0 IRRITABLE BOWEL SYNDROME WITH DIARRHEA: ICD-10-CM

## 2025-06-24 DIAGNOSIS — N18.5 ANEMIA OF CHRONIC KIDNEY FAILURE, STAGE 5  (HCC): ICD-10-CM

## 2025-06-24 DIAGNOSIS — A41.9 SEPSIS, DUE TO UNSPECIFIED ORGANISM, UNSPECIFIED WHETHER ACUTE ORGAN DYSFUNCTION PRESENT (HCC): Primary | ICD-10-CM

## 2025-06-24 DIAGNOSIS — D63.1 ANEMIA OF CHRONIC KIDNEY FAILURE, STAGE 5  (HCC): ICD-10-CM

## 2025-06-24 DIAGNOSIS — I95.1 ORTHOSTATIC HYPOTENSION: ICD-10-CM

## 2025-06-24 PROCEDURE — 99495 TRANSJ CARE MGMT MOD F2F 14D: CPT | Performed by: FAMILY MEDICINE

## 2025-06-24 NOTE — PROGRESS NOTES
Virtual TCM Visit:Name: Saige Haji      : 1970      MRN: [id number]  Encounter Provider: Cirilo Fowler DO  Encounter Date: 2025   [facility] department[facility]  :  Assessment & Plan  Sepsis, due to unspecified organism, unspecified whether acute organ dysfunction present (HCC)  Patient seen today transition of care management virtually to discuss her after hospital care and she notes that she stopped her antihypertensives during the hospitalization due to hypotension and I discussed this as likely related to the sepsis and her overall illness with diverticulitis her blood pressure was 139/90 last night but then again low this morning at 90/60.  I recommend that she resume the lisinopril for now hold off on [place] in and contact me in 48 hours with blood pressure readings checking blood pressure in the a.m. afternoon and evening over the next 48 hours.  She is overall doing better her diet has improved and she has had no blood in her stool and abdominal pain and cramping has improved as well.  She will contact her gastroenterologist Dr. Elias about this hospitalization         Orthostatic hypotension  Hypertension secondary to sepsis resolving posthospitalization resume lisinopril discussed with patient follow-up blood pressures over the next 2 days and contact me         Irritable bowel syndrome with diarrhea  No diarrhea or blood in stools bowel movements have been more formed now and she will follow-up with me at her next visit and contact me if any changes at this point following a bland diet and advancing her diet slowly after recent diverticulitis and sepsis         Anemia of chronic kidney failure, stage 5  (HCC)    Monitor renal function closely laboratory work will be done as usual patient will continue with good hydration posthospitalization                  History of Present Illness     Transitional Care Management Review:   Saige Haji is a 55 y.o.  female here for TCM follow up.    During the TCM phone call patient stated:  TCM Call (since 6/10/2025)       Date and time call was made  6/19/2025  3:49 PM    Hospital care reviewed  Records reviewed    Patient was hospitialized at  [facility]    Date of Admission  06/16/25    Date of discharge  06/19/25    Diagnosis  Acute left lower quadrant pain    Disposition  Home    Were the patients medications reviewed and updated  Yes    Current Symptoms  Lower abdominal pain  Tender to touch          TCM Call (since 6/10/2025)       Scheduled for follow up?  Yes    Did you obtain your prescribed medications  Yes    Do you need help managing your prescriptions or medications  No    Is transportation to your appointment needed  No    I have advised the patient to call PCP with any new or worsening symptoms  Derek Steele    Living Arrangements  Spouse or Significiant other          Follow-up evaluation transition of care management posthospitalization      Review of Systems   Constitutional:  Negative for chills, fatigue and fever.   HENT:  Negative for congestion, nosebleeds, rhinorrhea, sinus pressure and sore throat.    Eyes:  Negative for discharge and redness.   Respiratory:  Negative for cough and shortness of breath.    Cardiovascular:  Negative for chest pain, palpitations and leg swelling.   Gastrointestinal:  Negative for abdominal pain, blood in stool and nausea.   Endocrine: Negative for cold intolerance, heat intolerance and polyuria.   Genitourinary:  Negative for dysuria and frequency.   Musculoskeletal:  Negative for arthralgias, back pain and myalgias.   Skin:  Negative for rash.   Neurological:  Negative for dizziness, weakness and headaches.   Hematological:  Negative for adenopathy.   Psychiatric/Behavioral:  Negative for behavioral problems and sleep disturbance. The patient is not nervous/anxious.      Objective   LMP  (LMP Unknown) Comment: menopause - one year ago    Physical Exam  Vitals  and nursing note reviewed.   Constitutional:       General: She is not in acute distress.     Appearance: Normal appearance. She is well-developed.   HENT:      Head: Normocephalic and atraumatic.      Right Ear: Tympanic membrane and external ear normal.      Left Ear: Tympanic membrane and external ear normal.      Nose: Nose normal.      Mouth/Throat:      Mouth: Mucous membranes are moist.      Pharynx: Oropharynx is clear. No oropharyngeal exudate.     Eyes:      General: No scleral icterus.        Right eye: No discharge.         Left eye: No discharge.      Conjunctiva/sclera: Conjunctivae normal.      Pupils: Pupils are equal, round, and reactive to light.     Neck:      Thyroid: No thyromegaly.      Vascular: No JVD.     Cardiovascular:      Rate and Rhythm: Normal rate and regular rhythm.      Heart sounds: Normal heart sounds. No murmur heard.  Pulmonary:      Effort: Pulmonary effort is normal.      Breath sounds: No wheezing or rales.   Chest:      Chest wall: No tenderness.   Abdominal:      General: Bowel sounds are normal. There is no distension.      Palpations: Abdomen is soft. There is no mass.      Tenderness: There is no abdominal tenderness.     Musculoskeletal:         General: No tenderness or deformity. Normal range of motion.      Cervical back: Normal range of motion.   Lymphadenopathy:      Cervical: No cervical adenopathy.     Skin:     General: Skin is warm and dry.      Findings: No rash.     Neurological:      General: No focal deficit present.      Mental Status: She is alert and oriented to person, place, and time.      Cranial Nerves: No cranial nerve deficit.      Coordination: Coordination normal.      Deep Tendon Reflexes: Reflexes are normal and symmetric. Reflexes normal.     Psychiatric:         Mood and Affect: Mood normal.         Behavior: Behavior normal.         Thought Content: Thought content normal.         Judgment: Judgment normal.       Medications have been  reviewed by provider in current encounter    Administrative Statements   Encounter provider Cirilo Fowler DO    The Patient is located at Home and in the following state in which I hold an active license [place].    The patient was identified by name and date of birth. Saige Haji was informed that this is a telemedicine visit and that the visit is being conducted through the Epic Embedded platform. She agrees to proceed..  My office door was closed. No one else was in the room.  She acknowledged consent and understanding of privacy and security of the video platform. The patient has agreed to participate and understands they can discontinue the visit at any time.    I have spent a total time of 40 minutes in caring for this patient on the day of the visit/encounter including Diagnostic results, Prognosis, Risks and benefits of tx options, Instructions for management, Patient and family education, Importance of tx compliance, Risk factor reductions, Impressions, Counseling / Coordination of care, Documenting in the medical record, Reviewing/placing orders in the medical record (including tests, medications, and/or procedures), Obtaining or reviewing history  , and Communicating with other healthcare professionals , not including the time spent for establishing the audio/video connection.    Cirilo Fowler,

## 2025-06-24 NOTE — ASSESSMENT & PLAN NOTE
No diarrhea or blood in stools bowel movements have been more formed now and she will follow-up with me at her next visit and contact me if any changes at this point following a bland diet and advancing her diet slowly after recent diverticulitis and sepsis

## 2025-06-24 NOTE — ASSESSMENT & PLAN NOTE
Hypertension secondary to sepsis resolving posthospitalization resume lisinopril discussed with patient follow-up blood pressures over the next 2 days and contact me

## 2025-06-24 NOTE — ASSESSMENT & PLAN NOTE
Patient seen today transition of care management virtually to discuss her after hospital care and she notes that she stopped her antihypertensives during the hospitalization due to hypotension and I discussed this as likely related to the sepsis and her overall illness with diverticulitis her blood pressure was 139/90 last night but then again low this morning at 90/60.  I recommend that she resume the lisinopril for now hold off on [place] in and contact me in 48 hours with blood pressure readings checking blood pressure in the a.m. afternoon and evening over the next 48 hours.  She is overall doing better her diet has improved and she has had no blood in her stool and abdominal pain and cramping has improved as well.  She will contact her gastroenterologist Dr. Elias about this hospitalization

## 2025-06-24 NOTE — ASSESSMENT & PLAN NOTE
Monitor renal function closely laboratory work will be done as usual patient will continue with good hydration posthospitalization

## 2025-06-27 ENCOUNTER — TELEPHONE (OUTPATIENT)
Age: 55
End: 2025-06-27

## 2025-06-27 NOTE — TELEPHONE ENCOUNTER
Patient called with blood pressure readings:    6/27/25- 106/56    6 am    6/26/25- 134/77    9 pm (Patient took a lisinopril)                 111/66    5:45 am    6/25/25-  110/70   9 pm                 107/69   7:15 am    Please advise

## 2025-07-16 PROBLEM — A41.9 SEPSIS (HCC): Status: RESOLVED | Noted: 2025-06-16 | Resolved: 2025-07-16

## 2025-08-19 ENCOUNTER — TELEPHONE (OUTPATIENT)
Age: 55
End: 2025-08-19

## 2025-08-20 ENCOUNTER — DOCUMENTATION (OUTPATIENT)
Dept: OTHER | Facility: HOSPITAL | Age: 55
End: 2025-08-20

## 2025-08-20 DIAGNOSIS — E83.39 HYPERPHOSPHATEMIA: ICD-10-CM

## 2025-08-20 DIAGNOSIS — R53.83 OTHER FATIGUE: Primary | ICD-10-CM

## 2025-08-20 DIAGNOSIS — E53.8 B12 DEFICIENCY: ICD-10-CM

## 2025-08-20 RX ORDER — CALCIUM ACETATE 667 MG/1
667 CAPSULE ORAL
Qty: 90 CAPSULE | Refills: 1 | Status: SHIPPED | OUTPATIENT
Start: 2025-08-20

## (undated) DEVICE — FALLER TUNNELING TROCAR: Brand: ARGYLE

## (undated) DEVICE — ENDOPATH 5 MM GRASPERS WITH RATCHET HANDLES: Brand: ENDOPATH

## (undated) DEVICE — SPONGE DRAIN 4 X 4

## (undated) DEVICE — METZENBAUM ADTEC SINGLE USE DISSECTING SCISSORS, SHAFT ONLY, MONOPOLAR, CURVED TO LEFT, WORKING LENGTH: 12 1/4", (310 MM), DIAM. 5 MM, INSULATED, DOUBLE ACTION, STERILE, DISPOSABLE, PACKAGE OF 10 PIECES: Brand: AESCULAP

## (undated) DEVICE — TROCAR: Brand: KII SLEEVE

## (undated) DEVICE — MICRO HVTSA, 0.5G AND HVTSA SOURCEMARK PRODUCT CODE M1206 AND M1206-01: Brand: EXOFIN MICRO HVTSA, 0.5G

## (undated) DEVICE — CHLORAPREP HI-LITE 26ML ORANGE

## (undated) DEVICE — NEPTUNE E-SEP SMOKE EVACUATION PENCIL, COATED, 70MM BLADE, PUSH BUTTON SWITCH: Brand: NEPTUNE E-SEP

## (undated) DEVICE — TUBING SUCTION 5MM X 12 FT

## (undated) DEVICE — INTENDED FOR TISSUE SEPARATION, AND OTHER PROCEDURES THAT REQUIRE A SHARP SURGICAL BLADE TO PUNCTURE OR CUT.: Brand: BARD-PARKER SAFETY BLADES SIZE 15, STERILE

## (undated) DEVICE — SUT SILK 0 30 IN A306H

## (undated) DEVICE — DRESSING MEPORE FILM ADHESIVE 4 X 5IN

## (undated) DEVICE — ENDOPATH PNEUMONEEDLE INSUFFLATION NEEDLES WITH LUER LOCK CONNECTORS 150MM: Brand: ENDOPATH

## (undated) DEVICE — BASIC SINGLE BASIN 2-LF: Brand: MEDLINE INDUSTRIES, INC.

## (undated) DEVICE — IMPERVIOUS SURGICAL GOWN, LG: Brand: CONVERTORS

## (undated) DEVICE — GAUZE SPONGES,16 PLY: Brand: CURITY

## (undated) DEVICE — CYSTO TUBING SINGLE IRRIGATION

## (undated) DEVICE — 3M™ TEGADERM™ TRANSPARENT FILM DRESSING FRAME STYLE, 1626W, 4 IN X 4-3/4 IN (10 CM X 12 CM), 50/CT 4CT/CASE: Brand: 3M™ TEGADERM™

## (undated) DEVICE — DISPOSABLE OR TOWEL: Brand: CARDINAL HEALTH

## (undated) DEVICE — 5 MM CURVED DISSECTORS WITH MONOPOLAR CAUTERY: Brand: ENDOPATH

## (undated) DEVICE — ANTIBACTERIAL UNDYED BRAIDED (POLYGLACTIN 910), SYNTHETIC ABSORBABLE SUTURE: Brand: COATED VICRYL

## (undated) DEVICE — TROCAR SITE CLOSURE DEVICE: Brand: ENDO CLOSE

## (undated) DEVICE — PACK PBDS LAP CHOLE RF

## (undated) DEVICE — THREE-QUARTER SHEET: Brand: CONVERTORS

## (undated) DEVICE — GLOVE INDICATOR PI UNDERGLOVE SZ 7 BLUE

## (undated) DEVICE — 3M™ STERI-STRIP™ REINFORCED ADHESIVE SKIN CLOSURES, R1546, 1/4 IN X 4 IN (6 MM X 100 MM), 10 STRIPS/ENVELOPE: Brand: 3M™ STERI-STRIP™

## (undated) DEVICE — NEEDLE 25G X 1 1/2

## (undated) DEVICE — SYRINGE 20ML LL

## (undated) DEVICE — ADHESIVE SKN CLSR HISTOACRYL FLEX 0.5ML LF

## (undated) DEVICE — 2, DISPOSABLE SUCTION/IRRIGATOR WITHOUT DISPOSABLE TIP: Brand: STRYKEFLOW

## (undated) DEVICE — MEDI-VAC YANKAUER SUCTION HANDLE W/STRAIGHT TIP & CONTROL VENT: Brand: CARDINAL HEALTH

## (undated) DEVICE — SWABSTCK, BENZOIN TINCTURE, 1/PK, STRL: Brand: APLICARE

## (undated) DEVICE — GLOVE SRG BIOGEL 7

## (undated) DEVICE — SUT VICRYL 0 UR-6 27 IN J603H

## (undated) DEVICE — SUT MONOCRYL 4-0 PS-2 27 IN Y426H

## (undated) DEVICE — GLOVE PI ULTRA TOUCH SZ.7.0

## (undated) DEVICE — BULB SYRINGE,IRRIGATION WITH PROTECTIVE CAP: Brand: DOVER

## (undated) DEVICE — MONTCRIEF CATH TITANIUM ADAPTOR

## (undated) DEVICE — DRAIN SPONGES,6 PLY: Brand: EXCILON

## (undated) DEVICE — TROCARS: Brand: KII® OPTICAL ACCESS SYSTEM

## (undated) DEVICE — GLOVE INDICATOR PI UNDERGLOVE SZ 7.5 BLUE